# Patient Record
Sex: FEMALE | Race: WHITE | NOT HISPANIC OR LATINO | URBAN - METROPOLITAN AREA
[De-identification: names, ages, dates, MRNs, and addresses within clinical notes are randomized per-mention and may not be internally consistent; named-entity substitution may affect disease eponyms.]

---

## 2017-01-10 ENCOUNTER — ON CAMPUS - OUTPATIENT (AMBULATORY)
Dept: URBAN - METROPOLITAN AREA HOSPITAL 2 | Facility: HOSPITAL | Age: 72
End: 2017-01-10

## 2017-01-10 ENCOUNTER — OFFICE (AMBULATORY)
Dept: URBAN - METROPOLITAN AREA CLINIC 64 | Facility: CLINIC | Age: 72
End: 2017-01-10

## 2017-01-10 ENCOUNTER — HOSPITAL ENCOUNTER (OUTPATIENT)
Dept: OTHER | Facility: HOSPITAL | Age: 72
Setting detail: SPECIMEN
Discharge: HOME | End: 2017-01-10
Attending: INTERNAL MEDICINE | Admitting: INTERNAL MEDICINE

## 2017-01-10 VITALS
DIASTOLIC BLOOD PRESSURE: 79 MMHG | SYSTOLIC BLOOD PRESSURE: 155 MMHG | HEART RATE: 51 BPM | HEART RATE: 56 BPM | SYSTOLIC BLOOD PRESSURE: 128 MMHG | TEMPERATURE: 98.9 F | SYSTOLIC BLOOD PRESSURE: 139 MMHG | DIASTOLIC BLOOD PRESSURE: 78 MMHG | SYSTOLIC BLOOD PRESSURE: 113 MMHG | SYSTOLIC BLOOD PRESSURE: 117 MMHG | SYSTOLIC BLOOD PRESSURE: 158 MMHG | OXYGEN SATURATION: 100 % | RESPIRATION RATE: 18 BRPM | DIASTOLIC BLOOD PRESSURE: 76 MMHG | OXYGEN SATURATION: 97 % | OXYGEN SATURATION: 98 % | HEART RATE: 52 BPM | DIASTOLIC BLOOD PRESSURE: 85 MMHG | HEART RATE: 48 BPM | SYSTOLIC BLOOD PRESSURE: 130 MMHG | DIASTOLIC BLOOD PRESSURE: 90 MMHG | DIASTOLIC BLOOD PRESSURE: 89 MMHG | HEIGHT: 63 IN | HEART RATE: 54 BPM | DIASTOLIC BLOOD PRESSURE: 87 MMHG | DIASTOLIC BLOOD PRESSURE: 64 MMHG | HEART RATE: 53 BPM | SYSTOLIC BLOOD PRESSURE: 132 MMHG | DIASTOLIC BLOOD PRESSURE: 75 MMHG | SYSTOLIC BLOOD PRESSURE: 127 MMHG | WEIGHT: 136 LBS

## 2017-01-10 DIAGNOSIS — D12.2 BENIGN NEOPLASM OF ASCENDING COLON: ICD-10-CM

## 2017-01-10 DIAGNOSIS — K57.30 DIVERTICULOSIS OF LARGE INTESTINE WITHOUT PERFORATION OR ABS: ICD-10-CM

## 2017-01-10 DIAGNOSIS — K62.5 HEMORRHAGE OF ANUS AND RECTUM: ICD-10-CM

## 2017-01-10 LAB
GI HISTOLOGY: A. UNSPECIFIED: (no result)
GI HISTOLOGY: PDF REPORT: (no result)

## 2017-01-10 PROCEDURE — 88305 TISSUE EXAM BY PATHOLOGIST: CPT | Mod: 26 | Performed by: INTERNAL MEDICINE

## 2017-01-10 PROCEDURE — 45385 COLONOSCOPY W/LESION REMOVAL: CPT | Performed by: INTERNAL MEDICINE

## 2017-01-10 RX ADMIN — PROPOFOL: 10 INJECTION, EMULSION INTRAVENOUS at 09:24

## 2018-04-09 ENCOUNTER — OFFICE (AMBULATORY)
Dept: URBAN - METROPOLITAN AREA CLINIC 64 | Facility: CLINIC | Age: 73
End: 2018-04-09

## 2018-04-09 VITALS
WEIGHT: 150 LBS | HEIGHT: 63 IN | DIASTOLIC BLOOD PRESSURE: 91 MMHG | HEART RATE: 76 BPM | SYSTOLIC BLOOD PRESSURE: 149 MMHG

## 2018-04-09 DIAGNOSIS — R10.11 RIGHT UPPER QUADRANT PAIN: ICD-10-CM

## 2018-04-09 PROCEDURE — 99214 OFFICE O/P EST MOD 30 MIN: CPT | Performed by: INTERNAL MEDICINE

## 2018-04-09 RX ORDER — DICYCLOMINE HYDROCHLORIDE 20 MG/1
TABLET ORAL
Qty: 60 | Refills: 11 | Status: COMPLETED
End: 2018-04-09

## 2018-04-09 RX ORDER — HYOSCYAMINE SULFATE EXTENDED-RELEASE 0.38 MG/1
0.38 TABLET ORAL
Qty: 30 | Refills: 11 | Status: COMPLETED
Start: 2018-04-09 | End: 2018-06-12

## 2018-04-09 RX ORDER — POLYETHYLENE GLYCOL 3350 17 G/17G
17 POWDER, FOR SOLUTION ORAL
Qty: 1 | Refills: 11 | Status: COMPLETED
End: 2019-08-06

## 2018-06-12 ENCOUNTER — OFFICE (AMBULATORY)
Dept: URBAN - METROPOLITAN AREA CLINIC 64 | Facility: CLINIC | Age: 73
End: 2018-06-12

## 2018-06-12 VITALS
SYSTOLIC BLOOD PRESSURE: 126 MMHG | HEIGHT: 63 IN | WEIGHT: 148 LBS | HEART RATE: 72 BPM | DIASTOLIC BLOOD PRESSURE: 79 MMHG

## 2018-06-12 DIAGNOSIS — K21.9 GASTRO-ESOPHAGEAL REFLUX DISEASE WITHOUT ESOPHAGITIS: ICD-10-CM

## 2018-06-12 DIAGNOSIS — R10.11 RIGHT UPPER QUADRANT PAIN: ICD-10-CM

## 2018-06-12 PROCEDURE — 99213 OFFICE O/P EST LOW 20 MIN: CPT | Performed by: INTERNAL MEDICINE

## 2018-06-12 RX ORDER — HYOSCYAMINE SULFATE EXTENDED-RELEASE 0.38 MG/1
0.38 TABLET ORAL
Qty: 30 | Refills: 11 | Status: COMPLETED
Start: 2018-04-09 | End: 2018-06-12

## 2018-11-08 ENCOUNTER — OFFICE (AMBULATORY)
Dept: URBAN - METROPOLITAN AREA CLINIC 64 | Facility: CLINIC | Age: 73
End: 2018-11-08

## 2018-11-08 VITALS
HEART RATE: 74 BPM | SYSTOLIC BLOOD PRESSURE: 139 MMHG | HEIGHT: 63 IN | DIASTOLIC BLOOD PRESSURE: 91 MMHG | WEIGHT: 147 LBS

## 2018-11-08 DIAGNOSIS — K21.9 GASTRO-ESOPHAGEAL REFLUX DISEASE WITHOUT ESOPHAGITIS: ICD-10-CM

## 2018-11-08 DIAGNOSIS — K58.9 IRRITABLE BOWEL SYNDROME WITHOUT DIARRHEA: ICD-10-CM

## 2018-11-08 PROCEDURE — 99212 OFFICE O/P EST SF 10 MIN: CPT | Performed by: INTERNAL MEDICINE

## 2018-11-08 RX ORDER — RANITIDINE 300 MG/1
300 TABLET ORAL
Qty: 90 | Refills: 3 | Status: COMPLETED
Start: 2018-11-08 | End: 2019-02-06

## 2018-11-08 RX ORDER — DICYCLOMINE HYDROCHLORIDE 20 MG/1
40 TABLET ORAL
Qty: 180 | Refills: 3 | Status: COMPLETED
Start: 2018-11-08 | End: 2021-03-16

## 2018-11-08 RX ORDER — PANTOPRAZOLE SODIUM 40 MG/1
TABLET, DELAYED RELEASE ORAL
Qty: 90 | Refills: 0 | Status: ACTIVE

## 2019-01-07 ENCOUNTER — OFFICE (AMBULATORY)
Dept: URBAN - METROPOLITAN AREA CLINIC 64 | Facility: CLINIC | Age: 74
End: 2019-01-07

## 2019-01-07 VITALS — WEIGHT: 147 LBS | HEIGHT: 63 IN

## 2019-01-07 DIAGNOSIS — D50.0 IRON DEFICIENCY ANEMIA SECONDARY TO BLOOD LOSS (CHRONIC): ICD-10-CM

## 2019-01-07 DIAGNOSIS — R19.7 DIARRHEA, UNSPECIFIED: ICD-10-CM

## 2019-01-07 PROCEDURE — 99213 OFFICE O/P EST LOW 20 MIN: CPT | Performed by: INTERNAL MEDICINE

## 2019-02-06 ENCOUNTER — ON CAMPUS - OUTPATIENT (AMBULATORY)
Dept: URBAN - METROPOLITAN AREA HOSPITAL 2 | Facility: HOSPITAL | Age: 74
End: 2019-02-06

## 2019-02-06 VITALS
DIASTOLIC BLOOD PRESSURE: 77 MMHG | SYSTOLIC BLOOD PRESSURE: 124 MMHG | HEART RATE: 52 BPM | SYSTOLIC BLOOD PRESSURE: 163 MMHG | DIASTOLIC BLOOD PRESSURE: 101 MMHG | RESPIRATION RATE: 18 BRPM | WEIGHT: 136 LBS | DIASTOLIC BLOOD PRESSURE: 95 MMHG | OXYGEN SATURATION: 99 % | HEART RATE: 62 BPM | SYSTOLIC BLOOD PRESSURE: 140 MMHG | DIASTOLIC BLOOD PRESSURE: 89 MMHG | HEART RATE: 65 BPM | HEART RATE: 54 BPM | RESPIRATION RATE: 16 BRPM | TEMPERATURE: 97 F | SYSTOLIC BLOOD PRESSURE: 103 MMHG | OXYGEN SATURATION: 98 % | DIASTOLIC BLOOD PRESSURE: 72 MMHG | RESPIRATION RATE: 17 BRPM | HEART RATE: 61 BPM | OXYGEN SATURATION: 100 % | HEIGHT: 63 IN | DIASTOLIC BLOOD PRESSURE: 56 MMHG | HEART RATE: 56 BPM | SYSTOLIC BLOOD PRESSURE: 123 MMHG

## 2019-02-06 DIAGNOSIS — D50.0 IRON DEFICIENCY ANEMIA SECONDARY TO BLOOD LOSS (CHRONIC): ICD-10-CM

## 2019-02-06 DIAGNOSIS — K29.70 GASTRITIS, UNSPECIFIED, WITHOUT BLEEDING: ICD-10-CM

## 2019-02-06 PROCEDURE — 43235 EGD DIAGNOSTIC BRUSH WASH: CPT | Performed by: INTERNAL MEDICINE

## 2019-02-21 ENCOUNTER — OFFICE (AMBULATORY)
Dept: URBAN - METROPOLITAN AREA CLINIC 64 | Facility: CLINIC | Age: 74
End: 2019-02-21

## 2019-02-21 DIAGNOSIS — D50.0 IRON DEFICIENCY ANEMIA SECONDARY TO BLOOD LOSS (CHRONIC): ICD-10-CM

## 2019-02-21 DIAGNOSIS — Z98.890 OTHER SPECIFIED POSTPROCEDURAL STATES: ICD-10-CM

## 2019-02-21 DIAGNOSIS — K55.20 ANGIODYSPLASIA OF COLON WITHOUT HEMORRHAGE: ICD-10-CM

## 2019-02-21 PROCEDURE — 91110 GI TRC IMG INTRAL ESOPH-ILE: CPT | Performed by: INTERNAL MEDICINE

## 2019-04-02 ENCOUNTER — TELEPHONE (OUTPATIENT)
Dept: INTERNAL MEDICINE | Facility: CLINIC | Age: 74
End: 2019-04-02

## 2019-04-02 DIAGNOSIS — D50.9 IRON DEFICIENCY ANEMIA, UNSPECIFIED IRON DEFICIENCY ANEMIA TYPE: Primary | ICD-10-CM

## 2019-04-02 DIAGNOSIS — I10 ESSENTIAL HYPERTENSION, BENIGN: ICD-10-CM

## 2019-04-02 DIAGNOSIS — Z00.00 HEALTH CARE MAINTENANCE: ICD-10-CM

## 2019-04-02 DIAGNOSIS — E55.9 VITAMIN D DEFICIENCY: ICD-10-CM

## 2019-04-02 NOTE — TELEPHONE ENCOUNTER
----- Message from [facility] sent at 4/2/2019  3:18 PM EDT -----  Contact: [phone]  MAIL LAB ORDER TO PATIENT.

## 2019-04-07 ENCOUNTER — RESULTS ENCOUNTER (OUTPATIENT)
Dept: INTERNAL MEDICINE | Facility: CLINIC | Age: 74
End: 2019-04-07

## 2019-04-07 DIAGNOSIS — D50.9 IRON DEFICIENCY ANEMIA, UNSPECIFIED IRON DEFICIENCY ANEMIA TYPE: ICD-10-CM

## 2019-04-07 DIAGNOSIS — I10 ESSENTIAL HYPERTENSION, BENIGN: ICD-10-CM

## 2019-04-07 DIAGNOSIS — Z00.00 HEALTH CARE MAINTENANCE: ICD-10-CM

## 2019-04-07 DIAGNOSIS — E55.9 VITAMIN D DEFICIENCY: ICD-10-CM

## 2019-04-18 ENCOUNTER — HOSPITAL ENCOUNTER (OUTPATIENT)
Dept: LAB | Facility: HOSPITAL | Age: 74
Discharge: HOME | End: 2019-04-18
Attending: INTERNAL MEDICINE | Admitting: INTERNAL MEDICINE

## 2019-04-18 LAB
ALBUMIN SERPL-MCNC: 3.8 G/DL (ref 3.5–4.8)
ALBUMIN/GLOB SERPL: 1.6 {RATIO} (ref 1–1.7)
ALP SERPL-CCNC: 73 IU/L (ref 32–91)
ALT SERPL-CCNC: 21 IU/L (ref 14–54)
ANION GAP SERPL CALC-SCNC: 15.7 MMOL/L (ref 10–20)
AST SERPL-CCNC: 28 IU/L (ref 15–41)
BASOPHILS # BLD AUTO: 0.1 10*3/UL (ref 0–0.2)
BASOPHILS NFR BLD AUTO: 1 % (ref 0–2)
BILIRUB SERPL-MCNC: 0.6 MG/DL (ref 0.3–1.2)
BUN SERPL-MCNC: 10 MG/DL (ref 8–20)
BUN/CREAT SERPL: 8.3 (ref 5.4–26.2)
CALCIUM SERPL-MCNC: 9.2 MG/DL (ref 8.9–10.3)
CHLORIDE SERPL-SCNC: 98 MMOL/L (ref 101–111)
CONV CO2: 25 MMOL/L (ref 22–32)
CONV TOTAL PROTEIN: 6.2 G/DL (ref 6.1–7.9)
CREAT UR-MCNC: 1.2 MG/DL (ref 0.4–1)
DIFFERENTIAL METHOD BLD: (no result)
EOSINOPHIL # BLD AUTO: 0.4 10*3/UL (ref 0–0.3)
EOSINOPHIL # BLD AUTO: 4 % (ref 0–3)
ERYTHROCYTE [DISTWIDTH] IN BLOOD BY AUTOMATED COUNT: 15.6 % (ref 11.5–14.5)
GLOBULIN UR ELPH-MCNC: 2.4 G/DL (ref 2.5–3.8)
GLUCOSE SERPL-MCNC: 99 MG/DL (ref 65–99)
HCT VFR BLD AUTO: 36.6 % (ref 35–49)
HGB BLD-MCNC: 12.5 G/DL (ref 12–15)
LYMPHOCYTES # BLD AUTO: 2.7 10*3/UL (ref 0.8–4.8)
LYMPHOCYTES NFR BLD AUTO: 33 % (ref 18–42)
MCH RBC QN AUTO: 29.9 PG (ref 26–32)
MCHC RBC AUTO-ENTMCNC: 34.2 G/DL (ref 32–36)
MCV RBC AUTO: 87.3 FL (ref 80–94)
MONOCYTES # BLD AUTO: 0.8 10*3/UL (ref 0.1–1.3)
MONOCYTES NFR BLD AUTO: 9 % (ref 2–11)
NEUTROPHILS # BLD AUTO: 4.5 10*3/UL (ref 2.3–8.6)
NEUTROPHILS NFR BLD AUTO: 53 % (ref 50–75)
NRBC BLD AUTO-RTO: 0 /100{WBCS}
NRBC/RBC NFR BLD MANUAL: 0 10*3/UL
PLATELET # BLD AUTO: 295 10*3/UL (ref 150–450)
PMV BLD AUTO: 8.2 FL (ref 7.4–10.4)
POTASSIUM SERPL-SCNC: 4.7 MMOL/L (ref 3.6–5.1)
RBC # BLD AUTO: 4.2 10*6/UL (ref 4–5.4)
SODIUM SERPL-SCNC: 134 MMOL/L (ref 136–144)
WBC # BLD AUTO: 8.4 10*3/UL (ref 4.5–11.5)

## 2019-04-25 ENCOUNTER — TELEPHONE (OUTPATIENT)
Dept: INTERNAL MEDICINE | Facility: CLINIC | Age: 74
End: 2019-04-25

## 2019-04-25 ENCOUNTER — OFFICE VISIT (OUTPATIENT)
Dept: INTERNAL MEDICINE | Facility: CLINIC | Age: 74
End: 2019-04-25

## 2019-04-25 VITALS
WEIGHT: 142 LBS | TEMPERATURE: 97.9 F | HEART RATE: 69 BPM | SYSTOLIC BLOOD PRESSURE: 122 MMHG | DIASTOLIC BLOOD PRESSURE: 82 MMHG | OXYGEN SATURATION: 98 %

## 2019-04-25 DIAGNOSIS — D50.0 IRON DEFICIENCY ANEMIA DUE TO CHRONIC BLOOD LOSS: ICD-10-CM

## 2019-04-25 DIAGNOSIS — R73.03 PREDIABETES: ICD-10-CM

## 2019-04-25 DIAGNOSIS — E03.8 OTHER SPECIFIED HYPOTHYROIDISM: ICD-10-CM

## 2019-04-25 DIAGNOSIS — I10 ESSENTIAL HYPERTENSION: Primary | ICD-10-CM

## 2019-04-25 DIAGNOSIS — G44.89 OTHER HEADACHE SYNDROME: ICD-10-CM

## 2019-04-25 DIAGNOSIS — E78.49 OTHER HYPERLIPIDEMIA: ICD-10-CM

## 2019-04-25 PROBLEM — M85.89 OSTEOPENIA OF MULTIPLE SITES: Status: ACTIVE | Noted: 2019-04-25

## 2019-04-25 PROCEDURE — 99214 OFFICE O/P EST MOD 30 MIN: CPT | Performed by: INTERNAL MEDICINE

## 2019-04-25 RX ORDER — FEXOFENADINE HCL 180 MG/1
180 TABLET ORAL DAILY
COMMUNITY

## 2019-04-25 RX ORDER — CLONIDINE HYDROCHLORIDE 0.1 MG/1
0.1 TABLET ORAL
COMMUNITY
End: 2020-03-11 | Stop reason: SDUPTHER

## 2019-04-25 RX ORDER — PANTOPRAZOLE SODIUM 40 MG/1
40 TABLET, DELAYED RELEASE ORAL DAILY
COMMUNITY

## 2019-04-25 RX ORDER — MULTIVITAMIN
1 TABLET ORAL DAILY
COMMUNITY

## 2019-04-25 RX ORDER — ACETAMINOPHEN 500 MG
500 TABLET ORAL AS NEEDED
COMMUNITY
End: 2021-02-10

## 2019-04-25 RX ORDER — FERROUS SULFATE 325(65) MG
TABLET ORAL DAILY
Refills: 2 | COMMUNITY
Start: 2019-03-13 | End: 2019-10-29 | Stop reason: SDUPTHER

## 2019-04-25 RX ORDER — LEVOTHYROXINE SODIUM 0.05 MG/1
50 TABLET ORAL DAILY
COMMUNITY
End: 2020-09-03

## 2019-04-25 RX ORDER — POLYETHYLENE GLYCOL 3350 17 G/17G
POWDER, FOR SOLUTION ORAL
COMMUNITY
Start: 2018-01-15

## 2019-04-25 RX ORDER — KETOCONAZOLE 20 MG/ML
SHAMPOO TOPICAL
Refills: 3 | COMMUNITY
Start: 2019-02-12

## 2019-04-25 RX ORDER — SPIRONOLACTONE 25 MG/1
TABLET ORAL
COMMUNITY
Start: 2014-08-23 | End: 2019-10-29

## 2019-04-25 RX ORDER — DICYCLOMINE HCL 20 MG
20 TABLET ORAL DAILY
COMMUNITY

## 2019-04-25 RX ORDER — UBIDECARENONE 100 MG
100 CAPSULE ORAL DAILY
COMMUNITY

## 2019-04-25 RX ORDER — ALBUTEROL SULFATE 90 UG/1
2 AEROSOL, METERED RESPIRATORY (INHALATION)
COMMUNITY
Start: 2018-02-25 | End: 2022-05-12

## 2019-04-25 RX ORDER — ATORVASTATIN CALCIUM 40 MG/1
40 TABLET, FILM COATED ORAL DAILY
COMMUNITY
Start: 2014-08-25 | End: 2020-03-11 | Stop reason: SDUPTHER

## 2019-04-25 RX ORDER — PAROXETINE HYDROCHLORIDE 20 MG/1
TABLET, FILM COATED ORAL
COMMUNITY
Start: 2014-07-08 | End: 2020-03-11 | Stop reason: SDUPTHER

## 2019-04-25 NOTE — PROGRESS NOTES
"Subjective        Chief Complaint   Patient presents with   • Follow-up     aneima- also on stomach issues-had egd            Luna Jacobson is a 73 y.o. female who presents for    Patient Active Problem List   Diagnosis   • Essential hypertension   • Other hyperlipidemia   • Other specified hypothyroidism   • Other headache syndrome   • Prediabetes   • Iron deficiency anemia due to chronic blood loss       History of Present Illness     She has sporadic headaches. It will last 30 seconds. There is no trigger.  There is no slurred speech or blindness; it started 2 weeks ago and she has had similar in the past. She denies nausea or vomiting. They do not wake her from sleep. Her eyes do not water with them and her nose does not water with. There is no FH of cerebral aneurysm. She had an EGD in February and it was \"good\" so she had a capsule endoscopy. It showed a bulging vessel in the bowel. Her BP has been good. It has been 120/80. She has been doing well emotionally.   Allergies   Allergen Reactions   • Hydrochlorothiazide Other (See Comments)     hyponatremia   • Lisinopril Hives   • Norvasc [Amlodipine Besylate] Other (See Comments)     Head tightness   • Sporanos [Itraconazole] Hives     sporanax   • Sulfa Antibiotics Other (See Comments)     headache       Current Outpatient Medications on File Prior to Visit   Medication Sig Dispense Refill   • acetaminophen (TYLENOL) 500 MG tablet Take 500 mg by mouth.     • albuterol sulfate HFA (PROVENTIL HFA) 108 (90 Base) MCG/ACT inhaler Inhale 2 puffs.     • aspirin 81 MG tablet Take 81 mg by mouth Daily.     • atorvastatin (LIPITOR) 40 MG tablet Take 40 mg by mouth Daily.     • Calcium Carbonate-Vitamin D (Calcium 500/D) 500-125 MG-UNIT tablet Take  by mouth.     • CloNIDine (CATAPRES) 0.1 MG tablet Take 0.1 mg by mouth.     • coenzyme Q10 100 MG capsule Take 100 mg by mouth Daily.     • dicyclomine (BENTYL) 20 MG tablet Take 20 mg by mouth Daily.     • FEROSUL 325 " (65 Fe) MG tablet Take  by mouth Daily.  2   • fexofenadine (ALLEGRA) 180 MG tablet Take 180 mg by mouth Daily.     • ketoconazole (NIZORAL) 2 % shampoo   3   • levothyroxine (SYNTHROID, LEVOTHROID) 50 MCG tablet Take 50 mcg by mouth Daily.     • Multiple Vitamin (MULTIVITAMIN) tablet Take 1 tablet by mouth Daily.     • pantoprazole (PROTONIX) 40 MG EC tablet Take 40 mg by mouth Daily.     • PARoxetine (PAXIL) 20 MG tablet      • polyethylene glycol (MIRALAX) powder      • spironolactone (ALDACTONE) 25 MG tablet        No current facility-administered medications on file prior to visit.        Past Medical History:   Diagnosis Date   • Allergic    • Anemia    • Anxiety    • Aortic aneurysm (CMS/HCC)     4 cm thoracic aorta   • Arthritis    • Atypical chest pain    • COPD (chronic obstructive pulmonary disease) (CMS/HCC)    • Coronary artery calcification seen on CT scan 07/2012    mild calcification in the LAD with calcium score of 22. modere narrowing left subclavian origin   • Decreased diffusion capacity of lung    • Diverticulosis    • Gastroparesis    • GERD (gastroesophageal reflux disease)    • GIST (gastrointestinal stromal tumor), non-malignant    • Hyperlipidemia    • Hypertension    • Hypothyroidism    • Irritable bowel syndrome    • Low back pain    • Mild mitral regurgitation    • Osteoporosis    • PAD (peripheral artery disease) (CMS/HCC)     small vessel disease in feet   • Subclavian artery stenosis, left (CMS/HCC) 05/2016    50 percent   • Tubular adenoma of colon 01/2017       Past Surgical History:   Procedure Laterality Date   • APPENDECTOMY     • COLONOSCOPY      2018- dr mcintyre   • ENDOSCOPY  02/2019   • HYSTERECTOMY     • TONSILLECTOMY         Family History   Problem Relation Age of Onset   • Hypertension Mother    • Diabetes Mother    • Dementia Mother    • Stroke Mother    • Lung cancer Father    • Alcohol abuse Father    • COPD Father    • Breast cancer Sister    • Hypertension Sister     • Hypothyroidism Sister    • Stroke Sister    • Other Sister         AAA   • Bipolar disorder Daughter    • Fibromyalgia Daughter        Social History     Socioeconomic History   • Marital status:      Spouse name: Not on file   • Number of children: Not on file   • Years of education: Not on file   • Highest education level: Not on file   Tobacco Use   • Smoking status: Current Some Day Smoker   • Smokeless tobacco: Never Used   • Tobacco comment: she has smoked intermittently for 40 years; she smoked over 2 ppd twenty years ago   Substance and Sexual Activity   • Alcohol use: No     Frequency: Never           The following portions of the patient's history were reviewed and updated as appropriate: problem list, allergies, current medications, past medical history, past family history, past social history and past surgical history.    Review of Systems   Respiratory: Negative for shortness of breath.    Cardiovascular: Negative for chest pain.   Neurological: Positive for headache.       Immunization History   Administered Date(s) Administered   • Flu Vaccine High Dose PF 65YR+ 11/29/2018   • Hepatitis A 05/01/2018, 12/02/2018   • Pneumococcal Conjugate 13-Valent (PCV13) 04/23/2015   • Pneumococcal Polysaccharide (PPSV23) 08/01/2008   • Tdap 01/01/2012   • Zostavax 08/25/2010       Objective   Vitals:    04/25/19 1247   BP: 122/82   Pulse: 69   Temp: 97.9 °F (36.6 °C)   SpO2: 98%   Weight: 64.4 kg (142 lb)     Physical Exam   Constitutional: She appears well-developed and well-nourished.   HENT:   Head: Normocephalic and atraumatic.   Right Ear: Tympanic membrane and external ear normal.   Left Ear: Tympanic membrane and external ear normal.   Mouth/Throat: Oropharynx is clear and moist.   Eyes: EOM are normal. Pupils are equal, round, and reactive to light.   Cardiovascular: Normal rate, regular rhythm, S1 normal, S2 normal and normal heart sounds.   Pulmonary/Chest: Effort normal and breath sounds  normal.   Neurological: She is alert.   Skin: Skin is warm.   Psychiatric: She has a normal mood and affect.   Vitals reviewed.      Procedures    Assessment/Plan   Luna was seen today for follow-up.    Diagnoses and all orders for this visit:    Essential hypertension    Other hyperlipidemia  -     Lipid Panel w/ Chol/HDL Ratio; Future    Other specified hypothyroidism  -     TSH; Future  -     TSH    Other headache syndrome    Prediabetes  -     Hemoglobin A1c; Future  -     Comprehensive metabolic panel; Future    Iron deficiency anemia due to chronic blood loss  -     CBC w AUTO Differential; Future  -     Ferritin; Future             Discussed her headaches; we will monitor since she has had some in the past. Labs reviewed. Bp is good. Stable emotionally. She sees GI next month. Continue iron. Get copy of her capsule endoscopy and her last DEXA.    Return in about 6 months (around 10/25/2019), or 30 minutes.

## 2019-04-26 LAB — TSH SERPL DL<=0.005 MIU/L-ACNC: 1.83 MIU/ML (ref 0.27–4.2)

## 2019-04-26 NOTE — TELEPHONE ENCOUNTER
Please get her capsule study and last OV note Dr. Owen (GI in [place])    I also need her last DEXA from [facility]

## 2019-05-14 ENCOUNTER — OFFICE (AMBULATORY)
Dept: URBAN - METROPOLITAN AREA CLINIC 64 | Facility: CLINIC | Age: 74
End: 2019-05-14

## 2019-05-14 VITALS
SYSTOLIC BLOOD PRESSURE: 144 MMHG | WEIGHT: 144 LBS | HEIGHT: 63 IN | HEART RATE: 68 BPM | DIASTOLIC BLOOD PRESSURE: 83 MMHG

## 2019-05-14 DIAGNOSIS — D50.0 IRON DEFICIENCY ANEMIA SECONDARY TO BLOOD LOSS (CHRONIC): ICD-10-CM

## 2019-05-14 DIAGNOSIS — R10.11 RIGHT UPPER QUADRANT PAIN: ICD-10-CM

## 2019-05-14 PROCEDURE — 99213 OFFICE O/P EST LOW 20 MIN: CPT | Performed by: INTERNAL MEDICINE

## 2019-05-14 RX ORDER — POLYETHYLENE GLYCOL 3350 17 G/17G
17 POWDER, FOR SOLUTION ORAL
Qty: 1 | Refills: 11 | Status: COMPLETED
End: 2019-08-06

## 2019-05-14 RX ORDER — DICYCLOMINE HYDROCHLORIDE 20 MG/1
40 TABLET ORAL
Qty: 180 | Refills: 3 | Status: COMPLETED
Start: 2018-11-08 | End: 2021-03-16

## 2019-06-28 RX ORDER — BUTALBITAL, ACETAMINOPHEN AND CAFFEINE 50; 325; 40 MG/1; MG/1; MG/1
1 TABLET ORAL EVERY 12 HOURS PRN
COMMUNITY
End: 2019-06-28 | Stop reason: SDUPTHER

## 2019-06-28 RX ORDER — BUTALBITAL, ACETAMINOPHEN AND CAFFEINE 50; 325; 40 MG/1; MG/1; MG/1
1 TABLET ORAL EVERY 12 HOURS PRN
Qty: 30 TABLET | Refills: 0 | Status: SHIPPED | OUTPATIENT
Start: 2019-06-28 | End: 2019-11-11 | Stop reason: SDUPTHER

## 2019-07-30 ENCOUNTER — TELEPHONE (OUTPATIENT)
Dept: INTERNAL MEDICINE | Facility: CLINIC | Age: 74
End: 2019-07-30

## 2019-07-30 NOTE — TELEPHONE ENCOUNTER
Actually stop her iron after next refill. I see her in the fall with labs already. We will see what her iron does when she is off of it for 6 weeks or more.

## 2019-07-30 NOTE — TELEPHONE ENCOUNTER
Pt calling back on iron states GI did not put her on this and that she had her labs done by you please advise

## 2019-08-06 ENCOUNTER — OFFICE (AMBULATORY)
Dept: URBAN - METROPOLITAN AREA CLINIC 64 | Facility: CLINIC | Age: 74
End: 2019-08-06

## 2019-08-06 VITALS
HEART RATE: 66 BPM | WEIGHT: 141 LBS | SYSTOLIC BLOOD PRESSURE: 133 MMHG | DIASTOLIC BLOOD PRESSURE: 85 MMHG | HEIGHT: 63 IN

## 2019-08-06 DIAGNOSIS — R19.7 DIARRHEA, UNSPECIFIED: ICD-10-CM

## 2019-08-06 DIAGNOSIS — K58.9 IRRITABLE BOWEL SYNDROME WITHOUT DIARRHEA: ICD-10-CM

## 2019-08-06 DIAGNOSIS — D50.0 IRON DEFICIENCY ANEMIA SECONDARY TO BLOOD LOSS (CHRONIC): ICD-10-CM

## 2019-08-06 PROCEDURE — 99214 OFFICE O/P EST MOD 30 MIN: CPT | Performed by: INTERNAL MEDICINE

## 2019-08-06 RX ORDER — COLESTIPOL HYDROCHLORIDE 1 G/1
TABLET, FILM COATED ORAL
Qty: 60 | Refills: 11 | Status: COMPLETED
End: 2019-08-14

## 2019-08-06 RX ORDER — WHEAT DEXTRIN 3 G/3.5 G
POWDER IN PACKET (EA) ORAL
Refills: 0 | Status: COMPLETED
End: 2019-08-06

## 2019-08-06 RX ORDER — POLYETHYLENE GLYCOL 3350 17 G/17G
17 POWDER, FOR SOLUTION ORAL
Qty: 1 | Refills: 11 | Status: COMPLETED
End: 2019-08-06

## 2019-09-16 ENCOUNTER — OFFICE (AMBULATORY)
Dept: URBAN - METROPOLITAN AREA CLINIC 64 | Facility: CLINIC | Age: 74
End: 2019-09-16

## 2019-09-16 VITALS
HEIGHT: 63 IN | SYSTOLIC BLOOD PRESSURE: 119 MMHG | DIASTOLIC BLOOD PRESSURE: 75 MMHG | HEART RATE: 77 BPM | WEIGHT: 139 LBS

## 2019-09-16 DIAGNOSIS — I10 ESSENTIAL (PRIMARY) HYPERTENSION: ICD-10-CM

## 2019-09-16 DIAGNOSIS — K21.9 GASTRO-ESOPHAGEAL REFLUX DISEASE WITHOUT ESOPHAGITIS: ICD-10-CM

## 2019-09-16 DIAGNOSIS — K58.0 IRRITABLE BOWEL SYNDROME WITH DIARRHEA: ICD-10-CM

## 2019-09-16 DIAGNOSIS — K62.5 HEMORRHAGE OF ANUS AND RECTUM: ICD-10-CM

## 2019-09-16 PROCEDURE — 99214 OFFICE O/P EST MOD 30 MIN: CPT | Performed by: NURSE PRACTITIONER

## 2019-09-16 RX ORDER — RIFAXIMIN 550 MG/1
TABLET ORAL
Qty: 42 | Refills: 2 | Status: COMPLETED
Start: 2019-09-16 | End: 2019-10-01

## 2019-10-01 ENCOUNTER — ON CAMPUS - OUTPATIENT (AMBULATORY)
Dept: URBAN - METROPOLITAN AREA HOSPITAL 2 | Facility: HOSPITAL | Age: 74
End: 2019-10-01

## 2019-10-01 ENCOUNTER — OFFICE (AMBULATORY)
Dept: URBAN - METROPOLITAN AREA PATHOLOGY 4 | Facility: PATHOLOGY | Age: 74
End: 2019-10-01

## 2019-10-01 VITALS
DIASTOLIC BLOOD PRESSURE: 74 MMHG | SYSTOLIC BLOOD PRESSURE: 117 MMHG | SYSTOLIC BLOOD PRESSURE: 110 MMHG | RESPIRATION RATE: 17 BRPM | DIASTOLIC BLOOD PRESSURE: 68 MMHG | HEIGHT: 63 IN | DIASTOLIC BLOOD PRESSURE: 72 MMHG | DIASTOLIC BLOOD PRESSURE: 84 MMHG | OXYGEN SATURATION: 100 % | HEART RATE: 57 BPM | OXYGEN SATURATION: 97 % | TEMPERATURE: 98.7 F | OXYGEN SATURATION: 99 % | HEART RATE: 58 BPM | DIASTOLIC BLOOD PRESSURE: 76 MMHG | SYSTOLIC BLOOD PRESSURE: 146 MMHG | SYSTOLIC BLOOD PRESSURE: 138 MMHG | SYSTOLIC BLOOD PRESSURE: 181 MMHG | SYSTOLIC BLOOD PRESSURE: 120 MMHG | RESPIRATION RATE: 18 BRPM | HEART RATE: 74 BPM | HEART RATE: 62 BPM | DIASTOLIC BLOOD PRESSURE: 80 MMHG | HEART RATE: 69 BPM | SYSTOLIC BLOOD PRESSURE: 103 MMHG | HEART RATE: 60 BPM | DIASTOLIC BLOOD PRESSURE: 58 MMHG | RESPIRATION RATE: 16 BRPM | SYSTOLIC BLOOD PRESSURE: 124 MMHG | WEIGHT: 139 LBS | DIASTOLIC BLOOD PRESSURE: 92 MMHG

## 2019-10-01 DIAGNOSIS — K52.9 NONINFECTIVE GASTROENTERITIS AND COLITIS, UNSPECIFIED: ICD-10-CM

## 2019-10-01 DIAGNOSIS — Z12.11 ENCOUNTER FOR SCREENING FOR MALIGNANT NEOPLASM OF COLON: ICD-10-CM

## 2019-10-01 DIAGNOSIS — K64.0 FIRST DEGREE HEMORRHOIDS: ICD-10-CM

## 2019-10-01 DIAGNOSIS — Z86.010 PERSONAL HISTORY OF COLONIC POLYPS: ICD-10-CM

## 2019-10-01 LAB
GI HISTOLOGY: A. UNSPECIFIED: (no result)
GI HISTOLOGY: PDF REPORT: (no result)

## 2019-10-01 PROCEDURE — 88305 TISSUE EXAM BY PATHOLOGIST: CPT | Mod: 26 | Performed by: INTERNAL MEDICINE

## 2019-10-01 PROCEDURE — 88305 TISSUE EXAM BY PATHOLOGIST: CPT | Performed by: INTERNAL MEDICINE

## 2019-10-01 PROCEDURE — 45380 COLONOSCOPY AND BIOPSY: CPT | Mod: PT | Performed by: INTERNAL MEDICINE

## 2019-10-02 ENCOUNTER — LAB REQUISITION (OUTPATIENT)
Dept: LAB | Facility: HOSPITAL | Age: 74
End: 2019-10-02

## 2019-10-02 DIAGNOSIS — K64.0 FIRST DEGREE HEMORRHOIDS: ICD-10-CM

## 2019-10-02 DIAGNOSIS — Z86.010 PERSONAL HISTORY OF COLONIC POLYPS: ICD-10-CM

## 2019-10-03 LAB
LAB AP CASE REPORT: NORMAL
LAB AP DIAGNOSIS COMMENT: NORMAL
PATH REPORT.FINAL DX SPEC: NORMAL
PATH REPORT.GROSS SPEC: NORMAL

## 2019-10-17 ENCOUNTER — TELEPHONE (OUTPATIENT)
Dept: INTERNAL MEDICINE | Facility: CLINIC | Age: 74
End: 2019-10-17

## 2019-10-17 DIAGNOSIS — E03.8 OTHER SPECIFIED HYPOTHYROIDISM: ICD-10-CM

## 2019-10-17 DIAGNOSIS — R73.03 PREDIABETES: ICD-10-CM

## 2019-10-17 DIAGNOSIS — E78.49 OTHER HYPERLIPIDEMIA: ICD-10-CM

## 2019-10-17 DIAGNOSIS — D50.0 IRON DEFICIENCY ANEMIA DUE TO CHRONIC BLOOD LOSS: ICD-10-CM

## 2019-10-17 NOTE — TELEPHONE ENCOUNTER
Has upcoming appt wants to get labs at [facility] please advise of what she needs would like to get a cbc feels really tired per pt

## 2019-10-22 ENCOUNTER — OFFICE (AMBULATORY)
Dept: URBAN - METROPOLITAN AREA CLINIC 64 | Facility: CLINIC | Age: 74
End: 2019-10-22

## 2019-10-22 VITALS
DIASTOLIC BLOOD PRESSURE: 85 MMHG | HEIGHT: 63 IN | WEIGHT: 137 LBS | HEART RATE: 68 BPM | SYSTOLIC BLOOD PRESSURE: 153 MMHG

## 2019-10-22 DIAGNOSIS — K52.9 NONINFECTIVE GASTROENTERITIS AND COLITIS, UNSPECIFIED: ICD-10-CM

## 2019-10-22 DIAGNOSIS — R19.7 DIARRHEA, UNSPECIFIED: ICD-10-CM

## 2019-10-22 PROCEDURE — 99213 OFFICE O/P EST LOW 20 MIN: CPT | Performed by: INTERNAL MEDICINE

## 2019-10-24 ENCOUNTER — LAB (OUTPATIENT)
Dept: LAB | Facility: HOSPITAL | Age: 74
End: 2019-10-24

## 2019-10-24 DIAGNOSIS — E78.49 FAMILIAL COMBINED HYPERLIPIDEMIA: ICD-10-CM

## 2019-10-24 DIAGNOSIS — E03.8 HYPOTHYROIDISM DUE TO FIBROUS INVASIVE THYROIDITIS: ICD-10-CM

## 2019-10-24 DIAGNOSIS — D50.0 IRON DEFICIENCY ANEMIA SECONDARY TO BLOOD LOSS (CHRONIC): Primary | ICD-10-CM

## 2019-10-24 DIAGNOSIS — R73.03 DIABETES MELLITUS, LATENT: ICD-10-CM

## 2019-10-24 DIAGNOSIS — E06.5 HYPOTHYROIDISM DUE TO FIBROUS INVASIVE THYROIDITIS: ICD-10-CM

## 2019-10-24 LAB
ALBUMIN SERPL-MCNC: 4 G/DL (ref 3.5–5.2)
ALBUMIN/GLOB SERPL: 1.3 G/DL
ALP SERPL-CCNC: 74 U/L (ref 39–117)
ALT SERPL W P-5'-P-CCNC: 19 U/L (ref 1–33)
ANION GAP SERPL CALCULATED.3IONS-SCNC: 6.8 MMOL/L (ref 5–15)
AST SERPL-CCNC: 19 U/L (ref 1–32)
BASOPHILS # BLD AUTO: 0.05 10*3/MM3 (ref 0–0.2)
BASOPHILS NFR BLD AUTO: 0.5 % (ref 0–1.5)
BILIRUB SERPL-MCNC: 0.3 MG/DL (ref 0.2–1.2)
BUN BLD-MCNC: 11 MG/DL (ref 8–23)
BUN/CREAT SERPL: 8.4 (ref 7–25)
CALCIUM SPEC-SCNC: 9.2 MG/DL (ref 8.6–10.5)
CHLORIDE SERPL-SCNC: 97 MMOL/L (ref 98–107)
CHOLEST SERPL-MCNC: 133 MG/DL (ref 0–200)
CO2 SERPL-SCNC: 32.2 MMOL/L (ref 22–29)
CREAT BLD-MCNC: 1.31 MG/DL (ref 0.57–1)
DEPRECATED RDW RBC AUTO: 40 FL (ref 37–54)
EOSINOPHIL # BLD AUTO: 0.09 10*3/MM3 (ref 0–0.4)
EOSINOPHIL NFR BLD AUTO: 0.9 % (ref 0.3–6.2)
ERYTHROCYTE [DISTWIDTH] IN BLOOD BY AUTOMATED COUNT: 12.3 % (ref 12.3–15.4)
FERRITIN SERPL-MCNC: 15.1 NG/ML (ref 13–150)
GFR SERPL CREATININE-BSD FRML MDRD: 40 ML/MIN/1.73
GLOBULIN UR ELPH-MCNC: 3.2 GM/DL
GLUCOSE BLD-MCNC: 87 MG/DL (ref 65–99)
HBA1C MFR BLD: 5.5 % (ref 3.5–5.6)
HCT VFR BLD AUTO: 37.6 % (ref 34–46.6)
HDLC SERPL-MCNC: 73 MG/DL (ref 40–60)
HGB BLD-MCNC: 12.4 G/DL (ref 12–15.9)
IMM GRANULOCYTES # BLD AUTO: 0.05 10*3/MM3 (ref 0–0.05)
IMM GRANULOCYTES NFR BLD AUTO: 0.5 % (ref 0–0.5)
LDLC SERPL CALC-MCNC: 46 MG/DL (ref 0–100)
LDLC/HDLC SERPL: 0.63 {RATIO}
LYMPHOCYTES # BLD AUTO: 2.59 10*3/MM3 (ref 0.7–3.1)
LYMPHOCYTES NFR BLD AUTO: 27 % (ref 19.6–45.3)
MCH RBC QN AUTO: 29.6 PG (ref 26.6–33)
MCHC RBC AUTO-ENTMCNC: 33 G/DL (ref 31.5–35.7)
MCV RBC AUTO: 89.7 FL (ref 79–97)
MONOCYTES # BLD AUTO: 0.91 10*3/MM3 (ref 0.1–0.9)
MONOCYTES NFR BLD AUTO: 9.5 % (ref 5–12)
NEUTROPHILS # BLD AUTO: 5.91 10*3/MM3 (ref 1.7–7)
NEUTROPHILS NFR BLD AUTO: 61.6 % (ref 42.7–76)
NRBC BLD AUTO-RTO: 0 /100 WBC (ref 0–0.2)
PLATELET # BLD AUTO: 373 10*3/MM3 (ref 140–450)
PMV BLD AUTO: 9.5 FL (ref 6–12)
POTASSIUM BLD-SCNC: 4.1 MMOL/L (ref 3.5–5.2)
PROT SERPL-MCNC: 7.2 G/DL (ref 6–8.5)
RBC # BLD AUTO: 4.19 10*6/MM3 (ref 3.77–5.28)
SODIUM BLD-SCNC: 136 MMOL/L (ref 136–145)
TRIGL SERPL-MCNC: 70 MG/DL (ref 0–150)
TSH SERPL DL<=0.05 MIU/L-ACNC: 2.77 UIU/ML (ref 0.27–4.2)
VLDLC SERPL-MCNC: 14 MG/DL (ref 5–40)
WBC NRBC COR # BLD: 9.6 10*3/MM3 (ref 3.4–10.8)

## 2019-10-24 PROCEDURE — 84443 ASSAY THYROID STIM HORMONE: CPT

## 2019-10-24 PROCEDURE — 85025 COMPLETE CBC W/AUTO DIFF WBC: CPT

## 2019-10-24 PROCEDURE — 80053 COMPREHEN METABOLIC PANEL: CPT

## 2019-10-24 PROCEDURE — 82728 ASSAY OF FERRITIN: CPT

## 2019-10-24 PROCEDURE — 36415 COLL VENOUS BLD VENIPUNCTURE: CPT

## 2019-10-24 PROCEDURE — 83036 HEMOGLOBIN GLYCOSYLATED A1C: CPT

## 2019-10-24 PROCEDURE — 80061 LIPID PANEL: CPT

## 2019-10-29 ENCOUNTER — OFFICE VISIT (OUTPATIENT)
Dept: INTERNAL MEDICINE | Facility: CLINIC | Age: 74
End: 2019-10-29

## 2019-10-29 VITALS
DIASTOLIC BLOOD PRESSURE: 80 MMHG | BODY MASS INDEX: 24.07 KG/M2 | WEIGHT: 141 LBS | SYSTOLIC BLOOD PRESSURE: 150 MMHG | HEIGHT: 64 IN

## 2019-10-29 DIAGNOSIS — I10 ESSENTIAL HYPERTENSION: ICD-10-CM

## 2019-10-29 DIAGNOSIS — E78.49 OTHER HYPERLIPIDEMIA: ICD-10-CM

## 2019-10-29 DIAGNOSIS — D50.0 IRON DEFICIENCY ANEMIA DUE TO CHRONIC BLOOD LOSS: ICD-10-CM

## 2019-10-29 DIAGNOSIS — E03.8 OTHER SPECIFIED HYPOTHYROIDISM: ICD-10-CM

## 2019-10-29 DIAGNOSIS — Z12.31 ENCOUNTER FOR SCREENING MAMMOGRAM FOR MALIGNANT NEOPLASM OF BREAST: Primary | ICD-10-CM

## 2019-10-29 DIAGNOSIS — K52.839 MICROSCOPIC COLITIS, UNSPECIFIED MICROSCOPIC COLITIS TYPE: ICD-10-CM

## 2019-10-29 PROBLEM — I71.20 THORACIC AORTIC ANEURYSM: Status: ACTIVE | Noted: 2019-10-29

## 2019-10-29 PROCEDURE — 99214 OFFICE O/P EST MOD 30 MIN: CPT | Performed by: INTERNAL MEDICINE

## 2019-10-29 RX ORDER — BUDESONIDE 3 MG/1
CAPSULE, COATED PELLETS ORAL
Refills: 3 | COMMUNITY
Start: 2019-10-04 | End: 2021-03-01

## 2019-10-29 RX ORDER — FERROUS SULFATE 325(65) MG
325 TABLET ORAL DAILY
Qty: 30 TABLET | Refills: 2 | Status: SHIPPED | OUTPATIENT
Start: 2019-10-29 | End: 2020-01-24

## 2019-10-29 RX ORDER — SPIRONOLACTONE 50 MG/1
50 TABLET, FILM COATED ORAL DAILY
Qty: 30 TABLET | Refills: 3 | Status: SHIPPED | OUTPATIENT
Start: 2019-10-29 | End: 2019-10-29 | Stop reason: SDUPTHER

## 2019-10-29 RX ORDER — SPIRONOLACTONE 50 MG/1
50 TABLET, FILM COATED ORAL DAILY
Qty: 90 TABLET | Refills: 3 | Status: SHIPPED | OUTPATIENT
Start: 2019-10-29 | End: 2019-12-30

## 2019-10-29 NOTE — PROGRESS NOTES
Subjective        Chief Complaint   Patient presents with   • Anemia     6 month fup anemia htn thyroid med evall refills lab review    • Fatigue   • Hypertension   • Med Refill   • Hypothyroidism           Luna Jacobson is a 74 y.o. female who presents for    Patient Active Problem List   Diagnosis   • Essential hypertension   • Other hyperlipidemia   • Other specified hypothyroidism   • Other headache syndrome   • Prediabetes   • Iron deficiency anemia due to chronic blood loss   • Thoracic aortic aneurysm (CMS/HCC)   • Hypertension   • Hyperlipidemia   • Microscopic colitis       History of Present Illness     She had a colonoscopy on October 1st and she had microscopic colitis; she was started on Budesonide and her diarrhea resolved in 1-2 days. She is feeling much better and she is in the process of weaning Entocort. She has been checking her BP and it has been good at 120/80. She has had some reflux in the last two weeks and she started Ranitidine and it has helped. She sees Dr. Koenig tomorrow. She is smoke free for 6 months. She has been stable emotionally.   Allergies   Allergen Reactions   • Hydrochlorothiazide Other (See Comments)     hyponatremia   • Lisinopril Hives   • Norvasc [Amlodipine Besylate] Other (See Comments)     Head tightness   • Sporanos [Itraconazole] Hives     sporanax   • Sulfa Antibiotics Other (See Comments)     headache       Current Outpatient Medications on File Prior to Visit   Medication Sig Dispense Refill   • acetaminophen (TYLENOL) 500 MG tablet Take 500 mg by mouth.     • albuterol sulfate HFA (PROVENTIL HFA) 108 (90 Base) MCG/ACT inhaler Inhale 2 puffs.     • aspirin 81 MG tablet Take 81 mg by mouth Daily.     • atorvastatin (LIPITOR) 40 MG tablet Take 40 mg by mouth Daily.     • Budesonide (ENTOCORT EC) 3 MG 24 hr capsule TK 3 CS PO QAM  3   • butalbital-acetaminophen-caffeine (FIORICET, ESGIC) -40 MG per tablet Take 1 tablet by mouth Every 12 (Twelve) Hours  As Needed for Headache. 30 tablet 0   • Calcium Carbonate-Vitamin D (Calcium 500/D) 500-125 MG-UNIT tablet Take  by mouth.     • CloNIDine (CATAPRES) 0.1 MG tablet Take 0.1 mg by mouth. One tab in am and two in the pm     • coenzyme Q10 100 MG capsule Take 100 mg by mouth Daily.     • dicyclomine (BENTYL) 20 MG tablet Take 20 mg by mouth Daily.     • fexofenadine (ALLEGRA) 180 MG tablet Take 180 mg by mouth Daily.     • ketoconazole (NIZORAL) 2 % shampoo   3   • levothyroxine (SYNTHROID, LEVOTHROID) 50 MCG tablet Take 50 mcg by mouth Daily.     • Multiple Vitamin (MULTIVITAMIN) tablet Take 1 tablet by mouth Daily.     • pantoprazole (PROTONIX) 40 MG EC tablet Take 40 mg by mouth Daily.     • PARoxetine (PAXIL) 20 MG tablet      • polyethylene glycol (MIRALAX) powder      • [DISCONTINUED] spironolactone (ALDACTONE) 25 MG tablet      • [DISCONTINUED] FEROSUL 325 (65 Fe) MG tablet Take  by mouth Daily.  2     No current facility-administered medications on file prior to visit.        Past Medical History:   Diagnosis Date   • Allergic    • Anemia    • Anxiety    • Aortic aneurysm (CMS/HCC)     4 cm thoracic aorta   • Arthritis    • Atypical chest pain    • COPD (chronic obstructive pulmonary disease) (CMS/HCC)    • Coronary artery calcification seen on CT scan 07/2012    mild calcification in the LAD with calcium score of 22. modere narrowing left subclavian origin   • Decreased diffusion capacity of lung    • Diverticulosis    • Gastroparesis    • GERD (gastroesophageal reflux disease)    • GIST (gastrointestinal stromal tumor), non-malignant    • Hyperlipidemia    • Hypertension    • Hypothyroidism    • Irritable bowel syndrome    • Low back pain    • Mild mitral regurgitation    • Osteoporosis    • PAD (peripheral artery disease) (CMS/HCC)     small vessel disease in feet   • Subclavian artery stenosis, left (CMS/HCC) 05/2016    50 percent   • Tubular adenoma of colon 01/2017       Past Surgical History:   Procedure  "Laterality Date   • APPENDECTOMY     • COLONOSCOPY  10/01/2019    dr mcintyre   • ENDOSCOPY  02/2019   • HYSTERECTOMY     • TONSILLECTOMY         Family History   Problem Relation Age of Onset   • Hypertension Mother    • Diabetes Mother    • Dementia Mother    • Stroke Mother    • Lung cancer Father    • Alcohol abuse Father    • COPD Father    • Breast cancer Sister    • Hypertension Sister    • Hypothyroidism Sister    • Stroke Sister    • Other Sister         AAA   • Bipolar disorder Daughter    • Fibromyalgia Daughter        Social History     Socioeconomic History   • Marital status:      Spouse name: Not on file   • Number of children: Not on file   • Years of education: Not on file   • Highest education level: Not on file   Tobacco Use   • Smoking status: Current Some Day Smoker   • Smokeless tobacco: Never Used   • Tobacco comment: she has smoked intermittently for 40 years; she smoked over 2 ppd twenty years ago   Substance and Sexual Activity   • Alcohol use: No     Frequency: Never   • Drug use: No   • Sexual activity: Defer           The following portions of the patient's history were reviewed and updated as appropriate: problem list, allergies, current medications, past medical history, past family history, past social history and past surgical history.    Review of Systems   Gastrointestinal: Positive for GERD.   Psychiatric/Behavioral: Negative for depressed mood.       Immunization History   Administered Date(s) Administered   • Flu Vaccine High Dose PF 65YR+ 11/29/2018   • Hepatitis A 05/01/2018, 12/02/2018   • Influenza TIV (IM) 11/07/2013, 10/13/2014   • Pneumococcal Conjugate 13-Valent (PCV13) 04/23/2015   • Pneumococcal Polysaccharide (PPSV23) 08/01/2008   • Tdap 01/01/2012   • Zostavax 08/25/2010       Objective   Vitals:    10/29/19 1501   BP: 150/80   Weight: 64 kg (141 lb)   Height: 162.6 cm (64\")     Physical Exam   Constitutional: She appears well-developed and well-nourished. "   HENT:   Head: Normocephalic and atraumatic.   Cardiovascular: Normal rate, regular rhythm, S1 normal, S2 normal and normal heart sounds.   Pulmonary/Chest: Effort normal and breath sounds normal.   Neurological: She is alert.   Skin: Skin is warm.   Psychiatric: She has a normal mood and affect.   Vitals reviewed.      Procedures    Assessment/Plan   Luna was seen today for anemia, fatigue, hypertension, med refill and hypothyroidism.    Diagnoses and all orders for this visit:    Encounter for screening mammogram for malignant neoplasm of breast  -     Cancel: Mammo Screening Bilateral With CAD  -     Cancel: Mammo Screening Bilateral With CAD  -     Mammo Screening Bilateral With CAD; Future    Microscopic colitis, unspecified microscopic colitis type    Other specified hypothyroidism    Essential hypertension  -     Discontinue: spironolactone (ALDACTONE) 50 MG tablet; Take 1 tablet by mouth Daily.  -     Basic Metabolic Panel; Future    Other hyperlipidemia    Iron deficiency anemia due to chronic blood loss  -     FEROSUL 325 (65 Fe) MG tablet; Take 1 tablet by mouth Daily.  -     CBC & Differential; Future  -     Ferritin; Future             She will get a flu shot at [facility]. Reviewed labs. TSH and LDL are at goal. Her ferritin is low off iron; restart it. She did have a capsule study in the last year year with an angioectasia. Increase aldactone to 50 mg daily and monitor Cr carefully. I will get a copy of her last CT chest from [facility] to see when her aorta will need to be re imaged.    Return in about 2 months (around 12/29/2019).

## 2019-11-11 DIAGNOSIS — G44.89 OTHER HEADACHE SYNDROME: Primary | ICD-10-CM

## 2019-11-11 RX ORDER — BUTALBITAL, ACETAMINOPHEN AND CAFFEINE 50; 325; 40 MG/1; MG/1; MG/1
1 TABLET ORAL EVERY 12 HOURS PRN
Qty: 30 TABLET | Refills: 1 | Status: SHIPPED | OUTPATIENT
Start: 2019-11-11 | End: 2020-06-23

## 2019-11-11 RX ORDER — BUTALBITAL, ACETAMINOPHEN AND CAFFEINE 50; 325; 40 MG/1; MG/1; MG/1
TABLET ORAL
Qty: 30 TABLET | Refills: 0 | OUTPATIENT
Start: 2019-11-11

## 2019-11-22 ENCOUNTER — TRANSCRIBE ORDERS (OUTPATIENT)
Dept: ADMINISTRATIVE | Facility: HOSPITAL | Age: 74
End: 2019-11-22

## 2019-11-22 DIAGNOSIS — Z12.31 ENCOUNTER FOR SCREENING MAMMOGRAM FOR MALIGNANT NEOPLASM OF BREAST: Primary | ICD-10-CM

## 2019-12-06 ENCOUNTER — APPOINTMENT (OUTPATIENT)
Dept: MAMMOGRAPHY | Facility: HOSPITAL | Age: 74
End: 2019-12-06

## 2019-12-17 ENCOUNTER — APPOINTMENT (OUTPATIENT)
Dept: MAMMOGRAPHY | Facility: HOSPITAL | Age: 74
End: 2019-12-17

## 2019-12-18 ENCOUNTER — HOSPITAL ENCOUNTER (OUTPATIENT)
Dept: MAMMOGRAPHY | Facility: HOSPITAL | Age: 74
Discharge: HOME | End: 2019-12-18
Admitting: INTERNAL MEDICINE

## 2019-12-18 DIAGNOSIS — Z12.31 ENCOUNTER FOR SCREENING MAMMOGRAM FOR MALIGNANT NEOPLASM OF BREAST: ICD-10-CM

## 2019-12-18 PROCEDURE — 77067 SCR MAMMO BI INCL CAD: CPT

## 2019-12-18 PROCEDURE — 77063 BREAST TOMOSYNTHESIS BI: CPT

## 2019-12-26 ENCOUNTER — RESULTS ENCOUNTER (OUTPATIENT)
Dept: INTERNAL MEDICINE | Facility: CLINIC | Age: 74
End: 2019-12-26

## 2019-12-26 DIAGNOSIS — D50.0 IRON DEFICIENCY ANEMIA DUE TO CHRONIC BLOOD LOSS: ICD-10-CM

## 2019-12-26 DIAGNOSIS — I10 ESSENTIAL HYPERTENSION: ICD-10-CM

## 2019-12-30 ENCOUNTER — TELEPHONE (OUTPATIENT)
Dept: INTERNAL MEDICINE | Facility: CLINIC | Age: 74
End: 2019-12-30

## 2019-12-30 ENCOUNTER — OFFICE VISIT (OUTPATIENT)
Dept: INTERNAL MEDICINE | Facility: CLINIC | Age: 74
End: 2019-12-30

## 2019-12-30 VITALS
WEIGHT: 143.2 LBS | HEIGHT: 63 IN | BODY MASS INDEX: 25.37 KG/M2 | HEART RATE: 66 BPM | DIASTOLIC BLOOD PRESSURE: 82 MMHG | SYSTOLIC BLOOD PRESSURE: 160 MMHG

## 2019-12-30 DIAGNOSIS — I71.20 THORACIC AORTIC ANEURYSM WITHOUT RUPTURE (HCC): ICD-10-CM

## 2019-12-30 DIAGNOSIS — I10 ESSENTIAL HYPERTENSION: ICD-10-CM

## 2019-12-30 DIAGNOSIS — M79.10 MYALGIA: ICD-10-CM

## 2019-12-30 DIAGNOSIS — E55.9 VITAMIN D DEFICIENCY, UNSPECIFIED: ICD-10-CM

## 2019-12-30 DIAGNOSIS — D50.0 IRON DEFICIENCY ANEMIA DUE TO CHRONIC BLOOD LOSS: ICD-10-CM

## 2019-12-30 DIAGNOSIS — I10 ESSENTIAL HYPERTENSION: Primary | ICD-10-CM

## 2019-12-30 DIAGNOSIS — Z11.59 NEED FOR HEPATITIS C SCREENING TEST: ICD-10-CM

## 2019-12-30 PROCEDURE — 99214 OFFICE O/P EST MOD 30 MIN: CPT | Performed by: INTERNAL MEDICINE

## 2019-12-30 RX ORDER — SPIRONOLACTONE 100 MG/1
100 TABLET, FILM COATED ORAL DAILY
Qty: 30 TABLET | Refills: 3 | Status: SHIPPED | OUTPATIENT
Start: 2019-12-30 | End: 2019-12-30

## 2019-12-30 RX ORDER — SPIRONOLACTONE 100 MG/1
100 TABLET, FILM COATED ORAL DAILY
Qty: 90 TABLET | Refills: 3 | Status: SHIPPED | OUTPATIENT
Start: 2019-12-30 | End: 2021-01-11 | Stop reason: SDUPTHER

## 2019-12-30 NOTE — PROGRESS NOTES
Subjective        Chief Complaint   Patient presents with   • Hypertension           Luna Jacobson is a 74 y.o. female who presents for    Patient Active Problem List   Diagnosis   • Essential hypertension   • Other hyperlipidemia   • Other specified hypothyroidism   • Other headache syndrome   • Prediabetes   • Iron deficiency anemia due to chronic blood loss   • Thoracic aortic aneurysm (CMS/HCC)   • Hypertension   • Hyperlipidemia   • Microscopic colitis   • Myalgia       History of Present Illness     She is not on any new meds. She is on a lower dose of Budesonide; she takes two capsules per day. Her Bp went up last week and her heart rate is up as well. Her Heart rate has been up to 99. Her BP has been up 164/101. She has not missed of any of her BP meds. Her salt intake has not changed. She has had some headaches as well on top of her head for two months; taking Fioricet will help. She has been hurting in her arms and legs for two months; it is intermittent. She was treated for thrush with Nystatin earlier in the month. She stopped synthroid since was having hair loss and it has improved.  Allergies   Allergen Reactions   • Lisinopril Hives   • Norvasc [Amlodipine Besylate] Other (See Comments)     Head tightness   • Sporanos [Itraconazole] Hives     sporanax   • Sulfa Antibiotics Other (See Comments)     headache   • Hydrochlorothiazide Other (See Comments)     hyponatremia       Current Outpatient Medications on File Prior to Visit   Medication Sig Dispense Refill   • acetaminophen (TYLENOL) 500 MG tablet Take 500 mg by mouth.     • albuterol sulfate HFA (PROVENTIL HFA) 108 (90 Base) MCG/ACT inhaler Inhale 2 puffs.     • aspirin 81 MG tablet Take 81 mg by mouth Daily.     • atorvastatin (LIPITOR) 40 MG tablet Take 40 mg by mouth Daily.     • Budesonide (ENTOCORT EC) 3 MG 24 hr capsule TK 3 CS PO QAM  3   • butalbital-acetaminophen-caffeine (FIORICET, ESGIC) -40 MG per tablet Take 1 tablet by  mouth Every 12 (Twelve) Hours As Needed for Headache. 30 tablet 1   • Calcium Carbonate-Vitamin D (Calcium 500/D) 500-125 MG-UNIT tablet Take  by mouth.     • CloNIDine (CATAPRES) 0.1 MG tablet Take 0.1 mg by mouth. One tab in am and two in the pm     • coenzyme Q10 100 MG capsule Take 100 mg by mouth Daily.     • dicyclomine (BENTYL) 20 MG tablet Take 20 mg by mouth Daily.     • FEROSUL 325 (65 Fe) MG tablet Take 1 tablet by mouth Daily. 30 tablet 2   • fexofenadine (ALLEGRA) 180 MG tablet Take 180 mg by mouth Daily.     • ketoconazole (NIZORAL) 2 % shampoo   3   • Multiple Vitamin (MULTIVITAMIN) tablet Take 1 tablet by mouth Daily.     • pantoprazole (PROTONIX) 40 MG EC tablet Take 40 mg by mouth Daily.     • PARoxetine (PAXIL) 20 MG tablet      • polyethylene glycol (MIRALAX) powder      • [DISCONTINUED] nystatin (MYCOSTATIN) 788762 UNIT/ML suspension Take 5 mL by mouth 4 (Four) Times a Day. 473 mL 0   • [DISCONTINUED] spironolactone (ALDACTONE) 50 MG tablet TAKE 1 TABLET BY MOUTH DAILY 90 tablet 3   • levothyroxine (SYNTHROID, LEVOTHROID) 50 MCG tablet Take 50 mcg by mouth Daily.       No current facility-administered medications on file prior to visit.        Past Medical History:   Diagnosis Date   • Allergic    • Anemia    • Anxiety    • Aortic aneurysm (CMS/HCC)     4 cm thoracic aorta   • Arthritis    • Atypical chest pain    • COPD (chronic obstructive pulmonary disease) (CMS/HCC)    • Coronary artery calcification seen on CT scan 07/2012    mild calcification in the LAD with calcium score of 22. modere narrowing left subclavian origin   • Decreased diffusion capacity of lung    • Diverticulosis    • Gastroparesis    • GERD (gastroesophageal reflux disease)    • GIST (gastrointestinal stromal tumor), non-malignant    • Hyperlipidemia    • Hypertension    • Hypothyroidism    • Irritable bowel syndrome    • Low back pain    • Mild mitral regurgitation    • Osteoporosis    • PAD (peripheral artery disease)  (CMS/HCC)     small vessel disease in feet   • Subclavian artery stenosis, left (CMS/HCC) 05/2016    50 percent   • Tubular adenoma of colon 01/2017       Past Surgical History:   Procedure Laterality Date   • APPENDECTOMY     • COLONOSCOPY  10/01/2019    dr mcintyre   • ENDOSCOPY  02/2019   • HYSTERECTOMY     • TONSILLECTOMY         Family History   Problem Relation Age of Onset   • Hypertension Mother    • Diabetes Mother    • Dementia Mother    • Stroke Mother    • Lung cancer Father    • Alcohol abuse Father    • COPD Father    • Breast cancer Sister    • Hypertension Sister    • Hypothyroidism Sister    • Stroke Sister    • Other Sister         AAA   • Bipolar disorder Daughter    • Fibromyalgia Daughter        Social History     Socioeconomic History   • Marital status:      Spouse name: Not on file   • Number of children: Not on file   • Years of education: Not on file   • Highest education level: Not on file   Tobacco Use   • Smoking status: Current Some Day Smoker   • Smokeless tobacco: Never Used   • Tobacco comment: she has smoked intermittently for 40 years; she smoked over 2 ppd twenty years ago   Substance and Sexual Activity   • Alcohol use: No     Frequency: Never   • Drug use: No   • Sexual activity: Defer           The following portions of the patient's history were reviewed and updated as appropriate: problem list, allergies, current medications, past medical history, past family history, past social history and past surgical history.    Review of Systems   Skin:        Had white patches on her tongue.   Neurological: Positive for headache.       Immunization History   Administered Date(s) Administered   • FLUAD TRI 65YR+ 11/01/2019   • Fluzone High Dose =>65 Years ([employer] ONLY) 11/29/2018   • Hepatitis A 05/01/2018, 12/02/2018   • Influenza TIV (IM) 11/07/2013, 10/13/2014   • Pneumococcal Conjugate 13-Valent (PCV13) 04/23/2015   • Pneumococcal Polysaccharide (PPSV23) 08/01/2008   • Tdap  "01/01/2012   • Zostavax 08/25/2010       Objective   Vitals:    12/30/19 1440   BP: 160/82   Pulse: 66   Weight: 65 kg (143 lb 3.2 oz)   Height: 160 cm (63\")     Body mass index is 25.37 kg/m².  Physical Exam   Constitutional: She appears well-developed and well-nourished.   HENT:   Head: Normocephalic and atraumatic.   Mouth/Throat: Oropharynx is clear and moist.   Eyes: Pupils are equal, round, and reactive to light. Conjunctivae and EOM are normal.   Cardiovascular: Normal rate, regular rhythm, S1 normal, S2 normal and normal heart sounds.   Pulmonary/Chest: Effort normal and breath sounds normal.   Musculoskeletal:   Arms and legs non tender   Neurological: She is alert.   Skin: Skin is warm.   Psychiatric: She has a normal mood and affect.   Vitals reviewed.      Procedures    Assessment/Plan   Luna was seen today for hypertension.    Diagnoses and all orders for this visit:    Essential hypertension  -     Basic Metabolic Panel  -     spironolactone (ALDACTONE) 100 MG tablet; Take 1 tablet by mouth Daily.    Myalgia  -     TSH  -     CK  -     Vitamin D 25 hydroxy    Thoracic aortic aneurysm without rupture (CMS/HCC)  Comments:  Get copy of last CT from [facility]    Iron deficiency anemia due to chronic blood loss  -     CBC & Differential  -     Ferritin    Need for hepatitis C screening test  -     Hepatitis C Antibody    Vitamin D deficiency, unspecified   -     Vitamin D 25 hydroxy               MMG result pending. Get copy of CT chest from [facility]. Labs today. Increase Aldactone to 100 mg once we get her bmp back. Labs today. No evidence of thrush. She sees me in about 4 weeks.  No follow-ups on file.  "

## 2019-12-30 NOTE — TELEPHONE ENCOUNTER
What is her heart rate and what has her BP been in the last week   normal (ped)... In no apparent distress, appears well developed and well nourished.

## 2019-12-30 NOTE — TELEPHONE ENCOUNTER
155/106 OVER WEEKEND HR 75-95 SINCE Saturday PT TAKING BUDESONIDE FOR COLITIS PER GI STATES IT CAN MAKE YOUR HR ELEVATED

## 2019-12-31 DIAGNOSIS — I10 ESSENTIAL HYPERTENSION: Primary | ICD-10-CM

## 2019-12-31 DIAGNOSIS — I71.20 THORACIC AORTIC ANEURYSM WITHOUT RUPTURE (HCC): Primary | ICD-10-CM

## 2019-12-31 LAB
25(OH)D3+25(OH)D2 SERPL-MCNC: 49.9 NG/ML (ref 30–100)
BASOPHILS # BLD AUTO: 0.05 10*3/MM3 (ref 0–0.2)
BASOPHILS NFR BLD AUTO: 0.4 % (ref 0–1.5)
BUN SERPL-MCNC: 14 MG/DL (ref 8–23)
BUN/CREAT SERPL: 13.3 (ref 7–25)
CALCIUM SERPL-MCNC: 9.7 MG/DL (ref 8.6–10.5)
CHLORIDE SERPL-SCNC: 93 MMOL/L (ref 98–107)
CK SERPL-CCNC: 89 U/L (ref 20–180)
CO2 SERPL-SCNC: 25.4 MMOL/L (ref 22–29)
CREAT SERPL-MCNC: 1.05 MG/DL (ref 0.57–1)
EOSINOPHIL # BLD AUTO: 0.16 10*3/MM3 (ref 0–0.4)
EOSINOPHIL NFR BLD AUTO: 1.2 % (ref 0.3–6.2)
ERYTHROCYTE [DISTWIDTH] IN BLOOD BY AUTOMATED COUNT: 13.5 % (ref 12.3–15.4)
FERRITIN SERPL-MCNC: 24.8 NG/ML (ref 13–150)
GLUCOSE SERPL-MCNC: 81 MG/DL (ref 65–99)
HCT VFR BLD AUTO: 38.6 % (ref 34–46.6)
HCV AB S/CO SERPL IA: <0.1 S/CO RATIO (ref 0–0.9)
HGB BLD-MCNC: 13.1 G/DL (ref 12–15.9)
IMM GRANULOCYTES # BLD AUTO: 0.13 10*3/MM3 (ref 0–0.05)
IMM GRANULOCYTES NFR BLD AUTO: 1 % (ref 0–0.5)
LYMPHOCYTES # BLD AUTO: 2.91 10*3/MM3 (ref 0.7–3.1)
LYMPHOCYTES NFR BLD AUTO: 21.7 % (ref 19.6–45.3)
MCH RBC QN AUTO: 30.8 PG (ref 26.6–33)
MCHC RBC AUTO-ENTMCNC: 33.9 G/DL (ref 31.5–35.7)
MCV RBC AUTO: 90.8 FL (ref 79–97)
MONOCYTES # BLD AUTO: 1.25 10*3/MM3 (ref 0.1–0.9)
MONOCYTES NFR BLD AUTO: 9.3 % (ref 5–12)
NEUTROPHILS # BLD AUTO: 8.89 10*3/MM3 (ref 1.7–7)
NEUTROPHILS NFR BLD AUTO: 66.4 % (ref 42.7–76)
NRBC BLD AUTO-RTO: 0 /100 WBC (ref 0–0.2)
PLATELET # BLD AUTO: 386 10*3/MM3 (ref 140–450)
POTASSIUM SERPL-SCNC: 5.3 MMOL/L (ref 3.5–5.2)
RBC # BLD AUTO: 4.25 10*6/MM3 (ref 3.77–5.28)
SODIUM SERPL-SCNC: 134 MMOL/L (ref 136–145)
TSH SERPL DL<=0.005 MIU/L-ACNC: 6.3 UIU/ML (ref 0.27–4.2)
WBC # BLD AUTO: 13.39 10*3/MM3 (ref 3.4–10.8)

## 2020-01-08 ENCOUNTER — RESULTS ENCOUNTER (OUTPATIENT)
Dept: INTERNAL MEDICINE | Facility: CLINIC | Age: 75
End: 2020-01-08

## 2020-01-08 DIAGNOSIS — I10 ESSENTIAL HYPERTENSION: ICD-10-CM

## 2020-01-21 ENCOUNTER — APPOINTMENT (OUTPATIENT)
Dept: OTHER | Facility: HOSPITAL | Age: 75
End: 2020-01-21

## 2020-01-21 ENCOUNTER — HOSPITAL ENCOUNTER (OUTPATIENT)
Dept: CT IMAGING | Facility: HOSPITAL | Age: 75
Discharge: HOME | End: 2020-01-21
Admitting: INTERNAL MEDICINE

## 2020-01-21 DIAGNOSIS — I71.20 THORACIC AORTIC ANEURYSM WITHOUT RUPTURE (HCC): ICD-10-CM

## 2020-01-21 DIAGNOSIS — Z00.6 EXAMINATION FOR NORMAL COMPARISON OR CONTROL IN CLINICAL RESEARCH: ICD-10-CM

## 2020-01-21 PROCEDURE — 0 IOPAMIDOL PER 1 ML: Performed by: INTERNAL MEDICINE

## 2020-01-21 PROCEDURE — 71275 CT ANGIOGRAPHY CHEST: CPT

## 2020-01-21 RX ADMIN — IOPAMIDOL 100 ML: 755 INJECTION, SOLUTION INTRAVENOUS at 15:15

## 2020-01-24 DIAGNOSIS — D50.0 IRON DEFICIENCY ANEMIA DUE TO CHRONIC BLOOD LOSS: ICD-10-CM

## 2020-01-24 RX ORDER — FERROUS SULFATE 325(65) MG
325 TABLET ORAL DAILY
Qty: 30 TABLET | Refills: 2 | Status: SHIPPED | OUTPATIENT
Start: 2020-01-24 | End: 2020-05-14

## 2020-01-28 ENCOUNTER — OFFICE VISIT (OUTPATIENT)
Dept: INTERNAL MEDICINE | Facility: CLINIC | Age: 75
End: 2020-01-28

## 2020-01-28 VITALS
HEART RATE: 60 BPM | SYSTOLIC BLOOD PRESSURE: 110 MMHG | WEIGHT: 141.8 LBS | DIASTOLIC BLOOD PRESSURE: 72 MMHG | BODY MASS INDEX: 25.12 KG/M2 | HEIGHT: 63 IN

## 2020-01-28 DIAGNOSIS — R73.03 PREDIABETES: ICD-10-CM

## 2020-01-28 DIAGNOSIS — I71.20 THORACIC AORTIC ANEURYSM WITHOUT RUPTURE (HCC): ICD-10-CM

## 2020-01-28 DIAGNOSIS — D50.0 IRON DEFICIENCY ANEMIA DUE TO CHRONIC BLOOD LOSS: ICD-10-CM

## 2020-01-28 DIAGNOSIS — E03.8 OTHER SPECIFIED HYPOTHYROIDISM: ICD-10-CM

## 2020-01-28 DIAGNOSIS — I10 ESSENTIAL HYPERTENSION: Primary | ICD-10-CM

## 2020-01-28 PROCEDURE — 99214 OFFICE O/P EST MOD 30 MIN: CPT | Performed by: INTERNAL MEDICINE

## 2020-01-28 NOTE — PROGRESS NOTES
Subjective        Chief Complaint   Patient presents with   • Hypertension     fup bp med eval refills            Luna Jacobson is a 74 y.o. female who presents for    Patient Active Problem List   Diagnosis   • Essential hypertension   • Other hyperlipidemia   • Other specified hypothyroidism   • Other headache syndrome   • Prediabetes   • Iron deficiency anemia due to chronic blood loss   • Thoracic aortic aneurysm (CMS/HCC)   • Microscopic colitis   • Myalgia       History of Present Illness     She has had a could with a cough and a runny nose. She has checked her BP and it has been 107-143/75-90 with a heart rate of 58-79. She has not been exercising. Her breathing has been fine. She has cramps in her feet and ankles about once per week. She has them at night. She has felt a rapid beat after starting the Budesonide; it will last for an hour. It can happen randomly. It is not affected by activity. She has 4-5 cups of coffee per day. She does not use sudafed. She will have 1-2 cigarettes per month  Allergies   Allergen Reactions   • Lisinopril Hives   • Norvasc [Amlodipine Besylate] Other (See Comments)     Head tightness   • Sporanos [Itraconazole] Hives     sporanax   • Sulfa Antibiotics Other (See Comments)     headache   • Hydrochlorothiazide Other (See Comments)     hyponatremia       Current Outpatient Medications on File Prior to Visit   Medication Sig Dispense Refill   • albuterol sulfate HFA (PROVENTIL HFA) 108 (90 Base) MCG/ACT inhaler Inhale 2 puffs.     • aspirin 81 MG tablet Take 81 mg by mouth Daily.     • atorvastatin (LIPITOR) 40 MG tablet Take 40 mg by mouth Daily.     • Budesonide (ENTOCORT EC) 3 MG 24 hr capsule TK 3 CS PO QAM  3   • butalbital-acetaminophen-caffeine (FIORICET, ESGIC) -40 MG per tablet Take 1 tablet by mouth Every 12 (Twelve) Hours As Needed for Headache. 30 tablet 1   • Calcium Carbonate-Vitamin D (Calcium 500/D) 500-125 MG-UNIT tablet Take  by mouth.     •  CloNIDine (CATAPRES) 0.1 MG tablet Take 0.1 mg by mouth. One tab in am and two in the pm     • coenzyme Q10 100 MG capsule Take 100 mg by mouth Daily.     • dicyclomine (BENTYL) 20 MG tablet Take 20 mg by mouth Daily.     • FEROSUL 325 (65 Fe) MG tablet TAKE 1 TABLET BY MOUTH DAILY 30 tablet 2   • fexofenadine (ALLEGRA) 180 MG tablet Take 180 mg by mouth Daily.     • ketoconazole (NIZORAL) 2 % shampoo   3   • levothyroxine (SYNTHROID, LEVOTHROID) 50 MCG tablet Take 50 mcg by mouth Daily.     • Multiple Vitamin (MULTIVITAMIN) tablet Take 1 tablet by mouth Daily.     • pantoprazole (PROTONIX) 40 MG EC tablet Take 40 mg by mouth Daily.     • PARoxetine (PAXIL) 20 MG tablet      • polyethylene glycol (MIRALAX) powder      • spironolactone (ALDACTONE) 100 MG tablet TAKE 1 TABLET BY MOUTH DAILY 90 tablet 3   • acetaminophen (TYLENOL) 500 MG tablet Take 500 mg by mouth.       No current facility-administered medications on file prior to visit.        Past Medical History:   Diagnosis Date   • Allergic    • Anemia    • Anxiety    • Aortic aneurysm (CMS/HCC)     4 cm thoracic aorta   • Arthritis    • Atypical chest pain    • COPD (chronic obstructive pulmonary disease) (CMS/HCC)    • Coronary artery calcification seen on CT scan 07/2012    mild calcification in the LAD with calcium score of 22. modere narrowing left subclavian origin   • Decreased diffusion capacity of lung    • Diverticulosis    • Gastroparesis    • GERD (gastroesophageal reflux disease)    • GIST (gastrointestinal stromal tumor), non-malignant    • Hyperlipidemia    • Hypertension    • Hypothyroidism    • Irritable bowel syndrome    • Low back pain    • Mild mitral regurgitation    • Osteoporosis    • PAD (peripheral artery disease) (CMS/HCC)     small vessel disease in feet   • Subclavian artery stenosis, left (CMS/HCC) 05/2016    50 percent   • Tubular adenoma of colon 01/2017       Past Surgical History:   Procedure Laterality Date   • APPENDECTOMY    "  • COLONOSCOPY  10/01/2019    dr mcintyre   • ENDOSCOPY  02/2019   • HYSTERECTOMY     • TONSILLECTOMY         Family History   Problem Relation Age of Onset   • Hypertension Mother    • Diabetes Mother    • Dementia Mother    • Stroke Mother    • Lung cancer Father    • Alcohol abuse Father    • COPD Father    • Breast cancer Sister    • Hypertension Sister    • Hypothyroidism Sister    • Stroke Sister    • Other Sister         AAA   • Bipolar disorder Daughter    • Fibromyalgia Daughter        Social History     Socioeconomic History   • Marital status:      Spouse name: Not on file   • Number of children: Not on file   • Years of education: Not on file   • Highest education level: Not on file   Tobacco Use   • Smoking status: Current Some Day Smoker   • Smokeless tobacco: Never Used   • Tobacco comment: she has smoked intermittently for 40 years; she smoked over 2 ppd twenty years ago   Substance and Sexual Activity   • Alcohol use: No     Frequency: Never   • Drug use: No   • Sexual activity: Defer           The following portions of the patient's history were reviewed and updated as appropriate: problem list, allergies, current medications, past medical history, past family history, past social history and past surgical history.    Review of Systems   Constitutional: Negative for fever.   Cardiovascular: Positive for palpitations.       Immunization History   Administered Date(s) Administered   • FLUAD TRI 65YR+ 11/01/2019   • Fluzone High Dose =>65 Years ([employer] ONLY) 11/29/2018   • Hepatitis A 05/01/2018, 12/02/2018   • Influenza TIV (IM) 11/07/2013, 10/13/2014   • Pneumococcal Conjugate 13-Valent (PCV13) 04/23/2015   • Pneumococcal Polysaccharide (PPSV23) 08/01/2008   • Tdap 01/01/2012   • Zostavax 08/25/2010       Objective   Vitals:    01/28/20 1442   BP: 110/72   Pulse: 60   Weight: 64.3 kg (141 lb 12.8 oz)   Height: 160 cm (63\")     Body mass index is 25.12 kg/m².  Physical Exam   Constitutional: " She appears well-developed and well-nourished.   HENT:   Head: Normocephalic and atraumatic.   Cardiovascular: Normal rate, regular rhythm, S1 normal, S2 normal and normal heart sounds.   Pulmonary/Chest: Effort normal and breath sounds normal.   Musculoskeletal:   2+ DP/PT in both feet without edema or discoloration.   Neurological: She is alert.   Skin: Skin is warm.   Psychiatric: She has a normal mood and affect.   Vitals reviewed.      Procedures    Assessment/Plan   Luna was seen today for hypertension.    Diagnoses and all orders for this visit:    Essential hypertension  Comments:  Bmp today    Other specified hypothyroidism  Comments:  just r/s replacement  Orders:  -     TSH; Future    Thoracic aortic aneurysm without rupture (CMS/HCC)  Comments:  CT stable    Prediabetes  -     Hemoglobin A1c; Future    Iron deficiency anemia due to chronic blood loss  -     CBC & Differential; Future  -     Ferritin; Future               Discussed trying Tonic Water for leg cramps. BP is good. Reviewed CT chest. Wean off Entocort since she thinks it may have caused her a fast heart rate; she will call Dr. Koenig to discuss getting a holter.  Return in about 4 months (around 5/28/2020), or 30 minutes.

## 2020-01-29 LAB
BUN SERPL-MCNC: 15 MG/DL (ref 8–23)
BUN/CREAT SERPL: 13.6 (ref 7–25)
CALCIUM SERPL-MCNC: 9.3 MG/DL (ref 8.6–10.5)
CHLORIDE SERPL-SCNC: 92 MMOL/L (ref 98–107)
CO2 SERPL-SCNC: 25.4 MMOL/L (ref 22–29)
CREAT SERPL-MCNC: 1.1 MG/DL (ref 0.57–1)
GLUCOSE SERPL-MCNC: 90 MG/DL (ref 65–99)
POTASSIUM SERPL-SCNC: 4.8 MMOL/L (ref 3.5–5.2)
SODIUM SERPL-SCNC: 130 MMOL/L (ref 136–145)

## 2020-01-30 DIAGNOSIS — I10 ESSENTIAL HYPERTENSION: Primary | ICD-10-CM

## 2020-03-11 DIAGNOSIS — I10 ESSENTIAL HYPERTENSION: ICD-10-CM

## 2020-03-11 DIAGNOSIS — F41.9 ANXIETY: Primary | ICD-10-CM

## 2020-03-11 DIAGNOSIS — E78.49 OTHER HYPERLIPIDEMIA: ICD-10-CM

## 2020-03-11 RX ORDER — CLONIDINE HYDROCHLORIDE 0.1 MG/1
TABLET ORAL
Qty: 270 TABLET | Refills: 1 | Status: SHIPPED | OUTPATIENT
Start: 2020-03-11 | End: 2020-09-08

## 2020-03-11 RX ORDER — ATORVASTATIN CALCIUM 40 MG/1
40 TABLET, FILM COATED ORAL DAILY
Qty: 90 TABLET | Refills: 1 | Status: SHIPPED | OUTPATIENT
Start: 2020-03-11 | End: 2020-09-10

## 2020-03-11 RX ORDER — PAROXETINE HYDROCHLORIDE 20 MG/1
20 TABLET, FILM COATED ORAL EVERY MORNING
Qty: 90 TABLET | Refills: 1 | Status: SHIPPED | OUTPATIENT
Start: 2020-03-11 | End: 2020-09-15

## 2020-03-12 ENCOUNTER — RESULTS ENCOUNTER (OUTPATIENT)
Dept: INTERNAL MEDICINE | Facility: CLINIC | Age: 75
End: 2020-03-12

## 2020-03-12 ENCOUNTER — TELEPHONE (OUTPATIENT)
Dept: INTERNAL MEDICINE | Facility: CLINIC | Age: 75
End: 2020-03-12

## 2020-03-12 DIAGNOSIS — I10 ESSENTIAL HYPERTENSION: ICD-10-CM

## 2020-03-12 DIAGNOSIS — Z72.0 TOBACCO ABUSE: Primary | ICD-10-CM

## 2020-03-12 NOTE — TELEPHONE ENCOUNTER
Pt calling on wanting a new rx for Chantix sent into pharmacy she is wanting to try to quit smoking but needs help feels like she is smoking more than what she wants to do please advise

## 2020-04-11 DIAGNOSIS — Z72.0 TOBACCO ABUSE: ICD-10-CM

## 2020-04-28 ENCOUNTER — TELEPHONE (OUTPATIENT)
Dept: INTERNAL MEDICINE | Facility: CLINIC | Age: 75
End: 2020-04-28

## 2020-04-28 NOTE — TELEPHONE ENCOUNTER
Pt called and stated that she needs an Rx for the continuing pack of varenicline (Chantix) be sent to     [id number] - [facility] - [place] - [address] AT SEC OF [facility] [address] - [phone]  - [phone] FX    Pt stated that Insurance stated she will need a PA for continuing pack    Please advise   [phone]

## 2020-05-04 NOTE — TELEPHONE ENCOUNTER
PATIENT CALLED AND HAD NOT HEARD ANYTHING ABOUT GETTING THE PRESCRIPTION AND WAS  CHECKING THE ON THE STATUS OF IT.     PATIENT CALLBACK [phone]

## 2020-05-08 NOTE — TELEPHONE ENCOUNTER
has a number for her insurance company that she feels may help expedite this process of getting her medication.     [phone]

## 2020-05-13 DIAGNOSIS — D50.0 IRON DEFICIENCY ANEMIA DUE TO CHRONIC BLOOD LOSS: ICD-10-CM

## 2020-05-13 NOTE — TELEPHONE ENCOUNTER
ERICKA FROM [employer] CALLED IN AND STATED THE varenicline (Chantix Starting Month Pak) 0.5 MG X 11 & 1 MG X 42 tablet THAT WAS ORDERED NEEDS A PRIOR AUTHORIZATION. THE ONE SHE WAS GETTING BEFORE WAS THE STARTER PACK .  PATIENT HAS BEEN OUT OF MEDS SINCE MARCH . [facility] CALL BACK  [phone]

## 2020-05-14 RX ORDER — FERROUS SULFATE 325(65) MG
325 TABLET ORAL DAILY
Qty: 30 TABLET | Refills: 2 | Status: SHIPPED | OUTPATIENT
Start: 2020-05-14 | End: 2020-09-08

## 2020-05-27 ENCOUNTER — TELEPHONE (OUTPATIENT)
Dept: INTERNAL MEDICINE | Facility: CLINIC | Age: 75
End: 2020-05-27

## 2020-05-27 NOTE — TELEPHONE ENCOUNTER
Patient is wanting to do a video visit instead of an office visit due to her age, she doesn't feel comfortable coming in for an office visit, she is willing to wait a few months if that is okay with you.

## 2020-05-27 NOTE — TELEPHONE ENCOUNTER
Her visit centers around high blood pressure so if does not want to come in please find 30 minute OV later in the summer    Ask katlyn if someone else needs to be put in her spot

## 2020-05-28 ENCOUNTER — RESULTS ENCOUNTER (OUTPATIENT)
Dept: INTERNAL MEDICINE | Facility: CLINIC | Age: 75
End: 2020-05-28

## 2020-05-28 ENCOUNTER — OFFICE (AMBULATORY)
Dept: URBAN - METROPOLITAN AREA CLINIC 64 | Facility: CLINIC | Age: 75
End: 2020-05-28

## 2020-05-28 VITALS — HEIGHT: 63 IN

## 2020-05-28 DIAGNOSIS — R73.03 PREDIABETES: ICD-10-CM

## 2020-05-28 DIAGNOSIS — D50.0 IRON DEFICIENCY ANEMIA DUE TO CHRONIC BLOOD LOSS: ICD-10-CM

## 2020-05-28 DIAGNOSIS — R10.10 UPPER ABDOMINAL PAIN, UNSPECIFIED: ICD-10-CM

## 2020-05-28 DIAGNOSIS — R11.0 NAUSEA: ICD-10-CM

## 2020-05-28 DIAGNOSIS — E03.8 OTHER SPECIFIED HYPOTHYROIDISM: ICD-10-CM

## 2020-05-28 DIAGNOSIS — K52.9 NONINFECTIVE GASTROENTERITIS AND COLITIS, UNSPECIFIED: ICD-10-CM

## 2020-05-28 PROCEDURE — 99213 OFFICE O/P EST LOW 20 MIN: CPT | Mod: 95 | Performed by: NURSE PRACTITIONER

## 2020-05-28 RX ORDER — DICYCLOMINE HYDROCHLORIDE 20 MG/1
80 TABLET ORAL
Qty: 120 | Refills: 12 | Status: COMPLETED
Start: 2020-05-28 | End: 2021-06-17

## 2020-05-28 NOTE — SERVICEHPINOTES
Patient is a 75 y/o female with a history of microscopic colitis with diarrhea, PE and anemia.  She had been on budesonide and BID bentyl for microscopic colitis with good relief.  She has developed tachycardia with budesonide and had to stop about a month ago.  She tried taking it again and the tachycardia recurred.  Since stopping budesonide, she has had upper abdominal discomfort and bloating with a tightness.  It is worse after eating and associated with nausea.  She denies vomiting or reflux.  Rare NSAID use.  Reports normal gallbladder workup X 3 in the past.  No history of pancreatitis.  N o weight loss.  She has black stool on oral iron with rare scant bright red blood with hemorrhoid flare.  Previous hemorrhoid surgery as well.  She is having a daily bowel movement with miralax and denies diarrhea or constipation.  Dicyclomine and gas X help as she has increased gas as well.   10/2019 Colonoscopy (Dr. Bliss) - hemorrhoids, random bx  + nonspecific chronic colitis

## 2020-06-23 DIAGNOSIS — G44.89 OTHER HEADACHE SYNDROME: ICD-10-CM

## 2020-06-23 RX ORDER — BUTALBITAL, ACETAMINOPHEN AND CAFFEINE 50; 325; 40 MG/1; MG/1; MG/1
TABLET ORAL
Qty: 30 TABLET | Refills: 1 | Status: SHIPPED | OUTPATIENT
Start: 2020-06-23 | End: 2021-03-12

## 2020-07-21 ENCOUNTER — TELEPHONE (OUTPATIENT)
Dept: INTERNAL MEDICINE | Facility: CLINIC | Age: 75
End: 2020-07-21

## 2020-07-21 NOTE — TELEPHONE ENCOUNTER
PATIENT STATE: that she think that she has covid-19 and would like to know what she should do. Please advise     PATIENT CAN BE REACHED ON:[phone]

## 2020-07-27 ENCOUNTER — LAB REQUISITION (OUTPATIENT)
Dept: LAB | Facility: HOSPITAL | Age: 75
End: 2020-07-27

## 2020-07-27 DIAGNOSIS — Z00.00 ENCOUNTER FOR GENERAL ADULT MEDICAL EXAMINATION WITHOUT ABNORMAL FINDINGS: ICD-10-CM

## 2020-07-27 PROCEDURE — U0003 INFECTIOUS AGENT DETECTION BY NUCLEIC ACID (DNA OR RNA); SEVERE ACUTE RESPIRATORY SYNDROME CORONAVIRUS 2 (SARS-COV-2) (CORONAVIRUS DISEASE [COVID-19]), AMPLIFIED PROBE TECHNIQUE, MAKING USE OF HIGH THROUGHPUT TECHNOLOGIES AS DESCRIBED BY CMS-2020-01-R: HCPCS | Performed by: EMERGENCY MEDICINE

## 2020-07-28 LAB
COVID LABCORP PRIORITY: NORMAL
SARS-COV-2 RNA RESP QL NAA+PROBE: NOT DETECTED

## 2020-07-28 RX ORDER — VARENICLINE TARTRATE 1 MG/1
TABLET, FILM COATED ORAL
Qty: 56 TABLET | Refills: 0 | Status: SHIPPED | OUTPATIENT
Start: 2020-07-28 | End: 2020-09-08

## 2020-08-31 ENCOUNTER — TELEPHONE (OUTPATIENT)
Dept: INTERNAL MEDICINE | Facility: CLINIC | Age: 75
End: 2020-08-31

## 2020-08-31 NOTE — TELEPHONE ENCOUNTER
PATIENT IS REQUESTING TO BE SEEN. SHE IS IN PAIN IN HER BACK, LEGS AND FEET AND THEY ARE SWOLLEN. SHE IS HARDLY ABLE TO WALK SHE STATES.    PLEASE ADVISE  [phone]

## 2020-09-01 ENCOUNTER — OFFICE VISIT (OUTPATIENT)
Dept: INTERNAL MEDICINE | Facility: CLINIC | Age: 75
End: 2020-09-01

## 2020-09-01 VITALS
DIASTOLIC BLOOD PRESSURE: 90 MMHG | TEMPERATURE: 98.1 F | BODY MASS INDEX: 25.48 KG/M2 | HEIGHT: 63 IN | WEIGHT: 143.8 LBS | SYSTOLIC BLOOD PRESSURE: 150 MMHG

## 2020-09-01 DIAGNOSIS — I49.9 IRREGULAR HEART BEAT: ICD-10-CM

## 2020-09-01 DIAGNOSIS — I10 ESSENTIAL HYPERTENSION: ICD-10-CM

## 2020-09-01 DIAGNOSIS — E03.8 OTHER SPECIFIED HYPOTHYROIDISM: ICD-10-CM

## 2020-09-01 DIAGNOSIS — R60.9 EDEMA, UNSPECIFIED TYPE: ICD-10-CM

## 2020-09-01 DIAGNOSIS — R73.03 PREDIABETES: ICD-10-CM

## 2020-09-01 DIAGNOSIS — D50.0 IRON DEFICIENCY ANEMIA DUE TO CHRONIC BLOOD LOSS: ICD-10-CM

## 2020-09-01 DIAGNOSIS — M54.50 ACUTE MIDLINE LOW BACK PAIN WITHOUT SCIATICA: Primary | ICD-10-CM

## 2020-09-01 PROCEDURE — 93000 ELECTROCARDIOGRAM COMPLETE: CPT | Performed by: INTERNAL MEDICINE

## 2020-09-01 PROCEDURE — 99214 OFFICE O/P EST MOD 30 MIN: CPT | Performed by: INTERNAL MEDICINE

## 2020-09-01 RX ORDER — HYDROCODONE BITARTRATE AND ACETAMINOPHEN 5; 325 MG/1; MG/1
1 TABLET ORAL EVERY 6 HOURS PRN
Qty: 12 TABLET | Refills: 0 | Status: SHIPPED | OUTPATIENT
Start: 2020-09-01 | End: 2020-10-09

## 2020-09-01 RX ORDER — CYCLOBENZAPRINE HYDROCHLORIDE 7.5 MG/1
7.5 TABLET, FILM COATED ORAL 2 TIMES DAILY PRN
Qty: 20 TABLET | Refills: 0 | Status: SHIPPED | OUTPATIENT
Start: 2020-09-01 | End: 2020-09-15

## 2020-09-01 NOTE — PROGRESS NOTES
Subjective        Chief Complaint   Patient presents with   • Back Pain     into hips and legs   • Foot Swelling           Luna Jacobson is a 75 y.o. female who presents for    Patient Active Problem List   Diagnosis   • Essential hypertension   • Other hyperlipidemia   • Other specified hypothyroidism   • Other headache syndrome   • Prediabetes   • Iron deficiency anemia due to chronic blood loss   • Thoracic aortic aneurysm (CMS/HCC)   • Microscopic colitis   • Myalgia   • Acute midline low back pain without sciatica   • Irregular heart beat   • Edema       History of Present Illness     She has had severe lower back pain for the last two weeks that radiates to her legs. She has not injured herself. She has not had a similar episode. She has no incontinence with it. She saw the chiropractor. She is able to bend over without any problem. It is worse with standing up. Her feet have been swollen for 2 days. She has been checking her BP and it has been 132/85. She was to see Dr. Koenig for irregular heart beats but she did not see him secondary to she felt ill. She tested negative for COVID in July. She did not get a holter after seeing me earlier in the year. She is taking Chantix for smoking cessation; she has been off tobacco for two months.  Allergies   Allergen Reactions   • Lisinopril Hives   • Norvasc [Amlodipine Besylate] Other (See Comments)     Head tightness   • Sporanos [Itraconazole] Hives     sporanax   • Sulfa Antibiotics Other (See Comments)     headache   • Hydrochlorothiazide Other (See Comments)     hyponatremia       Current Outpatient Medications on File Prior to Visit   Medication Sig Dispense Refill   • aspirin 81 MG tablet Take 81 mg by mouth Daily.     • atorvastatin (LIPITOR) 40 MG tablet Take 1 tablet by mouth Daily. 90 tablet 1   • butalbital-acetaminophen-caffeine (FIORICET, ESGIC) -40 MG per tablet TAKE 1 TABLET BY MOUTH EVERY 12 HOURS AS NEEDED FOR HEADACHE 30 tablet 1   •  Calcium Carbonate-Vitamin D (Calcium 500/D) 500-125 MG-UNIT tablet Take  by mouth.     • CHANTIX CONTINUING MONTH GURPREET 1 MG tablet TAKE 1 TABLET BY MOUTH TWICE DAILY( EVERY TWELVE HOURS) AS DIRECTED 56 tablet 0   • cloNIDine (CATAPRES) 0.1 MG tablet One tab in am and two in the pm 270 tablet 1   • coenzyme Q10 100 MG capsule Take 100 mg by mouth Daily.     • dicyclomine (BENTYL) 20 MG tablet Take 20 mg by mouth Daily.     • FEROSUL 325 (65 Fe) MG tablet TAKE 1 TABLET BY MOUTH DAILY 30 tablet 2   • fexofenadine (ALLEGRA) 180 MG tablet Take 180 mg by mouth Daily.     • ketoconazole (NIZORAL) 2 % shampoo   3   • levothyroxine (SYNTHROID, LEVOTHROID) 50 MCG tablet Take 50 mcg by mouth Daily.     • Multiple Vitamin (MULTIVITAMIN) tablet Take 1 tablet by mouth Daily.     • pantoprazole (PROTONIX) 40 MG EC tablet Take 40 mg by mouth Daily.     • PARoxetine (PAXIL) 20 MG tablet Take 1 tablet by mouth Every Morning. 90 tablet 1   • polyethylene glycol (MIRALAX) powder      • spironolactone (ALDACTONE) 100 MG tablet TAKE 1 TABLET BY MOUTH DAILY 90 tablet 3   • acetaminophen (TYLENOL) 500 MG tablet Take 500 mg by mouth.     • albuterol sulfate HFA (PROVENTIL HFA) 108 (90 Base) MCG/ACT inhaler Inhale 2 puffs.     • Budesonide (ENTOCORT EC) 3 MG 24 hr capsule TK 3 CS PO QAM  3     No current facility-administered medications on file prior to visit.        Past Medical History:   Diagnosis Date   • Allergic    • Anemia    • Anxiety    • Aortic aneurysm (CMS/HCC)     4 cm thoracic aorta   • Arthritis    • Atypical chest pain    • COPD (chronic obstructive pulmonary disease) (CMS/HCC)    • Coronary artery calcification seen on CT scan 07/2012    mild calcification in the LAD with calcium score of 22. modere narrowing left subclavian origin   • Decreased diffusion capacity of lung    • Diverticulosis    • Gastroparesis    • GERD (gastroesophageal reflux disease)    • GIST (gastrointestinal stromal tumor), non-malignant    •  Hyperlipidemia    • Hypertension    • Hypothyroidism    • Irritable bowel syndrome    • Low back pain    • Mild mitral regurgitation    • Osteoporosis    • PAD (peripheral artery disease) (CMS/HCC)     small vessel disease in feet   • Subclavian artery stenosis, left (CMS/HCC) 05/2016    50 percent   • Tubular adenoma of colon 01/2017       Past Surgical History:   Procedure Laterality Date   • APPENDECTOMY     • COLONOSCOPY  10/01/2019    dr mcintyre   • ENDOSCOPY  02/2019   • HYSTERECTOMY     • TONSILLECTOMY         Family History   Problem Relation Age of Onset   • Hypertension Mother    • Diabetes Mother    • Dementia Mother    • Stroke Mother    • Lung cancer Father    • Alcohol abuse Father    • COPD Father    • Breast cancer Sister    • Hypertension Sister    • Hypothyroidism Sister    • Stroke Sister    • Other Sister         AAA   • Bipolar disorder Daughter    • Fibromyalgia Daughter        Social History     Socioeconomic History   • Marital status:      Spouse name: Not on file   • Number of children: Not on file   • Years of education: Not on file   • Highest education level: Not on file   Tobacco Use   • Smoking status: Current Some Day Smoker   • Smokeless tobacco: Never Used   • Tobacco comment: she has smoked intermittently for 40 years; she smoked over 2 ppd twenty years ago   Substance and Sexual Activity   • Alcohol use: No     Frequency: Never   • Drug use: No   • Sexual activity: Defer           The following portions of the patient's history were reviewed and updated as appropriate: problem list, allergies, current medications, past medical history, past family history, past social history and past surgical history.    Review of Systems   Cardiovascular: Positive for palpitations.   Musculoskeletal: Positive for back pain.       Immunization History   Administered Date(s) Administered   • FLUAD TRI 65YR+ 11/01/2019   • Fluzone High Dose =>65 Years ([employer] ONLY) 11/29/2018   • Hepatitis  "A 05/01/2018, 12/02/2018   • Influenza TIV (IM) 11/07/2013, 10/13/2014   • Pneumococcal Conjugate 13-Valent (PCV13) 04/23/2015   • Pneumococcal Polysaccharide (PPSV23) 08/01/2008   • Tdap 01/01/2012   • Zostavax 08/25/2010     Advance Care Planning   ACP discussion was held with the patient during this visit. Patient does not have an advance directive, information provided.  Objective   Vitals:    09/01/20 0906   BP: 150/90   Temp: 98.1 °F (36.7 °C)   Weight: 65.2 kg (143 lb 12.8 oz)   Height: 160 cm (63\")     Body mass index is 25.47 kg/m².  Physical Exam   Constitutional: She appears well-developed and well-nourished.   HENT:   Head: Normocephalic and atraumatic.   Cardiovascular: Normal rate, regular rhythm, S1 normal, S2 normal and normal heart sounds.   Pulmonary/Chest: Effort normal and breath sounds normal.   Musculoskeletal:   Back non tender   Neurological: She is alert.   Reflex Scores:       Patellar reflexes are 0 on the right side and 0 on the left side.       Achilles reflexes are 0 on the right side and 0 on the left side.  5/5 strength in back    Trace to 1+ edema at ankles   Skin: Skin is warm.   Psychiatric: She has a normal mood and affect.   Vitals reviewed.        ECG 12 Lead  Date/Time: 9/1/2020 12:38 PM  Performed by: Danish Serrano MD  Authorized by: Danish Serrano MD   Comparison: not compared with previous ECG   Rhythm: sinus rhythm  Rate: normal  Conduction: conduction normal  ST Segments: ST segments normal  T Waves: T waves normal    Clinical impression: normal ECG            Assessment/Plan   Luna was seen today for back pain and foot swelling.    Diagnoses and all orders for this visit:    Acute midline low back pain without sciatica  -     cyclobenzaprine (FEXMID) 7.5 MG tablet; Take 1 tablet by mouth 2 (Two) Times a Day As Needed for Muscle Spasms.  -     Ambulatory Referral to Physical Therapy Evaluate and treat  -     HYDROcodone-acetaminophen (NORCO) 5-325 MG per " tablet; Take 1 tablet by mouth Every 6 (Six) Hours As Needed (back pain).    Other specified hypothyroidism    Essential hypertension  -     CBC & Differential  -     Comprehensive Metabolic Panel    Irregular heart beat  -     Holter monitor - 48 hour  -     TSH  -     ECG 12 Lead    Edema, unspecified type  -     Urinalysis With Culture If Indicated -    Iron deficiency anemia due to chronic blood loss  -     Ferritin    Prediabetes  -     Hemoglobin A1c             She will get the flu shot at the drug store. Recc Shingrix at drug store. BP is up here today but it has not been at home. Check TSH and ferritin today. Set up PT. No indication for MRI at this point. Set up holter.     Return in about 6 weeks (around 10/13/2020), or 30 minutes.

## 2020-09-02 LAB
ALBUMIN SERPL-MCNC: 4.3 G/DL (ref 3.5–5.2)
ALBUMIN/GLOB SERPL: 1.7 G/DL
ALP SERPL-CCNC: 114 U/L (ref 39–117)
ALT SERPL-CCNC: 86 U/L (ref 1–33)
APPEARANCE UR: CLEAR
AST SERPL-CCNC: 88 U/L (ref 1–32)
BACTERIA #/AREA URNS HPF: NORMAL /HPF
BASOPHILS # BLD AUTO: 0.05 10*3/MM3 (ref 0–0.2)
BASOPHILS NFR BLD AUTO: 0.7 % (ref 0–1.5)
BILIRUB SERPL-MCNC: 0.3 MG/DL (ref 0–1.2)
BILIRUB UR QL STRIP: NEGATIVE
BUN SERPL-MCNC: 9 MG/DL (ref 8–23)
BUN/CREAT SERPL: 9.3 (ref 7–25)
CALCIUM SERPL-MCNC: 10 MG/DL (ref 8.6–10.5)
CHLORIDE SERPL-SCNC: 102 MMOL/L (ref 98–107)
CO2 SERPL-SCNC: 26.9 MMOL/L (ref 22–29)
COLOR UR: YELLOW
CREAT SERPL-MCNC: 0.97 MG/DL (ref 0.57–1)
EOSINOPHIL # BLD AUTO: 0.15 10*3/MM3 (ref 0–0.4)
EOSINOPHIL NFR BLD AUTO: 2 % (ref 0.3–6.2)
EPI CELLS #/AREA URNS HPF: NORMAL /HPF (ref 0–10)
ERYTHROCYTE [DISTWIDTH] IN BLOOD BY AUTOMATED COUNT: 12.3 % (ref 12.3–15.4)
FERRITIN SERPL-MCNC: 33 NG/ML (ref 13–150)
GLOBULIN SER CALC-MCNC: 2.6 GM/DL
GLUCOSE SERPL-MCNC: 103 MG/DL (ref 65–99)
GLUCOSE UR QL: NEGATIVE
HBA1C MFR BLD: 5.74 % (ref 4.8–5.6)
HCT VFR BLD AUTO: 34.8 % (ref 34–46.6)
HGB BLD-MCNC: 11.8 G/DL (ref 12–15.9)
HGB UR QL STRIP: ABNORMAL
IMM GRANULOCYTES # BLD AUTO: 0.02 10*3/MM3 (ref 0–0.05)
IMM GRANULOCYTES NFR BLD AUTO: 0.3 % (ref 0–0.5)
KETONES UR QL STRIP: NEGATIVE
LEUKOCYTE ESTERASE UR QL STRIP: NEGATIVE
LYMPHOCYTES # BLD AUTO: 1.84 10*3/MM3 (ref 0.7–3.1)
LYMPHOCYTES NFR BLD AUTO: 24.1 % (ref 19.6–45.3)
MCH RBC QN AUTO: 30.6 PG (ref 26.6–33)
MCHC RBC AUTO-ENTMCNC: 33.9 G/DL (ref 31.5–35.7)
MCV RBC AUTO: 90.4 FL (ref 79–97)
MICRO URNS: ABNORMAL
MONOCYTES # BLD AUTO: 0.79 10*3/MM3 (ref 0.1–0.9)
MONOCYTES NFR BLD AUTO: 10.4 % (ref 5–12)
NEUTROPHILS # BLD AUTO: 4.77 10*3/MM3 (ref 1.7–7)
NEUTROPHILS NFR BLD AUTO: 62.5 % (ref 42.7–76)
NITRITE UR QL STRIP: NEGATIVE
NRBC BLD AUTO-RTO: 0 /100 WBC (ref 0–0.2)
PH UR STRIP: 7.5 [PH] (ref 5–7.5)
PLATELET # BLD AUTO: 306 10*3/MM3 (ref 140–450)
POTASSIUM SERPL-SCNC: 4.8 MMOL/L (ref 3.5–5.2)
PROT SERPL-MCNC: 6.9 G/DL (ref 6–8.5)
PROT UR QL STRIP: NEGATIVE
RBC # BLD AUTO: 3.85 10*6/MM3 (ref 3.77–5.28)
RBC #/AREA URNS HPF: NORMAL /HPF (ref 0–2)
SODIUM SERPL-SCNC: 139 MMOL/L (ref 136–145)
SP GR UR: 1.01 (ref 1–1.03)
TSH SERPL DL<=0.005 MIU/L-ACNC: 5.6 UIU/ML (ref 0.27–4.2)
URINALYSIS REFLEX: ABNORMAL
UROBILINOGEN UR STRIP-MCNC: 0.2 MG/DL (ref 0.2–1)
WBC # BLD AUTO: 7.62 10*3/MM3 (ref 3.4–10.8)
WBC #/AREA URNS HPF: NORMAL /HPF (ref 0–5)

## 2020-09-03 DIAGNOSIS — E03.8 OTHER SPECIFIED HYPOTHYROIDISM: Primary | ICD-10-CM

## 2020-09-03 DIAGNOSIS — D50.0 IRON DEFICIENCY ANEMIA DUE TO CHRONIC BLOOD LOSS: Primary | ICD-10-CM

## 2020-09-03 DIAGNOSIS — R94.5 ABNORMAL LIVER FUNCTION: ICD-10-CM

## 2020-09-03 RX ORDER — LEVOTHYROXINE SODIUM 75 UG/1
75 CAPSULE ORAL DAILY
Qty: 30 CAPSULE | Refills: 2 | Status: SHIPPED | OUTPATIENT
Start: 2020-09-03 | End: 2021-08-10

## 2020-09-03 NOTE — PROGRESS NOTES
PT informed and understood information.    PT said she does not drink, she does take tylenol sometimes

## 2020-09-05 DIAGNOSIS — I10 ESSENTIAL HYPERTENSION: ICD-10-CM

## 2020-09-06 DIAGNOSIS — D50.0 IRON DEFICIENCY ANEMIA DUE TO CHRONIC BLOOD LOSS: ICD-10-CM

## 2020-09-08 RX ORDER — CLONIDINE HYDROCHLORIDE 0.1 MG/1
TABLET ORAL
Qty: 270 TABLET | Refills: 1 | Status: SHIPPED | OUTPATIENT
Start: 2020-09-08 | End: 2021-03-05

## 2020-09-08 RX ORDER — FERROUS SULFATE 325(65) MG
325 TABLET ORAL DAILY
Qty: 30 TABLET | Refills: 2 | Status: SHIPPED | OUTPATIENT
Start: 2020-09-08 | End: 2021-01-18

## 2020-09-08 RX ORDER — VARENICLINE TARTRATE 1 MG/1
TABLET, FILM COATED ORAL
Qty: 56 TABLET | Refills: 0 | Status: SHIPPED | OUTPATIENT
Start: 2020-09-08 | End: 2021-08-09

## 2020-09-10 DIAGNOSIS — E78.49 OTHER HYPERLIPIDEMIA: ICD-10-CM

## 2020-09-10 RX ORDER — ATORVASTATIN CALCIUM 40 MG/1
40 TABLET, FILM COATED ORAL DAILY
Qty: 90 TABLET | Refills: 1 | Status: SHIPPED | OUTPATIENT
Start: 2020-09-10 | End: 2021-03-05

## 2020-09-12 ENCOUNTER — RESULTS ENCOUNTER (OUTPATIENT)
Dept: INTERNAL MEDICINE | Facility: CLINIC | Age: 75
End: 2020-09-12

## 2020-09-12 DIAGNOSIS — R94.5 ABNORMAL LIVER FUNCTION: ICD-10-CM

## 2020-09-12 DIAGNOSIS — D50.0 IRON DEFICIENCY ANEMIA DUE TO CHRONIC BLOOD LOSS: ICD-10-CM

## 2020-09-14 DIAGNOSIS — F41.9 ANXIETY: ICD-10-CM

## 2020-09-15 DIAGNOSIS — M54.50 ACUTE MIDLINE LOW BACK PAIN WITHOUT SCIATICA: ICD-10-CM

## 2020-09-15 RX ORDER — PAROXETINE HYDROCHLORIDE 20 MG/1
20 TABLET, FILM COATED ORAL EVERY MORNING
Qty: 90 TABLET | Refills: 1 | Status: SHIPPED | OUTPATIENT
Start: 2020-09-15 | End: 2021-03-11

## 2020-09-15 RX ORDER — CYCLOBENZAPRINE HYDROCHLORIDE 7.5 MG/1
TABLET, FILM COATED ORAL
Qty: 20 TABLET | Refills: 0 | Status: SHIPPED | OUTPATIENT
Start: 2020-09-15 | End: 2020-09-15 | Stop reason: SDUPTHER

## 2020-09-15 RX ORDER — CYCLOBENZAPRINE HYDROCHLORIDE 7.5 MG/1
7.5 TABLET, FILM COATED ORAL 2 TIMES DAILY PRN
Qty: 20 TABLET | Refills: 0 | Status: SHIPPED | OUTPATIENT
Start: 2020-09-15 | End: 2020-09-29 | Stop reason: SDUPTHER

## 2020-09-15 NOTE — TELEPHONE ENCOUNTER
Caller: Luna Jacobson    Relationship: Self    Best call back number: [phone]    Medication needed:   Requested Prescriptions     Pending Prescriptions Disp Refills   • cyclobenzaprine (FEXMID) 7.5 MG tablet 20 tablet 0     Sig: Take 1 tablet by mouth 2 (Two) Times a Day As Needed for Muscle Spasms.         What details did the patient provide when requesting the medication: PT CALLED STATING SHE IS OUT OF THIS MEDICATION, SHE IS IN SEVERE PAIN AND WANTS TO KNOW OF ANOTHER RX CAN BE CALLED IN FOR IT.     Does the patient have less than a 3 day supply:  [x] Yes  [] No    What is the patient's preferred pharmacy: [facility] #[id number] [place] [address] AT [place] [address] & [facility] - [phone]  - [phone] FX

## 2020-09-27 DIAGNOSIS — M54.50 ACUTE MIDLINE LOW BACK PAIN WITHOUT SCIATICA: ICD-10-CM

## 2020-09-28 ENCOUNTER — TELEPHONE (OUTPATIENT)
Dept: INTERNAL MEDICINE | Facility: CLINIC | Age: 75
End: 2020-09-28

## 2020-09-28 RX ORDER — CYCLOBENZAPRINE HYDROCHLORIDE 7.5 MG/1
TABLET, FILM COATED ORAL
Qty: 20 TABLET | Refills: 0 | OUTPATIENT
Start: 2020-09-28

## 2020-09-29 DIAGNOSIS — M54.50 ACUTE MIDLINE LOW BACK PAIN WITHOUT SCIATICA: ICD-10-CM

## 2020-09-29 RX ORDER — CYCLOBENZAPRINE HYDROCHLORIDE 7.5 MG/1
7.5 TABLET, FILM COATED ORAL 2 TIMES DAILY PRN
Qty: 20 TABLET | Refills: 0 | Status: SHIPPED | OUTPATIENT
Start: 2020-09-29 | End: 2020-10-13 | Stop reason: SDUPTHER

## 2020-10-08 NOTE — PROGRESS NOTES
CHIEF COMPLAINT  Lower back pain       Subjective   Luna Jacobson is a 75 y.o. female who was referred by Dr. Serrano, Danish MAGUIRE MD  to our pain management clinic for consultation, evaluation and treatment of lower back pain. Her lower back pain has been present for several years which was well controlled with Chiropractor care. Her lower back pain has worsened since last 3 months without injury. Denies any surgeries in past. She has taken old norco which has helped with pain.     Lower back pain (L>R) is 8/10 on VAS, at maximum is 9/10. Pain is sharp, shooting and stabbing in nature. Pain is referred left lateral thigh and lateral leg going to 2nd toe on left foot. The pain is constant. The pain is improved by pain meds, laying on floor, exercises. The pain is worse in morning. She is unable to walk much.     PMH: COPD, GERD, HLD, HTN, Thoracic aortic aneurysm     Current Medications:   Norco 5-325 mg q6H PRN (last dose this morning)     Past Medications:  Flexeril 7.5 mg BID PRN    Past Modalities:  TENS:       no          Physical Therapy Within The Last 6 Months     no  Psychotherapy     no  Massage Therapy      no    Patient Complains Of:  Uro-Fecal Incontinence no  Weight Gain/Loss  no  Fever/Chills   no  Weakness   Yes (subjective weakness in left leg)      PEG Assessment   What number best describes your pain on average in the past week?8  What number best describes how, during the past week, pain has interfered with your enjoyment of life?8  What number best describes how, during the past week, pain has interfered with your general activity?  8        Current Outpatient Medications:   •  acetaminophen (TYLENOL) 500 MG tablet, Take 500 mg by mouth., Disp: , Rfl:   •  aspirin 81 MG tablet, Take 81 mg by mouth Daily., Disp: , Rfl:   •  atorvastatin (LIPITOR) 40 MG tablet, TAKE 1 TABLET BY MOUTH DAILY, Disp: 90 tablet, Rfl: 1  •  butalbital-acetaminophen-caffeine (FIORICET, ESGIC) -40 MG per  tablet, TAKE 1 TABLET BY MOUTH EVERY 12 HOURS AS NEEDED FOR HEADACHE, Disp: 30 tablet, Rfl: 1  •  Calcium Carbonate-Vitamin D (Calcium 500/D) 500-125 MG-UNIT tablet, Take  by mouth., Disp: , Rfl:   •  CHANTIX CONTINUING MONTH GURPREET 1 MG tablet, TAKE 1 TABLET BY MOUTH EVERY 12 HOURS AS DIRECTED, Disp: 56 tablet, Rfl: 0  •  cloNIDine (CATAPRES) 0.1 MG tablet, TAKE ONE TABLET BY MOUTH EVERY MORNING AND 2 TABLETS IN THE EVENING, Disp: 270 tablet, Rfl: 1  •  cyclobenzaprine (FEXMID) 7.5 MG tablet, Take 1 tablet by mouth 2 (Two) Times a Day As Needed for Muscle Spasms., Disp: 20 tablet, Rfl: 0  •  dicyclomine (BENTYL) 20 MG tablet, Take 20 mg by mouth Daily., Disp: , Rfl:   •  FEROSUL 325 (65 Fe) MG tablet, TAKE 1 TABLET BY MOUTH DAILY, Disp: 30 tablet, Rfl: 2  •  fexofenadine (ALLEGRA) 180 MG tablet, Take 180 mg by mouth Daily., Disp: , Rfl:   •  levothyroxine sodium (TIROSINT) 75 MCG capsule, Take 1 capsule by mouth Daily., Disp: 30 capsule, Rfl: 2  •  Multiple Vitamin (MULTIVITAMIN) tablet, Take 1 tablet by mouth Daily., Disp: , Rfl:   •  pantoprazole (PROTONIX) 40 MG EC tablet, Take 40 mg by mouth Daily., Disp: , Rfl:   •  PARoxetine (PAXIL) 20 MG tablet, TAKE 1 TABLET BY MOUTH EVERY MORNING, Disp: 90 tablet, Rfl: 1  •  polyethylene glycol (MIRALAX) powder, , Disp: , Rfl:   •  spironolactone (ALDACTONE) 100 MG tablet, TAKE 1 TABLET BY MOUTH DAILY, Disp: 90 tablet, Rfl: 3  •  albuterol sulfate HFA (PROVENTIL HFA) 108 (90 Base) MCG/ACT inhaler, Inhale 2 puffs., Disp: , Rfl:   •  Budesonide (ENTOCORT EC) 3 MG 24 hr capsule, TK 3 CS PO QAM, Disp: , Rfl: 3  •  coenzyme Q10 100 MG capsule, Take 100 mg by mouth Daily., Disp: , Rfl:   •  HYDROcodone-acetaminophen (NORCO) 5-325 MG per tablet, Take 1 tablet by mouth 2 (Two) Times a Day As Needed for Severe Pain ., Disp: 28 tablet, Rfl: 0  •  ketoconazole (NIZORAL) 2 % shampoo, , Disp: , Rfl: 3  •  naloxone (NARCAN) 4 MG/0.1ML nasal spray, 1 spray into the nostril(s) as directed  by provider As Needed (narcotic overdose, respiratory depression) for up to 30 days., Disp: 1 each, Rfl: 0    The following portions of the patient's history were reviewed and updated as appropriate: allergies, current medications, past family history, past medical history, past social history, past surgical history and problem list.      REVIEW OF PERTINENT MEDICAL DATA    Past Medical History:   Diagnosis Date   • Allergic    • Anemia    • Anxiety    • Aortic aneurysm (CMS/HCC)     4 cm thoracic aorta   • Arthritis    • Atypical chest pain    • COPD (chronic obstructive pulmonary disease) (CMS/HCC)    • Coronary artery calcification seen on CT scan 07/2012    mild calcification in the LAD with calcium score of 22. modere narrowing left subclavian origin   • Decreased diffusion capacity of lung    • Diverticulosis    • Gastroparesis    • GERD (gastroesophageal reflux disease)    • GIST (gastrointestinal stromal tumor), non-malignant    • Hyperlipidemia    • Hypertension    • Hypothyroidism    • Irritable bowel syndrome    • Low back pain    • Mild mitral regurgitation    • Osteoporosis    • PAD (peripheral artery disease) (CMS/HCC)     small vessel disease in feet   • Subclavian artery stenosis, left (CMS/HCC) 05/2016    50 percent   • Tubular adenoma of colon 01/2017     Past Surgical History:   Procedure Laterality Date   • APPENDECTOMY     • COLONOSCOPY  10/01/2019    dr mcintyre   • ENDOSCOPY  02/2019   • HEMORRHOIDECTOMY     • HYSTERECTOMY     • TONSILLECTOMY       Family History   Problem Relation Age of Onset   • Hypertension Mother    • Diabetes Mother    • Dementia Mother    • Stroke Mother    • Lung cancer Father    • Alcohol abuse Father    • COPD Father    • Breast cancer Sister    • Hypertension Sister    • Hypothyroidism Sister    • Stroke Sister    • Other Sister         AAA   • Bipolar disorder Daughter    • Fibromyalgia Daughter      Social History     Socioeconomic History   • Marital status:  "     Spouse name: Not on file   • Number of children: Not on file   • Years of education: Not on file   • Highest education level: Not on file   Tobacco Use   • Smoking status: Current Some Day Smoker   • Smokeless tobacco: Never Used   • Tobacco comment: she has smoked intermittently for 40 years; she smoked over 2 ppd twenty years ago   Substance and Sexual Activity   • Alcohol use: No     Frequency: Never   • Drug use: No   • Sexual activity: Defer         Review of Systems   Constitutional: Negative.  Negative for appetite change, chills, fatigue, fever and unexpected weight change.   HENT: Negative.    Eyes: Negative for pain and redness.   Respiratory: Negative.    Cardiovascular: Negative.    Gastrointestinal: Negative.    Endocrine: Negative.    Musculoskeletal: Positive for arthralgias and back pain.   Skin: Negative.    Neurological: Negative.    Psychiatric/Behavioral: Negative.          Vitals:    10/09/20 1109   BP: 132/87   Pulse: 89   Resp: 16   Temp: 98 °F (36.7 °C)   SpO2: 98%   Weight: 64.9 kg (143 lb)   Height: 160 cm (63\")   PainSc:   7         Objective   Physical Exam  Vitals signs and nursing note reviewed.   Constitutional:       Appearance: She is well-developed.   HENT:      Head: Normocephalic and atraumatic.   Eyes:      Conjunctiva/sclera: Conjunctivae normal.   Pulmonary:      Effort: Pulmonary effort is normal.   Abdominal:      Palpations: Abdomen is soft.   Musculoskeletal:      Lumbar back: She exhibits decreased range of motion and tenderness.        Legs:    Skin:     General: Skin is warm.   Neurological:      Mental Status: She is alert and oriented to person, place, and time.      Deep Tendon Reflexes:      Reflex Scores:       Patellar reflexes are 2+ on the right side and 2+ on the left side.       Achilles reflexes are 2+ on the right side and 2+ on the left side.     Comments: Motor strength 5/5 b/l LE  Sensory intact b/l LE             Imaging Reviewed:  MRI done at " [facility] - 9/2020:     L3-L4-a small broad based disc protrusion centrally.  There is moderate facet arthropathy.  This changes contribute to moderate central canal stenosis.  There is moderate right and severe left neural foraminal stenosis.  L4-L5-there is a posterior disc protrusion centrally.  Moderate facet arthropathy and mild limited flavum hypertrophy. Severe central canal stenosis.  Severe bilateral neural foraminal stenosis, right greater than left.  L5-S1-there is small left far lateral disc protrusion.  No central canal stenosis.  There is mild facet arthropathy.  There is moderate bilateral neural foraminal stenosis.    Lumbar X-ray: 10/1/2020   Multilevel degenerative spondylosis.     Assessment:    1. DDD (degenerative disc disease), lumbar    2. Lumbar spondylosis    3. Chronic pain syndrome    4. Chronic narcotic use             Plan:  1. New patient visit, urine drug screen per office policy. UDS today to monitor for compliance with medication use. The UDS is medically necessary to monitor for compliance due to the potential side effects and complications of misuse of opioids. UDS done today will review on next visit.  Narcotic contract discussed on 10/9/2020.   2. Discussed with the patient that pain medicine physicians use a variety of evidence based treatment modalities in order to treat various painful conditions. This may include referral to physical therapy, the use of interventional procedures, referral for psychological assessment and treatment, and the use of both opioid and non-opioid medications as appropriate for the treatment of pain. Informed the patient that opioids have not been proven to be effective for the long term treatment chronic pain conditions, hence our conservative use of these medications. Discussed with the patient that our goal is to use a variety of treatment options that may be indicated for their condition in order to manage their pain and improve their overall  functionality. The patient expressed understanding.   3. We discussed trying a course of formal physical therapy.  Physical therapy can help strengthen and stretch the muscles around the joints. Continue to be as active as possible. Start physical therapy as it will help generalized pain and follow up with HEP.   4. Discussed with the patient regarding the etiology of their pain. Informed them that they would likely benefit from a LESI L4-5. SLR is positive on left. MRI findings show severe b/l neural foraminal stenosis at L3-4, L4-5 and moderate at L5-S1.The procedure was described in detail and the risks, benefits and alternatives were discussed with the patient (including but not limited to: bleeding, infection, nerve damage, worsening of pain, CSF leak, inability to perform injection, paralysis, seizures, and death) who agreed to proceed. Will schedule for procedure.   5. Start Norco 5-325 mg  po q12h (#28 tablets given for 2 week) as needed for pain. Side effects discussed including but not limited to nausea, vomiting, constipation, lightheadedness, dizziness, drowsiness, or headache, abuse potential, respiratory depression, accidental overdosage and death  6. Narcan ordered as patient is on chronic opioid therapy. Narcan Nasal Spray/ Naloxone is used to treat an opioid overdose in an emergency situation.  It blocks or reverses the effects of opioid medication, including extreme drowsiness, slowed breathing, or loss of consciousness.   Administer 1 spray intranasally into 1 nostril, if desired response is not achieved after 2 or 3 minutes, give a second dose intranasally into alternate nostril.  Because Narcan reverses opioid effects, this medicine may cause sudden withdrawal symptoms such as: nausea, vomiting, diarrhea, stomach pain,fever, sweating, body aches, weakness; tremors or shivering, fast heart rate, pounding heartbeats, increased blood pressure; feeling nervous, restless, or irritable; goosebumps,  shivering; runny nose, yawning.  7. Continue meds as per other physicians for now.  8. Will consider starting gabapentin in future.     RTC for injection and then 4 week follow up.     Galileo Portillo DO  Pain Management   [facility]         INSPECT REPORT    As part of the patient's treatment plan, I may be prescribing controlled substances. The patient has been made aware of appropriate use of such medications, including potential risk of somnolence, limited ability to drive and/or work safely, and the potential for dependence or overdose. It has also been made clear that these medications are for use by this patient only, without concomitant use of alcohol or other substances unless prescribed.     Patient has completed prescribing agreement detailing terms of continued prescribing of controlled substances, including monitoring INSPECT reports, urine drug screening, and pill counts if necessary. The patient is aware that inappropriate use will results in cessation of prescribing such medications.    INSPECT report has been reviewed and scanned into the patient's chart.

## 2020-10-09 ENCOUNTER — OFFICE VISIT (OUTPATIENT)
Dept: PAIN MEDICINE | Facility: CLINIC | Age: 75
End: 2020-10-09

## 2020-10-09 VITALS
OXYGEN SATURATION: 98 % | WEIGHT: 143 LBS | DIASTOLIC BLOOD PRESSURE: 87 MMHG | RESPIRATION RATE: 16 BRPM | BODY MASS INDEX: 25.34 KG/M2 | HEART RATE: 89 BPM | SYSTOLIC BLOOD PRESSURE: 132 MMHG | HEIGHT: 63 IN | TEMPERATURE: 98 F

## 2020-10-09 DIAGNOSIS — M51.36 DDD (DEGENERATIVE DISC DISEASE), LUMBAR: Primary | ICD-10-CM

## 2020-10-09 DIAGNOSIS — Z79.899 ENCOUNTER FOR LONG-TERM (CURRENT) USE OF OTHER MEDICATIONS: ICD-10-CM

## 2020-10-09 DIAGNOSIS — M47.816 LUMBAR SPONDYLOSIS: ICD-10-CM

## 2020-10-09 DIAGNOSIS — G89.4 CHRONIC PAIN SYNDROME: ICD-10-CM

## 2020-10-09 DIAGNOSIS — F11.90 CHRONIC NARCOTIC USE: ICD-10-CM

## 2020-10-09 PROCEDURE — G0463 HOSPITAL OUTPT CLINIC VISIT: HCPCS | Performed by: STUDENT IN AN ORGANIZED HEALTH CARE EDUCATION/TRAINING PROGRAM

## 2020-10-09 PROCEDURE — 99204 OFFICE O/P NEW MOD 45 MIN: CPT | Performed by: STUDENT IN AN ORGANIZED HEALTH CARE EDUCATION/TRAINING PROGRAM

## 2020-10-09 RX ORDER — NALOXONE HYDROCHLORIDE 4 MG/.1ML
1 SPRAY NASAL AS NEEDED
Qty: 1 EACH | Refills: 0 | Status: SHIPPED | OUTPATIENT
Start: 2020-10-09 | End: 2020-11-08

## 2020-10-09 RX ORDER — HYDROCODONE BITARTRATE AND ACETAMINOPHEN 5; 325 MG/1; MG/1
1 TABLET ORAL 2 TIMES DAILY PRN
Qty: 28 TABLET | Refills: 0 | Status: SHIPPED | OUTPATIENT
Start: 2020-10-09 | End: 2020-10-13

## 2020-10-12 DIAGNOSIS — M54.50 ACUTE MIDLINE LOW BACK PAIN WITHOUT SCIATICA: ICD-10-CM

## 2020-10-12 RX ORDER — CYCLOBENZAPRINE HYDROCHLORIDE 7.5 MG/1
TABLET, FILM COATED ORAL
Qty: 20 TABLET | Refills: 0 | OUTPATIENT
Start: 2020-10-12

## 2020-10-13 ENCOUNTER — HOSPITAL ENCOUNTER (OUTPATIENT)
Dept: PAIN MEDICINE | Facility: HOSPITAL | Age: 75
Discharge: HOME | End: 2020-10-13

## 2020-10-13 VITALS
HEART RATE: 84 BPM | RESPIRATION RATE: 16 BRPM | WEIGHT: 143 LBS | OXYGEN SATURATION: 100 % | DIASTOLIC BLOOD PRESSURE: 98 MMHG | SYSTOLIC BLOOD PRESSURE: 160 MMHG | TEMPERATURE: 98.1 F | BODY MASS INDEX: 25.34 KG/M2 | HEIGHT: 63 IN

## 2020-10-13 DIAGNOSIS — M51.36 DDD (DEGENERATIVE DISC DISEASE), LUMBAR: Primary | ICD-10-CM

## 2020-10-13 DIAGNOSIS — M54.50 ACUTE MIDLINE LOW BACK PAIN WITHOUT SCIATICA: ICD-10-CM

## 2020-10-13 DIAGNOSIS — G89.4 CHRONIC PAIN SYNDROME: ICD-10-CM

## 2020-10-13 DIAGNOSIS — M47.816 LUMBAR SPONDYLOSIS: ICD-10-CM

## 2020-10-13 DIAGNOSIS — R52 PAIN: ICD-10-CM

## 2020-10-13 PROCEDURE — G0463 HOSPITAL OUTPT CLINIC VISIT: HCPCS | Performed by: STUDENT IN AN ORGANIZED HEALTH CARE EDUCATION/TRAINING PROGRAM

## 2020-10-13 PROCEDURE — G0463 HOSPITAL OUTPT CLINIC VISIT: HCPCS

## 2020-10-13 PROCEDURE — 62323 NJX INTERLAMINAR LMBR/SAC: CPT | Performed by: STUDENT IN AN ORGANIZED HEALTH CARE EDUCATION/TRAINING PROGRAM

## 2020-10-13 PROCEDURE — 25010000002 METHYLPREDNISOLONE PER 80 MG

## 2020-10-13 PROCEDURE — 77003 FLUOROGUIDE FOR SPINE INJECT: CPT

## 2020-10-13 PROCEDURE — 62323 NJX INTERLAMINAR LMBR/SAC: CPT

## 2020-10-13 RX ORDER — OXYCODONE HYDROCHLORIDE AND ACETAMINOPHEN 5; 325 MG/1; MG/1
1 TABLET ORAL 2 TIMES DAILY PRN
Qty: 60 TABLET | Refills: 0 | Status: SHIPPED | OUTPATIENT
Start: 2020-10-13 | End: 2020-11-10 | Stop reason: SDUPTHER

## 2020-10-13 RX ORDER — METHYLPREDNISOLONE ACETATE 80 MG/ML
80 INJECTION, SUSPENSION INTRA-ARTICULAR; INTRALESIONAL; INTRAMUSCULAR; SOFT TISSUE ONCE
Status: DISCONTINUED | OUTPATIENT
Start: 2020-10-13 | End: 2020-10-14 | Stop reason: HOSPADM

## 2020-10-13 RX ORDER — LIDOCAINE HYDROCHLORIDE 10 MG/ML
INJECTION, SOLUTION EPIDURAL; INFILTRATION; INTRACAUDAL; PERINEURAL
Status: COMPLETED
Start: 2020-10-13 | End: 2020-10-13

## 2020-10-13 RX ORDER — CYCLOBENZAPRINE HYDROCHLORIDE 7.5 MG/1
7.5 TABLET, FILM COATED ORAL 2 TIMES DAILY PRN
Qty: 20 TABLET | Refills: 0 | Status: SHIPPED | OUTPATIENT
Start: 2020-10-13 | End: 2020-10-13 | Stop reason: SDUPTHER

## 2020-10-13 RX ORDER — LIDOCAINE HYDROCHLORIDE 10 MG/ML
5 INJECTION, SOLUTION EPIDURAL; INFILTRATION; INTRACAUDAL; PERINEURAL ONCE
Status: COMPLETED | OUTPATIENT
Start: 2020-10-13 | End: 2020-10-13

## 2020-10-13 RX ORDER — METHYLPREDNISOLONE ACETATE 80 MG/ML
INJECTION, SUSPENSION INTRA-ARTICULAR; INTRALESIONAL; INTRAMUSCULAR; SOFT TISSUE
Status: DISCONTINUED
Start: 2020-10-13 | End: 2020-10-14 | Stop reason: HOSPADM

## 2020-10-13 RX ORDER — CYCLOBENZAPRINE HYDROCHLORIDE 7.5 MG/1
7.5 TABLET, FILM COATED ORAL 2 TIMES DAILY PRN
Qty: 60 TABLET | Refills: 1 | Status: SHIPPED | OUTPATIENT
Start: 2020-10-13 | End: 2020-11-10 | Stop reason: SDUPTHER

## 2020-10-13 RX ADMIN — LIDOCAINE HYDROCHLORIDE 5 ML: 10 INJECTION, SOLUTION EPIDURAL; INFILTRATION; INTRACAUDAL; PERINEURAL at 13:45

## 2020-10-13 RX ADMIN — LIDOCAINE HYDROCHLORIDE 5 ML: 10 INJECTION, SOLUTION EPIDURAL; INFILTRATION; INTRACAUDAL at 13:45

## 2020-10-13 NOTE — H&P
H and P reviewed from previous visit and no changes to patient's clinical presentation. Will proceed with procedure as planned. Patient denies history of DM and being on blood thinners.      Galileo Portillo DO  Pain Management   [facility]

## 2020-10-13 NOTE — PROGRESS NOTES
Patient complaints that her pain is not well controlled unless she takes 2 tablets of Norco at the same time and even that doesn't last more than few hours.     Advised to patient that, she is not supposed to take more medications than prescribed. Also, explained adverse effects of pain medications.     Due to increased pain, we will stop Norco and start her on Percocet 5-325 mg BID PRN. Patient will bring left over Norco on next visit.     Will also add flexeril 10 mg TID PRN. Common side effects of flexeril include blurred vision, dizziness or lightheadedness, drowsiness, dryness of mouth, bloated feeling or gas, indigestion, nausea or vomiting, or stomach cramps or pain, constipation, diarrhea, excitement or nervousness, frequent urination, general feeling of discomfort or illness, headache, muscle twitching, numbness, tingling, pain, or weakness in hands or feet, pounding heartbeat, problems in speaking, trembling, trouble in sleeping, unpleasant taste or other taste changes, unusual muscle weakness or unusual tiredness, especially during the first few days as your body adjusts to this medication.    RTC in 1 month.     Galileo Portillo DO  Pain Management   [facility]

## 2020-10-13 NOTE — PROCEDURES
PREOPERATIVE DIAGNOSIS:    1. Lumbar DDD    POSTOPERATIVE DIAGNOSIS: Same    PROCEDURE:  Lumbar epidural steroid injection L 4-5    PROCEDURE NOTE:  After obtaining written informed consent patient was taken to the procedure room. Pre-procedure blood pressure and pulse were stable and recorded in patients clinic chart.     The patient was placed in the prone position. The lower back was prepped with antiseptic solution and draped in the usual sterile fashion.  The skin over the L4-5 space was identified under fluoroscopic guidance and infiltrated with 1% lidocaine for local anesthesia via 25 gauge needle.  A 20 gauge tuohy needle was used to access the epidural space using loss of resistance to air technique. Patient started to get nervous and hyperventilating while doing injection. Upon reaching close to dura, osteophytes were encountered and needle was harder to advance. Patient also complained of pain. Needle was removed from L4-5 level.  Needle was moved to a level below at L5-S1 after infiltration of 1% lidocaine at the site. Needle was advanced until loss of resistance was achieved.  Mild CSF was aspirated at this level. Needle was quickly removed. Procedure at this point was terminated.     Patient and daughter were explained what happened during the procedure and why it was terminated. Also, explained about risk of spinal headache. Patient and daughter understood and agreed.     Following the procedure the patient's vital signs were stable. The patient was discharged home in good condition after being given discharge instructions.    COMPLICATIONS: Dural puncture.     Galileo Portillo DO  Pain Management   [facility]

## 2020-10-14 ENCOUNTER — TELEPHONE (OUTPATIENT)
Dept: PAIN MEDICINE | Facility: HOSPITAL | Age: 75
End: 2020-10-14

## 2020-10-14 NOTE — ADDENDUM NOTE
Encounter addended by: Annamaria Harley RN on: 10/14/2020 8:05 AM   Actions taken: Charge Capture section accepted

## 2020-10-14 NOTE — TELEPHONE ENCOUNTER
She thought you were going to order her pain medication at 3 times a day, she does not think with the amount of pain she is having that she will be able to tolerate only taking it BID.

## 2020-10-14 NOTE — TELEPHONE ENCOUNTER
She stated she understands but she is taking 1 in the morning and one at night but is having pain in between the 2 doses.

## 2020-10-14 NOTE — ADDENDUM NOTE
Encounter addended by: Galileo Portillo DO on: 10/13/2020 8:28 PM   Actions taken: Clinical Note Signed

## 2020-10-26 NOTE — ADDENDUM NOTE
Encounter addended by: Shari Cedeño LPN on: 10/26/2020 1:10 PM   Actions taken: Charge Capture section accepted

## 2020-10-27 ENCOUNTER — TELEPHONE (OUTPATIENT)
Dept: PHYSICAL THERAPY | Facility: CLINIC | Age: 75
End: 2020-10-27

## 2020-10-27 NOTE — TELEPHONE ENCOUNTER
SAME DAY CANCEL PATIENT IS NOT FEELING WELL TODAY SHE COULD NOT STAY ON THE PHONE TO RESCHEDULE APPT.

## 2020-11-02 ENCOUNTER — TELEPHONE (OUTPATIENT)
Dept: PAIN MEDICINE | Facility: HOSPITAL | Age: 75
End: 2020-11-02

## 2020-11-02 DIAGNOSIS — M51.36 DDD (DEGENERATIVE DISC DISEASE), LUMBAR: Primary | ICD-10-CM

## 2020-11-02 NOTE — TELEPHONE ENCOUNTER
Patient wants to know instead of follow up if she can schedule another LESI but this time with sedation.

## 2020-11-10 ENCOUNTER — APPOINTMENT (OUTPATIENT)
Dept: PAIN MEDICINE | Facility: CLINIC | Age: 75
End: 2020-11-10

## 2020-11-10 ENCOUNTER — HOSPITAL ENCOUNTER (OUTPATIENT)
Dept: PAIN MEDICINE | Facility: HOSPITAL | Age: 75
Discharge: HOME | End: 2020-11-10

## 2020-11-10 VITALS
BODY MASS INDEX: 25.34 KG/M2 | HEART RATE: 73 BPM | HEIGHT: 63 IN | OXYGEN SATURATION: 99 % | SYSTOLIC BLOOD PRESSURE: 124 MMHG | WEIGHT: 143 LBS | DIASTOLIC BLOOD PRESSURE: 70 MMHG | RESPIRATION RATE: 18 BRPM | TEMPERATURE: 97.3 F

## 2020-11-10 DIAGNOSIS — R52 PAIN: ICD-10-CM

## 2020-11-10 DIAGNOSIS — M51.36 DDD (DEGENERATIVE DISC DISEASE), LUMBAR: Primary | ICD-10-CM

## 2020-11-10 DIAGNOSIS — M54.50 ACUTE MIDLINE LOW BACK PAIN WITHOUT SCIATICA: ICD-10-CM

## 2020-11-10 PROCEDURE — 77003 FLUOROGUIDE FOR SPINE INJECT: CPT

## 2020-11-10 PROCEDURE — 25010000002 METHYLPREDNISOLONE PER 80 MG

## 2020-11-10 PROCEDURE — 25010000002 MIDAZOLAM PER 1 MG

## 2020-11-10 PROCEDURE — 62323 NJX INTERLAMINAR LMBR/SAC: CPT | Performed by: STUDENT IN AN ORGANIZED HEALTH CARE EDUCATION/TRAINING PROGRAM

## 2020-11-10 PROCEDURE — 25010000002 FENTANYL CITRATE (PF) 100 MCG/2ML SOLUTION

## 2020-11-10 PROCEDURE — 0 IOPAMIDOL 41 % SOLUTION: Performed by: STUDENT IN AN ORGANIZED HEALTH CARE EDUCATION/TRAINING PROGRAM

## 2020-11-10 RX ORDER — MIDAZOLAM HYDROCHLORIDE 1 MG/ML
1 INJECTION INTRAMUSCULAR; INTRAVENOUS ONCE
Status: COMPLETED | OUTPATIENT
Start: 2020-11-10 | End: 2020-11-10

## 2020-11-10 RX ORDER — FENTANYL CITRATE 50 UG/ML
50 INJECTION, SOLUTION INTRAMUSCULAR; INTRAVENOUS ONCE
Status: COMPLETED | OUTPATIENT
Start: 2020-11-10 | End: 2020-11-10

## 2020-11-10 RX ORDER — CYCLOBENZAPRINE HYDROCHLORIDE 7.5 MG/1
7.5 TABLET, FILM COATED ORAL 2 TIMES DAILY PRN
Qty: 60 TABLET | Refills: 1 | Status: SHIPPED | OUTPATIENT
Start: 2020-11-10 | End: 2020-12-08 | Stop reason: SDUPTHER

## 2020-11-10 RX ORDER — OXYCODONE HYDROCHLORIDE AND ACETAMINOPHEN 5; 325 MG/1; MG/1
1 TABLET ORAL 2 TIMES DAILY PRN
Qty: 60 TABLET | Refills: 0 | Status: SHIPPED | OUTPATIENT
Start: 2020-11-12 | End: 2020-12-08 | Stop reason: SDUPTHER

## 2020-11-10 RX ORDER — MIDAZOLAM HYDROCHLORIDE 1 MG/ML
INJECTION INTRAMUSCULAR; INTRAVENOUS
Status: DISPENSED
Start: 2020-11-10 | End: 2020-11-10

## 2020-11-10 RX ORDER — METHYLPREDNISOLONE ACETATE 80 MG/ML
INJECTION, SUSPENSION INTRA-ARTICULAR; INTRALESIONAL; INTRAMUSCULAR; SOFT TISSUE
Status: DISPENSED
Start: 2020-11-10 | End: 2020-11-10

## 2020-11-10 RX ORDER — FENTANYL CITRATE 50 UG/ML
INJECTION, SOLUTION INTRAMUSCULAR; INTRAVENOUS
Status: DISPENSED
Start: 2020-11-10 | End: 2020-11-10

## 2020-11-10 RX ORDER — METHYLPREDNISOLONE ACETATE 80 MG/ML
80 INJECTION, SUSPENSION INTRA-ARTICULAR; INTRALESIONAL; INTRAMUSCULAR; SOFT TISSUE ONCE
Status: COMPLETED | OUTPATIENT
Start: 2020-11-10 | End: 2020-11-10

## 2020-11-10 RX ADMIN — MIDAZOLAM HYDROCHLORIDE 1 MG: 1 INJECTION INTRAMUSCULAR; INTRAVENOUS at 08:26

## 2020-11-10 RX ADMIN — METHYLPREDNISOLONE ACETATE 80 MG: 80 INJECTION, SUSPENSION INTRA-ARTICULAR; INTRALESIONAL; INTRAMUSCULAR; SOFT TISSUE at 08:30

## 2020-11-10 RX ADMIN — FENTANYL CITRATE 25 MCG: 50 INJECTION, SOLUTION INTRAMUSCULAR; INTRAVENOUS at 08:26

## 2020-11-10 RX ADMIN — IOPAMIDOL 3 ML: 408 INJECTION, SOLUTION INTRATHECAL at 08:29

## 2020-11-10 NOTE — H&P
H and P reviewed from previous visit and no changes to patient's clinical presentation. Will proceed with procedure as planned. Patient denies history of DM and being on blood thinners.    Prescription of Percocet 5-325 mg BID PRN (11/12/2020 date for 1 prescription) and Flexeril 7.5 mg BID PRN sent after reviewing INSPECT.     Galileo Portillo DO  Pain Management   [facility]

## 2020-11-10 NOTE — PROCEDURES
PREOPERATIVE DIAGNOS  1. Lumbar DDD    POSTOPERATIVE DIAGNOSIS:  1.  Same    PROCEDURE:  Caudal Epidural Steroid Injection under Fluoroscopy.    PROCEDURE NOTE:  After obtaining written informed consent patient was taken to the procedure room. Pre-procedure blood pressure and pulse were stable and recorded in patients clinic chart.     The patient was placed in the prone position on fluoroscopy table.  The lower back was prepped with chloraprep times three and draped in the usual sterile fashion.  The skin over the sacral hiatus was identified under fluoroscopic guidance and infiltrated with 1% lidocaine for local anesthesia via 25 gauge needle.  An 22-gauge spinal needle was used to access the epidural space under fluoroscopic guidance.  3 cc of the omnipaque dye was injected through the catheter with good spread seen from L5-S2 area.  80 mg of DepoMedrol with 3 cc of saline as injected. There was no evidence of CSF, paresthesia or heme. The needle was cleared with preservative free local anesthetic and removed. Skin was cleaned and a sterile dressing was applied.    Following the procedure the patient's vital signs were stable. The patient was discharged home in good condition after being given discharge instructions.    Sedation: 1 mg Versed, 25 mcg Fentanyl     COMPLICATIONS: None      Galileo Portillo DO  Pain Management   [facility]

## 2020-11-10 NOTE — ADDENDUM NOTE
Encounter addended by: Liliana Parmar RN on: 11/10/2020 8:53 AM   Actions taken: LDA removed, Flowsheet accepted

## 2020-11-10 NOTE — PATIENT INSTRUCTIONS
Moderate Conscious Sedation, Adult, Care After  These instructions provide you with information about caring for yourself after your procedure. Your health care provider may also give you more specific instructions. Your treatment has been planned according to current medical practices, but problems sometimes occur. Call your health care provider if you have any problems or questions after your procedure.  What can I expect after the procedure?  After your procedure, it is common:  · To feel sleepy for several hours.  · To feel clumsy and have poor balance for several hours.  · To have poor judgment for several hours.  · To vomit if you eat too soon.  Follow these instructions at home:  For at least 24 hours after the procedure:    · Do not:  ? Participate in activities where you could fall or become injured.  ? Drive.  ? Use heavy machinery.  ? Drink alcohol.  ? Take sleeping pills or medicines that cause drowsiness.  ? Make important decisions or sign legal documents.  ? Take care of children on your own.  · Rest.  Eating and drinking  · Follow the diet recommended by your health care provider.  · If you vomit:  ? Drink water, juice, or soup when you can drink without vomiting.  ? Make sure you have little or no nausea before eating solid foods.  General instructions  · Have a responsible adult stay with you until you are awake and alert.  · Take over-the-counter and prescription medicines only as told by your health care provider.  · If you smoke, do not smoke without supervision.  · Keep all follow-up visits as told by your health care provider. This is important.  Contact a health care provider if:  · You keep feeling nauseous or you keep vomiting.  · You feel light-headed.  · You develop a rash.  · You have a fever.  Get help right away if:  · You have trouble breathing.  This information is not intended to replace advice given to you by your health care provider. Make sure you discuss any questions you have  with your health care provider.  Document Released: 10/08/2014 Document Revised: 11/30/2018 Document Reviewed: 04/08/2017  [employer] Patient Education © 2020 [employer]  Epidural Steroid Injection, Care After  Refer to this sheet in the next few weeks. These instructions provide you with information about caring for yourself after your procedure. Your health care provider may also give you more specific instructions. Your treatment has been planned according to current medical practices, but problems sometimes occur. Call your health care provider if you have any problems or questions after your procedure.  What can I expect after the procedure?  After your procedure, it is common to feel a little discomfort at the injection site.  Follow these instructions at home:  · For 24 hours after the procedure:  ? Avoid using heat on the injection site.  ? Do not take a tub bath, and do not soak in water.  ? Do not drive if you received a medicine to help you relax (sedative).  · If directed, put ice on the injection site:  ? Put ice in a plastic bag.  ? Place a towel between your skin and the bag.  ? Leave the ice on for 20 minutes, 2-3 times a day.  · Return to your normal activities as told by your health care provider. Ask your health care provider what activities are safe for you.  · You may remove the bandage (dressing) after 24 hours.  · Take over-the-counter and prescription medicines only as told by your health care provider.  · Keep all follow-up visits as told by your health care provider. This is important.  Contact a health care provider if:  · You have a fever.  · You continue to have pain and soreness around the injection site, even after taking over-the-counter pain medicine.  · You have severe, sudden, or lasting nausea or vomiting.  Get help right away if:  · You have severe pain at the injection site that is not relieved by medicines.  · You develop a severe headache or a stiff neck.  · You become  sensitive to light.  · You have any new numbness or weakness in your legs or arms.  · You lose control of your bladder or bowel movements.  · You have trouble breathing.  This information is not intended to replace advice given to you by your health care provider. Make sure you discuss any questions you have with your health care provider.  Document Released: 04/03/2012 Document Revised: 11/30/2018 Document Reviewed: 04/04/2017  [employer] Patient Education © 2020 [employer]

## 2020-12-07 NOTE — PROGRESS NOTES
Subjective   Luna Jacobson is a 75 y.o. female is here for follow up for lower back pain. Patient was last seen on 11/20/2020 for Caudal NIMA  With excellent relief. She was switched from Norco to percocet and she is doing well on those meds.     On last visit:     Lower back pain is 5/10 on VAS, at maximum is 6/10. Pain is sharp and shooting, stabbing in nature. Pain is referred left lateral thigh, left ankle. The pain is cocnstant. The pain is improved by caudal NIMA, pain meds. The pain is worse with walking.      Previous Injection:   11/20/2020 - Caudal NIMA - 80% pain relief ongoing     PMH: COPD, GERD, HLD, HTN, Thoracic aortic aneurysm      Current Medications:   Percocet 5-325 mg BID PRN   Flexeril 7.5 mg BID PRN     Past Medications:       Past Modalities:  TENS:                                                                          no                                                  Physical Therapy Within The Last 6 Months              no  Psychotherapy                                                            no  Massage Therapy                                                       no     Patient Complains Of:  Uro-Fecal Incontinence          no  Weight Gain/Loss                   no  Fever/Chills                             no  Weakness                               Yes (subjective weakness in left leg)              Current Outpatient Medications:   •  acetaminophen (TYLENOL) 500 MG tablet, Take 500 mg by mouth As Needed., Disp: , Rfl:   •  albuterol sulfate HFA (PROVENTIL HFA) 108 (90 Base) MCG/ACT inhaler, Inhale 2 puffs., Disp: , Rfl:   •  aspirin 81 MG tablet, Take 81 mg by mouth Daily., Disp: , Rfl:   •  atorvastatin (LIPITOR) 40 MG tablet, TAKE 1 TABLET BY MOUTH DAILY, Disp: 90 tablet, Rfl: 1  •  Budesonide (ENTOCORT EC) 3 MG 24 hr capsule, TK 3 CS PO QAM, Disp: , Rfl: 3  •  butalbital-acetaminophen-caffeine (FIORICET, ESGIC) -40 MG per tablet, TAKE 1 TABLET BY MOUTH EVERY 12 HOURS  AS NEEDED FOR HEADACHE, Disp: 30 tablet, Rfl: 1  •  Calcium Carbonate-Vitamin D (Calcium 500/D) 500-125 MG-UNIT tablet, Take  by mouth., Disp: , Rfl:   •  CHANTIX CONTINUING MONTH GURPREET 1 MG tablet, TAKE 1 TABLET BY MOUTH EVERY 12 HOURS AS DIRECTED, Disp: 56 tablet, Rfl: 0  •  cloNIDine (CATAPRES) 0.1 MG tablet, TAKE ONE TABLET BY MOUTH EVERY MORNING AND 2 TABLETS IN THE EVENING, Disp: 270 tablet, Rfl: 1  •  coenzyme Q10 100 MG capsule, Take 100 mg by mouth Daily., Disp: , Rfl:   •  cyclobenzaprine (FEXMID) 7.5 MG tablet, Take 1 tablet by mouth 2 (Two) Times a Day As Needed for Muscle Spasms for up to 60 days., Disp: 60 tablet, Rfl: 1  •  dicyclomine (BENTYL) 20 MG tablet, Take 20 mg by mouth Daily., Disp: , Rfl:   •  FEROSUL 325 (65 Fe) MG tablet, TAKE 1 TABLET BY MOUTH DAILY, Disp: 30 tablet, Rfl: 2  •  fexofenadine (ALLEGRA) 180 MG tablet, Take 180 mg by mouth Daily., Disp: , Rfl:   •  ketoconazole (NIZORAL) 2 % shampoo, , Disp: , Rfl: 3  •  levothyroxine sodium (TIROSINT) 75 MCG capsule, Take 1 capsule by mouth Daily., Disp: 30 capsule, Rfl: 2  •  Multiple Vitamin (MULTIVITAMIN) tablet, Take 1 tablet by mouth Daily., Disp: , Rfl:   •  oxyCODONE-acetaminophen (PERCOCET) 5-325 MG per tablet, Take 1 tablet by mouth 2 (Two) Times a Day As Needed for Severe Pain  for up to 30 days., Disp: 60 tablet, Rfl: 0  •  pantoprazole (PROTONIX) 40 MG EC tablet, Take 40 mg by mouth Daily., Disp: , Rfl:   •  PARoxetine (PAXIL) 20 MG tablet, TAKE 1 TABLET BY MOUTH EVERY MORNING, Disp: 90 tablet, Rfl: 1  •  polyethylene glycol (MIRALAX) powder, , Disp: , Rfl:   •  spironolactone (ALDACTONE) 100 MG tablet, TAKE 1 TABLET BY MOUTH DAILY, Disp: 90 tablet, Rfl: 3    The following portions of the patient's history were reviewed and updated as appropriate: allergies, current medications, past family history, past medical history, past social history, past surgical history and problem list.      REVIEW OF PERTINENT MEDICAL DATA    Past  Medical History:   Diagnosis Date   • Allergic    • Anemia    • Anxiety    • Aortic aneurysm (CMS/HCC)     4 cm thoracic aorta   • Arthritis    • Atypical chest pain    • COPD (chronic obstructive pulmonary disease) (CMS/HCC)    • Coronary artery calcification seen on CT scan 2012    mild calcification in the LAD with calcium score of 22. modere narrowing left subclavian origin   • Decreased diffusion capacity of lung    • Diverticulosis    • Gastroparesis    • GERD (gastroesophageal reflux disease)    • GIST (gastrointestinal stromal tumor), non-malignant    • Hyperlipidemia    • Hypertension    • Hypothyroidism    • Irritable bowel syndrome    • Low back pain    • Mild mitral regurgitation    • Osteoporosis    • PAD (peripheral artery disease) (CMS/HCC)     small vessel disease in feet   • Subclavian artery stenosis, left (CMS/HCC) 2016    50 percent   • Tubular adenoma of colon 2017     Past Surgical History:   Procedure Laterality Date   • APPENDECTOMY     • COLONOSCOPY  10/01/2019    dr mcintyre   • ENDOSCOPY  2019   • HEMORRHOIDECTOMY     • HYSTERECTOMY     • TONSILLECTOMY       Family History   Problem Relation Age of Onset   • Hypertension Mother    • Diabetes Mother    • Dementia Mother    • Stroke Mother    • Lung cancer Father    • Alcohol abuse Father    • COPD Father    • Breast cancer Sister    • Hypertension Sister    • Hypothyroidism Sister    • Stroke Sister    • Other Sister         AAA   • Bipolar disorder Daughter    • Fibromyalgia Daughter      Social History     Socioeconomic History   • Marital status:      Spouse name: Not on file   • Number of children: Not on file   • Years of education: Not on file   • Highest education level: Not on file   Tobacco Use   • Smoking status: Former Smoker     Quit date: 2020     Years since quittin.3   • Smokeless tobacco: Never Used   • Tobacco comment: she has smoked intermittently for 40 years; she smoked over 2 ppd twenty years  ago   Substance and Sexual Activity   • Alcohol use: No     Frequency: Never   • Drug use: No   • Sexual activity: Defer         Review of Systems      There were no vitals filed for this visit.      Objective   Physical Exam      Imaging Reviewed:  Imaging Reviewed:  MRI done at [facility] - 9/2020:      L3-L4-a small broad based disc protrusion centrally.  There is moderate facet arthropathy.  This changes contribute to moderate central canal stenosis.  There is moderate right and severe left neural foraminal stenosis.  L4-L5-there is a posterior disc protrusion centrally.  Moderate facet arthropathy and mild limited flavum hypertrophy. Severe central canal stenosis.  Severe bilateral neural foraminal stenosis, right greater than left.  L5-S1-there is small left far lateral disc protrusion.  No central canal stenosis.  There is mild facet arthropathy.  There is moderate bilateral neural foraminal stenosis.     Lumbar X-ray: 10/1/2020   Multilevel degenerative spondylosis.          Assessment:    No diagnosis found.   Assessment:     1. DDD (degenerative disc disease), lumbar    2. Lumbar spondylosis    3. Chronic pain syndrome    4. Chronic narcotic use                Plan:  1. UDS- 10/9/2020- consistent with patient interview.  Narcotic contract discussed on 10/9/2020. Narcan ordered on 10/9/2020.  2. Continue Percocet 5-325 mg  po BID PRN (#60 tablets- 2 prescriptions given) as needed for pain. Side effects discussed including but not limited to nausea, vomiting, constipation, lightheadedness, dizziness, drowsiness, or headache, abuse potential, respiratory depression, accidental overdosage and death  3. Continue Flexeril 7.5 mg BID PRN. Common side effects of flexeril include blurred vision, dizziness or lightheadedness, drowsiness, dryness of mouth, bloated feeling or gas, indigestion, nausea or vomiting, or stomach cramps or pain, constipation, diarrhea, excitement or nervousness, frequent urination, general  feeling of discomfort or illness, headache, muscle twitching, numbness, tingling, pain, or weakness in hands or feet, pounding heartbeat, problems in speaking, trembling, trouble in sleeping, unpleasant taste or other taste changes, unusual muscle weakness or unusual tiredness, especially during the first few days as your body adjusts to this medication.  4. Will repeat Caudal NIMA as needed in future.     RTC in 2 months.       Galileo Portillo DO  Pain Management   [facility]         INSPECT REPORT    As part of the patient's treatment plan, I may be prescribing controlled substances. The patient has been made aware of appropriate use of such medications, including potential risk of somnolence, limited ability to drive and/or work safely, and the potential for dependence or overdose. It has also been made clear that these medications are for use by this patient only, without concomitant use of alcohol or other substances unless prescribed.     Patient has completed prescribing agreement detailing terms of continued prescribing of controlled substances, including monitoring INSPECT reports, urine drug screening, and pill counts if necessary. The patient is aware that inappropriate use will results in cessation of prescribing such medications.    INSPECT report has been reviewed and scanned into the patient's chart.

## 2020-12-08 ENCOUNTER — OFFICE VISIT (OUTPATIENT)
Dept: PAIN MEDICINE | Facility: CLINIC | Age: 75
End: 2020-12-08

## 2020-12-08 VITALS
HEIGHT: 63 IN | OXYGEN SATURATION: 99 % | HEART RATE: 81 BPM | DIASTOLIC BLOOD PRESSURE: 73 MMHG | BODY MASS INDEX: 25.87 KG/M2 | WEIGHT: 146 LBS | SYSTOLIC BLOOD PRESSURE: 117 MMHG | RESPIRATION RATE: 16 BRPM | TEMPERATURE: 97.3 F

## 2020-12-08 DIAGNOSIS — M54.40 CHRONIC LEFT-SIDED LOW BACK PAIN WITH SCIATICA, SCIATICA LATERALITY UNSPECIFIED: ICD-10-CM

## 2020-12-08 DIAGNOSIS — G89.29 CHRONIC LEFT-SIDED LOW BACK PAIN WITH SCIATICA, SCIATICA LATERALITY UNSPECIFIED: ICD-10-CM

## 2020-12-08 DIAGNOSIS — M51.36 DDD (DEGENERATIVE DISC DISEASE), LUMBAR: Primary | ICD-10-CM

## 2020-12-08 PROBLEM — M51.369 DDD (DEGENERATIVE DISC DISEASE), LUMBAR: Status: ACTIVE | Noted: 2020-12-08

## 2020-12-08 PROBLEM — M54.50 ACUTE MIDLINE LOW BACK PAIN WITHOUT SCIATICA: Status: RESOLVED | Noted: 2020-09-01 | Resolved: 2020-12-08

## 2020-12-08 PROCEDURE — 99213 OFFICE O/P EST LOW 20 MIN: CPT | Performed by: STUDENT IN AN ORGANIZED HEALTH CARE EDUCATION/TRAINING PROGRAM

## 2020-12-08 RX ORDER — OXYCODONE HYDROCHLORIDE AND ACETAMINOPHEN 5; 325 MG/1; MG/1
1 TABLET ORAL 2 TIMES DAILY PRN
Qty: 60 TABLET | Refills: 0 | Status: SHIPPED | OUTPATIENT
Start: 2020-12-11 | End: 2021-01-10

## 2020-12-08 RX ORDER — CYCLOBENZAPRINE HYDROCHLORIDE 7.5 MG/1
7.5 TABLET, FILM COATED ORAL 2 TIMES DAILY PRN
Qty: 60 TABLET | Refills: 1 | Status: SHIPPED | OUTPATIENT
Start: 2020-12-08 | End: 2021-02-06

## 2020-12-08 RX ORDER — OXYCODONE HYDROCHLORIDE AND ACETAMINOPHEN 5; 325 MG/1; MG/1
1 TABLET ORAL 2 TIMES DAILY PRN
Qty: 60 TABLET | Refills: 0 | Status: SHIPPED | OUTPATIENT
Start: 2021-01-10 | End: 2021-02-09

## 2020-12-24 DIAGNOSIS — I10 ESSENTIAL HYPERTENSION: ICD-10-CM

## 2020-12-24 RX ORDER — SPIRONOLACTONE 100 MG/1
100 TABLET, FILM COATED ORAL DAILY
Qty: 90 TABLET | Refills: 3 | OUTPATIENT
Start: 2020-12-24

## 2021-01-10 DIAGNOSIS — I10 ESSENTIAL HYPERTENSION: ICD-10-CM

## 2021-01-11 ENCOUNTER — TELEPHONE (OUTPATIENT)
Dept: INTERNAL MEDICINE | Facility: CLINIC | Age: 76
End: 2021-01-11

## 2021-01-11 DIAGNOSIS — I10 ESSENTIAL HYPERTENSION: Primary | ICD-10-CM

## 2021-01-11 DIAGNOSIS — D50.0 IRON DEFICIENCY ANEMIA DUE TO CHRONIC BLOOD LOSS: ICD-10-CM

## 2021-01-11 DIAGNOSIS — R73.03 PREDIABETES: ICD-10-CM

## 2021-01-11 RX ORDER — SPIRONOLACTONE 100 MG/1
100 TABLET, FILM COATED ORAL DAILY
Qty: 90 TABLET | Refills: 3 | OUTPATIENT
Start: 2021-01-11

## 2021-01-11 RX ORDER — SPIRONOLACTONE 100 MG/1
100 TABLET, FILM COATED ORAL DAILY
Qty: 90 TABLET | Refills: 0 | Status: SHIPPED | OUTPATIENT
Start: 2021-01-11 | End: 2021-03-01

## 2021-01-15 DIAGNOSIS — D50.0 IRON DEFICIENCY ANEMIA DUE TO CHRONIC BLOOD LOSS: ICD-10-CM

## 2021-01-18 RX ORDER — FERROUS SULFATE 325(65) MG
325 TABLET ORAL DAILY
Qty: 30 TABLET | Refills: 2 | Status: SHIPPED | OUTPATIENT
Start: 2021-01-18 | End: 2021-08-13

## 2021-02-09 NOTE — PROGRESS NOTES
Subjective   Luna Jacobson is a 75 y.o. female is here for follow-up for lower back pain.  She was last seen on 12/8/2020. Her pain is well tolerated with medications and injection.       On last visit:     Lower back pain is 5/10 on VAS, at maximum is 6/10. Pain is sharp, shooting and stabbing in nature. Pain is referred left lateral thigh and left ankle. The pain is constant. The pain is improved by caudal NIMA and pain meds. The pain is worse with walking.    Previous Injection:   11/20/2020 - Caudal NIMA - 80% pain relief ongoing     PMH: COPD, GERD, HLD, HTN, Thoracic aortic aneurysm      Current Medications:   Percocet 5-325 mg BID PRN   Flexeril 10 mg BID PRN     Past Medications:       Past Modalities:  TENS:                                                                          no                                                  Physical Therapy Within The Last 6 Months              no  Psychotherapy                                                            no  Massage Therapy                                                       no     Patient Complains Of:  Uro-Fecal Incontinence          no  Weight Gain/Loss                   no  Fever/Chills                             no  Weakness                               Yes (subjective weakness in left leg)              Current Outpatient Medications:   •  albuterol sulfate HFA (PROVENTIL HFA) 108 (90 Base) MCG/ACT inhaler, Inhale 2 puffs., Disp: , Rfl:   •  aspirin 81 MG tablet, Take 81 mg by mouth Daily., Disp: , Rfl:   •  atorvastatin (LIPITOR) 40 MG tablet, TAKE 1 TABLET BY MOUTH DAILY, Disp: 90 tablet, Rfl: 1  •  Budesonide (ENTOCORT EC) 3 MG 24 hr capsule, TK 3 CS PO QAM, Disp: , Rfl: 3  •  butalbital-acetaminophen-caffeine (FIORICET, ESGIC) -40 MG per tablet, TAKE 1 TABLET BY MOUTH EVERY 12 HOURS AS NEEDED FOR HEADACHE, Disp: 30 tablet, Rfl: 1  •  Calcium Carbonate-Vitamin D (Calcium 500/D) 500-125 MG-UNIT tablet, Take  by mouth., Disp: ,  Rfl:   •  CHANTIX CONTINUING MONTH GURPREET 1 MG tablet, TAKE 1 TABLET BY MOUTH EVERY 12 HOURS AS DIRECTED, Disp: 56 tablet, Rfl: 0  •  cloNIDine (CATAPRES) 0.1 MG tablet, TAKE ONE TABLET BY MOUTH EVERY MORNING AND 2 TABLETS IN THE EVENING, Disp: 270 tablet, Rfl: 1  •  coenzyme Q10 100 MG capsule, Take 100 mg by mouth Daily., Disp: , Rfl:   •  dicyclomine (BENTYL) 20 MG tablet, Take 20 mg by mouth Daily., Disp: , Rfl:   •  FeroSul 325 (65 Fe) MG tablet, TAKE 1 TABLET BY MOUTH DAILY, Disp: 30 tablet, Rfl: 2  •  fexofenadine (ALLEGRA) 180 MG tablet, Take 180 mg by mouth Daily., Disp: , Rfl:   •  fluticasone (FLONASE) 50 MCG/ACT nasal spray, 2 sprays by Each Nare route Daily. Shake liquid, Disp: , Rfl:   •  ketoconazole (NIZORAL) 2 % shampoo, , Disp: , Rfl: 3  •  levothyroxine sodium (TIROSINT) 75 MCG capsule, Take 1 capsule by mouth Daily., Disp: 30 capsule, Rfl: 2  •  Multiple Vitamin (MULTIVITAMIN) tablet, Take 1 tablet by mouth Daily., Disp: , Rfl:   •  pantoprazole (PROTONIX) 40 MG EC tablet, Take 40 mg by mouth Daily., Disp: , Rfl:   •  PARoxetine (PAXIL) 20 MG tablet, TAKE 1 TABLET BY MOUTH EVERY MORNING, Disp: 90 tablet, Rfl: 1  •  polyethylene glycol (MIRALAX) powder, , Disp: , Rfl:   •  spironolactone (ALDACTONE) 100 MG tablet, Take 1 tablet by mouth Daily., Disp: 90 tablet, Rfl: 0  •  cyclobenzaprine (FLEXERIL) 10 MG tablet, Take 1 tablet by mouth 2 (Two) Times a Day As Needed for Muscle Spasms for up to 60 days., Disp: 60 tablet, Rfl: 1  •  [START ON 2/13/2021] oxyCODONE-acetaminophen (PERCOCET) 5-325 MG per tablet, Take 1 tablet by mouth 2 (Two) Times a Day As Needed for Severe Pain  for up to 30 days., Disp: 60 tablet, Rfl: 0  •  [START ON 3/14/2021] oxyCODONE-acetaminophen (PERCOCET) 5-325 MG per tablet, Take 1 tablet by mouth 2 (Two) Times a Day As Needed for Severe Pain  for up to 30 days., Disp: 60 tablet, Rfl: 0    The following portions of the patient's history were reviewed and updated as appropriate:  allergies, current medications, past family history, past medical history, past social history, past surgical history and problem list.      REVIEW OF PERTINENT MEDICAL DATA    Past Medical History:   Diagnosis Date   • Allergic    • Anemia    • Anxiety    • Aortic aneurysm (CMS/HCC)     4 cm thoracic aorta   • Arthritis    • Atypical chest pain    • COPD (chronic obstructive pulmonary disease) (CMS/HCC)    • Coronary artery calcification seen on CT scan 07/2012    mild calcification in the LAD with calcium score of 22. modere narrowing left subclavian origin   • Decreased diffusion capacity of lung    • Diverticulosis    • Gastroparesis    • GERD (gastroesophageal reflux disease)    • GIST (gastrointestinal stromal tumor), non-malignant    • Hyperlipidemia    • Hypertension    • Hypothyroidism    • Irritable bowel syndrome    • Low back pain    • Mild mitral regurgitation    • Osteoporosis    • PAD (peripheral artery disease) (CMS/[facility])     small vessel disease in feet   • Subclavian artery stenosis, left (CMS/HCC) 05/2016    50 percent   • Tubular adenoma of colon 01/2017     Past Surgical History:   Procedure Laterality Date   • APPENDECTOMY     • COLONOSCOPY  10/01/2019    dr mcintyre   • ENDOSCOPY  02/2019   • HEMORRHOIDECTOMY     • HYSTERECTOMY     • TONSILLECTOMY       Family History   Problem Relation Age of Onset   • Hypertension Mother    • Diabetes Mother    • Dementia Mother    • Stroke Mother    • Lung cancer Father    • Alcohol abuse Father    • COPD Father    • Breast cancer Sister    • Hypertension Sister    • Hypothyroidism Sister    • Stroke Sister    • Other Sister         AAA   • Bipolar disorder Daughter    • Fibromyalgia Daughter      Social History     Socioeconomic History   • Marital status:      Spouse name: Not on file   • Number of children: Not on file   • Years of education: Not on file   • Highest education level: Not on file   Tobacco Use   • Smoking status: Former Smoker      "Quit date: 2020     Years since quittin.4   • Smokeless tobacco: Never Used   • Tobacco comment: she has smoked intermittently for 40 years; she smoked over 2 ppd twenty years ago   Substance and Sexual Activity   • Alcohol use: No     Frequency: Never   • Drug use: No   • Sexual activity: Defer         Review of Systems      Vitals:    02/10/21 1341   BP: 121/80   Pulse: 82   Resp: 16   Temp: 97.8 °F (36.6 °C)   SpO2: 100%   Weight: 66.2 kg (146 lb)   Height: 160 cm (63\")   PainSc:   5         Objective   Physical Exam  Musculoskeletal:      Lumbar back: She exhibits decreased range of motion and tenderness.        Legs:    Neurological:      Comments: Motor strength 5/5 b/l LE  Sensory intact b/l LE             Imaging Reviewed:  MRI done at [facility] - 2020:      L3-L4-a small broad based disc protrusion centrally.  There is moderate facet arthropathy.  This changes contribute to moderate central canal stenosis.  There is moderate right and severe left neural foraminal stenosis.  L4-L5-there is a posterior disc protrusion centrally.  Moderate facet arthropathy and mild limited flavum hypertrophy. Severe central canal stenosis.  Severe bilateral neural foraminal stenosis, right greater than left.  L5-S1-there is small left far lateral disc protrusion.  No central canal stenosis.  There is mild facet arthropathy.  There is moderate bilateral neural foraminal stenosis.     Lumbar X-ray: 10/1/2020   Multilevel degenerative spondylosis.          Assessment:    1. DDD (degenerative disc disease), lumbar    2. Chronic left-sided low back pain with sciatica, sciatica laterality unspecified    3. Lumbar spondylosis    4. Chronic pain syndrome          Plan:  1. UDS- 10/9/2020- consistent with patient interview.  Narcotic contract discussed on 10/9/2020. Narcan ordered on 10/9/2020.  2. Continue Percocet 5-325 mg po BID PRN (#60 tablets- 2 prescriptions given -, 3/14 ) as needed for pain. Side effects discussed " including but not limited to nausea, vomiting, constipation, lightheadedness, dizziness, drowsiness, or headache, abuse potential, respiratory depression, accidental overdosage and death  3. Increase Flexeril to 10 mg BID PRN. Common side effects of flexeril include blurred vision, dizziness or lightheadedness, drowsiness, dryness of mouth, bloated feeling or gas, indigestion, nausea or vomiting, or stomach cramps or pain, constipation, diarrhea, excitement or nervousness, frequent urination, general feeling of discomfort or illness, headache, muscle twitching, numbness, tingling, pain, or weakness in hands or feet, pounding heartbeat, problems in speaking, trembling, trouble in sleeping, unpleasant taste or other taste changes, unusual muscle weakness or unusual tiredness, especially during the first few days as your body adjusts to this medication.  4. Will repeat Caudal NIMA as needed in future.     RTC in 2 months.       Galileo Portillo DO  Pain Management   [facility]         INSPECT REPORT    As part of the patient's treatment plan, I may be prescribing controlled substances. The patient has been made aware of appropriate use of such medications, including potential risk of somnolence, limited ability to drive and/or work safely, and the potential for dependence or overdose. It has also been made clear that these medications are for use by this patient only, without concomitant use of alcohol or other substances unless prescribed.     Patient has completed prescribing agreement detailing terms of continued prescribing of controlled substances, including monitoring INSPECT reports, urine drug screening, and pill counts if necessary. The patient is aware that inappropriate use will results in cessation of prescribing such medications.    INSPECT report has been reviewed and scanned into the patient's chart.

## 2021-02-10 ENCOUNTER — OFFICE VISIT (OUTPATIENT)
Dept: PAIN MEDICINE | Facility: CLINIC | Age: 76
End: 2021-02-10

## 2021-02-10 VITALS
RESPIRATION RATE: 16 BRPM | BODY MASS INDEX: 25.87 KG/M2 | OXYGEN SATURATION: 100 % | HEIGHT: 63 IN | HEART RATE: 82 BPM | TEMPERATURE: 97.8 F | SYSTOLIC BLOOD PRESSURE: 121 MMHG | DIASTOLIC BLOOD PRESSURE: 80 MMHG | WEIGHT: 146 LBS

## 2021-02-10 DIAGNOSIS — M47.816 LUMBAR SPONDYLOSIS: ICD-10-CM

## 2021-02-10 DIAGNOSIS — M51.36 DDD (DEGENERATIVE DISC DISEASE), LUMBAR: Primary | ICD-10-CM

## 2021-02-10 DIAGNOSIS — G89.29 CHRONIC LEFT-SIDED LOW BACK PAIN WITH SCIATICA, SCIATICA LATERALITY UNSPECIFIED: ICD-10-CM

## 2021-02-10 DIAGNOSIS — M54.40 CHRONIC LEFT-SIDED LOW BACK PAIN WITH SCIATICA, SCIATICA LATERALITY UNSPECIFIED: ICD-10-CM

## 2021-02-10 DIAGNOSIS — G89.4 CHRONIC PAIN SYNDROME: ICD-10-CM

## 2021-02-10 PROCEDURE — 99214 OFFICE O/P EST MOD 30 MIN: CPT | Performed by: STUDENT IN AN ORGANIZED HEALTH CARE EDUCATION/TRAINING PROGRAM

## 2021-02-10 RX ORDER — CYCLOBENZAPRINE HCL 10 MG
10 TABLET ORAL 2 TIMES DAILY PRN
Qty: 60 TABLET | Refills: 1 | Status: SHIPPED | OUTPATIENT
Start: 2021-02-10 | End: 2021-04-07 | Stop reason: SDUPTHER

## 2021-02-10 RX ORDER — OXYCODONE HYDROCHLORIDE AND ACETAMINOPHEN 5; 325 MG/1; MG/1
1 TABLET ORAL 2 TIMES DAILY PRN
Qty: 60 TABLET | Refills: 0 | Status: SHIPPED | OUTPATIENT
Start: 2021-02-13 | End: 2021-03-15

## 2021-02-10 RX ORDER — OXYCODONE HYDROCHLORIDE AND ACETAMINOPHEN 5; 325 MG/1; MG/1
1 TABLET ORAL 2 TIMES DAILY PRN
Qty: 60 TABLET | Refills: 0 | Status: SHIPPED | OUTPATIENT
Start: 2021-03-14 | End: 2021-03-01

## 2021-02-10 RX ORDER — FLUTICASONE PROPIONATE 50 MCG
2 SPRAY, SUSPENSION (ML) NASAL DAILY
COMMUNITY
Start: 2020-12-07 | End: 2021-03-05

## 2021-03-01 ENCOUNTER — OFFICE VISIT (OUTPATIENT)
Dept: INTERNAL MEDICINE | Facility: CLINIC | Age: 76
End: 2021-03-01

## 2021-03-01 VITALS
TEMPERATURE: 97.8 F | HEIGHT: 63 IN | WEIGHT: 141 LBS | BODY MASS INDEX: 24.98 KG/M2 | DIASTOLIC BLOOD PRESSURE: 80 MMHG | SYSTOLIC BLOOD PRESSURE: 134 MMHG

## 2021-03-01 DIAGNOSIS — Z12.31 ENCOUNTER FOR SCREENING MAMMOGRAM FOR MALIGNANT NEOPLASM OF BREAST: ICD-10-CM

## 2021-03-01 DIAGNOSIS — I71.20 THORACIC AORTIC ANEURYSM WITHOUT RUPTURE (HCC): Chronic | ICD-10-CM

## 2021-03-01 DIAGNOSIS — I10 ESSENTIAL HYPERTENSION: Primary | Chronic | ICD-10-CM

## 2021-03-01 DIAGNOSIS — E03.8 OTHER SPECIFIED HYPOTHYROIDISM: ICD-10-CM

## 2021-03-01 DIAGNOSIS — R06.00 DYSPNEA, UNSPECIFIED TYPE: ICD-10-CM

## 2021-03-01 DIAGNOSIS — R73.03 PREDIABETES: ICD-10-CM

## 2021-03-01 DIAGNOSIS — E78.49 OTHER HYPERLIPIDEMIA: Chronic | ICD-10-CM

## 2021-03-01 DIAGNOSIS — Z78.0 POSTMENOPAUSAL: ICD-10-CM

## 2021-03-01 PROCEDURE — 93000 ELECTROCARDIOGRAM COMPLETE: CPT | Performed by: INTERNAL MEDICINE

## 2021-03-01 PROCEDURE — 99214 OFFICE O/P EST MOD 30 MIN: CPT | Performed by: INTERNAL MEDICINE

## 2021-03-01 RX ORDER — SPIRONOLACTONE 50 MG/1
50 TABLET, FILM COATED ORAL DAILY
Qty: 30 TABLET | Refills: 4 | Status: SHIPPED | OUTPATIENT
Start: 2021-03-01 | End: 2021-04-23 | Stop reason: SDUPTHER

## 2021-03-01 NOTE — PROGRESS NOTES
"Subjective        Chief Complaint   Patient presents with   • Hypertension           Luna Jacobson is a 75 y.o. female who presents for    Patient Active Problem List   Diagnosis   • Essential hypertension   • Other hyperlipidemia   • Other specified hypothyroidism   • Other headache syndrome   • Prediabetes   • Iron deficiency anemia due to chronic blood loss   • Thoracic aortic aneurysm (CMS/HCC)   • Microscopic colitis   • Myalgia   • Irregular heart beat   • Edema   • DDD (degenerative disc disease), lumbar       History of Present Illness     Her BP runs /58-85. Her BP drops about an hour after taking her Clonidine and spironolactone in the am. She came off the latter 5 days ago. She is prescribed Percocet through [facility] for her back. She has been diagnosed with severe central canal stenosis at L4-L5. She has been off of tobacco since August. She has had some episodes of dyspnea that \"come out of no where.\" It happens about twice per week for the last 2-3 weeks.They can occur at rest or with walking. She denies wheezing. She has no chest pain.   Allergies   Allergen Reactions   • Amlodipine Headache     Head tightness   • Lisinopril Hives   • Norvasc [Amlodipine Besylate] Other (See Comments)     Head tightness   • Sporanos [Itraconazole] Hives     sporanax   • Sulfa Antibiotics Other (See Comments)     headache   • Hydrochlorothiazide Other (See Comments)     hyponatremia  Other reaction(s): Other (See Comments)  hyponatremia       Current Outpatient Medications on File Prior to Visit   Medication Sig Dispense Refill   • albuterol sulfate HFA (PROVENTIL HFA) 108 (90 Base) MCG/ACT inhaler Inhale 2 puffs.     • atorvastatin (LIPITOR) 40 MG tablet TAKE 1 TABLET BY MOUTH DAILY 90 tablet 1   • butalbital-acetaminophen-caffeine (FIORICET, ESGIC) -40 MG per tablet TAKE 1 TABLET BY MOUTH EVERY 12 HOURS AS NEEDED FOR HEADACHE 30 tablet 1   • Calcium Carbonate-Vitamin D (Calcium 500/D) " 500-125 MG-UNIT tablet Take  by mouth.     • CHANTIX CONTINUING MONTH GURPREET 1 MG tablet TAKE 1 TABLET BY MOUTH EVERY 12 HOURS AS DIRECTED 56 tablet 0   • cloNIDine (CATAPRES) 0.1 MG tablet TAKE ONE TABLET BY MOUTH EVERY MORNING AND 2 TABLETS IN THE EVENING 270 tablet 1   • coenzyme Q10 100 MG capsule Take 100 mg by mouth Daily.     • cyclobenzaprine (FLEXERIL) 10 MG tablet Take 1 tablet by mouth 2 (Two) Times a Day As Needed for Muscle Spasms for up to 60 days. 60 tablet 1   • dicyclomine (BENTYL) 20 MG tablet Take 20 mg by mouth Daily.     • FeroSul 325 (65 Fe) MG tablet TAKE 1 TABLET BY MOUTH DAILY 30 tablet 2   • fexofenadine (ALLEGRA) 180 MG tablet Take 180 mg by mouth Daily.     • fluticasone (FLONASE) 50 MCG/ACT nasal spray 2 sprays by Each Nare route Daily. Shake liquid     • ketoconazole (NIZORAL) 2 % shampoo   3   • levothyroxine sodium (TIROSINT) 75 MCG capsule Take 1 capsule by mouth Daily. 30 capsule 2   • Multiple Vitamin (MULTIVITAMIN) tablet Take 1 tablet by mouth Daily.     • oxyCODONE-acetaminophen (PERCOCET) 5-325 MG per tablet Take 1 tablet by mouth 2 (Two) Times a Day As Needed for Severe Pain  for up to 30 days. 60 tablet 0   • pantoprazole (PROTONIX) 40 MG EC tablet Take 40 mg by mouth Daily.     • PARoxetine (PAXIL) 20 MG tablet TAKE 1 TABLET BY MOUTH EVERY MORNING 90 tablet 1   • polyethylene glycol (MIRALAX) powder      • [DISCONTINUED] spironolactone (ALDACTONE) 100 MG tablet Take 1 tablet by mouth Daily. 90 tablet 0   • aspirin 81 MG tablet Take 81 mg by mouth Daily.     • [DISCONTINUED] Budesonide (ENTOCORT EC) 3 MG 24 hr capsule TK 3 CS PO QAM  3   • [DISCONTINUED] oxyCODONE-acetaminophen (PERCOCET) 5-325 MG per tablet Take 1 tablet by mouth 2 (Two) Times a Day As Needed for Severe Pain  for up to 30 days. 60 tablet 0     No current facility-administered medications on file prior to visit.        Past Medical History:   Diagnosis Date   • Anemia    • Anxiety    • Aortic aneurysm (CMS/HCC)      4 cm thoracic aorta   • COPD (chronic obstructive pulmonary disease) (CMS/HCC)    • Coronary artery calcification seen on CT scan 2012    mild calcification in the LAD with calcium score of 22. modere narrowing left subclavian origin   • Decreased diffusion capacity of lung    • Diverticulosis    • Gastroparesis    • GERD (gastroesophageal reflux disease)    • GIST (gastrointestinal stromal tumor), non-malignant    • Irritable bowel syndrome    • Low back pain    • Mild mitral regurgitation    • Osteoporosis    • PAD (peripheral artery disease) (CMS/HCC)     small vessel disease in feet   • Subclavian artery stenosis, left (CMS/HCC) 2016    50 percent   • Tubular adenoma of colon 2017       Past Surgical History:   Procedure Laterality Date   • APPENDECTOMY     • COLONOSCOPY  10/01/2019    dr mcintyre   • ENDOSCOPY  2019   • HEMORRHOIDECTOMY     • HYSTERECTOMY     • TONSILLECTOMY         Family History   Problem Relation Age of Onset   • Hypertension Mother    • Diabetes Mother    • Dementia Mother    • Stroke Mother    • Lung cancer Father    • Alcohol abuse Father    • COPD Father    • Breast cancer Sister    • Hypertension Sister    • Hypothyroidism Sister    • Stroke Sister    • Other Sister         AAA   • Bipolar disorder Daughter    • Fibromyalgia Daughter        Social History     Socioeconomic History   • Marital status:      Spouse name: Not on file   • Number of children: Not on file   • Years of education: Not on file   • Highest education level: Not on file   Tobacco Use   • Smoking status: Former Smoker     Quit date: 2020     Years since quittin.5   • Smokeless tobacco: Never Used   • Tobacco comment: she has smoked intermittently for 40 years; she smoked over 2 ppd twenty years ago   Substance and Sexual Activity   • Alcohol use: No     Frequency: Never   • Drug use: No   • Sexual activity: Defer           The following portions of the patient's history were reviewed and  "updated as appropriate: problem list, allergies, current medications, past medical history, past family history, past social history and past surgical history.    Review of Systems    Immunization History   Administered Date(s) Administered   • COVID-19 (PFIZER) 02/07/2021, 02/28/2021   • FLUAD TRI 65YR+ 11/01/2019   • Fluzone High Dose =>65 Years ([employer] ONLY) 11/29/2018   • Hepatitis A 05/01/2018, 12/02/2018   • Influenza TIV (IM) 11/07/2013, 10/13/2014   • Pneumococcal Conjugate 13-Valent (PCV13) 04/23/2015   • Pneumococcal Polysaccharide (PPSV23) 08/01/2008   • Tdap 01/01/2012   • Zostavax 08/25/2010       Objective   Vitals:    03/01/21 1418   BP: 134/80   Temp: 97.8 °F (36.6 °C)   Weight: 64 kg (141 lb)   Height: 160 cm (63\")     Body mass index is 24.98 kg/m².  Physical Exam  Vitals signs reviewed.   Constitutional:       Appearance: She is well-developed.   HENT:      Head: Normocephalic and atraumatic.   Cardiovascular:      Rate and Rhythm: Normal rate and regular rhythm.      Heart sounds: Normal heart sounds, S1 normal and S2 normal.   Pulmonary:      Effort: Pulmonary effort is normal.      Breath sounds: Normal breath sounds.   Skin:     General: Skin is warm.   Neurological:      Mental Status: She is alert.   Psychiatric:         Behavior: Behavior normal.           ECG 12 Lead    Date/Time: 3/1/2021 5:10 PM  Performed by: Danish Serrano MD  Authorized by: Danish Serrnao MD   Comparison: compared with previous ECG from 9/1/2020  Rhythm: sinus rhythm  Rate: normal  T inversion: V1    Clinical impression: normal ECG            Assessment/Plan   Diagnoses and all orders for this visit:    1. Essential hypertension (Primary)  -     CBC & Differential  -     spironolactone (Aldactone) 50 MG tablet; Take 1 tablet by mouth Daily.  Dispense: 30 tablet; Refill: 4    2. Other hyperlipidemia  -     Comprehensive Metabolic Panel  -     Lipid Panel With / Chol / HDL Ratio    3. Thoracic aortic aneurysm " without rupture (CMS/HCC)  -     CT Angiogram Chest With Contrast; Future    4. Other specified hypothyroidism  -     TSH    5. Prediabetes  -     Hemoglobin A1c    6. Encounter for screening mammogram for malignant neoplasm of breast  -     Mammo Screening Bilateral With CAD    7. Postmenopausal  -     DEXA Bone Density Axial    8. Dyspnea, unspecified type    Other orders  -     ECG 12 Lead               EKG is unchanged. I will image her chest and get a CBC. Set up CTA, MMG and DEXA. Labs today. Decrease spironolactone to 50 mg daily. She will call Dr. Koenig for follow up to see if the dyspnea is cardiac..  Return in about 3 months (around 6/1/2021) for Medicare Wellness.

## 2021-03-02 LAB
ALBUMIN SERPL-MCNC: 4.6 G/DL (ref 3.5–5.2)
ALBUMIN/GLOB SERPL: 2.2 G/DL
ALP SERPL-CCNC: 108 U/L (ref 39–117)
ALT SERPL-CCNC: 32 U/L (ref 1–33)
AST SERPL-CCNC: 37 U/L (ref 1–32)
BASOPHILS # BLD AUTO: 0.05 10*3/MM3 (ref 0–0.2)
BASOPHILS NFR BLD AUTO: 0.5 % (ref 0–1.5)
BILIRUB SERPL-MCNC: 0.3 MG/DL (ref 0–1.2)
BUN SERPL-MCNC: 15 MG/DL (ref 8–23)
BUN/CREAT SERPL: 14.2 (ref 7–25)
CALCIUM SERPL-MCNC: 9 MG/DL (ref 8.6–10.5)
CHLORIDE SERPL-SCNC: 95 MMOL/L (ref 98–107)
CHOLEST SERPL-MCNC: 110 MG/DL (ref 0–200)
CHOLEST/HDLC SERPL: 2 {RATIO}
CO2 SERPL-SCNC: 26.7 MMOL/L (ref 22–29)
CREAT SERPL-MCNC: 1.06 MG/DL (ref 0.57–1)
EOSINOPHIL # BLD AUTO: 0.12 10*3/MM3 (ref 0–0.4)
EOSINOPHIL NFR BLD AUTO: 1.3 % (ref 0.3–6.2)
ERYTHROCYTE [DISTWIDTH] IN BLOOD BY AUTOMATED COUNT: 12.3 % (ref 12.3–15.4)
GLOBULIN SER CALC-MCNC: 2.1 GM/DL
GLUCOSE SERPL-MCNC: 94 MG/DL (ref 65–99)
HBA1C MFR BLD: 5.8 % (ref 4.8–5.6)
HCT VFR BLD AUTO: 35 % (ref 34–46.6)
HDLC SERPL-MCNC: 55 MG/DL (ref 40–60)
HGB BLD-MCNC: 11.6 G/DL (ref 12–15.9)
IMM GRANULOCYTES # BLD AUTO: 0.04 10*3/MM3 (ref 0–0.05)
IMM GRANULOCYTES NFR BLD AUTO: 0.4 % (ref 0–0.5)
LDLC SERPL CALC-MCNC: 36 MG/DL (ref 0–100)
LYMPHOCYTES # BLD AUTO: 1.05 10*3/MM3 (ref 0.7–3.1)
LYMPHOCYTES NFR BLD AUTO: 11.4 % (ref 19.6–45.3)
Lab: NORMAL
MCH RBC QN AUTO: 30.9 PG (ref 26.6–33)
MCHC RBC AUTO-ENTMCNC: 33.1 G/DL (ref 31.5–35.7)
MCV RBC AUTO: 93.3 FL (ref 79–97)
MONOCYTES # BLD AUTO: 1.05 10*3/MM3 (ref 0.1–0.9)
MONOCYTES NFR BLD AUTO: 11.4 % (ref 5–12)
NEUTROPHILS # BLD AUTO: 6.89 10*3/MM3 (ref 1.7–7)
NEUTROPHILS NFR BLD AUTO: 75 % (ref 42.7–76)
NRBC BLD AUTO-RTO: 0 /100 WBC (ref 0–0.2)
PLATELET # BLD AUTO: 319 10*3/MM3 (ref 140–450)
POTASSIUM SERPL-SCNC: 4.7 MMOL/L (ref 3.5–5.2)
PROT SERPL-MCNC: 6.7 G/DL (ref 6–8.5)
RBC # BLD AUTO: 3.75 10*6/MM3 (ref 3.77–5.28)
SODIUM SERPL-SCNC: 132 MMOL/L (ref 136–145)
TRIGL SERPL-MCNC: 102 MG/DL (ref 0–150)
TSH SERPL DL<=0.005 MIU/L-ACNC: 3.62 UIU/ML (ref 0.27–4.2)
VLDLC SERPL CALC-MCNC: 19 MG/DL (ref 5–40)
WBC # BLD AUTO: 9.2 10*3/MM3 (ref 3.4–10.8)

## 2021-03-05 DIAGNOSIS — I10 ESSENTIAL HYPERTENSION: ICD-10-CM

## 2021-03-05 DIAGNOSIS — E78.49 OTHER HYPERLIPIDEMIA: ICD-10-CM

## 2021-03-05 RX ORDER — ATORVASTATIN CALCIUM 40 MG/1
40 TABLET, FILM COATED ORAL DAILY
Qty: 90 TABLET | Refills: 1 | Status: SHIPPED | OUTPATIENT
Start: 2021-03-05 | End: 2021-09-07

## 2021-03-05 RX ORDER — FLUTICASONE PROPIONATE 50 MCG
SPRAY, SUSPENSION (ML) NASAL
Qty: 48 G | Refills: 3 | Status: SHIPPED | OUTPATIENT
Start: 2021-03-05 | End: 2022-04-04

## 2021-03-05 RX ORDER — CLONIDINE HYDROCHLORIDE 0.1 MG/1
TABLET ORAL
Qty: 270 TABLET | Refills: 1 | Status: SHIPPED | OUTPATIENT
Start: 2021-03-05 | End: 2021-08-31

## 2021-03-11 DIAGNOSIS — F41.9 ANXIETY: ICD-10-CM

## 2021-03-11 DIAGNOSIS — G44.89 OTHER HEADACHE SYNDROME: ICD-10-CM

## 2021-03-11 RX ORDER — PAROXETINE HYDROCHLORIDE 20 MG/1
20 TABLET, FILM COATED ORAL EVERY MORNING
Qty: 90 TABLET | Refills: 1 | Status: SHIPPED | OUTPATIENT
Start: 2021-03-11 | End: 2022-03-24

## 2021-03-12 ENCOUNTER — TRANSCRIBE ORDERS (OUTPATIENT)
Dept: ADMINISTRATIVE | Facility: HOSPITAL | Age: 76
End: 2021-03-12

## 2021-03-12 DIAGNOSIS — Z12.31 VISIT FOR SCREENING MAMMOGRAM: Primary | ICD-10-CM

## 2021-03-12 RX ORDER — BUTALBITAL, ACETAMINOPHEN AND CAFFEINE 50; 325; 40 MG/1; MG/1; MG/1
TABLET ORAL
Qty: 30 TABLET | Refills: 1 | Status: SHIPPED | OUTPATIENT
Start: 2021-03-12 | End: 2021-09-16

## 2021-03-16 ENCOUNTER — OFFICE (AMBULATORY)
Dept: URBAN - METROPOLITAN AREA CLINIC 64 | Facility: CLINIC | Age: 76
End: 2021-03-16

## 2021-03-16 VITALS
DIASTOLIC BLOOD PRESSURE: 52 MMHG | HEART RATE: 79 BPM | SYSTOLIC BLOOD PRESSURE: 96 MMHG | WEIGHT: 141 LBS | HEIGHT: 63 IN

## 2021-03-16 DIAGNOSIS — D50.0 IRON DEFICIENCY ANEMIA SECONDARY TO BLOOD LOSS (CHRONIC): ICD-10-CM

## 2021-03-16 DIAGNOSIS — K62.5 HEMORRHAGE OF ANUS AND RECTUM: ICD-10-CM

## 2021-03-16 PROCEDURE — 99213 OFFICE O/P EST LOW 20 MIN: CPT | Performed by: INTERNAL MEDICINE

## 2021-03-17 ENCOUNTER — APPOINTMENT (OUTPATIENT)
Dept: CT IMAGING | Facility: HOSPITAL | Age: 76
End: 2021-03-17

## 2021-03-22 ENCOUNTER — HOSPITAL ENCOUNTER (OUTPATIENT)
Dept: CT IMAGING | Facility: HOSPITAL | Age: 76
End: 2021-03-22

## 2021-04-02 ENCOUNTER — HOSPITAL ENCOUNTER (OUTPATIENT)
Dept: CT IMAGING | Facility: HOSPITAL | Age: 76
Discharge: HOME | End: 2021-04-02
Admitting: INTERNAL MEDICINE

## 2021-04-02 DIAGNOSIS — I71.20 THORACIC AORTIC ANEURYSM WITHOUT RUPTURE (HCC): Chronic | ICD-10-CM

## 2021-04-02 LAB — CREAT BLDA-MCNC: 1.1 MG/DL (ref 0.6–1.3)

## 2021-04-02 PROCEDURE — 71275 CT ANGIOGRAPHY CHEST: CPT

## 2021-04-02 PROCEDURE — 0 IOPAMIDOL PER 1 ML: Performed by: INTERNAL MEDICINE

## 2021-04-02 PROCEDURE — 82565 ASSAY OF CREATININE: CPT

## 2021-04-02 RX ADMIN — IOPAMIDOL 100 ML: 755 INJECTION, SOLUTION INTRAVENOUS at 14:42

## 2021-04-07 ENCOUNTER — OFFICE VISIT (OUTPATIENT)
Dept: PAIN MEDICINE | Facility: CLINIC | Age: 76
End: 2021-04-07

## 2021-04-07 VITALS
DIASTOLIC BLOOD PRESSURE: 80 MMHG | BODY MASS INDEX: 24.98 KG/M2 | TEMPERATURE: 98.4 F | SYSTOLIC BLOOD PRESSURE: 125 MMHG | HEIGHT: 63 IN | WEIGHT: 141 LBS | OXYGEN SATURATION: 100 % | HEART RATE: 63 BPM | RESPIRATION RATE: 16 BRPM

## 2021-04-07 DIAGNOSIS — F11.90 CHRONIC NARCOTIC USE: ICD-10-CM

## 2021-04-07 DIAGNOSIS — M47.816 LUMBAR SPONDYLOSIS: ICD-10-CM

## 2021-04-07 DIAGNOSIS — M54.40 CHRONIC LEFT-SIDED LOW BACK PAIN WITH SCIATICA, SCIATICA LATERALITY UNSPECIFIED: ICD-10-CM

## 2021-04-07 DIAGNOSIS — G89.29 CHRONIC LEFT-SIDED LOW BACK PAIN WITH SCIATICA, SCIATICA LATERALITY UNSPECIFIED: ICD-10-CM

## 2021-04-07 DIAGNOSIS — M51.36 DDD (DEGENERATIVE DISC DISEASE), LUMBAR: Primary | ICD-10-CM

## 2021-04-07 DIAGNOSIS — G89.4 CHRONIC PAIN SYNDROME: ICD-10-CM

## 2021-04-07 PROCEDURE — 99214 OFFICE O/P EST MOD 30 MIN: CPT | Performed by: STUDENT IN AN ORGANIZED HEALTH CARE EDUCATION/TRAINING PROGRAM

## 2021-04-07 RX ORDER — CYCLOBENZAPRINE HCL 10 MG
10 TABLET ORAL 2 TIMES DAILY PRN
Qty: 60 TABLET | Refills: 1 | Status: SHIPPED | OUTPATIENT
Start: 2021-04-07 | End: 2021-06-01 | Stop reason: SDUPTHER

## 2021-04-07 RX ORDER — OXYCODONE HYDROCHLORIDE AND ACETAMINOPHEN 5; 325 MG/1; MG/1
1 TABLET ORAL 2 TIMES DAILY PRN
Qty: 60 TABLET | Refills: 0 | Status: SHIPPED | OUTPATIENT
Start: 2021-04-16 | End: 2021-05-16

## 2021-04-07 RX ORDER — OXYCODONE HYDROCHLORIDE AND ACETAMINOPHEN 5; 325 MG/1; MG/1
1 TABLET ORAL 2 TIMES DAILY PRN
Qty: 60 TABLET | Refills: 0 | Status: SHIPPED | OUTPATIENT
Start: 2021-05-16 | End: 2021-06-02 | Stop reason: SDUPTHER

## 2021-04-07 RX ORDER — OXYCODONE HYDROCHLORIDE AND ACETAMINOPHEN 5; 325 MG/1; MG/1
TABLET ORAL
COMMUNITY
Start: 2021-03-17 | End: 2021-04-07

## 2021-04-07 NOTE — PROGRESS NOTES
Subjective   Luna Jacobson is a 75 y.o. female is here for follow-up for lower back pain.  Last seen on 2/10/2021. Sates that her pain is slightly worsened since visit.  She states that injection is still helping her with pain.     On last visit: increased flexeril - no side effects.     Lower back pain is 5/10 on VAS, at maximum is 6/10. Pain is sharp, shooting and stabbing in nature. Pain is referred L lateral thigh, left ankle. The pain is constant . The pain is improved by caudal NIMA, pain meds. The pain is worse with walking.      Previous Injection:   11/20/2020 - Caudal NIMA - 80% pain relief ongoing     PMH: COPD, GERD, HLD, HTN, Thoracic aortic aneurysm      Current Medications:   Percocet 5-325 mg BID PRN   Flexeril 10 mg BID PRN     Past Medications:       Past Modalities:  TENS:                                                                          no                                                  Physical Therapy Within The Last 6 Months              no  Psychotherapy                                                            no  Massage Therapy                                                       no     Patient Complains Of:  Uro-Fecal Incontinence          no  Weight Gain/Loss                   no  Fever/Chills                             no  Weakness                               Yes (subjective weakness in left leg)              Current Outpatient Medications:   •  albuterol sulfate HFA (PROVENTIL HFA) 108 (90 Base) MCG/ACT inhaler, Inhale 2 puffs., Disp: , Rfl:   •  aspirin 81 MG tablet, Take 81 mg by mouth Daily., Disp: , Rfl:   •  atorvastatin (LIPITOR) 40 MG tablet, TAKE 1 TABLET BY MOUTH DAILY, Disp: 90 tablet, Rfl: 1  •  butalbital-acetaminophen-caffeine (FIORICET, ESGIC) -40 MG per tablet, TAKE 1 TABLET BY MOUTH EVERY 12 HOURS AS NEEDED FOR HEADACHE, Disp: 30 tablet, Rfl: 1  •  Calcium Carbonate-Vitamin D (Calcium 500/D) 500-125 MG-UNIT tablet, Take  by mouth., Disp: , Rfl:    •  CHANTIX CONTINUING MONTH GURPREET 1 MG tablet, TAKE 1 TABLET BY MOUTH EVERY 12 HOURS AS DIRECTED, Disp: 56 tablet, Rfl: 0  •  cloNIDine (CATAPRES) 0.1 MG tablet, TAKE ONE TABLET BY MOUTH EVERY MORNING AND TWO TABLETS BY MOUTH EVERY EVENING, Disp: 270 tablet, Rfl: 1  •  coenzyme Q10 100 MG capsule, Take 100 mg by mouth Daily., Disp: , Rfl:   •  cyclobenzaprine (FLEXERIL) 10 MG tablet, Take 1 tablet by mouth 2 (Two) Times a Day As Needed for Muscle Spasms for up to 60 days., Disp: 60 tablet, Rfl: 1  •  dicyclomine (BENTYL) 20 MG tablet, Take 20 mg by mouth Daily., Disp: , Rfl:   •  FeroSul 325 (65 Fe) MG tablet, TAKE 1 TABLET BY MOUTH DAILY, Disp: 30 tablet, Rfl: 2  •  fexofenadine (ALLEGRA) 180 MG tablet, Take 180 mg by mouth Daily., Disp: , Rfl:   •  fluticasone (FLONASE) 50 MCG/ACT nasal spray, SHAKE LIQUID AND USE 2 SPRAYS IN EACH NOSTRIL EVERY DAY, Disp: 48 g, Rfl: 3  •  ketoconazole (NIZORAL) 2 % shampoo, , Disp: , Rfl: 3  •  levothyroxine sodium (TIROSINT) 75 MCG capsule, Take 1 capsule by mouth Daily., Disp: 30 capsule, Rfl: 2  •  Multiple Vitamin (MULTIVITAMIN) tablet, Take 1 tablet by mouth Daily., Disp: , Rfl:   •  pantoprazole (PROTONIX) 40 MG EC tablet, Take 40 mg by mouth Daily., Disp: , Rfl:   •  PARoxetine (PAXIL) 20 MG tablet, TAKE 1 TABLET BY MOUTH EVERY MORNING, Disp: 90 tablet, Rfl: 1  •  polyethylene glycol (MIRALAX) powder, , Disp: , Rfl:   •  spironolactone (Aldactone) 50 MG tablet, Take 1 tablet by mouth Daily., Disp: 30 tablet, Rfl: 4  •  [START ON 4/16/2021] oxyCODONE-acetaminophen (PERCOCET) 5-325 MG per tablet, Take 1 tablet by mouth 2 (Two) Times a Day As Needed for Moderate Pain  for up to 30 days., Disp: 60 tablet, Rfl: 0  •  [START ON 5/16/2021] oxyCODONE-acetaminophen (PERCOCET) 5-325 MG per tablet, Take 1 tablet by mouth 2 (Two) Times a Day As Needed for Moderate Pain  for up to 30 days., Disp: 60 tablet, Rfl: 0    The following portions of the patient's history were reviewed and  updated as appropriate: allergies, current medications, past family history, past medical history, past social history, past surgical history and problem list.      REVIEW OF PERTINENT MEDICAL DATA    Past Medical History:   Diagnosis Date   • Anemia    • Anxiety    • Aortic aneurysm (CMS/HCC)     4 cm thoracic aorta   • COPD (chronic obstructive pulmonary disease) (CMS/HCC)    • Coronary artery calcification seen on CT scan 2012    mild calcification in the LAD with calcium score of 22. modere narrowing left subclavian origin   • Decreased diffusion capacity of lung    • Diverticulosis    • Gastroparesis    • GERD (gastroesophageal reflux disease)    • GIST (gastrointestinal stromal tumor), non-malignant    • Irritable bowel syndrome    • Low back pain    • Mild mitral regurgitation    • Osteoporosis    • PAD (peripheral artery disease) (CMS/HCC)     small vessel disease in feet   • Subclavian artery stenosis, left (CMS/HCC) 2016    50 percent   • Tubular adenoma of colon 2017     Past Surgical History:   Procedure Laterality Date   • APPENDECTOMY     • COLONOSCOPY  10/01/2019    dr mcintyre   • ENDOSCOPY  2019   • HEMORRHOIDECTOMY     • HYSTERECTOMY     • TONSILLECTOMY       Family History   Problem Relation Age of Onset   • Hypertension Mother    • Diabetes Mother    • Dementia Mother    • Stroke Mother    • Lung cancer Father    • Alcohol abuse Father    • COPD Father    • Breast cancer Sister    • Hypertension Sister    • Hypothyroidism Sister    • Stroke Sister    • Other Sister         AAA   • Bipolar disorder Daughter    • Fibromyalgia Daughter      Social History     Socioeconomic History   • Marital status:      Spouse name: Not on file   • Number of children: Not on file   • Years of education: Not on file   • Highest education level: Not on file   Tobacco Use   • Smoking status: Former Smoker     Quit date: 2020     Years since quittin.6   • Smokeless tobacco: Never Used   •  "Tobacco comment: she has smoked intermittently for 40 years; she smoked over 2 ppd twenty years ago   Vaping Use   • Vaping Use: Never used   Substance and Sexual Activity   • Alcohol use: No   • Drug use: No   • Sexual activity: Defer         Review of Systems      Vitals:    04/07/21 1300   BP: 125/80   Pulse: 63   Resp: 16   Temp: 98.4 °F (36.9 °C)   SpO2: 100%   Weight: 64 kg (141 lb)   Height: 160 cm (63\")   PainSc:   5         Objective   Physical Exam  Musculoskeletal:      Lumbar back: Tenderness present. Decreased range of motion.        Legs:    Neurological:      Comments: Motor strength 5/5 b/l LE  Sensory intact b/l LE             Imaging Reviewed:  [facility]  - 9/2020:      L3-L4-a small broad based disc protrusion centrally.  There is moderate facet arthropathy.  This changes contribute to moderate central canal stenosis.  There is moderate right and severe left neural foraminal stenosis.  L4-L5-there is a posterior disc protrusion centrally.  Moderate facet arthropathy and mild limited flavum hypertrophy. Severe central canal stenosis.  Severe bilateral neural foraminal stenosis, right greater than left.  L5-S1-there is small left far lateral disc protrusion.  No central canal stenosis.  There is mild facet arthropathy.  There is moderate bilateral neural foraminal stenosis.     Lumbar X-ray: 10/1/2020   Multilevel degenerative spondylosis.          Assessment:    1. DDD (degenerative disc disease), lumbar    2. Chronic left-sided low back pain with sciatica, sciatica laterality unspecified    3. Lumbar spondylosis    4. Chronic narcotic use    5. Chronic pain syndrome          Plan:    1. UDS- 10/9/2020- consistent with patient interview.  Narcotic contract discussed on 10/9/2020. Narcan ordered on 10/9/2020.  2. Continue Percocet 5-325 mg po BID PRN (#60 tablets-4/16; 5/16) as needed for pain. Side effects discussed including but not limited to nausea, vomiting, constipation, " lightheadedness, dizziness, drowsiness, or headache, abuse potential, respiratory depression, accidental overdosage and death  3. Continue Flexeril to 10 mg BID PRN (1 refill). Common side effects of flexeril include blurred vision, dizziness or lightheadedness, drowsiness, dryness of mouth, bloated feeling or gas, indigestion, nausea or vomiting, or stomach cramps or pain, constipation, diarrhea, excitement or nervousness, frequent urination, general feeling of discomfort or illness, headache, muscle twitching, numbness, tingling, pain, or weakness in hands or feet, pounding heartbeat, problems in speaking, trembling, trouble in sleeping, unpleasant taste or other taste changes, unusual muscle weakness or unusual tiredness, especially during the first few days as your body adjusts to this medication.  4. Will repeat Caudal NIMA as needed in future. Patient will give us a call to schedule the injection.     RTC in 2 months.       Galileo Portillo DO  Pain Management   [facility]         INSPECT REPORT    As part of the patient's treatment plan, I may be prescribing controlled substances. The patient has been made aware of appropriate use of such medications, including potential risk of somnolence, limited ability to drive and/or work safely, and the potential for dependence or overdose. It has also been made clear that these medications are for use by this patient only, without concomitant use of alcohol or other substances unless prescribed.     Patient has completed prescribing agreement detailing terms of continued prescribing of controlled substances, including monitoring INSPECT reports, urine drug screening, and pill counts if necessary. The patient is aware that inappropriate use will results in cessation of prescribing such medications.    INSPECT report has been reviewed and scanned into the patient's chart.

## 2021-04-20 ENCOUNTER — APPOINTMENT (OUTPATIENT)
Dept: MAMMOGRAPHY | Facility: HOSPITAL | Age: 76
End: 2021-04-20

## 2021-04-23 DIAGNOSIS — I10 ESSENTIAL HYPERTENSION: Chronic | ICD-10-CM

## 2021-04-23 RX ORDER — SPIRONOLACTONE 50 MG/1
50 TABLET, FILM COATED ORAL DAILY
Qty: 30 TABLET | Refills: 4 | Status: SHIPPED | OUTPATIENT
Start: 2021-04-23 | End: 2021-11-05

## 2021-05-11 ENCOUNTER — APPOINTMENT (OUTPATIENT)
Dept: MAMMOGRAPHY | Facility: HOSPITAL | Age: 76
End: 2021-05-11

## 2021-05-19 ENCOUNTER — HOSPITAL ENCOUNTER (OUTPATIENT)
Dept: MAMMOGRAPHY | Facility: HOSPITAL | Age: 76
Discharge: HOME | End: 2021-05-19
Admitting: INTERNAL MEDICINE

## 2021-05-19 DIAGNOSIS — Z12.31 VISIT FOR SCREENING MAMMOGRAM: ICD-10-CM

## 2021-05-19 PROCEDURE — 77063 BREAST TOMOSYNTHESIS BI: CPT

## 2021-05-19 PROCEDURE — 77067 SCR MAMMO BI INCL CAD: CPT

## 2021-06-01 RX ORDER — OXYCODONE HYDROCHLORIDE AND ACETAMINOPHEN 5; 325 MG/1; MG/1
1 TABLET ORAL 2 TIMES DAILY PRN
Qty: 60 TABLET | Refills: 0 | Status: CANCELLED | OUTPATIENT
Start: 2021-07-15 | End: 2021-08-14

## 2021-06-01 RX ORDER — OXYCODONE HYDROCHLORIDE AND ACETAMINOPHEN 5; 325 MG/1; MG/1
1 TABLET ORAL 2 TIMES DAILY PRN
Qty: 60 TABLET | Refills: 0 | Status: CANCELLED | OUTPATIENT
Start: 2021-06-15 | End: 2021-07-15

## 2021-06-01 NOTE — PROGRESS NOTES
Subjective   Luna Jacobson is a 75 y.o. female is here for follow-up for lower back pain.  She was last seen on 4/7/2021. Pain is same as last time. Well controlled with percocet.        On last visit:    Lower back pain is 4/10 on VAS, at maximum is 5/10. Pain is sharp and shooting in nature. Pain is referred L lateral thigh, left ankle. The pain is constant. The pain is improved by pain meds, caudal NIMA. The pain is worse with worse with walking.    Previous Injection:   11/20/2020 - Caudal NIMA - 80% pain relief ongoing     PMH: COPD, GERD, HLD, HTN, Thoracic aortic aneurysm      Current Medications:   Percocet 5-325 mg BID PRN   Flexeril 10 mg BID PRN     Past Medications:       Past Modalities:  TENS:                                                                          no                                                  Physical Therapy Within The Last 6 Months              no  Psychotherapy                                                            no  Massage Therapy                                                       no     Patient Complains Of:  Uro-Fecal Incontinence          no  Weight Gain/Loss                   no  Fever/Chills                             no  Weakness                               Yes (subjective weakness in left leg)              Current Outpatient Medications:   •  albuterol sulfate HFA (PROVENTIL HFA) 108 (90 Base) MCG/ACT inhaler, Inhale 2 puffs., Disp: , Rfl:   •  aspirin 81 MG tablet, Take 81 mg by mouth Daily., Disp: , Rfl:   •  atorvastatin (LIPITOR) 40 MG tablet, TAKE 1 TABLET BY MOUTH DAILY, Disp: 90 tablet, Rfl: 1  •  butalbital-acetaminophen-caffeine (FIORICET, ESGIC) -40 MG per tablet, TAKE 1 TABLET BY MOUTH EVERY 12 HOURS AS NEEDED FOR HEADACHE, Disp: 30 tablet, Rfl: 1  •  Calcium Carbonate-Vitamin D (Calcium 500/D) 500-125 MG-UNIT tablet, Take  by mouth., Disp: , Rfl:   •  CHANTIX CONTINUING MONTH GURPREET 1 MG tablet, TAKE 1 TABLET BY MOUTH EVERY 12 HOURS  AS DIRECTED, Disp: 56 tablet, Rfl: 0  •  cloNIDine (CATAPRES) 0.1 MG tablet, TAKE ONE TABLET BY MOUTH EVERY MORNING AND TWO TABLETS BY MOUTH EVERY EVENING, Disp: 270 tablet, Rfl: 1  •  coenzyme Q10 100 MG capsule, Take 100 mg by mouth Daily., Disp: , Rfl:   •  dicyclomine (BENTYL) 20 MG tablet, Take 20 mg by mouth Daily., Disp: , Rfl:   •  FeroSul 325 (65 Fe) MG tablet, TAKE 1 TABLET BY MOUTH DAILY, Disp: 30 tablet, Rfl: 2  •  fexofenadine (ALLEGRA) 180 MG tablet, Take 180 mg by mouth Daily., Disp: , Rfl:   •  fluticasone (FLONASE) 50 MCG/ACT nasal spray, SHAKE LIQUID AND USE 2 SPRAYS IN EACH NOSTRIL EVERY DAY, Disp: 48 g, Rfl: 3  •  ketoconazole (NIZORAL) 2 % shampoo, , Disp: , Rfl: 3  •  levothyroxine sodium (TIROSINT) 75 MCG capsule, Take 1 capsule by mouth Daily., Disp: 30 capsule, Rfl: 2  •  Multiple Vitamin (MULTIVITAMIN) tablet, Take 1 tablet by mouth Daily., Disp: , Rfl:   •  [START ON 6/14/2021] oxyCODONE-acetaminophen (PERCOCET) 5-325 MG per tablet, Take 1 tablet by mouth 2 (Two) Times a Day As Needed for Moderate Pain  for up to 30 days., Disp: 60 tablet, Rfl: 0  •  [START ON 7/14/2021] oxyCODONE-acetaminophen (PERCOCET) 5-325 MG per tablet, Take 1 tablet by mouth 2 (Two) Times a Day As Needed for Moderate Pain  for up to 30 days., Disp: 60 tablet, Rfl: 0  •  pantoprazole (PROTONIX) 40 MG EC tablet, Take 40 mg by mouth Daily., Disp: , Rfl:   •  PARoxetine (PAXIL) 20 MG tablet, TAKE 1 TABLET BY MOUTH EVERY MORNING, Disp: 90 tablet, Rfl: 1  •  polyethylene glycol (MIRALAX) powder, , Disp: , Rfl:   •  spironolactone (Aldactone) 50 MG tablet, Take 1 tablet by mouth Daily., Disp: 30 tablet, Rfl: 4  •  cyclobenzaprine (FLEXERIL) 10 MG tablet, Take 1 tablet by mouth 2 (Two) Times a Day As Needed for Muscle Spasms for up to 60 days., Disp: 60 tablet, Rfl: 1    The following portions of the patient's history were reviewed and updated as appropriate: allergies, current medications, past family history, past medical  history, past social history, past surgical history and problem list.      REVIEW OF PERTINENT MEDICAL DATA    Past Medical History:   Diagnosis Date   • Anemia    • Anxiety    • Aortic aneurysm (CMS/HCC)     4 cm thoracic aorta   • COPD (chronic obstructive pulmonary disease) (CMS/HCC)    • Coronary artery calcification seen on CT scan 2012    mild calcification in the LAD with calcium score of 22. modere narrowing left subclavian origin   • Decreased diffusion capacity of lung    • Diverticulosis    • Gastroparesis    • GERD (gastroesophageal reflux disease)    • GIST (gastrointestinal stromal tumor), non-malignant    • Irritable bowel syndrome    • Low back pain    • Mild mitral regurgitation    • Osteoporosis    • PAD (peripheral artery disease) (CMS/HCC)     small vessel disease in feet   • Subclavian artery stenosis, left (CMS/HCC) 2016    50 percent   • Tubular adenoma of colon 2017     Past Surgical History:   Procedure Laterality Date   • APPENDECTOMY     • COLONOSCOPY  10/01/2019    dr mcintyre   • ENDOSCOPY  2019   • HEMORRHOIDECTOMY     • HYSTERECTOMY     • TONSILLECTOMY       Family History   Problem Relation Age of Onset   • Hypertension Mother    • Diabetes Mother    • Dementia Mother    • Stroke Mother    • Lung cancer Father    • Alcohol abuse Father    • COPD Father    • Breast cancer Sister    • Hypertension Sister    • Hypothyroidism Sister    • Stroke Sister    • Other Sister         AAA   • Bipolar disorder Daughter    • Fibromyalgia Daughter      Social History     Socioeconomic History   • Marital status:      Spouse name: Not on file   • Number of children: Not on file   • Years of education: Not on file   • Highest education level: Not on file   Tobacco Use   • Smoking status: Former Smoker     Quit date: 2020     Years since quittin.8   • Smokeless tobacco: Never Used   • Tobacco comment: she has smoked intermittently for 40 years; she smoked over 2 ppd twenty  "years ago   Vaping Use   • Vaping Use: Never used   Substance and Sexual Activity   • Alcohol use: No   • Drug use: No   • Sexual activity: Defer         Review of Systems      Vitals:    06/02/21 1313   BP: 113/68   Pulse: 85   Resp: 16   SpO2: 98%   Weight: 64 kg (141 lb)   Height: 160 cm (63\")   PainSc:   4         Objective   Physical Exam  Musculoskeletal:      Lumbar back: Tenderness present. Decreased range of motion.        Legs:    Neurological:      Comments: Motor strength 5/5 b/l LE  Sensory intact b/l LE             Imaging Reviewed:  MRI done at [facility] - 9/2020:      L3-L4-a small broad based disc protrusion centrally.  There is moderate facet arthropathy.  This changes contribute to moderate central canal stenosis.  There is moderate right and severe left neural foraminal stenosis.  L4-L5-there is a posterior disc protrusion centrally.  Moderate facet arthropathy and mild limited flavum hypertrophy. Severe central canal stenosis.  Severe bilateral neural foraminal stenosis, right greater than left.  L5-S1-there is small left far lateral disc protrusion.  No central canal stenosis.  There is mild facet arthropathy.  There is moderate bilateral neural foraminal stenosis.     Lumbar X-ray: 10/1/2020   Multilevel degenerative spondylosis.          Assessment:    1. DDD (degenerative disc disease), lumbar    2. Chronic left-sided low back pain with sciatica, sciatica laterality unspecified    3. Lumbar spondylosis    4. Chronic narcotic use    5. Chronic pain syndrome          Plan:    1. UDS- 10/9/2020- consistent with patient interview.  Narcotic contract discussed on 10/9/2020. Narcan ordered on 10/9/2020.  2. Continue Percocet 5-325 mg po BID PRN (#60 tablets- 6/14; 7/14) as needed for pain. Side effects discussed including but not limited to nausea, vomiting, constipation, lightheadedness, dizziness, drowsiness, or headache, abuse potential, respiratory depression, accidental overdosage and death  3. " Continue Flexeril to 10 mg BID PRN (1 refill). Common side effects of flexeril include blurred vision, dizziness or lightheadedness, drowsiness, dryness of mouth, bloated feeling or gas, indigestion, nausea or vomiting, or stomach cramps or pain, constipation, diarrhea, excitement or nervousness, frequent urination, general feeling of discomfort or illness, headache, muscle twitching, numbness, tingling, pain, or weakness in hands or feet, pounding heartbeat, problems in speaking, trembling, trouble in sleeping, unpleasant taste or other taste changes, unusual muscle weakness or unusual tiredness, especially during the first few days as your body adjusts to this medication.  4. Will repeat Caudal NIMA as needed in future. Patient will give us a call to schedule the injection.     RTC in 2 months.     Galileo Portillo DO  Pain Management   [facility]         INSPECT REPORT    As part of the patient's treatment plan, I may be prescribing controlled substances. The patient has been made aware of appropriate use of such medications, including potential risk of somnolence, limited ability to drive and/or work safely, and the potential for dependence or overdose. It has also been made clear that these medications are for use by this patient only, without concomitant use of alcohol or other substances unless prescribed.     Patient has completed prescribing agreement detailing terms of continued prescribing of controlled substances, including monitoring INSPECT reports, urine drug screening, and pill counts if necessary. The patient is aware that inappropriate use will results in cessation of prescribing such medications.    INSPECT report has been reviewed and scanned into the patient's chart.

## 2021-06-02 ENCOUNTER — OFFICE VISIT (OUTPATIENT)
Dept: PAIN MEDICINE | Facility: CLINIC | Age: 76
End: 2021-06-02

## 2021-06-02 VITALS
HEART RATE: 85 BPM | OXYGEN SATURATION: 98 % | BODY MASS INDEX: 24.98 KG/M2 | SYSTOLIC BLOOD PRESSURE: 113 MMHG | DIASTOLIC BLOOD PRESSURE: 68 MMHG | WEIGHT: 141 LBS | RESPIRATION RATE: 16 BRPM | HEIGHT: 63 IN

## 2021-06-02 DIAGNOSIS — M47.816 LUMBAR SPONDYLOSIS: ICD-10-CM

## 2021-06-02 DIAGNOSIS — G89.4 CHRONIC PAIN SYNDROME: ICD-10-CM

## 2021-06-02 DIAGNOSIS — M54.40 CHRONIC LEFT-SIDED LOW BACK PAIN WITH SCIATICA, SCIATICA LATERALITY UNSPECIFIED: ICD-10-CM

## 2021-06-02 DIAGNOSIS — F11.90 CHRONIC NARCOTIC USE: ICD-10-CM

## 2021-06-02 DIAGNOSIS — G89.29 CHRONIC LEFT-SIDED LOW BACK PAIN WITH SCIATICA, SCIATICA LATERALITY UNSPECIFIED: ICD-10-CM

## 2021-06-02 DIAGNOSIS — M51.36 DDD (DEGENERATIVE DISC DISEASE), LUMBAR: ICD-10-CM

## 2021-06-02 PROCEDURE — 99214 OFFICE O/P EST MOD 30 MIN: CPT | Performed by: STUDENT IN AN ORGANIZED HEALTH CARE EDUCATION/TRAINING PROGRAM

## 2021-06-02 RX ORDER — OXYCODONE HYDROCHLORIDE AND ACETAMINOPHEN 5; 325 MG/1; MG/1
1 TABLET ORAL 2 TIMES DAILY PRN
Qty: 60 TABLET | Refills: 0 | Status: SHIPPED | OUTPATIENT
Start: 2021-06-14 | End: 2021-07-14

## 2021-06-02 RX ORDER — CYCLOBENZAPRINE HCL 10 MG
10 TABLET ORAL 2 TIMES DAILY PRN
Qty: 60 TABLET | Refills: 1 | Status: SHIPPED | OUTPATIENT
Start: 2021-06-02 | End: 2021-08-01

## 2021-06-02 RX ORDER — OXYCODONE HYDROCHLORIDE AND ACETAMINOPHEN 5; 325 MG/1; MG/1
1 TABLET ORAL 2 TIMES DAILY PRN
Qty: 60 TABLET | Refills: 0 | Status: SHIPPED | OUTPATIENT
Start: 2021-07-14 | End: 2021-08-04

## 2021-06-17 ENCOUNTER — OFFICE (AMBULATORY)
Dept: URBAN - METROPOLITAN AREA CLINIC 64 | Facility: CLINIC | Age: 76
End: 2021-06-17

## 2021-06-17 ENCOUNTER — TELEPHONE (OUTPATIENT)
Dept: INTERNAL MEDICINE | Facility: CLINIC | Age: 76
End: 2021-06-17

## 2021-06-17 VITALS
HEART RATE: 86 BPM | WEIGHT: 146 LBS | HEIGHT: 63 IN | SYSTOLIC BLOOD PRESSURE: 121 MMHG | DIASTOLIC BLOOD PRESSURE: 74 MMHG

## 2021-06-17 DIAGNOSIS — K62.5 HEMORRHAGE OF ANUS AND RECTUM: ICD-10-CM

## 2021-06-17 DIAGNOSIS — D50.0 IRON DEFICIENCY ANEMIA SECONDARY TO BLOOD LOSS (CHRONIC): ICD-10-CM

## 2021-06-17 PROCEDURE — 99213 OFFICE O/P EST LOW 20 MIN: CPT | Performed by: INTERNAL MEDICINE

## 2021-06-17 NOTE — TELEPHONE ENCOUNTER
I put letter on your desk. She has cancelled more than one appointment same day. Explain to her our policy.     That is the standard [employer] letter.     Medicare wellness are pushed by hospital.    Please make sure she has 30 minute OV for August.

## 2021-07-16 ENCOUNTER — TELEPHONE (OUTPATIENT)
Dept: INTERNAL MEDICINE | Facility: CLINIC | Age: 76
End: 2021-07-16

## 2021-07-16 NOTE — TELEPHONE ENCOUNTER
Pt is wanting Chantix not Xanax. But chantix is on recall and not availiable right now. Would you be able to send something else in?

## 2021-07-16 NOTE — TELEPHONE ENCOUNTER
Caller: Luna Jacobson    Relationship: Self    Best call back number: [phone]    What medication are you requesting: XANX    What are your current symptoms: DEALING WITH SOME PERSONAL ISSUES     If a prescription is needed, what is your preferred pharmacy and phone number: [id number] [place] [id number] AT SEC OF [facility] [address] [facility] [address]  - [phone]  - [phone] FX     Additional notes: PATIENT STATES THAT SHE HAS TAKEN THIS MEDICATION BEFORE AND IT WORKED WELL FOR HER.    PLEASE ADVISE

## 2021-08-03 NOTE — PROGRESS NOTES
Subjective   Luna Jacobson is a 75 y.o. female is here for follow-up for lower back pain.  She was last seen on 6/2/2021.  Injection is still giving her good relief. States that Oxycodone is making her too sleepy.     On last visit:    Lower back pain is 4/10 on VAS, at maximum 5/10.  Pain is sharp and shooting in nature.  Pain is referred to left lateral thigh, left ankle.  Pain is constant.  Pain is improved by pain meds and caudal NIMA.  Pain is worse with walking.      Previous Injection:   11/20/2020 - Caudal NIMA - 80% pain relief ongoing     PMH: COPD, GERD, HLD, HTN, Thoracic aortic aneurysm      Current Medications:   Percocet 5-325 mg BID PRN   Flexeril 10 mg BID PRN     Past Medications:       Past Modalities:  TENS:                                                                          no                                                  Physical Therapy Within The Last 6 Months              no  Psychotherapy                                                            no  Massage Therapy                                                       no     Patient Complains Of:  Uro-Fecal Incontinence          no  Weight Gain/Loss                   no  Fever/Chills                             no  Weakness                               Yes (subjective weakness in left leg)            Current Outpatient Medications:   •  albuterol sulfate HFA (PROVENTIL HFA) 108 (90 Base) MCG/ACT inhaler, Inhale 2 puffs., Disp: , Rfl:   •  amoxicillin (AMOXIL) 500 MG tablet, , Disp: , Rfl:   •  aspirin 81 MG tablet, Take 81 mg by mouth Daily., Disp: , Rfl:   •  atorvastatin (LIPITOR) 40 MG tablet, TAKE 1 TABLET BY MOUTH DAILY, Disp: 90 tablet, Rfl: 1  •  butalbital-acetaminophen-caffeine (FIORICET, ESGIC) -40 MG per tablet, TAKE 1 TABLET BY MOUTH EVERY 12 HOURS AS NEEDED FOR HEADACHE, Disp: 30 tablet, Rfl: 1  •  Calcium Carbonate-Vitamin D (Calcium 500/D) 500-125 MG-UNIT tablet, Take  by mouth., Disp: , Rfl:   •   CHANTIX CONTINUING MONTH GURPREET 1 MG tablet, TAKE 1 TABLET BY MOUTH EVERY 12 HOURS AS DIRECTED, Disp: 56 tablet, Rfl: 0  •  clobetasol (TEMOVATE) 0.05 % external solution, APPLY TOPICALLY TO THE SCALP EVERY DAY IN THE MORNING AND IN THE EVENING, Disp: , Rfl:   •  cloNIDine (CATAPRES) 0.1 MG tablet, TAKE ONE TABLET BY MOUTH EVERY MORNING AND TWO TABLETS BY MOUTH EVERY EVENING, Disp: 270 tablet, Rfl: 1  •  coenzyme Q10 100 MG capsule, Take 100 mg by mouth Daily., Disp: , Rfl:   •  dicyclomine (BENTYL) 20 MG tablet, Take 20 mg by mouth Daily., Disp: , Rfl:   •  FeroSul 325 (65 Fe) MG tablet, TAKE 1 TABLET BY MOUTH DAILY, Disp: 30 tablet, Rfl: 2  •  fexofenadine (ALLEGRA) 180 MG tablet, Take 180 mg by mouth Daily., Disp: , Rfl:   •  fluticasone (FLONASE) 50 MCG/ACT nasal spray, SHAKE LIQUID AND USE 2 SPRAYS IN EACH NOSTRIL EVERY DAY, Disp: 48 g, Rfl: 3  •  ibuprofen (ADVIL,MOTRIN) 600 MG tablet, , Disp: , Rfl:   •  ketoconazole (NIZORAL) 2 % shampoo, , Disp: , Rfl: 3  •  levothyroxine sodium (TIROSINT) 75 MCG capsule, Take 1 capsule by mouth Daily., Disp: 30 capsule, Rfl: 2  •  Multiple Vitamin (MULTIVITAMIN) tablet, Take 1 tablet by mouth Daily., Disp: , Rfl:   •  oxyCODONE-acetaminophen (PERCOCET) 5-325 MG per tablet, Take 1 tablet by mouth 2 (Two) Times a Day As Needed for Moderate Pain  for up to 30 days., Disp: 60 tablet, Rfl: 0  •  pantoprazole (PROTONIX) 40 MG EC tablet, Take 40 mg by mouth Daily., Disp: , Rfl:   •  PARoxetine (PAXIL) 20 MG tablet, TAKE 1 TABLET BY MOUTH EVERY MORNING, Disp: 90 tablet, Rfl: 1  •  polyethylene glycol (MIRALAX) powder, , Disp: , Rfl:   •  PreviDent 5000 Dry Mouth 1.1 % gel, See Admin Instructions., Disp: , Rfl:   •  spironolactone (Aldactone) 50 MG tablet, Take 1 tablet by mouth Daily., Disp: 30 tablet, Rfl: 4    The following portions of the patient's history were reviewed and updated as appropriate: allergies, current medications, past family history, past medical history, past  social history, past surgical history and problem list.      REVIEW OF PERTINENT MEDICAL DATA    Past Medical History:   Diagnosis Date   • Anemia    • Anxiety    • Aortic aneurysm (CMS/HCC)     4 cm thoracic aorta   • COPD (chronic obstructive pulmonary disease) (CMS/HCC)    • Coronary artery calcification seen on CT scan 2012    mild calcification in the LAD with calcium score of 22. modere narrowing left subclavian origin   • Decreased diffusion capacity of lung    • Diverticulosis    • Gastroparesis    • GERD (gastroesophageal reflux disease)    • GIST (gastrointestinal stromal tumor), non-malignant    • Irritable bowel syndrome    • Low back pain    • Mild mitral regurgitation    • Osteoporosis    • PAD (peripheral artery disease) (CMS/HCC)     small vessel disease in feet   • Subclavian artery stenosis, left (CMS/HCC) 2016    50 percent   • Tubular adenoma of colon 2017     Past Surgical History:   Procedure Laterality Date   • APPENDECTOMY     • COLONOSCOPY  10/01/2019    dr mcintyre   • ENDOSCOPY  2019   • HEMORRHOIDECTOMY     • HYSTERECTOMY     • TONSILLECTOMY     • TOOTH EXTRACTION      8/3/21     Family History   Problem Relation Age of Onset   • Hypertension Mother    • Diabetes Mother    • Dementia Mother    • Stroke Mother    • Lung cancer Father    • Alcohol abuse Father    • COPD Father    • Breast cancer Sister    • Hypertension Sister    • Hypothyroidism Sister    • Stroke Sister    • Other Sister         AAA   • Bipolar disorder Daughter    • Fibromyalgia Daughter      Social History     Socioeconomic History   • Marital status:      Spouse name: Not on file   • Number of children: Not on file   • Years of education: Not on file   • Highest education level: Not on file   Tobacco Use   • Smoking status: Former Smoker     Quit date: 2020     Years since quittin.9   • Smokeless tobacco: Never Used   • Tobacco comment: she has smoked intermittently for 40 years; she smoked  "over 2 ppd twenty years ago   Vaping Use   • Vaping Use: Never used   Substance and Sexual Activity   • Alcohol use: No   • Drug use: No   • Sexual activity: Defer         Review of Systems      Vitals:    08/04/21 1348   BP: 111/71   Pulse: 77   Resp: 16   SpO2: 99%   Weight: 64 kg (141 lb)   Height: 160 cm (63\")   PainSc:   5         Objective   Physical Exam  Musculoskeletal:      Lumbar back: Tenderness present. Decreased range of motion.        Legs:    Neurological:      Comments: Motor strength 5/5 b/l LE  Sensory intact b/l LE             Imaging Reviewed:  [facility] done at [facility] - 9/2020:      L3-L4-a small broad based disc protrusion centrally.  There is moderate facet arthropathy.  This changes contribute to moderate central canal stenosis.  There is moderate right and severe left neural foraminal stenosis.  L4-L5-there is a posterior disc protrusion centrally.  Moderate facet arthropathy and mild limited flavum hypertrophy. Severe central canal stenosis.  Severe bilateral neural foraminal stenosis, right greater than left.  L5-S1-there is small left far lateral disc protrusion.  No central canal stenosis.  There is mild facet arthropathy.  There is moderate bilateral neural foraminal stenosis.     Lumbar X-ray: 10/1/2020   Multilevel degenerative spondylosis.          Assessment:    1. DDD (degenerative disc disease), lumbar    2. Chronic left-sided low back pain with sciatica, sciatica laterality unspecified    3. Lumbar spondylosis    4. Chronic pain syndrome          Plan:    1. Repeat UDS today - 8/3/2021; UDS- 10/9/2020- consistent with patient interview.  Narcotic contract discussed on 10/9/2020. Narcan ordered on 10/9/2020.  2. STOP percocet after finishing existing prescription.   3. START Norco 5-325 mg BID PRN ( 7 days followed by 30 days - 8/13; 8/18). Discussed with the patient regarding long-term side effects of opioids including but not limited to opioid induced hormonal suppression, " hyperalgesia, fatigue, weight gain, possible opioid induced altered immune system, addiction, tolerance, dependence, risk of hearing loss and elevated risk of myocardial infarction. Proper use and potential life threatening side effects of over use discussed with patient. Patient states understanding of their use and risks.  3. Continue Flexeril to 10 mg BID PRN (1 refill). Common side effects of flexeril include blurred vision, dizziness or lightheadedness, drowsiness, dryness of mouth, bloated feeling or gas, indigestion, nausea or vomiting, or stomach cramps or pain, constipation, diarrhea, excitement or nervousness, frequent urination, general feeling of discomfort or illness, headache, muscle twitching, numbness, tingling, pain, or weakness in hands or feet, pounding heartbeat, problems in speaking, trembling, trouble in sleeping, unpleasant taste or other taste changes, unusual muscle weakness or unusual tiredness, especially during the first few days as your body adjusts to this medication.  4. Will repeat Caudal NIMA as needed in future. Patient will give us a call to schedule the injection.     RTC in 5 weeks.     Galileo Portillo DO  Pain Management   [facility]         INSPECT REPORT    As part of the patient's treatment plan, I may be prescribing controlled substances. The patient has been made aware of appropriate use of such medications, including potential risk of somnolence, limited ability to drive and/or work safely, and the potential for dependence or overdose. It has also been made clear that these medications are for use by this patient only, without concomitant use of alcohol or other substances unless prescribed.     Patient has completed prescribing agreement detailing terms of continued prescribing of controlled substances, including monitoring INSPECT reports, urine drug screening, and pill counts if necessary. The patient is aware that inappropriate use will results in cessation of  prescribing such medications.    INSPECT report has been reviewed and scanned into the patient's chart.

## 2021-08-04 ENCOUNTER — OFFICE VISIT (OUTPATIENT)
Dept: PAIN MEDICINE | Facility: CLINIC | Age: 76
End: 2021-08-04

## 2021-08-04 VITALS
SYSTOLIC BLOOD PRESSURE: 111 MMHG | DIASTOLIC BLOOD PRESSURE: 71 MMHG | HEART RATE: 77 BPM | HEIGHT: 63 IN | BODY MASS INDEX: 24.98 KG/M2 | WEIGHT: 141 LBS | RESPIRATION RATE: 16 BRPM | OXYGEN SATURATION: 99 %

## 2021-08-04 DIAGNOSIS — M54.40 CHRONIC LEFT-SIDED LOW BACK PAIN WITH SCIATICA, SCIATICA LATERALITY UNSPECIFIED: ICD-10-CM

## 2021-08-04 DIAGNOSIS — M51.36 DDD (DEGENERATIVE DISC DISEASE), LUMBAR: Primary | ICD-10-CM

## 2021-08-04 DIAGNOSIS — M47.816 LUMBAR SPONDYLOSIS: ICD-10-CM

## 2021-08-04 DIAGNOSIS — Z79.899 HIGH RISK MEDICATION USE: Primary | ICD-10-CM

## 2021-08-04 DIAGNOSIS — G89.29 CHRONIC LEFT-SIDED LOW BACK PAIN WITH SCIATICA, SCIATICA LATERALITY UNSPECIFIED: ICD-10-CM

## 2021-08-04 DIAGNOSIS — G89.4 CHRONIC PAIN SYNDROME: ICD-10-CM

## 2021-08-04 PROCEDURE — 99214 OFFICE O/P EST MOD 30 MIN: CPT | Performed by: STUDENT IN AN ORGANIZED HEALTH CARE EDUCATION/TRAINING PROGRAM

## 2021-08-04 RX ORDER — CLOBETASOL PROPIONATE 0.46 MG/ML
SOLUTION TOPICAL
COMMUNITY
Start: 2021-07-08

## 2021-08-04 RX ORDER — HYDROCODONE BITARTRATE AND ACETAMINOPHEN 5; 325 MG/1; MG/1
1 TABLET ORAL 2 TIMES DAILY PRN
Qty: 60 TABLET | Refills: 0 | Status: SHIPPED | OUTPATIENT
Start: 2021-08-18 | End: 2021-09-09 | Stop reason: SDUPTHER

## 2021-08-04 RX ORDER — AMOXICILLIN 500 MG/1
TABLET, FILM COATED ORAL
COMMUNITY
Start: 2021-08-03 | End: 2021-09-09

## 2021-08-04 RX ORDER — CYCLOBENZAPRINE HCL 10 MG
10 TABLET ORAL 2 TIMES DAILY PRN
Qty: 60 TABLET | Refills: 1 | Status: SHIPPED | OUTPATIENT
Start: 2021-08-04 | End: 2021-09-09 | Stop reason: SDUPTHER

## 2021-08-04 RX ORDER — SODIUM FLUORIDE 6.1 MG/ML
GEL, DENTIFRICE DENTAL SEE ADMIN INSTRUCTIONS
COMMUNITY
Start: 2021-07-05 | End: 2022-03-24

## 2021-08-04 RX ORDER — IBUPROFEN 600 MG/1
TABLET ORAL
COMMUNITY
Start: 2021-08-03 | End: 2022-01-10

## 2021-08-04 RX ORDER — HYDROCODONE BITARTRATE AND ACETAMINOPHEN 5; 325 MG/1; MG/1
1 TABLET ORAL 2 TIMES DAILY PRN
Qty: 14 TABLET | Refills: 0 | Status: SHIPPED | OUTPATIENT
Start: 2021-08-12 | End: 2021-08-09

## 2021-08-09 ENCOUNTER — OFFICE VISIT (OUTPATIENT)
Dept: INTERNAL MEDICINE | Facility: CLINIC | Age: 76
End: 2021-08-09

## 2021-08-09 VITALS
WEIGHT: 140 LBS | HEIGHT: 63 IN | TEMPERATURE: 97.8 F | BODY MASS INDEX: 24.8 KG/M2 | SYSTOLIC BLOOD PRESSURE: 108 MMHG | DIASTOLIC BLOOD PRESSURE: 76 MMHG

## 2021-08-09 DIAGNOSIS — I71.20 THORACIC AORTIC ANEURYSM WITHOUT RUPTURE (HCC): Chronic | ICD-10-CM

## 2021-08-09 DIAGNOSIS — Z78.0 POST-MENOPAUSAL: ICD-10-CM

## 2021-08-09 DIAGNOSIS — I10 ESSENTIAL HYPERTENSION: Primary | Chronic | ICD-10-CM

## 2021-08-09 DIAGNOSIS — E03.8 OTHER SPECIFIED HYPOTHYROIDISM: ICD-10-CM

## 2021-08-09 DIAGNOSIS — R73.03 PREDIABETES: ICD-10-CM

## 2021-08-09 DIAGNOSIS — D50.0 IRON DEFICIENCY ANEMIA DUE TO CHRONIC BLOOD LOSS: ICD-10-CM

## 2021-08-09 DIAGNOSIS — R06.02 SHORTNESS OF BREATH: ICD-10-CM

## 2021-08-09 PROCEDURE — 99214 OFFICE O/P EST MOD 30 MIN: CPT | Performed by: INTERNAL MEDICINE

## 2021-08-09 NOTE — PROGRESS NOTES
Subjective        Chief Complaint   Patient presents with   • Hypertension           Luna Jacobson is a 75 y.o. female who presents for    Patient Active Problem List   Diagnosis   • Essential hypertension   • Other hyperlipidemia   • Other specified hypothyroidism   • Other headache syndrome   • Prediabetes   • Iron deficiency anemia due to chronic blood loss   • Thoracic aortic aneurysm (CMS/HCC)   • Microscopic colitis   • Myalgia   • Irregular heart beat   • DDD (degenerative disc disease), lumbar       History of Present Illness     She has been feeling good except for her stomach pain. She saw Dr. Owen in June with ferritin and hgb that were reported as nl. Her BP has been below 140/90. She smokes two cigarettes per day. She is not exercising. She thinks she sees Dr. Koenig soon. She had an echo and holter with Dr. Koenig in May; she reports them as normal. She thinks her breathing is good most of the time for the last two years. She does not wheeze or cough. She has been off iron for about 2 months. She was seeing pain mgmt and she is being switched from Oxycodone to Norco.  Allergies   Allergen Reactions   • Amlodipine Headache     Head tightness   • Itraconazole Hives     sporanax   • Lisinopril Hives   • Norvasc [Amlodipine Besylate] Other (See Comments)     Head tightness   • Sulfa Antibiotics Other (See Comments)     headache   • Hydrochlorothiazide Other (See Comments)     hyponatremia  Other reaction(s): Other (See Comments)  hyponatremia   • Sulfamethoxazole Nausea And Vomiting       Current Outpatient Medications on File Prior to Visit   Medication Sig Dispense Refill   • albuterol sulfate HFA (PROVENTIL HFA) 108 (90 Base) MCG/ACT inhaler Inhale 2 puffs.     • amoxicillin (AMOXIL) 500 MG tablet      • aspirin 81 MG tablet Take 81 mg by mouth Daily.     • atorvastatin (LIPITOR) 40 MG tablet TAKE 1 TABLET BY MOUTH DAILY 90 tablet 1   • butalbital-acetaminophen-caffeine (FIORICET, ESGIC)  -40 MG per tablet TAKE 1 TABLET BY MOUTH EVERY 12 HOURS AS NEEDED FOR HEADACHE 30 tablet 1   • Calcium Carbonate-Vitamin D (Calcium 500/D) 500-125 MG-UNIT tablet Take  by mouth.     • clobetasol (TEMOVATE) 0.05 % external solution APPLY TOPICALLY TO THE SCALP EVERY DAY IN THE MORNING AND IN THE EVENING     • cloNIDine (CATAPRES) 0.1 MG tablet TAKE ONE TABLET BY MOUTH EVERY MORNING AND TWO TABLETS BY MOUTH EVERY EVENING 270 tablet 1   • coenzyme Q10 100 MG capsule Take 100 mg by mouth Daily.     • cyclobenzaprine (FLEXERIL) 10 MG tablet Take 1 tablet by mouth 2 (Two) Times a Day As Needed for Muscle Spasms for up to 60 days. 60 tablet 1   • dicyclomine (BENTYL) 20 MG tablet Take 20 mg by mouth Daily.     • fexofenadine (ALLEGRA) 180 MG tablet Take 180 mg by mouth Daily.     • fluticasone (FLONASE) 50 MCG/ACT nasal spray SHAKE LIQUID AND USE 2 SPRAYS IN EACH NOSTRIL EVERY DAY 48 g 3   • [START ON 8/18/2021] HYDROcodone-acetaminophen (NORCO) 5-325 MG per tablet Take 1 tablet by mouth 2 (Two) Times a Day As Needed for Severe Pain  for up to 30 days. 60 tablet 0   • ibuprofen (ADVIL,MOTRIN) 600 MG tablet      • ketoconazole (NIZORAL) 2 % shampoo   3   • levothyroxine sodium (TIROSINT) 75 MCG capsule Take 1 capsule by mouth Daily. 30 capsule 2   • Multiple Vitamin (MULTIVITAMIN) tablet Take 1 tablet by mouth Daily.     • pantoprazole (PROTONIX) 40 MG EC tablet Take 40 mg by mouth Daily.     • PARoxetine (PAXIL) 20 MG tablet TAKE 1 TABLET BY MOUTH EVERY MORNING 90 tablet 1   • polyethylene glycol (MIRALAX) powder      • PreviDent 5000 Dry Mouth 1.1 % gel See Admin Instructions.     • spironolactone (Aldactone) 50 MG tablet Take 1 tablet by mouth Daily. 30 tablet 4   • [DISCONTINUED] CHANTIX CONTINUING MONTH GURPREET 1 MG tablet TAKE 1 TABLET BY MOUTH EVERY 12 HOURS AS DIRECTED 56 tablet 0   • [DISCONTINUED] HYDROcodone-acetaminophen (NORCO) 5-325 MG per tablet Take 1 tablet by mouth 2 (Two) Times a Day As Needed for  Severe Pain  for up to 7 days. 14 tablet 0   • FeroSul 325 (65 Fe) MG tablet TAKE 1 TABLET BY MOUTH DAILY 30 tablet 2     No current facility-administered medications on file prior to visit.       Past Medical History:   Diagnosis Date   • Anemia    • Anxiety    • Aortic aneurysm (CMS/HCC)     4 cm thoracic aorta   • COPD (chronic obstructive pulmonary disease) (CMS/[facility])    • Coronary artery calcification seen on CT scan 2012    mild calcification in the LAD with calcium score of 22. modere narrowing left subclavian origin   • Decreased diffusion capacity of lung    • Diverticulosis    • Gastroparesis    • GERD (gastroesophageal reflux disease)    • GIST (gastrointestinal stromal tumor), non-malignant    • Irritable bowel syndrome    • Low back pain    • Mild mitral regurgitation    • Osteoporosis    • PAD (peripheral artery disease) (CMS/[facility])     small vessel disease in feet   • Subclavian artery stenosis, left (CMS/[facility]) 2016    50 percent   • Tubular adenoma of colon 2017       Past Surgical History:   Procedure Laterality Date   • APPENDECTOMY     • COLONOSCOPY  10/01/2019    dr mcintyre   • ENDOSCOPY  2019   • HEMORRHOIDECTOMY     • HYSTERECTOMY     • TONSILLECTOMY     • TOOTH EXTRACTION      8/3/21       Family History   Problem Relation Age of Onset   • Hypertension Mother    • Diabetes Mother    • Dementia Mother    • Stroke Mother    • Lung cancer Father    • Alcohol abuse Father    • COPD Father    • Breast cancer Sister    • Hypertension Sister    • Hypothyroidism Sister    • Stroke Sister    • Other Sister         AAA   • Bipolar disorder Daughter    • Fibromyalgia Daughter        Social History     Socioeconomic History   • Marital status:      Spouse name: Not on file   • Number of children: Not on file   • Years of education: Not on file   • Highest education level: Not on file   Tobacco Use   • Smoking status: Former Smoker     Quit date: 2020     Years since quittin.9   •  "Smokeless tobacco: Never Used   • Tobacco comment: she has smoked intermittently for 40 years; she smoked over 2 ppd twenty years ago   Vaping Use   • Vaping Use: Never used   Substance and Sexual Activity   • Alcohol use: No   • Drug use: No   • Sexual activity: Defer           The following portions of the patient's history were reviewed and updated as appropriate: problem list, allergies, current medications, past medical history, past family history, past social history and past surgical history.    Review of Systems    Immunization History   Administered Date(s) Administered   • COVID-19 (PFIZER) 02/07/2021, 02/28/2021   • FLUAD TRI 65YR+ 11/01/2019   • Fluzone High Dose =>65 Years ([employer] ONLY) 11/29/2018   • Hepatitis A 05/01/2018, 12/02/2018   • Influenza TIV (IM) 11/07/2013, 10/13/2014   • Pneumococcal Conjugate 13-Valent (PCV13) 04/23/2015   • Pneumococcal Polysaccharide (PPSV23) 08/01/2008   • Tdap 01/01/2012   • Zostavax 08/25/2010       Objective   Vitals:    08/09/21 1507   BP: 108/76   Temp: 97.8 °F (36.6 °C)   Weight: 63.5 kg (140 lb)   Height: 160 cm (63\")     Body mass index is 24.8 kg/m².  Physical Exam  Vitals reviewed.   Constitutional:       Appearance: She is well-developed.   HENT:      Head: Normocephalic and atraumatic.   Cardiovascular:      Rate and Rhythm: Normal rate and regular rhythm.      Heart sounds: Normal heart sounds, S1 normal and S2 normal.   Pulmonary:      Effort: Pulmonary effort is normal.      Breath sounds: Normal breath sounds.   Skin:     General: Skin is warm.   Neurological:      Mental Status: She is alert.   Psychiatric:         Behavior: Behavior normal.         Procedures    Assessment/Plan   Diagnoses and all orders for this visit:    1. Essential hypertension (Primary)  -     Basic Metabolic Panel  -     Comprehensive Metabolic Panel; Future  -     Lipid Panel With / Chol / HDL Ratio; Future    2. Thoracic aortic aneurysm without rupture " (CMS/[facility])  Comments:  Stable earlier this year    3. Prediabetes  -     Hemoglobin A1c  -     Hemoglobin A1c; Future    4. Iron deficiency anemia due to chronic blood loss  Comments:  Check iron since she stopped tab  Orders:  -     CBC & Differential  -     Ferritin    5. Other specified hypothyroidism  -     TSH  -     TSH; Future    6. Post-menopausal  -     DEXA Bone Density Axial    7. Shortness of breath  Comments:  CT showed some COPD. I will get formal PFTs  Orders:  -     Full Pulmonary Function Test With Bronchodilator; Future               Reviewed CTA from April and nl MMG from May. Get labs today. BP is good.  Return in about 6 months (around 2/9/2022) for Lab Today, Lab Before [facility], Medicare Wellness.

## 2021-08-10 DIAGNOSIS — D50.0 IRON DEFICIENCY ANEMIA DUE TO CHRONIC BLOOD LOSS: Primary | ICD-10-CM

## 2021-08-10 DIAGNOSIS — E03.8 OTHER SPECIFIED HYPOTHYROIDISM: ICD-10-CM

## 2021-08-10 LAB
BASOPHILS # BLD AUTO: 0.03 10*3/MM3 (ref 0–0.2)
BASOPHILS NFR BLD AUTO: 0.4 % (ref 0–1.5)
BUN SERPL-MCNC: 18 MG/DL (ref 8–23)
BUN/CREAT SERPL: 14.3 (ref 7–25)
CALCIUM SERPL-MCNC: 9.9 MG/DL (ref 8.6–10.5)
CHLORIDE SERPL-SCNC: 98 MMOL/L (ref 98–107)
CO2 SERPL-SCNC: 23.9 MMOL/L (ref 22–29)
CREAT SERPL-MCNC: 1.26 MG/DL (ref 0.57–1)
EOSINOPHIL # BLD AUTO: 0.19 10*3/MM3 (ref 0–0.4)
EOSINOPHIL NFR BLD AUTO: 2.6 % (ref 0.3–6.2)
ERYTHROCYTE [DISTWIDTH] IN BLOOD BY AUTOMATED COUNT: 12.6 % (ref 12.3–15.4)
FERRITIN SERPL-MCNC: 20.5 NG/ML (ref 13–150)
GLUCOSE SERPL-MCNC: 96 MG/DL (ref 65–99)
HBA1C MFR BLD: 6.1 % (ref 4.8–5.6)
HCT VFR BLD AUTO: 36.9 % (ref 34–46.6)
HGB BLD-MCNC: 12.1 G/DL (ref 12–15.9)
IMM GRANULOCYTES # BLD AUTO: 0.03 10*3/MM3 (ref 0–0.05)
IMM GRANULOCYTES NFR BLD AUTO: 0.4 % (ref 0–0.5)
LYMPHOCYTES # BLD AUTO: 1.55 10*3/MM3 (ref 0.7–3.1)
LYMPHOCYTES NFR BLD AUTO: 21.5 % (ref 19.6–45.3)
MCH RBC QN AUTO: 29.7 PG (ref 26.6–33)
MCHC RBC AUTO-ENTMCNC: 32.8 G/DL (ref 31.5–35.7)
MCV RBC AUTO: 90.7 FL (ref 79–97)
MONOCYTES # BLD AUTO: 0.65 10*3/MM3 (ref 0.1–0.9)
MONOCYTES NFR BLD AUTO: 9 % (ref 5–12)
NEUTROPHILS # BLD AUTO: 4.77 10*3/MM3 (ref 1.7–7)
NEUTROPHILS NFR BLD AUTO: 66.1 % (ref 42.7–76)
NRBC BLD AUTO-RTO: 0 /100 WBC (ref 0–0.2)
PLATELET # BLD AUTO: 335 10*3/MM3 (ref 140–450)
POTASSIUM SERPL-SCNC: 4.7 MMOL/L (ref 3.5–5.2)
RBC # BLD AUTO: 4.07 10*6/MM3 (ref 3.77–5.28)
SODIUM SERPL-SCNC: 135 MMOL/L (ref 136–145)
TSH SERPL DL<=0.005 MIU/L-ACNC: 4.87 UIU/ML (ref 0.27–4.2)
WBC # BLD AUTO: 7.22 10*3/MM3 (ref 3.4–10.8)

## 2021-08-10 RX ORDER — LEVOTHYROXINE SODIUM 88 UG/1
88 TABLET ORAL DAILY
Qty: 30 TABLET | Refills: 3 | Status: SHIPPED | OUTPATIENT
Start: 2021-08-10 | End: 2021-12-13

## 2021-08-12 ENCOUNTER — TELEPHONE (OUTPATIENT)
Dept: INTERNAL MEDICINE | Facility: CLINIC | Age: 76
End: 2021-08-12

## 2021-08-12 NOTE — TELEPHONE ENCOUNTER
Caller: Luna Garcia    Relationship: Self    Best call back number:[phone]     What is the best time to reach you: ANY TIME    Who are you requesting to speak with (clinical staff, provider,  specific staff member): YOSEF    Do you know the name of the person who called: LUNA GARCIA    What was the call regarding: RETURNING CALL    Do you require a callback: YES

## 2021-08-13 ENCOUNTER — TRANSCRIBE ORDERS (OUTPATIENT)
Dept: ADMINISTRATIVE | Facility: HOSPITAL | Age: 76
End: 2021-08-13

## 2021-08-13 DIAGNOSIS — Z01.818 PREOP TESTING: Primary | ICD-10-CM

## 2021-08-13 DIAGNOSIS — D50.0 IRON DEFICIENCY ANEMIA DUE TO CHRONIC BLOOD LOSS: ICD-10-CM

## 2021-08-13 RX ORDER — FERROUS SULFATE 325(65) MG
325 TABLET ORAL DAILY
Qty: 30 TABLET | Refills: 2 | Status: SHIPPED | OUTPATIENT
Start: 2021-08-13 | End: 2021-11-24

## 2021-08-30 DIAGNOSIS — I10 ESSENTIAL HYPERTENSION: ICD-10-CM

## 2021-08-31 RX ORDER — SPIRONOLACTONE 100 MG/1
100 TABLET, FILM COATED ORAL DAILY
Qty: 90 TABLET | Refills: 0 | OUTPATIENT
Start: 2021-08-31

## 2021-08-31 RX ORDER — CLONIDINE HYDROCHLORIDE 0.1 MG/1
TABLET ORAL
Qty: 270 TABLET | Refills: 1 | Status: SHIPPED | OUTPATIENT
Start: 2021-08-31 | End: 2022-02-14

## 2021-09-04 DIAGNOSIS — E78.49 OTHER HYPERLIPIDEMIA: ICD-10-CM

## 2021-09-07 RX ORDER — ATORVASTATIN CALCIUM 40 MG/1
40 TABLET, FILM COATED ORAL DAILY
Qty: 90 TABLET | Refills: 1 | Status: SHIPPED | OUTPATIENT
Start: 2021-09-07 | End: 2022-03-14

## 2021-09-07 NOTE — PROGRESS NOTES
Subjective   Luna Jacobson is a 76 y.o. female is here for follow-up for lower back pain.  Last seen on 8/4/2021. No change in pain. Caudal is still helping with pain.     On last visit: Switched from oxycodone to hydrocodone due to excessive sedation - working better; no side effect.     Lower back pain is 4/10 on VAS, at maximum 5/10.  Pain is sharp and shooting in nature.  Pain is referred to left lateral thigh and left ankle.  Pain is constant.  Pain is improved by pain meds and caudal NIMA.  Pain is worse with walking.      Previous Injection:   11/20/2020 - Caudal NIMA - 80% pain relief ongoing     PMH: COPD, GERD, HLD, HTN, Thoracic aortic aneurysm      Current Medications:   Norco 5-325 mg BID PRN   Flexeril 10 mg BID PRN     Past Medications:       Past Modalities:  TENS:                                                                          no                                                  Physical Therapy Within The Last 6 Months              no  Psychotherapy                                                            no  Massage Therapy                                                       no     Patient Complains Of:  Uro-Fecal Incontinence          no  Weight Gain/Loss                   no  Fever/Chills                             no  Weakness                               Yes (subjective weakness in left leg)            Current Outpatient Medications:   •  albuterol sulfate HFA (PROVENTIL HFA) 108 (90 Base) MCG/ACT inhaler, Inhale 2 puffs., Disp: , Rfl:   •  aspirin 81 MG tablet, Take 81 mg by mouth Daily., Disp: , Rfl:   •  atorvastatin (LIPITOR) 40 MG tablet, TAKE 1 TABLET BY MOUTH DAILY, Disp: 90 tablet, Rfl: 1  •  butalbital-acetaminophen-caffeine (FIORICET, ESGIC) -40 MG per tablet, TAKE 1 TABLET BY MOUTH EVERY 12 HOURS AS NEEDED FOR HEADACHE, Disp: 30 tablet, Rfl: 1  •  Calcium Carbonate-Vitamin D (Calcium 500/D) 500-125 MG-UNIT tablet, Take  by mouth., Disp: , Rfl:   •   clobetasol (TEMOVATE) 0.05 % external solution, APPLY TOPICALLY TO THE SCALP EVERY DAY IN THE MORNING AND IN THE EVENING, Disp: , Rfl:   •  cloNIDine (CATAPRES) 0.1 MG tablet, TAKE ONE TABLET BY MOUTH EVERY MORNING AND TWO TABLETS BY MOUTH EVERY EVENING, Disp: 270 tablet, Rfl: 1  •  coenzyme Q10 100 MG capsule, Take 100 mg by mouth Daily., Disp: , Rfl:   •  [START ON 9/17/2021] cyclobenzaprine (FLEXERIL) 10 MG tablet, Take 1 tablet by mouth 2 (Two) Times a Day As Needed for Muscle Spasms for up to 60 days., Disp: 60 tablet, Rfl: 1  •  dicyclomine (BENTYL) 20 MG tablet, Take 20 mg by mouth Daily., Disp: , Rfl:   •  FeroSul 325 (65 Fe) MG tablet, TAKE 1 TABLET BY MOUTH DAILY, Disp: 30 tablet, Rfl: 2  •  fexofenadine (ALLEGRA) 180 MG tablet, Take 180 mg by mouth Daily., Disp: , Rfl:   •  fluticasone (FLONASE) 50 MCG/ACT nasal spray, SHAKE LIQUID AND USE 2 SPRAYS IN EACH NOSTRIL EVERY DAY, Disp: 48 g, Rfl: 3  •  [START ON 9/17/2021] HYDROcodone-acetaminophen (NORCO) 5-325 MG per tablet, Take 1 tablet by mouth 2 (Two) Times a Day As Needed for Severe Pain  for up to 30 days., Disp: 60 tablet, Rfl: 0  •  [START ON 10/17/2021] HYDROcodone-acetaminophen (NORCO) 5-325 MG per tablet, Take 1 tablet by mouth 2 (Two) Times a Day As Needed for Severe Pain  for up to 30 days., Disp: 60 tablet, Rfl: 0  •  ketoconazole (NIZORAL) 2 % shampoo, , Disp: , Rfl: 3  •  levothyroxine (Synthroid) 88 MCG tablet, Take 1 tablet by mouth Daily., Disp: 30 tablet, Rfl: 3  •  Multiple Vitamin (MULTIVITAMIN) tablet, Take 1 tablet by mouth Daily., Disp: , Rfl:   •  pantoprazole (PROTONIX) 40 MG EC tablet, Take 40 mg by mouth Daily., Disp: , Rfl:   •  PARoxetine (PAXIL) 20 MG tablet, TAKE 1 TABLET BY MOUTH EVERY MORNING, Disp: 90 tablet, Rfl: 1  •  polyethylene glycol (MIRALAX) powder, , Disp: , Rfl:   •  PreviDent 5000 Dry Mouth 1.1 % gel, See Admin Instructions., Disp: , Rfl:   •  spironolactone (Aldactone) 50 MG tablet, Take 1 tablet by mouth  Daily., Disp: 30 tablet, Rfl: 4  •  amoxicillin (AMOXIL) 500 MG tablet, , Disp: , Rfl:   •  ibuprofen (ADVIL,MOTRIN) 600 MG tablet, , Disp: , Rfl:     The following portions of the patient's history were reviewed and updated as appropriate: allergies, current medications, past family history, past medical history, past social history, past surgical history and problem list.      REVIEW OF PERTINENT MEDICAL DATA    Past Medical History:   Diagnosis Date   • Anemia    • Anxiety    • Aortic aneurysm (CMS/HCC)     4 cm thoracic aorta   • COPD (chronic obstructive pulmonary disease) (CMS/HCC)    • Coronary artery calcification seen on CT scan 07/2012    mild calcification in the LAD with calcium score of 22. modere narrowing left subclavian origin   • Decreased diffusion capacity of lung    • Diverticulosis    • Gastroparesis    • GERD (gastroesophageal reflux disease)    • GIST (gastrointestinal stromal tumor), non-malignant    • Irritable bowel syndrome    • Low back pain    • Mild mitral regurgitation    • Osteoporosis    • PAD (peripheral artery disease) (CMS/HCC)     small vessel disease in feet   • Subclavian artery stenosis, left (CMS/HCC) 05/2016    50 percent   • Tubular adenoma of colon 01/2017     Past Surgical History:   Procedure Laterality Date   • APPENDECTOMY     • COLONOSCOPY  10/01/2019    dr mcintyre   • ENDOSCOPY  02/2019   • HEMORRHOIDECTOMY     • HYSTERECTOMY     • TONSILLECTOMY     • TOOTH EXTRACTION      8/3/21     Family History   Problem Relation Age of Onset   • Hypertension Mother    • Diabetes Mother    • Dementia Mother    • Stroke Mother    • Lung cancer Father    • Alcohol abuse Father    • COPD Father    • Breast cancer Sister    • Hypertension Sister    • Hypothyroidism Sister    • Stroke Sister    • Other Sister         AAA   • Bipolar disorder Daughter    • Fibromyalgia Daughter      Social History     Socioeconomic History   • Marital status:      Spouse name: Not on file   •  "Number of children: Not on file   • Years of education: Not on file   • Highest education level: Not on file   Tobacco Use   • Smoking status: Former Smoker     Quit date: 2020     Years since quittin.0   • Smokeless tobacco: Never Used   • Tobacco comment: she has smoked intermittently for 40 years; she smoked over 2 ppd twenty years ago   Vaping Use   • Vaping Use: Never used   Substance and Sexual Activity   • Alcohol use: No   • Drug use: No   • Sexual activity: Defer         Review of Systems      Vitals:    21 1408   BP: 145/80   Pulse: 56   Resp: 16   SpO2: 99%   Weight: 63.5 kg (140 lb)   Height: 160 cm (63\")   PainSc:   3         Objective   Physical Exam  Musculoskeletal:      Lumbar back: Tenderness present. Decreased range of motion.        Legs:    Neurological:      Comments: Motor strength 5/5 b/l LE  Sensory intact b/l LE             Imaging Reviewed:  [facility]:      L3-L4-a small broad based disc protrusion centrally.  There is moderate facet arthropathy.  This changes contribute to moderate central canal stenosis.  There is moderate right and severe left neural foraminal stenosis.  L4-L5-there is a posterior disc protrusion centrally.  Moderate facet arthropathy and mild limited flavum hypertrophy. Severe central canal stenosis.  Severe bilateral neural foraminal stenosis, right greater than left.  L5-S1-there is small left far lateral disc protrusion.  No central canal stenosis.  There is mild facet arthropathy.  There is moderate bilateral neural foraminal stenosis.     Lumbar X-ray: 10/1/2020   Multilevel degenerative spondylosis.          Assessment:    1. DDD (degenerative disc disease), lumbar    2. Chronic left-sided low back pain with sciatica, sciatica laterality unspecified    3. Lumbar spondylosis    4. Chronic pain syndrome          Plan:    1.  UDS on 8/3/2021 is consistent with patient's interview.  Positive for Percocet as patient was on oxycodone at " the time.  Narcotic contract discussed on 10/9/2020. Narcan ordered on 10/9/2020.  2. Continue Norco 5-325 mg BID PRN (9/17; 10/17). Discussed with the patient regarding long-term side effects of opioids including but not limited to opioid induced hormonal suppression, hyperalgesia, fatigue, weight gain, possible opioid induced altered immune system, addiction, tolerance, dependence, risk of hearing loss and elevated risk of myocardial infarction. Proper use and potential life threatening side effects of over use discussed with patient. Patient states understanding of their use and risks.  3. Continue Flexeril to 10 mg BID PRN (1 refill). Common side effects of flexeril include blurred vision, dizziness or lightheadedness, drowsiness, dryness of mouth, bloated feeling or gas, indigestion, nausea or vomiting, or stomach cramps or pain, constipation, diarrhea, excitement or nervousness, frequent urination, general feeling of discomfort or illness, headache, muscle twitching, numbness, tingling, pain, or weakness in hands or feet, pounding heartbeat, problems in speaking, trembling, trouble in sleeping, unpleasant taste or other taste changes, unusual muscle weakness or unusual tiredness, especially during the first few days as your body adjusts to this medication.  4. Will repeat Caudal NIMA as needed in future. Patient will give us a call to schedule the injection.     RTC in 2 months.     Galileo Portillo DO  Pain Management   [facility]         INSPECT REPORT    As part of the patient's treatment plan, I may be prescribing controlled substances. The patient has been made aware of appropriate use of such medications, including potential risk of somnolence, limited ability to drive and/or work safely, and the potential for dependence or overdose. It has also been made clear that these medications are for use by this patient only, without concomitant use of alcohol or other substances unless prescribed.     Patient  has completed prescribing agreement detailing terms of continued prescribing of controlled substances, including monitoring INSPECT reports, urine drug screening, and pill counts if necessary. The patient is aware that inappropriate use will results in cessation of prescribing such medications.    INSPECT report has been reviewed and scanned into the patient's chart.

## 2021-09-09 ENCOUNTER — OFFICE VISIT (OUTPATIENT)
Dept: PAIN MEDICINE | Facility: CLINIC | Age: 76
End: 2021-09-09

## 2021-09-09 VITALS
HEIGHT: 63 IN | OXYGEN SATURATION: 99 % | HEART RATE: 56 BPM | SYSTOLIC BLOOD PRESSURE: 145 MMHG | RESPIRATION RATE: 16 BRPM | WEIGHT: 140 LBS | DIASTOLIC BLOOD PRESSURE: 80 MMHG | BODY MASS INDEX: 24.8 KG/M2

## 2021-09-09 DIAGNOSIS — G89.4 CHRONIC PAIN SYNDROME: ICD-10-CM

## 2021-09-09 DIAGNOSIS — M54.40 CHRONIC LEFT-SIDED LOW BACK PAIN WITH SCIATICA, SCIATICA LATERALITY UNSPECIFIED: ICD-10-CM

## 2021-09-09 DIAGNOSIS — M47.816 LUMBAR SPONDYLOSIS: ICD-10-CM

## 2021-09-09 DIAGNOSIS — M51.36 DDD (DEGENERATIVE DISC DISEASE), LUMBAR: ICD-10-CM

## 2021-09-09 DIAGNOSIS — G89.29 CHRONIC LEFT-SIDED LOW BACK PAIN WITH SCIATICA, SCIATICA LATERALITY UNSPECIFIED: ICD-10-CM

## 2021-09-09 PROCEDURE — 99214 OFFICE O/P EST MOD 30 MIN: CPT | Performed by: STUDENT IN AN ORGANIZED HEALTH CARE EDUCATION/TRAINING PROGRAM

## 2021-09-09 RX ORDER — HYDROCODONE BITARTRATE AND ACETAMINOPHEN 5; 325 MG/1; MG/1
1 TABLET ORAL 2 TIMES DAILY PRN
Qty: 60 TABLET | Refills: 0 | Status: SHIPPED | OUTPATIENT
Start: 2021-10-17 | End: 2021-11-08 | Stop reason: SDUPTHER

## 2021-09-09 RX ORDER — CYCLOBENZAPRINE HCL 10 MG
10 TABLET ORAL 2 TIMES DAILY PRN
Qty: 60 TABLET | Refills: 1 | Status: SHIPPED | OUTPATIENT
Start: 2021-09-17 | End: 2021-11-08 | Stop reason: SDUPTHER

## 2021-09-09 RX ORDER — HYDROCODONE BITARTRATE AND ACETAMINOPHEN 5; 325 MG/1; MG/1
1 TABLET ORAL 2 TIMES DAILY PRN
Qty: 60 TABLET | Refills: 0 | Status: SHIPPED | OUTPATIENT
Start: 2021-09-17 | End: 2021-10-17

## 2021-09-10 ENCOUNTER — HOSPITAL ENCOUNTER (OUTPATIENT)
Dept: RESPIRATORY THERAPY | Facility: HOSPITAL | Age: 76
End: 2021-09-10

## 2021-09-10 ENCOUNTER — TELEPHONE (OUTPATIENT)
Dept: PAIN MEDICINE | Facility: CLINIC | Age: 76
End: 2021-09-10

## 2021-09-10 ENCOUNTER — APPOINTMENT (OUTPATIENT)
Dept: BONE DENSITY | Facility: HOSPITAL | Age: 76
End: 2021-09-10

## 2021-09-15 DIAGNOSIS — G44.89 OTHER HEADACHE SYNDROME: ICD-10-CM

## 2021-09-16 RX ORDER — BUTALBITAL, ACETAMINOPHEN AND CAFFEINE 50; 325; 40 MG/1; MG/1; MG/1
TABLET ORAL
Qty: 30 TABLET | Refills: 1 | Status: SHIPPED | OUTPATIENT
Start: 2021-09-16 | End: 2021-12-30

## 2021-11-01 ENCOUNTER — TELEPHONE (OUTPATIENT)
Dept: INTERNAL MEDICINE | Facility: CLINIC | Age: 76
End: 2021-11-01

## 2021-11-01 DIAGNOSIS — I10 ESSENTIAL HYPERTENSION: Chronic | ICD-10-CM

## 2021-11-01 NOTE — TELEPHONE ENCOUNTER
Caller: Luna Jacobson    Relationship: Self    Requested Prescriptions:   Requested Prescriptions     Pending Prescriptions Disp Refills   • spironolactone (Aldactone) 50 MG tablet 30 tablet 4     Sig: Take 1 tablet by mouth Daily.        Pharmacy where request should be sent: [id number] [place], [place] - [address] AT [address] OF [place] [address] & [facility] [address]  - [phone]  - [phone]   [phone]    Additional details provided by patient: PATIENT IS REQUESTING  THAT SHE BE PUT BACK  MG OF SPIRONOLACTONE DUE TO BLOOD PRESSURE WENT BACK TO HOW IT WAS WHEN SHE WAS ON  MG .     Best call back number: [phone]     Does the patient have less than a 3 day supply:  [] Yes  [x] No    Benjamin Kc Rep   11/01/21 11:26 EDT

## 2021-11-01 NOTE — TELEPHONE ENCOUNTER
Message does not make sense about 100 mg. Please call her and find out what BP is and what she wants    Then send to [facility] for follow up on retraining

## 2021-11-03 NOTE — TELEPHONE ENCOUNTER
PATIENT IS RETURNING A CALL TO YOSEF, THE OFFICE INFORMED ME THAT SHE IS OUT TODAY AND THAT SHE WILL BE IN TOMORROW.  PATIENT WANTS TO BE CALLED BACK  [phone]

## 2021-11-04 ENCOUNTER — TELEPHONE (OUTPATIENT)
Dept: INTERNAL MEDICINE | Facility: CLINIC | Age: 76
End: 2021-11-04

## 2021-11-04 NOTE — PROGRESS NOTES
Subjective   Luna Jacobson is a 76 y.o. female is here for follow-up for lower back pain.  She was last seen on 9/9/2021. No change in pain since last visit. Doing well with medications.       Lower back pain is 4/10 on VAS, at maximum 5/10.  Pain is sharp and shooting in nature.  Pain is referred intermittently to left lateral thigh and left ankle.  Pain is constant.  Improved by pain meds and caudal NIMA.  Worse with walking.      Previous Injection:   11/20/2020 - Caudal NIMA - 80% pain relief ongoing     PMH: COPD, GERD, HLD, HTN, Thoracic aortic aneurysm      Current Medications:   Norco 5-325 mg BID PRN   Flexeril 10 mg BID PRN     Past Medications:       Past Modalities:  TENS:                                                                          no                                                  Physical Therapy Within The Last 6 Months              no  Psychotherapy                                                            no  Massage Therapy                                                       no     Patient Complains Of:  Uro-Fecal Incontinence          no  Weight Gain/Loss                   no  Fever/Chills                             no  Weakness                               Yes (subjective weakness in left leg)            Current Outpatient Medications:   •  albuterol sulfate HFA (PROVENTIL HFA) 108 (90 Base) MCG/ACT inhaler, Inhale 2 puffs., Disp: , Rfl:   •  aspirin 81 MG tablet, Take 81 mg by mouth Daily., Disp: , Rfl:   •  atorvastatin (LIPITOR) 40 MG tablet, TAKE 1 TABLET BY MOUTH DAILY, Disp: 90 tablet, Rfl: 1  •  butalbital-acetaminophen-caffeine (FIORICET, ESGIC) -40 MG per tablet, TAKE 1 TABLET BY MOUTH EVERY 12 HOURS AS NEEDED FOR HEADACHE, Disp: 30 tablet, Rfl: 1  •  Calcium Carbonate-Vitamin D (Calcium 500/D) 500-125 MG-UNIT tablet, Take  by mouth., Disp: , Rfl:   •  clobetasol (TEMOVATE) 0.05 % external solution, APPLY TOPICALLY TO THE SCALP EVERY DAY IN THE MORNING AND  IN THE EVENING, Disp: , Rfl:   •  cloNIDine (CATAPRES) 0.1 MG tablet, TAKE ONE TABLET BY MOUTH EVERY MORNING AND TWO TABLETS BY MOUTH EVERY EVENING, Disp: 270 tablet, Rfl: 1  •  coenzyme Q10 100 MG capsule, Take 100 mg by mouth Daily., Disp: , Rfl:   •  cyclobenzaprine (FLEXERIL) 10 MG tablet, Take 1 tablet by mouth 2 (Two) Times a Day As Needed for Muscle Spasms for up to 60 days., Disp: 60 tablet, Rfl: 1  •  dicyclomine (BENTYL) 20 MG tablet, Take 20 mg by mouth Daily., Disp: , Rfl:   •  FeroSul 325 (65 Fe) MG tablet, TAKE 1 TABLET BY MOUTH DAILY, Disp: 30 tablet, Rfl: 2  •  fexofenadine (ALLEGRA) 180 MG tablet, Take 180 mg by mouth Daily., Disp: , Rfl:   •  fluticasone (FLONASE) 50 MCG/ACT nasal spray, SHAKE LIQUID AND USE 2 SPRAYS IN EACH NOSTRIL EVERY DAY, Disp: 48 g, Rfl: 3  •  HYDROcodone-acetaminophen (NORCO) 5-325 MG per tablet, Take 1 tablet by mouth 2 (Two) Times a Day As Needed for Severe Pain  for up to 30 days., Disp: 60 tablet, Rfl: 0  •  ketoconazole (NIZORAL) 2 % shampoo, , Disp: , Rfl: 3  •  levothyroxine (Synthroid) 88 MCG tablet, Take 1 tablet by mouth Daily., Disp: 30 tablet, Rfl: 3  •  Multiple Vitamin (MULTIVITAMIN) tablet, Take 1 tablet by mouth Daily., Disp: , Rfl:   •  pantoprazole (PROTONIX) 40 MG EC tablet, Take 40 mg by mouth Daily., Disp: , Rfl:   •  PARoxetine (PAXIL) 20 MG tablet, TAKE 1 TABLET BY MOUTH EVERY MORNING, Disp: 90 tablet, Rfl: 1  •  polyethylene glycol (MIRALAX) powder, , Disp: , Rfl:   •  PreviDent 5000 Dry Mouth 1.1 % gel, See Admin Instructions., Disp: , Rfl:   •  spironolactone (Aldactone) 100 MG tablet, Take 1 tablet by mouth Daily., Disp: 30 tablet, Rfl: 5  •  ibuprofen (ADVIL,MOTRIN) 600 MG tablet, , Disp: , Rfl:     The following portions of the patient's history were reviewed and updated as appropriate: allergies, current medications, past family history, past medical history, past social history, past surgical history and problem list.      REVIEW OF PERTINENT  MEDICAL DATA    Past Medical History:   Diagnosis Date   • Anemia    • Anxiety    • Aortic aneurysm (HCC)     4 cm thoracic aorta   • COPD (chronic obstructive pulmonary disease) (HCC)    • Coronary artery calcification seen on CT scan 2012    mild calcification in the LAD with calcium score of 22. modere narrowing left subclavian origin   • Decreased diffusion capacity of lung    • Diverticulosis    • Gastroparesis    • GERD (gastroesophageal reflux disease)    • GIST (gastrointestinal stromal tumor), non-malignant    • Irritable bowel syndrome    • Low back pain    • Mild mitral regurgitation    • Osteoporosis    • PAD (peripheral artery disease) (HCC)     small vessel disease in feet   • Subclavian artery stenosis, left (HCC) 2016    50 percent   • Tubular adenoma of colon 2017     Past Surgical History:   Procedure Laterality Date   • APPENDECTOMY     • COLONOSCOPY  10/01/2019    dr mcintyre   • ENDOSCOPY  2019   • HEMORRHOIDECTOMY     • HYSTERECTOMY     • TONSILLECTOMY     • TOOTH EXTRACTION      8/3/21     Family History   Problem Relation Age of Onset   • Hypertension Mother    • Diabetes Mother    • Dementia Mother    • Stroke Mother    • Lung cancer Father    • Alcohol abuse Father    • COPD Father    • Breast cancer Sister    • Hypertension Sister    • Hypothyroidism Sister    • Stroke Sister    • Other Sister         AAA   • Bipolar disorder Daughter    • Fibromyalgia Daughter      Social History     Socioeconomic History   • Marital status:    Tobacco Use   • Smoking status: Former Smoker     Quit date: 2020     Years since quittin.2   • Smokeless tobacco: Never Used   • Tobacco comment: she has smoked intermittently for 40 years; she smoked over 2 ppd twenty years ago   Vaping Use   • Vaping Use: Never used   Substance and Sexual Activity   • Alcohol use: No   • Drug use: No   • Sexual activity: Defer         Review of Systems      Vitals:    21 1414   BP: 118/67  "  Pulse: 73   Resp: 16   SpO2: 96%   Weight: 63.5 kg (140 lb)   Height: 160 cm (63\")   PainSc:   4         Objective   Physical Exam  Musculoskeletal:      Lumbar back: Tenderness present. Decreased range of motion.        Legs:    Neurological:      Comments: Motor strength 5/5 b/l LE  Sensory intact b/l LE             Imaging Reviewed:  [facility]:      L3-L4-a small broad based disc protrusion centrally.  There is moderate facet arthropathy.  This changes contribute to moderate central canal stenosis.  There is moderate right and severe left neural foraminal stenosis.  L4-L5-there is a posterior disc protrusion centrally.  Moderate facet arthropathy and mild limited flavum hypertrophy. Severe central canal stenosis.  Severe bilateral neural foraminal stenosis, right greater than left.  L5-S1-there is small left far lateral disc protrusion.  No central canal stenosis.  There is mild facet arthropathy.  There is moderate bilateral neural foraminal stenosis.     Lumbar X-ray: 10/1/2020   Multilevel degenerative spondylosis.          Assessment:    1. DDD (degenerative disc disease), lumbar    2. Chronic left-sided low back pain with sciatica, sciatica laterality unspecified    3. Lumbar spondylosis    4. Chronic pain syndrome          Plan:    1.  UDS on 8/3/2021 is consistent with patient's interview.  Positive for Percocet as patient was on oxycodone at the time.  Narcotic contract discussed on 10/9/2020. Narcan ordered on 10/9/2020.  2. Continue Norco 5-325 mg BID PRN (9/17; 10/17). Discussed with the patient regarding long-term side effects of opioids including but not limited to opioid induced hormonal suppression, hyperalgesia, fatigue, weight gain, possible opioid induced altered immune system, addiction, tolerance, dependence, risk of hearing loss and elevated risk of myocardial infarction. Proper use and potential life threatening side effects of over use discussed with patient. Patient " states understanding of their use and risks.  3. Continue Flexeril to 10 mg BID PRN (1 refill). Common side effects of flexeril include blurred vision, dizziness or lightheadedness, drowsiness, dryness of mouth, bloated feeling or gas, indigestion, nausea or vomiting, or stomach cramps or pain, constipation, diarrhea, excitement or nervousness, frequent urination, general feeling of discomfort or illness, headache, muscle twitching, numbness, tingling, pain, or weakness in hands or feet, pounding heartbeat, problems in speaking, trembling, trouble in sleeping, unpleasant taste or other taste changes, unusual muscle weakness or unusual tiredness, especially during the first few days as your body adjusts to this medication.  4. Will repeat Caudal NIMA as needed in future. Patient will give us a call to schedule the injection.     RTC in 2 months.     Galileo Portillo DO  Pain Management   [facility]         INSPECT REPORT    As part of the patient's treatment plan, I may be prescribing controlled substances. The patient has been made aware of appropriate use of such medications, including potential risk of somnolence, limited ability to drive and/or work safely, and the potential for dependence or overdose. It has also been made clear that these medications are for use by this patient only, without concomitant use of alcohol or other substances unless prescribed.     Patient has completed prescribing agreement detailing terms of continued prescribing of controlled substances, including monitoring INSPECT reports, urine drug screening, and pill counts if necessary. The patient is aware that inappropriate use will results in cessation of prescribing such medications.    INSPECT report has been reviewed and scanned into the patient's chart.

## 2021-11-05 DIAGNOSIS — I10 ESSENTIAL HYPERTENSION: Primary | ICD-10-CM

## 2021-11-05 RX ORDER — SPIRONOLACTONE 100 MG/1
100 TABLET, FILM COATED ORAL DAILY
Qty: 30 TABLET | Refills: 5 | Status: SHIPPED | OUTPATIENT
Start: 2021-11-05 | End: 2022-03-25

## 2021-11-05 RX ORDER — SPIRONOLACTONE 50 MG/1
50 TABLET, FILM COATED ORAL DAILY
Qty: 30 TABLET | Refills: 4 | OUTPATIENT
Start: 2021-11-05

## 2021-11-05 NOTE — TELEPHONE ENCOUNTER
She was on 100mg and you switched her to 50mg last time. Her b/p has went up since lowering her dose and wants the 100mg again.

## 2021-11-08 ENCOUNTER — OFFICE VISIT (OUTPATIENT)
Dept: PAIN MEDICINE | Facility: CLINIC | Age: 76
End: 2021-11-08

## 2021-11-08 VITALS
HEART RATE: 73 BPM | OXYGEN SATURATION: 96 % | DIASTOLIC BLOOD PRESSURE: 67 MMHG | BODY MASS INDEX: 24.8 KG/M2 | RESPIRATION RATE: 16 BRPM | SYSTOLIC BLOOD PRESSURE: 118 MMHG | WEIGHT: 140 LBS | HEIGHT: 63 IN

## 2021-11-08 DIAGNOSIS — M54.40 CHRONIC LEFT-SIDED LOW BACK PAIN WITH SCIATICA, SCIATICA LATERALITY UNSPECIFIED: ICD-10-CM

## 2021-11-08 DIAGNOSIS — M51.36 DDD (DEGENERATIVE DISC DISEASE), LUMBAR: Primary | ICD-10-CM

## 2021-11-08 DIAGNOSIS — G89.29 CHRONIC LEFT-SIDED LOW BACK PAIN WITH SCIATICA, SCIATICA LATERALITY UNSPECIFIED: ICD-10-CM

## 2021-11-08 DIAGNOSIS — G89.4 CHRONIC PAIN SYNDROME: ICD-10-CM

## 2021-11-08 DIAGNOSIS — M47.816 LUMBAR SPONDYLOSIS: ICD-10-CM

## 2021-11-08 PROCEDURE — 99214 OFFICE O/P EST MOD 30 MIN: CPT | Performed by: STUDENT IN AN ORGANIZED HEALTH CARE EDUCATION/TRAINING PROGRAM

## 2021-11-08 RX ORDER — HYDROCODONE BITARTRATE AND ACETAMINOPHEN 5; 325 MG/1; MG/1
1 TABLET ORAL 2 TIMES DAILY PRN
Qty: 60 TABLET | Refills: 0 | Status: SHIPPED | OUTPATIENT
Start: 2021-12-16 | End: 2022-01-15

## 2021-11-08 RX ORDER — HYDROCODONE BITARTRATE AND ACETAMINOPHEN 5; 325 MG/1; MG/1
1 TABLET ORAL 2 TIMES DAILY PRN
Qty: 60 TABLET | Refills: 0 | Status: SHIPPED | OUTPATIENT
Start: 2021-11-16 | End: 2021-12-16

## 2021-11-08 RX ORDER — CYCLOBENZAPRINE HCL 10 MG
10 TABLET ORAL DAILY PRN
Qty: 60 TABLET | Refills: 0 | Status: SHIPPED | OUTPATIENT
Start: 2021-11-08 | End: 2021-11-29

## 2021-11-24 DIAGNOSIS — D50.0 IRON DEFICIENCY ANEMIA DUE TO CHRONIC BLOOD LOSS: ICD-10-CM

## 2021-11-24 RX ORDER — FERROUS SULFATE 325(65) MG
325 TABLET ORAL DAILY
Qty: 30 TABLET | Refills: 2 | Status: SHIPPED | OUTPATIENT
Start: 2021-11-24 | End: 2022-03-03

## 2021-11-26 DIAGNOSIS — M51.36 DDD (DEGENERATIVE DISC DISEASE), LUMBAR: ICD-10-CM

## 2021-11-26 DIAGNOSIS — M54.40 CHRONIC LEFT-SIDED LOW BACK PAIN WITH SCIATICA, SCIATICA LATERALITY UNSPECIFIED: ICD-10-CM

## 2021-11-26 DIAGNOSIS — G89.29 CHRONIC LEFT-SIDED LOW BACK PAIN WITH SCIATICA, SCIATICA LATERALITY UNSPECIFIED: ICD-10-CM

## 2021-11-26 DIAGNOSIS — G89.4 CHRONIC PAIN SYNDROME: ICD-10-CM

## 2021-11-26 DIAGNOSIS — M47.816 LUMBAR SPONDYLOSIS: ICD-10-CM

## 2021-11-29 RX ORDER — CYCLOBENZAPRINE HCL 10 MG
TABLET ORAL
Qty: 60 TABLET | Refills: 1 | Status: SHIPPED | OUTPATIENT
Start: 2021-11-29 | End: 2022-01-10 | Stop reason: SDUPTHER

## 2021-12-11 DIAGNOSIS — E03.8 OTHER SPECIFIED HYPOTHYROIDISM: ICD-10-CM

## 2021-12-13 RX ORDER — LEVOTHYROXINE SODIUM 88 UG/1
88 TABLET ORAL DAILY
Qty: 30 TABLET | Refills: 3 | Status: SHIPPED | OUTPATIENT
Start: 2021-12-13 | End: 2022-04-25 | Stop reason: CLARIF

## 2021-12-30 DIAGNOSIS — G44.89 OTHER HEADACHE SYNDROME: ICD-10-CM

## 2021-12-30 RX ORDER — HYDROCODONE BITARTRATE AND ACETAMINOPHEN 5; 325 MG/1; MG/1
1 TABLET ORAL 2 TIMES DAILY PRN
Qty: 60 TABLET | Refills: 0 | Status: CANCELLED | OUTPATIENT
Start: 2021-12-30 | End: 2022-01-29

## 2021-12-30 RX ORDER — BUTALBITAL, ACETAMINOPHEN AND CAFFEINE 50; 325; 40 MG/1; MG/1; MG/1
TABLET ORAL
Qty: 30 TABLET | Refills: 1 | Status: SHIPPED | OUTPATIENT
Start: 2021-12-30 | End: 2022-04-21

## 2021-12-30 RX ORDER — CYCLOBENZAPRINE HCL 10 MG
10 TABLET ORAL 2 TIMES DAILY PRN
Qty: 60 TABLET | Refills: 0 | Status: CANCELLED | OUTPATIENT
Start: 2021-12-30 | End: 2022-02-28

## 2022-01-03 ENCOUNTER — APPOINTMENT (OUTPATIENT)
Dept: PAIN MEDICINE | Facility: CLINIC | Age: 77
End: 2022-01-03

## 2022-01-05 NOTE — PROGRESS NOTES
Subjective   Luna Jacobson is a 76 y.o. female is here for follow-up for lower back pain.  She was last seen on 11/8/2021.  No change in pain since last visit.  Patient has been doing very well with medications.    Lower back pain is 4/10 on VAS, at maximum 5/10.  Pain is sharp and shooting in nature.  Referred intermittently to left lateral thigh and left ankle.  Pain is constant.  Improved with pain meds and caudal NIMA.  Worse with walking.    Previous Injection:   11/20/2020 - Caudal NIMA - 80% pain relief ongoing     PMH: COPD, GERD, HLD, HTN, Thoracic aortic aneurysm      Current Medications:   Norco 5-325 mg BID PRN   Flexeril 10 mg BID PRN     Past Medications:       Past Modalities:  TENS:                                                                          no                                                  Physical Therapy Within The Last 6 Months              no  Psychotherapy                                                            no  Massage Therapy                                                       no     Patient Complains Of:  Uro-Fecal Incontinence          no  Weight Gain/Loss                   no  Fever/Chills                             no  Weakness                               Yes (subjective weakness in left leg)            Current Outpatient Medications:   •  albuterol sulfate HFA (PROVENTIL HFA) 108 (90 Base) MCG/ACT inhaler, Inhale 2 puffs., Disp: , Rfl:   •  aspirin 81 MG tablet, Take 81 mg by mouth Daily., Disp: , Rfl:   •  atorvastatin (LIPITOR) 40 MG tablet, TAKE 1 TABLET BY MOUTH DAILY, Disp: 90 tablet, Rfl: 1  •  butalbital-acetaminophen-caffeine (FIORICET, ESGIC) -40 MG per tablet, TAKE 1 TABLET BY MOUTH EVERY 12 HOURS AS NEEDED FOR HEADACHE, Disp: 30 tablet, Rfl: 1  •  Calcium Carbonate-Vitamin D (Calcium 500/D) 500-125 MG-UNIT tablet, Take  by mouth., Disp: , Rfl:   •  clobetasol (TEMOVATE) 0.05 % external solution, APPLY TOPICALLY TO THE SCALP EVERY DAY IN  THE MORNING AND IN THE EVENING, Disp: , Rfl:   •  cloNIDine (CATAPRES) 0.1 MG tablet, TAKE ONE TABLET BY MOUTH EVERY MORNING AND TWO TABLETS BY MOUTH EVERY EVENING, Disp: 270 tablet, Rfl: 1  •  coenzyme Q10 100 MG capsule, Take 100 mg by mouth Daily., Disp: , Rfl:   •  cyclobenzaprine (FLEXERIL) 10 MG tablet, Take 1 tablet by mouth 2 (Two) Times a Day As Needed for Muscle Spasms for up to 60 days., Disp: 120 tablet, Rfl: 0  •  dicyclomine (BENTYL) 20 MG tablet, Take 20 mg by mouth Daily., Disp: , Rfl:   •  FeroSul 325 (65 Fe) MG tablet, TAKE 1 TABLET BY MOUTH DAILY, Disp: 30 tablet, Rfl: 2  •  fexofenadine (ALLEGRA) 180 MG tablet, Take 180 mg by mouth Daily., Disp: , Rfl:   •  fluticasone (FLONASE) 50 MCG/ACT nasal spray, SHAKE LIQUID AND USE 2 SPRAYS IN EACH NOSTRIL EVERY DAY, Disp: 48 g, Rfl: 3  •  HYDROcodone-acetaminophen (NORCO) 5-325 MG per tablet, Take 1 tablet by mouth 2 (Two) Times a Day As Needed for Severe Pain  for up to 30 days., Disp: 60 tablet, Rfl: 0  •  ketoconazole (NIZORAL) 2 % shampoo, , Disp: , Rfl: 3  •  levothyroxine (SYNTHROID, LEVOTHROID) 88 MCG tablet, TAKE 1 TABLET BY MOUTH DAILY, Disp: 30 tablet, Rfl: 3  •  Multiple Vitamin (MULTIVITAMIN) tablet, Take 1 tablet by mouth Daily., Disp: , Rfl:   •  pantoprazole (PROTONIX) 40 MG EC tablet, Take 40 mg by mouth Daily., Disp: , Rfl:   •  PARoxetine (PAXIL) 20 MG tablet, TAKE 1 TABLET BY MOUTH EVERY MORNING, Disp: 90 tablet, Rfl: 1  •  polyethylene glycol (MIRALAX) powder, , Disp: , Rfl:   •  PreviDent 5000 Dry Mouth 1.1 % gel, See Admin Instructions., Disp: , Rfl:   •  spironolactone (Aldactone) 100 MG tablet, Take 1 tablet by mouth Daily., Disp: 30 tablet, Rfl: 5  •  [START ON 1/15/2022] HYDROcodone-acetaminophen (NORCO) 5-325 MG per tablet, Take 1 tablet by mouth 2 (Two) Times a Day As Needed for Moderate Pain  for up to 30 days., Disp: 60 tablet, Rfl: 0  •  [START ON 2/13/2022] HYDROcodone-acetaminophen (NORCO) 5-325 MG per tablet, Take 1  tablet by mouth 2 (Two) Times a Day As Needed for Moderate Pain  for up to 30 days., Disp: 60 tablet, Rfl: 0    The following portions of the patient's history were reviewed and updated as appropriate: allergies, current medications, past family history, past medical history, past social history, past surgical history and problem list.      REVIEW OF PERTINENT MEDICAL DATA    Past Medical History:   Diagnosis Date   • Anemia    • Anxiety    • Aortic aneurysm (HCC)     4 cm thoracic aorta   • COPD (chronic obstructive pulmonary disease) ([facility])    • Coronary artery calcification seen on CT scan 07/2012    mild calcification in the LAD with calcium score of 22. modere narrowing left subclavian origin   • Decreased diffusion capacity of lung    • Diverticulosis    • Gastroparesis    • GERD (gastroesophageal reflux disease)    • GIST (gastrointestinal stromal tumor), non-malignant    • Irritable bowel syndrome    • Low back pain    • Mild mitral regurgitation    • Osteoporosis    • PAD (peripheral artery disease) ([facility])     small vessel disease in feet   • Subclavian artery stenosis, left ([facility]) 05/2016    50 percent   • Tubular adenoma of colon 01/2017     Past Surgical History:   Procedure Laterality Date   • APPENDECTOMY     • COLONOSCOPY  10/01/2019    dr mcintyre   • ENDOSCOPY  02/2019   • HEMORRHOIDECTOMY     • HYSTERECTOMY     • TONSILLECTOMY     • TOOTH EXTRACTION      8/3/21     Family History   Problem Relation Age of Onset   • Hypertension Mother    • Diabetes Mother    • Dementia Mother    • Stroke Mother    • Lung cancer Father    • Alcohol abuse Father    • COPD Father    • Breast cancer Sister    • Hypertension Sister    • Hypothyroidism Sister    • Stroke Sister    • Other Sister         AAA   • Bipolar disorder Daughter    • Fibromyalgia Daughter      Social History     Socioeconomic History   • Marital status:    Tobacco Use   • Smoking status: Former Smoker     Quit date: 8/13/2020     Years since  "quittin.4   • Smokeless tobacco: Never Used   • Tobacco comment: she has smoked intermittently for 40 years; she smoked over 2 ppd twenty years ago   Vaping Use   • Vaping Use: Never used   Substance and Sexual Activity   • Alcohol use: No   • Drug use: No   • Sexual activity: Defer         Review of Systems      Vitals:    01/10/22 1547   BP: 126/54   Pulse: 69   Resp: 16   SpO2: 96%   Weight: 63.5 kg (140 lb)   Height: 160 cm (63\")   PainSc:   4         Objective   Physical Exam  Musculoskeletal:      Lumbar back: Tenderness present. Decreased range of motion.        Legs:    Neurological:      Comments: Motor strength 5/5 b/l LE  Sensory intact b/l LE             Imaging Reviewed:  [facility]:      L3-L4-a small broad based disc protrusion centrally.  There is moderate facet arthropathy.  This changes contribute to moderate central canal stenosis.  There is moderate right and severe left neural foraminal stenosis.  L4-L5-there is a posterior disc protrusion centrally.  Moderate facet arthropathy and mild limited flavum hypertrophy. Severe central canal stenosis.  Severe bilateral neural foraminal stenosis, right greater than left.  L5-S1-there is small left far lateral disc protrusion.  No central canal stenosis.  There is mild facet arthropathy.  There is moderate bilateral neural foraminal stenosis.     Lumbar X-ray: 10/1/2020   Multilevel degenerative spondylosis.          Assessment:    1. DDD (degenerative disc disease), lumbar    2. Chronic left-sided low back pain with sciatica, sciatica laterality unspecified    3. Lumbar spondylosis    4. Chronic pain syndrome          Plan:    1.  Repeat UDS-1/3/2022.  UDS on 8/3/2021 is consistent with patient's interview.  Positive for Percocet as patient was on oxycodone at the time.  Narcotic contract discussed on 10/9/2020. Narcan ordered on 10/9/2020.  2. Continue Norco 5-325 mg BID PRN (1/15; ). Discussed with the patient regarding " long-term side effects of opioids including but not limited to opioid induced hormonal suppression, hyperalgesia, fatigue, weight gain, possible opioid induced altered immune system, addiction, tolerance, dependence, risk of hearing loss and elevated risk of myocardial infarction. Proper use and potential life threatening side effects of over use discussed with patient. Patient states understanding of their use and risks.  3. Continue Flexeril to 10 mg BID PRN (1 refill). Common side effects of flexeril include blurred vision, dizziness or lightheadedness, drowsiness, dryness of mouth, bloated feeling or gas, indigestion, nausea or vomiting, or stomach cramps or pain, constipation, diarrhea, excitement or nervousness, frequent urination, general feeling of discomfort or illness, headache, muscle twitching, numbness, tingling, pain, or weakness in hands or feet, pounding heartbeat, problems in speaking, trembling, trouble in sleeping, unpleasant taste or other taste changes, unusual muscle weakness or unusual tiredness, especially during the first few days as your body adjusts to this medication.  4. Will repeat Caudal NIMA as needed in future. Patient will give us a call to schedule the injection.     RTC in 2 months.     Galileo Portillo DO  Pain Management   [facility]         INSPECT REPORT    As part of the patient's treatment plan, I may be prescribing controlled substances. The patient has been made aware of appropriate use of such medications, including potential risk of somnolence, limited ability to drive and/or work safely, and the potential for dependence or overdose. It has also been made clear that these medications are for use by this patient only, without concomitant use of alcohol or other substances unless prescribed.     Patient has completed prescribing agreement detailing terms of continued prescribing of controlled substances, including monitoring INSPECT reports, urine drug screening, and pill  counts if necessary. The patient is aware that inappropriate use will results in cessation of prescribing such medications.    INSPECT report has been reviewed and scanned into the patient's chart.

## 2022-01-10 ENCOUNTER — OFFICE VISIT (OUTPATIENT)
Dept: PAIN MEDICINE | Facility: CLINIC | Age: 77
End: 2022-01-10

## 2022-01-10 VITALS
WEIGHT: 140 LBS | OXYGEN SATURATION: 96 % | HEIGHT: 63 IN | DIASTOLIC BLOOD PRESSURE: 54 MMHG | HEART RATE: 69 BPM | RESPIRATION RATE: 16 BRPM | SYSTOLIC BLOOD PRESSURE: 126 MMHG | BODY MASS INDEX: 24.8 KG/M2

## 2022-01-10 DIAGNOSIS — G89.29 CHRONIC LEFT-SIDED LOW BACK PAIN WITH SCIATICA, SCIATICA LATERALITY UNSPECIFIED: ICD-10-CM

## 2022-01-10 DIAGNOSIS — M54.40 CHRONIC LEFT-SIDED LOW BACK PAIN WITH SCIATICA, SCIATICA LATERALITY UNSPECIFIED: ICD-10-CM

## 2022-01-10 DIAGNOSIS — M47.816 LUMBAR SPONDYLOSIS: ICD-10-CM

## 2022-01-10 DIAGNOSIS — G89.4 CHRONIC PAIN SYNDROME: ICD-10-CM

## 2022-01-10 DIAGNOSIS — Z79.899 HIGH RISK MEDICATION USE: Primary | ICD-10-CM

## 2022-01-10 DIAGNOSIS — M51.36 DDD (DEGENERATIVE DISC DISEASE), LUMBAR: Primary | ICD-10-CM

## 2022-01-10 PROCEDURE — 99214 OFFICE O/P EST MOD 30 MIN: CPT | Performed by: STUDENT IN AN ORGANIZED HEALTH CARE EDUCATION/TRAINING PROGRAM

## 2022-01-10 RX ORDER — HYDROCODONE BITARTRATE AND ACETAMINOPHEN 5; 325 MG/1; MG/1
1 TABLET ORAL 2 TIMES DAILY PRN
Qty: 60 TABLET | Refills: 0 | Status: SHIPPED | OUTPATIENT
Start: 2022-02-13 | End: 2022-01-10

## 2022-01-10 RX ORDER — HYDROCODONE BITARTRATE AND ACETAMINOPHEN 5; 325 MG/1; MG/1
1 TABLET ORAL 2 TIMES DAILY PRN
Qty: 60 TABLET | Refills: 0 | Status: SHIPPED | OUTPATIENT
Start: 2022-01-15 | End: 2022-01-10

## 2022-01-10 RX ORDER — HYDROCODONE BITARTRATE AND ACETAMINOPHEN 5; 325 MG/1; MG/1
1 TABLET ORAL 2 TIMES DAILY PRN
Qty: 60 TABLET | Refills: 0 | Status: SHIPPED | OUTPATIENT
Start: 2022-02-13 | End: 2022-01-13 | Stop reason: SDUPTHER

## 2022-01-10 RX ORDER — HYDROCODONE BITARTRATE AND ACETAMINOPHEN 5; 325 MG/1; MG/1
1 TABLET ORAL 2 TIMES DAILY PRN
Qty: 60 TABLET | Refills: 0 | Status: SHIPPED | OUTPATIENT
Start: 2022-01-15 | End: 2022-01-13 | Stop reason: SDUPTHER

## 2022-01-10 RX ORDER — CYCLOBENZAPRINE HCL 10 MG
10 TABLET ORAL 2 TIMES DAILY PRN
Qty: 120 TABLET | Refills: 0 | Status: SHIPPED | OUTPATIENT
Start: 2022-01-10 | End: 2022-03-08 | Stop reason: SDUPTHER

## 2022-01-13 DIAGNOSIS — M51.36 DDD (DEGENERATIVE DISC DISEASE), LUMBAR: ICD-10-CM

## 2022-01-13 DIAGNOSIS — G89.4 CHRONIC PAIN SYNDROME: ICD-10-CM

## 2022-01-13 DIAGNOSIS — G89.29 CHRONIC LEFT-SIDED LOW BACK PAIN WITH SCIATICA, SCIATICA LATERALITY UNSPECIFIED: ICD-10-CM

## 2022-01-13 DIAGNOSIS — M47.816 LUMBAR SPONDYLOSIS: ICD-10-CM

## 2022-01-13 DIAGNOSIS — M54.40 CHRONIC LEFT-SIDED LOW BACK PAIN WITH SCIATICA, SCIATICA LATERALITY UNSPECIFIED: ICD-10-CM

## 2022-01-13 RX ORDER — HYDROCODONE BITARTRATE AND ACETAMINOPHEN 5; 325 MG/1; MG/1
1 TABLET ORAL 2 TIMES DAILY PRN
Qty: 60 TABLET | Refills: 0 | Status: SHIPPED | OUTPATIENT
Start: 2022-01-15 | End: 2022-02-14

## 2022-01-13 RX ORDER — HYDROCODONE BITARTRATE AND ACETAMINOPHEN 5; 325 MG/1; MG/1
1 TABLET ORAL 2 TIMES DAILY PRN
Qty: 60 TABLET | Refills: 0 | Status: SHIPPED | OUTPATIENT
Start: 2022-02-13 | End: 2022-03-08 | Stop reason: SDUPTHER

## 2022-02-09 ENCOUNTER — TELEPHONE (OUTPATIENT)
Dept: INTERNAL MEDICINE | Facility: CLINIC | Age: 77
End: 2022-02-09

## 2022-02-09 NOTE — TELEPHONE ENCOUNTER
Caller: Luna Jacobson    Relationship: Self    Best call back number: [phone]     What is the best time to reach you: ANY     Who are you requesting to speak with (clinical staff, provider,  specific staff member): CLINICAL STAFF     Do you know the name of the person who called: N/A     What was the call regarding: PATIENT WANTS TO KNOW IF SHE CAN GET IN SOONER THEN 5/20/2022 PLEASE CALL PATIENT . HUB ADDED TO WAIT LIST      Do you require a callback: YES

## 2022-02-11 ENCOUNTER — TELEPHONE (OUTPATIENT)
Dept: INTERNAL MEDICINE | Facility: CLINIC | Age: 77
End: 2022-02-11

## 2022-02-13 DIAGNOSIS — I10 ESSENTIAL HYPERTENSION: ICD-10-CM

## 2022-02-14 RX ORDER — CLONIDINE HYDROCHLORIDE 0.1 MG/1
TABLET ORAL
Qty: 270 TABLET | Refills: 1 | Status: SHIPPED | OUTPATIENT
Start: 2022-02-14 | End: 2022-03-24

## 2022-03-03 DIAGNOSIS — D50.0 IRON DEFICIENCY ANEMIA DUE TO CHRONIC BLOOD LOSS: ICD-10-CM

## 2022-03-03 RX ORDER — FERROUS SULFATE 325(65) MG
325 TABLET ORAL DAILY
Qty: 30 TABLET | Refills: 2 | Status: SHIPPED | OUTPATIENT
Start: 2022-03-03 | End: 2022-06-13

## 2022-03-04 DIAGNOSIS — I10 ESSENTIAL HYPERTENSION: Chronic | ICD-10-CM

## 2022-03-04 RX ORDER — SPIRONOLACTONE 50 MG/1
50 TABLET, FILM COATED ORAL DAILY
Qty: 30 TABLET | Refills: 4 | OUTPATIENT
Start: 2022-03-04

## 2022-03-08 NOTE — PROGRESS NOTES
Subjective   Luna Jacobson is a 76 y.o. female is here for follow-up for lower back pain.  She was last seen on 1/10/2022.  She has been doing well with medications.  No complications.    Lower back pain is 4/10 on VAS, maximum 5/10.  Pain is sharp and shooting in nature.  Referred intermittently to left lateral thigh and left ankle.  Pain is constant.  Improved by pain meds and caudal NIMA.  Worse with walking.      Previous Injection:   11/20/2020 - Caudal NIMA - 80% pain relief ongoing     PMH: COPD, GERD, HLD, HTN, Thoracic aortic aneurysm      Current Medications:   Norco 5-325 mg BID PRN   Flexeril 10 mg BID PRN  Fioricet     Past Medications:       Past Modalities:  TENS:                                                                          no                                                  Physical Therapy Within The Last 6 Months              no  Psychotherapy                                                            no  Massage Therapy                                                       no     Patient Complains Of:  Uro-Fecal Incontinence          no  Weight Gain/Loss                   no  Fever/Chills                             no  Weakness                               Yes (subjective weakness in left leg)            Current Outpatient Medications:   •  aspirin 81 MG tablet, Take 81 mg by mouth Daily., Disp: , Rfl:   •  atorvastatin (LIPITOR) 40 MG tablet, TAKE 1 TABLET BY MOUTH DAILY, Disp: 90 tablet, Rfl: 1  •  butalbital-acetaminophen-caffeine (FIORICET, ESGIC) -40 MG per tablet, TAKE 1 TABLET BY MOUTH EVERY 12 HOURS AS NEEDED FOR HEADACHE, Disp: 30 tablet, Rfl: 1  •  Calcium Carbonate-Vitamin D 500-125 MG-UNIT tablet, Take  by mouth., Disp: , Rfl:   •  clobetasol (TEMOVATE) 0.05 % external solution, APPLY TOPICALLY TO THE SCALP EVERY DAY IN THE MORNING AND IN THE EVENING, Disp: , Rfl:   •  cloNIDine (CATAPRES) 0.1 MG tablet, TAKE ONE TABLET BY MOUTH EVERY MORNING AND TWO TABLETS  BY MOUTH EVERY EVENING, Disp: 270 tablet, Rfl: 1  •  coenzyme Q10 100 MG capsule, Take 100 mg by mouth Daily., Disp: , Rfl:   •  cyclobenzaprine (FLEXERIL) 10 MG tablet, Take 1 tablet by mouth 2 (Two) Times a Day As Needed for Muscle Spasms for up to 60 days., Disp: 120 tablet, Rfl: 0  •  dicyclomine (BENTYL) 20 MG tablet, Take 20 mg by mouth Daily., Disp: , Rfl:   •  FeroSul 325 (65 Fe) MG tablet, TAKE 1 TABLET BY MOUTH DAILY, Disp: 30 tablet, Rfl: 2  •  fexofenadine (ALLEGRA) 180 MG tablet, Take 180 mg by mouth Daily., Disp: , Rfl:   •  fluticasone (FLONASE) 50 MCG/ACT nasal spray, SHAKE LIQUID AND USE 2 SPRAYS IN EACH NOSTRIL EVERY DAY, Disp: 48 g, Rfl: 3  •  [START ON 3/15/2022] HYDROcodone-acetaminophen (NORCO) 5-325 MG per tablet, Take 1 tablet by mouth 2 (Two) Times a Day As Needed for Severe Pain  for up to 30 days., Disp: 60 tablet, Rfl: 0  •  [START ON 4/14/2022] HYDROcodone-acetaminophen (NORCO) 5-325 MG per tablet, Take 1 tablet by mouth 2 (Two) Times a Day As Needed for Severe Pain  for up to 30 days., Disp: 60 tablet, Rfl: 0  •  ketoconazole (NIZORAL) 2 % shampoo, , Disp: , Rfl: 3  •  levothyroxine (SYNTHROID, LEVOTHROID) 88 MCG tablet, TAKE 1 TABLET BY MOUTH DAILY, Disp: 30 tablet, Rfl: 3  •  Multiple Vitamin (MULTIVITAMIN) tablet, Take 1 tablet by mouth Daily., Disp: , Rfl:   •  pantoprazole (PROTONIX) 40 MG EC tablet, Take 40 mg by mouth Daily., Disp: , Rfl:   •  PARoxetine (PAXIL) 20 MG tablet, TAKE 1 TABLET BY MOUTH EVERY MORNING, Disp: 90 tablet, Rfl: 1  •  polyethylene glycol (MIRALAX) powder, , Disp: , Rfl:   •  PreviDent 5000 Dry Mouth 1.1 % gel, See Admin Instructions., Disp: , Rfl:   •  spironolactone (Aldactone) 100 MG tablet, Take 1 tablet by mouth Daily., Disp: 30 tablet, Rfl: 5  •  albuterol sulfate  (90 Base) MCG/ACT inhaler, Inhale 2 puffs., Disp: , Rfl:     The following portions of the patient's history were reviewed and updated as appropriate: allergies, current medications,  past family history, past medical history, past social history, past surgical history and problem list.      REVIEW OF PERTINENT MEDICAL DATA    Past Medical History:   Diagnosis Date   • Anemia    • Anxiety    • Aortic aneurysm (HCC)     4 cm thoracic aorta   • COPD (chronic obstructive pulmonary disease) (HCC)    • Coronary artery calcification seen on CT scan 2012    mild calcification in the LAD with calcium score of 22. modere narrowing left subclavian origin   • Decreased diffusion capacity of lung    • Diverticulosis    • Gastroparesis    • GERD (gastroesophageal reflux disease)    • GIST (gastrointestinal stromal tumor), non-malignant    • Irritable bowel syndrome    • Low back pain    • Mild mitral regurgitation    • Osteoporosis    • PAD (peripheral artery disease) (HCC)     small vessel disease in feet   • Subclavian artery stenosis, left (HCC) 2016    50 percent   • Tubular adenoma of colon 2017     Past Surgical History:   Procedure Laterality Date   • APPENDECTOMY     • COLONOSCOPY  10/01/2019    dr mcintyre   • ENDOSCOPY  2019   • HEMORRHOIDECTOMY     • HYSTERECTOMY     • TONSILLECTOMY     • TOOTH EXTRACTION      8/3/21     Family History   Problem Relation Age of Onset   • Hypertension Mother    • Diabetes Mother    • Dementia Mother    • Stroke Mother    • Lung cancer Father    • Alcohol abuse Father    • COPD Father    • Breast cancer Sister    • Hypertension Sister    • Hypothyroidism Sister    • Stroke Sister    • Other Sister         AAA   • Bipolar disorder Daughter    • Fibromyalgia Daughter      Social History     Socioeconomic History   • Marital status:    Tobacco Use   • Smoking status: Former Smoker     Quit date: 2020     Years since quittin.5   • Smokeless tobacco: Never Used   • Tobacco comment: she has smoked intermittently for 40 years; she smoked over 2 ppd twenty years ago   Vaping Use   • Vaping Use: Never used   Substance and Sexual Activity   • Alcohol  "use: No   • Drug use: No   • Sexual activity: Defer         Review of Systems      Vitals:    03/10/22 1439   BP: 130/76   Pulse: 80   Resp: 16   SpO2: 98%   Weight: 65.8 kg (145 lb)   Height: 160 cm (63\")   PainSc:   4         Objective   Physical Exam  Musculoskeletal:      Lumbar back: Tenderness present. Decreased range of motion.        Legs:    Neurological:      Comments: Motor strength 5/5 b/l LE  Sensory intact b/l LE             Imaging Reviewed:  MRI done at [facility] - 9/2020:      L3-L4-a small broad based disc protrusion centrally.  There is moderate facet arthropathy.  This changes contribute to moderate central canal stenosis.  There is moderate right and severe left neural foraminal stenosis.  L4-L5-there is a posterior disc protrusion centrally.  Moderate facet arthropathy and mild limited flavum hypertrophy. Severe central canal stenosis.  Severe bilateral neural foraminal stenosis, right greater than left.  L5-S1-there is small left far lateral disc protrusion.  No central canal stenosis.  There is mild facet arthropathy.  There is moderate bilateral neural foraminal stenosis.     Lumbar X-ray: 10/1/2020   Multilevel degenerative spondylosis.          Assessment:    1. DDD (degenerative disc disease), lumbar    2. Chronic left-sided low back pain with sciatica, sciatica laterality unspecified    3. Lumbar spondylosis    4. Chronic pain syndrome          Plan:    1.  UDS consistent with patient's therapy on 1/10/2022. Narcotic contract discussed on 10/9/2020. Narcan ordered on 10/9/2020.  2. Continue Norco 5-325 mg BID PRN (3/15; 4/14). Discussed with the patient regarding long-term side effects of opioids including but not limited to opioid induced hormonal suppression, hyperalgesia, fatigue, weight gain, possible opioid induced altered immune system, addiction, tolerance, dependence, risk of hearing loss and elevated risk of myocardial infarction. Proper use and potential life threatening side " effects of over use discussed with patient. Patient states understanding of their use and risks.  3. Continue Flexeril to 10 mg BID PRN (1 refill). Common side effects of flexeril include blurred vision, dizziness or lightheadedness, drowsiness, dryness of mouth, bloated feeling or gas, indigestion, nausea or vomiting, or stomach cramps or pain, constipation, diarrhea, excitement or nervousness, frequent urination, general feeling of discomfort or illness, headache, muscle twitching, numbness, tingling, pain, or weakness in hands or feet, pounding heartbeat, problems in speaking, trembling, trouble in sleeping, unpleasant taste or other taste changes, unusual muscle weakness or unusual tiredness, especially during the first few days as your body adjusts to this medication.  4. Will repeat Caudal NIMA as needed in future. Patient will give us a call to schedule the injection.     RTC in 2 months.     Galileo Portillo DO  Pain Management   [facility]         INSPECT REPORT    As part of the patient's treatment plan, I may be prescribing controlled substances. The patient has been made aware of appropriate use of such medications, including potential risk of somnolence, limited ability to drive and/or work safely, and the potential for dependence or overdose. It has also been made clear that these medications are for use by this patient only, without concomitant use of alcohol or other substances unless prescribed.     Patient has completed prescribing agreement detailing terms of continued prescribing of controlled substances, including monitoring INSPECT reports, urine drug screening, and pill counts if necessary. The patient is aware that inappropriate use will results in cessation of prescribing such medications.    INSPECT report has been reviewed and scanned into the patient's chart.

## 2022-03-10 ENCOUNTER — OFFICE VISIT (OUTPATIENT)
Dept: PAIN MEDICINE | Facility: CLINIC | Age: 77
End: 2022-03-10

## 2022-03-10 VITALS
RESPIRATION RATE: 16 BRPM | WEIGHT: 145 LBS | SYSTOLIC BLOOD PRESSURE: 130 MMHG | HEART RATE: 80 BPM | HEIGHT: 63 IN | BODY MASS INDEX: 25.69 KG/M2 | OXYGEN SATURATION: 98 % | DIASTOLIC BLOOD PRESSURE: 76 MMHG

## 2022-03-10 DIAGNOSIS — G89.29 CHRONIC LEFT-SIDED LOW BACK PAIN WITH SCIATICA, SCIATICA LATERALITY UNSPECIFIED: ICD-10-CM

## 2022-03-10 DIAGNOSIS — G89.4 CHRONIC PAIN SYNDROME: ICD-10-CM

## 2022-03-10 DIAGNOSIS — M54.40 CHRONIC LEFT-SIDED LOW BACK PAIN WITH SCIATICA, SCIATICA LATERALITY UNSPECIFIED: ICD-10-CM

## 2022-03-10 DIAGNOSIS — M51.36 DDD (DEGENERATIVE DISC DISEASE), LUMBAR: ICD-10-CM

## 2022-03-10 DIAGNOSIS — M47.816 LUMBAR SPONDYLOSIS: ICD-10-CM

## 2022-03-10 PROCEDURE — 99214 OFFICE O/P EST MOD 30 MIN: CPT | Performed by: STUDENT IN AN ORGANIZED HEALTH CARE EDUCATION/TRAINING PROGRAM

## 2022-03-10 RX ORDER — HYDROCODONE BITARTRATE AND ACETAMINOPHEN 5; 325 MG/1; MG/1
1 TABLET ORAL 2 TIMES DAILY PRN
Qty: 60 TABLET | Refills: 0 | Status: SHIPPED | OUTPATIENT
Start: 2022-03-15 | End: 2022-03-24

## 2022-03-10 RX ORDER — HYDROCODONE BITARTRATE AND ACETAMINOPHEN 5; 325 MG/1; MG/1
1 TABLET ORAL 2 TIMES DAILY PRN
Qty: 60 TABLET | Refills: 0 | Status: SHIPPED | OUTPATIENT
Start: 2022-04-14 | End: 2022-05-11 | Stop reason: SDUPTHER

## 2022-03-10 RX ORDER — CYCLOBENZAPRINE HCL 10 MG
10 TABLET ORAL 2 TIMES DAILY PRN
Qty: 120 TABLET | Refills: 0 | Status: SHIPPED | OUTPATIENT
Start: 2022-03-10 | End: 2022-05-09

## 2022-03-13 DIAGNOSIS — E78.49 OTHER HYPERLIPIDEMIA: ICD-10-CM

## 2022-03-14 RX ORDER — ATORVASTATIN CALCIUM 40 MG/1
40 TABLET, FILM COATED ORAL DAILY
Qty: 90 TABLET | Refills: 1 | Status: SHIPPED | OUTPATIENT
Start: 2022-03-14 | End: 2022-09-15

## 2022-03-24 ENCOUNTER — OFFICE VISIT (OUTPATIENT)
Dept: INTERNAL MEDICINE | Facility: CLINIC | Age: 77
End: 2022-03-24

## 2022-03-24 VITALS
TEMPERATURE: 97.8 F | HEIGHT: 63 IN | WEIGHT: 147 LBS | DIASTOLIC BLOOD PRESSURE: 84 MMHG | SYSTOLIC BLOOD PRESSURE: 124 MMHG | BODY MASS INDEX: 26.05 KG/M2

## 2022-03-24 DIAGNOSIS — F33.41 RECURRENT MAJOR DEPRESSIVE DISORDER, IN PARTIAL REMISSION: ICD-10-CM

## 2022-03-24 DIAGNOSIS — Z78.0 POSTMENOPAUSAL: ICD-10-CM

## 2022-03-24 DIAGNOSIS — D50.0 IRON DEFICIENCY ANEMIA DUE TO CHRONIC BLOOD LOSS: ICD-10-CM

## 2022-03-24 DIAGNOSIS — I10 ESSENTIAL HYPERTENSION: Chronic | ICD-10-CM

## 2022-03-24 DIAGNOSIS — I71.20 THORACIC AORTIC ANEURYSM WITHOUT RUPTURE: Chronic | ICD-10-CM

## 2022-03-24 DIAGNOSIS — R73.03 PREDIABETES: ICD-10-CM

## 2022-03-24 DIAGNOSIS — E78.49 OTHER HYPERLIPIDEMIA: Chronic | ICD-10-CM

## 2022-03-24 DIAGNOSIS — Z12.31 ENCOUNTER FOR SCREENING MAMMOGRAM FOR MALIGNANT NEOPLASM OF BREAST: ICD-10-CM

## 2022-03-24 DIAGNOSIS — Z00.00 MEDICARE ANNUAL WELLNESS VISIT, SUBSEQUENT: Primary | ICD-10-CM

## 2022-03-24 PROCEDURE — G0439 PPPS, SUBSEQ VISIT: HCPCS | Performed by: INTERNAL MEDICINE

## 2022-03-24 PROCEDURE — 1159F MED LIST DOCD IN RCRD: CPT | Performed by: INTERNAL MEDICINE

## 2022-03-24 PROCEDURE — 96160 PT-FOCUSED HLTH RISK ASSMT: CPT | Performed by: INTERNAL MEDICINE

## 2022-03-24 PROCEDURE — 99213 OFFICE O/P EST LOW 20 MIN: CPT | Performed by: INTERNAL MEDICINE

## 2022-03-24 RX ORDER — PAROXETINE HYDROCHLORIDE 40 MG/1
40 TABLET, FILM COATED ORAL DAILY
Qty: 30 TABLET | Refills: 3 | Status: SHIPPED | OUTPATIENT
Start: 2022-03-24 | End: 2022-05-27

## 2022-03-24 RX ORDER — CARVEDILOL 12.5 MG/1
TABLET ORAL
COMMUNITY
Start: 2022-03-22 | End: 2022-04-22

## 2022-03-24 NOTE — PROGRESS NOTES
The ABCs of the Annual Wellness Visit  Subsequent Medicare Wellness Visit    Chief Complaint   Patient presents with   • Medicare Wellness-subsequent      Subjective    History of Present Illness:  Luna Jacobson is a 76 y.o. female who presents for a Subsequent Medicare Wellness Visit.  She saw Dr. Koenig earlier in the week. She was changed from Clonidine to Coreg b/c she was tired with it. She has been checking her BP; she says it runs 135/80. She has been feeling more depressed since being isolated with COVID and her  having dementia. She denies SI.   The following portions of the patient's history were reviewed and   updated as appropriate: allergies, current medications, past family history, past medical history, past social history, past surgical history and problem list.    Compared to one year ago, the patient feels her physical   health is the same.    Compared to one year ago, the patient feels her mental   health is worse.    Recent Hospitalizations:  She was not admitted to the hospital during the last year.       Current Medical Providers:  Patient Care Team:  Danish Serrano MD as PCP - General (Internal Medicine)  Danish Serrano MD as PCP - Family Medicine  Galileo Portillo DO as Consulting Physician (Anesthesiology)  Adelita Koenig MD as Consulting Physician (Cardiology)    Outpatient Medications Prior to Visit   Medication Sig Dispense Refill   • albuterol sulfate  (90 Base) MCG/ACT inhaler Inhale 2 puffs.     • aspirin 81 MG tablet Take 81 mg by mouth Daily.     • atorvastatin (LIPITOR) 40 MG tablet TAKE 1 TABLET BY MOUTH DAILY 90 tablet 1   • butalbital-acetaminophen-caffeine (FIORICET, ESGIC) -40 MG per tablet TAKE 1 TABLET BY MOUTH EVERY 12 HOURS AS NEEDED FOR HEADACHE 30 tablet 1   • Calcium Carbonate-Vitamin D 500-125 MG-UNIT tablet Take  by mouth.     • carvedilol (COREG) 12.5 MG tablet      • clobetasol (TEMOVATE) 0.05 % external solution APPLY TOPICALLY TO  THE SCALP EVERY DAY IN THE MORNING AND IN THE EVENING     • coenzyme Q10 100 MG capsule Take 100 mg by mouth Daily.     • cyclobenzaprine (FLEXERIL) 10 MG tablet Take 1 tablet by mouth 2 (Two) Times a Day As Needed for Muscle Spasms for up to 60 days. 120 tablet 0   • dicyclomine (BENTYL) 20 MG tablet Take 20 mg by mouth Daily.     • FeroSul 325 (65 Fe) MG tablet TAKE 1 TABLET BY MOUTH DAILY 30 tablet 2   • fexofenadine (ALLEGRA) 180 MG tablet Take 180 mg by mouth Daily.     • fluticasone (FLONASE) 50 MCG/ACT nasal spray SHAKE LIQUID AND USE 2 SPRAYS IN EACH NOSTRIL EVERY DAY 48 g 3   • [START ON 4/14/2022] HYDROcodone-acetaminophen (NORCO) 5-325 MG per tablet Take 1 tablet by mouth 2 (Two) Times a Day As Needed for Severe Pain  for up to 30 days. 60 tablet 0   • ketoconazole (NIZORAL) 2 % shampoo   3   • levothyroxine (SYNTHROID, LEVOTHROID) 88 MCG tablet TAKE 1 TABLET BY MOUTH DAILY 30 tablet 3   • Multiple Vitamin (MULTIVITAMIN) tablet Take 1 tablet by mouth Daily.     • pantoprazole (PROTONIX) 40 MG EC tablet Take 40 mg by mouth Daily.     • polyethylene glycol (MIRALAX) powder      • spironolactone (Aldactone) 100 MG tablet Take 1 tablet by mouth Daily. 30 tablet 5   • cloNIDine (CATAPRES) 0.1 MG tablet TAKE ONE TABLET BY MOUTH EVERY MORNING AND TWO TABLETS BY MOUTH EVERY EVENING 270 tablet 1   • HYDROcodone-acetaminophen (NORCO) 5-325 MG per tablet Take 1 tablet by mouth 2 (Two) Times a Day As Needed for Severe Pain  for up to 30 days. 60 tablet 0   • PARoxetine (PAXIL) 20 MG tablet TAKE 1 TABLET BY MOUTH EVERY MORNING 90 tablet 1   • PreviDent 5000 Dry Mouth 1.1 % gel See Admin Instructions.       No facility-administered medications prior to visit.       Opioid medication/s are on active medication list.  and I have evaluated her active treatment plan and pain score trends (see table).  There were no vitals filed for this visit.  I have reviewed the chart for potential of high risk medication and harmful  "drug interactions in the elderly.            Aspirin is on active medication list. Aspirin use is indicated based on review of current medical condition/s. Pros and cons of this therapy have been discussed today. Benefits of this medication outweigh potential harm.  Patient has been encouraged to continue taking this medication.  .      Patient Active Problem List   Diagnosis   • Essential hypertension   • Other hyperlipidemia   • Other specified hypothyroidism   • Other headache syndrome   • Prediabetes   • Iron deficiency anemia due to chronic blood loss   • Thoracic aortic aneurysm (HCC)   • Microscopic colitis   • Myalgia   • Irregular heart beat   • DDD (degenerative disc disease), lumbar   • Recurrent major depressive disorder, in partial remission (HCC)     Advance Care Planning  Advance Directive is not on file.  ACP discussion was held with the patient during this visit. Patient has an advance directive (not in EMR), copy requested.          Objective    Vitals:    03/24/22 1359   BP: 124/84   Temp: 97.8 °F (36.6 °C)   Weight: 66.7 kg (147 lb)   Height: 160 cm (62.99\")     BMI Readings from Last 1 Encounters:   03/24/22 26.05 kg/m²   BMI is above normal parameters. Recommendations include: exercise counseling    Does the patient have evidence of cognitive impairment? No    Physical Exam  Vitals reviewed.   Constitutional:       Appearance: She is well-developed.   HENT:      Head: Normocephalic and atraumatic.   Neck:      Vascular: No carotid bruit.   Cardiovascular:      Rate and Rhythm: Normal rate and regular rhythm.      Heart sounds: Normal heart sounds, S1 normal and S2 normal.   Pulmonary:      Effort: Pulmonary effort is normal.      Breath sounds: Normal breath sounds.   Skin:     General: Skin is warm.   Neurological:      Mental Status: She is alert.   Psychiatric:         Behavior: Behavior normal.                 HEALTH RISK ASSESSMENT    Smoking Status:  Social History     Tobacco Use "   Smoking Status Former Smoker   • Quit date: 2020   • Years since quittin.6   Smokeless Tobacco Never Used   Tobacco Comment    she has smoked intermittently for 40 years; she smoked over 2 ppd twenty years ago     Alcohol Consumption:  Social History     Substance and Sexual Activity   Alcohol Use No     Fall Risk Screen:    EDENILSON Fall Risk Assessment was completed, and patient is at LOW risk for falls.Assessment completed on:3/24/2022    Depression Screening:  PHQ-2/PHQ-9 Depression Screening 3/24/2022   Retired PHQ-9 Total Score -   Retired Total Score -   Little Interest or Pleasure in Doing Things 0-->not at all   Feeling Down, Depressed or Hopeless 0-->not at all   PHQ-9: Brief Depression Severity Measure Score 0       Health Habits and Functional and Cognitive Screening:  Functional & Cognitive Status 3/24/2022   Do you have difficulty preparing food and eating? No   Do you have difficulty bathing yourself, getting dressed or grooming yourself? No   Do you have difficulty using the toilet? No   Do you have difficulty moving around from place to place? No   Do you have trouble with steps or getting out of a bed or a chair? No   Current Diet Unhealthy Diet   Dental Exam Up to date   Eye Exam Up to date   Exercise (times per week) 0 times per week   Current Exercises Include No Regular Exercise   Do you need help using the phone?  No   Are you deaf or do you have serious difficulty hearing?  No   Do you need help with transportation? No   Do you need help shopping? No   Do you need help preparing meals?  No   Do you need help with housework?  No   Do you need help with laundry? No   Do you need help taking your medications? No   Do you need help managing money? No   Do you ever drive or ride in a car without wearing a seat belt? No   Have you felt unusual stress, anger or loneliness in the last month? No   Who do you live with? Spouse   If you need help, do you have trouble finding someone available  to you? No   Have you been bothered in the last four weeks by sexual problems? No   Do you have difficulty concentrating, remembering or making decisions? No       Age-appropriate Screening Schedule:  Refer to the list below for future screening recommendations based on patient's age, sex and/or medical conditions. Orders for these recommended tests are listed in the plan section. The patient has been provided with a written plan.    Health Maintenance   Topic Date Due   • ZOSTER VACCINE (2 of 3) 10/20/2010   • DXA SCAN  02/07/2020   • INFLUENZA VACCINE  08/01/2021   • TDAP/TD VACCINES (2 - Td or Tdap) 01/01/2022   • LIPID PANEL  03/01/2022              Assessment/Plan   CMS Preventative Services Quick Reference  Risk Factors Identified During Encounter  Immunizations Discussed/Encouraged (specific Immunizations; Shingrix  The above risks/problems have been discussed with the patient.  Follow up actions/plans if indicated are seen below in the Assessment/Plan Section.  Pertinent information has been shared with the patient in the After Visit Summary.    Diagnoses and all orders for this visit:    1. Medicare annual wellness visit, subsequent (Primary)    2. Essential hypertension  -     Comprehensive Metabolic Panel; Future  -     Lipid Panel With / Chol / HDL Ratio; Future  -     TSH; Future    3. Prediabetes  -     Hemoglobin A1c; Future    4. Iron deficiency anemia due to chronic blood loss  -     Ferritin; Future  -     CBC w AUTO Differential; Future    5. Other hyperlipidemia    6. Recurrent major depressive disorder, in partial remission (HCC)  -     PARoxetine (Paxil) 40 MG tablet; Take 1 tablet by mouth Daily.  Dispense: 30 tablet; Refill: 3    7. Thoracic aortic aneurysm without rupture (HCC)  -     CT Angiogram Chest With Contrast; Future    8. Encounter for screening mammogram for malignant neoplasm of breast  -     Mammo Screening Bilateral With CAD; Future    9. Postmenopausal  -     DEXA Bone Density  Axial; Future        Follow Up:   Return in about 2 months (around 5/24/2022), or 30 minutes, for Lab Today.     An After Visit Summary and PPPS were made available to the patient.                 Recc Tdap, Shingrix, and COVID. She is fasting so we will draw her TSH, CMP and FLP. Set up MMG and CTA chest. Increase Paxil. BP is good; she is transitioning to Coreg.

## 2022-03-25 DIAGNOSIS — I10 ESSENTIAL HYPERTENSION: Primary | ICD-10-CM

## 2022-03-25 LAB
ALBUMIN SERPL-MCNC: 4.7 G/DL (ref 3.7–4.7)
ALBUMIN/GLOB SERPL: 2 {RATIO} (ref 1.2–2.2)
ALP SERPL-CCNC: 110 IU/L (ref 44–121)
ALT SERPL-CCNC: 25 IU/L (ref 0–32)
AST SERPL-CCNC: 26 IU/L (ref 0–40)
BASOPHILS # BLD AUTO: 0.1 X10E3/UL (ref 0–0.2)
BASOPHILS NFR BLD AUTO: 1 %
BILIRUB SERPL-MCNC: 0.3 MG/DL (ref 0–1.2)
BUN SERPL-MCNC: 12 MG/DL (ref 8–27)
BUN/CREAT SERPL: 10 (ref 12–28)
CALCIUM SERPL-MCNC: 9.9 MG/DL (ref 8.7–10.3)
CHLORIDE SERPL-SCNC: 93 MMOL/L (ref 96–106)
CHOLEST SERPL-MCNC: 135 MG/DL (ref 100–199)
CHOLEST/HDLC SERPL: 2.7 RATIO (ref 0–4.4)
CO2 SERPL-SCNC: 20 MMOL/L (ref 20–29)
CREAT SERPL-MCNC: 1.2 MG/DL (ref 0.57–1)
EGFRCR SERPLBLD CKD-EPI 2021: 47 ML/MIN/1.73
EOSINOPHIL # BLD AUTO: 0.1 X10E3/UL (ref 0–0.4)
EOSINOPHIL NFR BLD AUTO: 1 %
ERYTHROCYTE [DISTWIDTH] IN BLOOD BY AUTOMATED COUNT: 12.6 % (ref 11.7–15.4)
FERRITIN SERPL-MCNC: 44 NG/ML (ref 15–150)
GLOBULIN SER CALC-MCNC: 2.4 G/DL (ref 1.5–4.5)
GLUCOSE SERPL-MCNC: 103 MG/DL (ref 65–99)
HBA1C MFR BLD: 5.8 % (ref 4.8–5.6)
HCT VFR BLD AUTO: 37 % (ref 34–46.6)
HDLC SERPL-MCNC: 50 MG/DL
HGB BLD-MCNC: 12.4 G/DL (ref 11.1–15.9)
IMM GRANULOCYTES # BLD AUTO: 0 X10E3/UL (ref 0–0.1)
IMM GRANULOCYTES NFR BLD AUTO: 1 %
LDLC SERPL CALC-MCNC: 51 MG/DL (ref 0–99)
LYMPHOCYTES # BLD AUTO: 1.4 X10E3/UL (ref 0.7–3.1)
LYMPHOCYTES NFR BLD AUTO: 15 %
MCH RBC QN AUTO: 30.8 PG (ref 26.6–33)
MCHC RBC AUTO-ENTMCNC: 33.5 G/DL (ref 31.5–35.7)
MCV RBC AUTO: 92 FL (ref 79–97)
MONOCYTES # BLD AUTO: 0.7 X10E3/UL (ref 0.1–0.9)
MONOCYTES NFR BLD AUTO: 8 %
NEUTROPHILS # BLD AUTO: 6.5 X10E3/UL (ref 1.4–7)
NEUTROPHILS NFR BLD AUTO: 74 %
PLATELET # BLD AUTO: 393 X10E3/UL (ref 150–450)
POTASSIUM SERPL-SCNC: 5.9 MMOL/L (ref 3.5–5.2)
PROT SERPL-MCNC: 7.1 G/DL (ref 6–8.5)
RBC # BLD AUTO: 4.02 X10E6/UL (ref 3.77–5.28)
SODIUM SERPL-SCNC: 132 MMOL/L (ref 134–144)
TRIGL SERPL-MCNC: 214 MG/DL (ref 0–149)
TSH SERPL DL<=0.005 MIU/L-ACNC: 2.17 UIU/ML (ref 0.45–4.5)
VLDLC SERPL CALC-MCNC: 34 MG/DL (ref 5–40)
WBC # BLD AUTO: 8.8 X10E3/UL (ref 3.4–10.8)

## 2022-03-25 RX ORDER — SPIRONOLACTONE 50 MG/1
50 TABLET, FILM COATED ORAL DAILY
Qty: 30 TABLET | Refills: 3 | Status: SHIPPED | OUTPATIENT
Start: 2022-03-25 | End: 2022-05-12

## 2022-03-31 ENCOUNTER — LAB (OUTPATIENT)
Dept: LAB | Facility: HOSPITAL | Age: 77
End: 2022-03-31

## 2022-03-31 ENCOUNTER — TELEPHONE (OUTPATIENT)
Dept: INTERNAL MEDICINE | Facility: CLINIC | Age: 77
End: 2022-03-31

## 2022-03-31 PROCEDURE — 80048 BASIC METABOLIC PNL TOTAL CA: CPT | Performed by: INTERNAL MEDICINE

## 2022-03-31 NOTE — TELEPHONE ENCOUNTER
Caller: Luna Jacobson    Relationship: Self    Best call back number: [phone]      What test was performed: LABS     When was the test performed: 3/31/2022    Where was the test performed: [facility]     Additional notes: PATIENT CALLED AND STATED SHE HAD HER LABS DONE THIS MORNING AND WANTED TO LET DOCTOR PAULETTE BUENROSTRO.     PATIENT IS NOT SURE WHEN SHE WILL RECEIVE HER RESULTS.

## 2022-04-04 RX ORDER — FLUTICASONE PROPIONATE 50 MCG
SPRAY, SUSPENSION (ML) NASAL
Qty: 48 G | Refills: 3 | Status: SHIPPED | OUTPATIENT
Start: 2022-04-04

## 2022-04-05 ENCOUNTER — TELEPHONE (OUTPATIENT)
Dept: INTERNAL MEDICINE | Facility: CLINIC | Age: 77
End: 2022-04-05

## 2022-04-05 DIAGNOSIS — E87.1 LOW SODIUM LEVELS: Primary | ICD-10-CM

## 2022-04-19 ENCOUNTER — LAB (OUTPATIENT)
Dept: LAB | Facility: HOSPITAL | Age: 77
End: 2022-04-19

## 2022-04-19 ENCOUNTER — TELEPHONE (OUTPATIENT)
Dept: INTERNAL MEDICINE | Facility: CLINIC | Age: 77
End: 2022-04-19

## 2022-04-19 PROCEDURE — 80048 BASIC METABOLIC PNL TOTAL CA: CPT | Performed by: INTERNAL MEDICINE

## 2022-04-19 NOTE — TELEPHONE ENCOUNTER
Caller: Luna Jacobson    Relationship: Self    Best call back number:[phone]    What is the best time to reach you: 3PM    Who are you requesting to speak with (clinical staff, provider,  specific staff member): YOSEF     What was the call regarding: PATIENT WANTED TO LET THE MEDICAL ASSISTANT YOSEF KNOW SHE HAD HER LABS DONE TODAY 4/19/2022

## 2022-04-20 ENCOUNTER — TELEPHONE (OUTPATIENT)
Dept: INTERNAL MEDICINE | Facility: CLINIC | Age: 77
End: 2022-04-20

## 2022-04-20 DIAGNOSIS — G44.89 OTHER HEADACHE SYNDROME: ICD-10-CM

## 2022-04-20 NOTE — TELEPHONE ENCOUNTER
Tell her this needs to be evaluated to know what is going on- can come today at 4:30 if she thinks emergency or Dr. Serrano can work her in tomrrow.

## 2022-04-20 NOTE — TELEPHONE ENCOUNTER
Caller: Luna Jacobson    Relationship to patient: Self    Best call back number: [phone]    Patient is needing: FOR THE  LAST 2 DAYS, PATIENT HAS HAD EXTREME SWELLING IN HANDS, FEET, LEGS AND FACE.  PLEASE CALL PATIENT AND ADVISE

## 2022-04-21 RX ORDER — BUTALBITAL, ACETAMINOPHEN AND CAFFEINE 50; 325; 40 MG/1; MG/1; MG/1
TABLET ORAL
Qty: 30 TABLET | Refills: 2 | Status: SHIPPED | OUTPATIENT
Start: 2022-04-21 | End: 2022-09-16

## 2022-04-22 ENCOUNTER — HOSPITAL ENCOUNTER (OUTPATIENT)
Dept: GENERAL RADIOLOGY | Facility: HOSPITAL | Age: 77
Discharge: HOME | End: 2022-04-22
Admitting: NURSE PRACTITIONER

## 2022-04-22 ENCOUNTER — OFFICE VISIT (OUTPATIENT)
Dept: INTERNAL MEDICINE | Facility: CLINIC | Age: 77
End: 2022-04-22

## 2022-04-22 VITALS
TEMPERATURE: 97.7 F | BODY MASS INDEX: 26.4 KG/M2 | WEIGHT: 149 LBS | RESPIRATION RATE: 14 BRPM | HEART RATE: 64 BPM | SYSTOLIC BLOOD PRESSURE: 158 MMHG | HEIGHT: 63 IN | DIASTOLIC BLOOD PRESSURE: 80 MMHG

## 2022-04-22 DIAGNOSIS — I10 ESSENTIAL HYPERTENSION: Chronic | ICD-10-CM

## 2022-04-22 DIAGNOSIS — R60.9 SWELLING: Primary | ICD-10-CM

## 2022-04-22 DIAGNOSIS — E03.8 OTHER SPECIFIED HYPOTHYROIDISM: Chronic | ICD-10-CM

## 2022-04-22 DIAGNOSIS — R06.00 DYSPNEA, UNSPECIFIED TYPE: ICD-10-CM

## 2022-04-22 DIAGNOSIS — I35.1 MODERATE AORTIC REGURGITATION: ICD-10-CM

## 2022-04-22 PROCEDURE — 99214 OFFICE O/P EST MOD 30 MIN: CPT | Performed by: NURSE PRACTITIONER

## 2022-04-22 PROCEDURE — 93000 ELECTROCARDIOGRAM COMPLETE: CPT | Performed by: NURSE PRACTITIONER

## 2022-04-22 PROCEDURE — 71046 X-RAY EXAM CHEST 2 VIEWS: CPT

## 2022-04-22 RX ORDER — CARVEDILOL 25 MG/1
TABLET ORAL
Qty: 180 TABLET | Refills: 0 | Status: SHIPPED | OUTPATIENT
Start: 2022-04-22 | End: 2022-06-27

## 2022-04-22 RX ORDER — CARVEDILOL 25 MG/1
25 TABLET ORAL 2 TIMES DAILY WITH MEALS
Qty: 60 TABLET | Refills: 1 | Status: SHIPPED | OUTPATIENT
Start: 2022-04-22 | End: 2022-04-22

## 2022-04-22 NOTE — PROGRESS NOTES
Subjective   Chief Complaint   Patient presents with   • Leg Swelling   • Shortness of Breath       History of Present Illness   76 y.o. female presents with cc swelling in hands, feet, legs and face ongoing times 5 days; she is followed by PCP Dr. Serrano.     Aldactone held for several days in March as potassium was high. Repeated the following week at which time potassium was normal but sodium was low (127). Spironolactone stopped. BMP repeated 4/19--electrolytes normal and kidney function stable.     She restarted Spironolactone 2 days ago and the swelling is getting better. Dyspnea also started 5 days ago. Weight up 2 pounds. NPC she feels is related to her allergies. Denies fever or chills. Energy is good. She has brought in HBP readings while she was off Spironolactone 129-158/79-95.     H/O aortic regurg with last echo in May 2021 with Dr. Koenig. Also has grade 1 diastolic dysfunction. Fatigue better after starting Clonidine.     She has not yet had a call from scheduling regarding chest CTA or MMG.      Patient Active Problem List   Diagnosis   • Essential hypertension   • Other hyperlipidemia   • Other specified hypothyroidism   • Other headache syndrome   • Prediabetes   • Iron deficiency anemia due to chronic blood loss   • Thoracic aortic aneurysm (HCC)   • Microscopic colitis   • Myalgia   • Irregular heart beat   • DDD (degenerative disc disease), lumbar   • Recurrent major depressive disorder, in partial remission (HCC)   • Moderate aortic regurgitation       Allergies   Allergen Reactions   • Amlodipine Headache     Head tightness   • Itraconazole Hives     sporanax   • Lisinopril Hives   • Norvasc [Amlodipine Besylate] Other (See Comments)     Head tightness   • Sulfa Antibiotics Other (See Comments)     headache   • Hydrochlorothiazide Other (See Comments)     hyponatremia  Other reaction(s): Other (See Comments)  hyponatremia   • Sulfamethoxazole Nausea And Vomiting       Current Outpatient  Medications on File Prior to Visit   Medication Sig Dispense Refill   • aspirin 81 MG tablet Take 81 mg by mouth Daily.     • atorvastatin (LIPITOR) 40 MG tablet TAKE 1 TABLET BY MOUTH DAILY 90 tablet 1   • butalbital-acetaminophen-caffeine (FIORICET, ESGIC) -40 MG per tablet TAKE 1 TABLET BY MOUTH EVERY 12 HOURS AS NEEDED FOR HEADACHE 30 tablet 2   • Calcium Carbonate-Vitamin D 500-125 MG-UNIT tablet Take  by mouth.     • clobetasol (TEMOVATE) 0.05 % external solution APPLY TOPICALLY TO THE SCALP EVERY DAY IN THE MORNING AND IN THE EVENING     • coenzyme Q10 100 MG capsule Take 100 mg by mouth Daily.     • cyclobenzaprine (FLEXERIL) 10 MG tablet Take 1 tablet by mouth 2 (Two) Times a Day As Needed for Muscle Spasms for up to 60 days. 120 tablet 0   • dicyclomine (BENTYL) 20 MG tablet Take 20 mg by mouth Daily.     • FeroSul 325 (65 Fe) MG tablet TAKE 1 TABLET BY MOUTH DAILY 30 tablet 2   • fexofenadine (ALLEGRA) 180 MG tablet Take 180 mg by mouth Daily.     • fluticasone (FLONASE) 50 MCG/ACT nasal spray SHAKE LIQUID AND USE 2 SPRAYS IN EACH NOSTRIL EVERY DAY 48 g 3   • HYDROcodone-acetaminophen (NORCO) 5-325 MG per tablet Take 1 tablet by mouth 2 (Two) Times a Day As Needed for Severe Pain  for up to 30 days. 60 tablet 0   • ketoconazole (NIZORAL) 2 % shampoo   3   • levothyroxine (SYNTHROID, LEVOTHROID) 88 MCG tablet TAKE 1 TABLET BY MOUTH DAILY 30 tablet 3   • Multiple Vitamin (MULTIVITAMIN) tablet Take 1 tablet by mouth Daily.     • pantoprazole (PROTONIX) 40 MG EC tablet Take 40 mg by mouth Daily.     • PARoxetine (Paxil) 40 MG tablet Take 1 tablet by mouth Daily. 30 tablet 3   • polyethylene glycol (MIRALAX) powder      • spironolactone (Aldactone) 50 MG tablet Take 1 tablet by mouth Daily. 30 tablet 3   • [DISCONTINUED] carvedilol (COREG) 12.5 MG tablet      • albuterol sulfate  (90 Base) MCG/ACT inhaler Inhale 2 puffs.       No current facility-administered medications on file prior to visit.        Past Medical History:   Diagnosis Date   • Anemia    • Anxiety    • Aortic aneurysm (HCC)     4 cm thoracic aorta   • COPD (chronic obstructive pulmonary disease) (HCC)    • Coronary artery calcification seen on CT scan 2012    mild calcification in the LAD with calcium score of 22. modere narrowing left subclavian origin   • Decreased diffusion capacity of lung    • Diverticulosis    • Gastroparesis    • GERD (gastroesophageal reflux disease)    • GIST (gastrointestinal stromal tumor), non-malignant    • Irritable bowel syndrome    • Low back pain    • Mild mitral regurgitation    • Osteoporosis    • PAD (peripheral artery disease) (HCC)     small vessel disease in feet   • Subclavian artery stenosis, left (HCC) 2016    50 percent   • Tubular adenoma of colon 2017       Family History   Problem Relation Age of Onset   • Hypertension Mother    • Diabetes Mother    • Dementia Mother    • Stroke Mother    • Lung cancer Father    • Alcohol abuse Father    • COPD Father    • Breast cancer Sister    • Hypertension Sister    • Hypothyroidism Sister    • Stroke Sister    • Other Sister         AAA   • Bipolar disorder Daughter    • Fibromyalgia Daughter        Social History     Socioeconomic History   • Marital status:    Tobacco Use   • Smoking status: Former Smoker     Quit date: 2020     Years since quittin.6   • Smokeless tobacco: Never Used   • Tobacco comment: she has smoked intermittently for 40 years; she smoked over 2 ppd twenty years ago   Vaping Use   • Vaping Use: Never used   Substance and Sexual Activity   • Alcohol use: No   • Drug use: No   • Sexual activity: Defer       Past Surgical History:   Procedure Laterality Date   • APPENDECTOMY     • COLONOSCOPY  10/01/2019    dr mcintyre   • ENDOSCOPY  2019   • HEMORRHOIDECTOMY     • HYSTERECTOMY     • TONSILLECTOMY     • TOOTH EXTRACTION      8/3/21       The following portions of the patient's history were reviewed and updated  "as appropriate: problem list, allergies, current medications, past medical history and past social history.    Review of Systems    Immunization History   Administered Date(s) Administered   • COVID-19 (PFIZER) PURPLE CAP 02/07/2021, 02/28/2021   • FLUAD TRI 65YR+ 11/01/2019   • Fluzone High Dose =>65 Years ([employer] ONLY) 11/29/2018   • Hepatitis A 05/01/2018, 12/02/2018   • Influenza TIV (IM) 11/07/2013, 10/13/2014   • Pneumococcal Conjugate 13-Valent (PCV13) 04/23/2015   • Pneumococcal Polysaccharide (PPSV23) 08/01/2008   • Tdap 01/01/2012   • Zostavax 08/25/2010       Objective   Vitals:    04/22/22 1334   BP: 158/80   Pulse: 64   Resp: 14   Temp: 97.7 °F (36.5 °C)   Weight: 67.6 kg (149 lb)   Height: 160 cm (63\")     Body mass index is 26.39 kg/m².  Physical Exam  Vitals reviewed.   Constitutional:       Appearance: Normal appearance. She is well-developed.   HENT:      Head: Normocephalic and atraumatic.   Neck:      Thyroid: No thyromegaly.   Cardiovascular:      Rate and Rhythm: Normal rate and regular rhythm.      Heart sounds: Normal heart sounds, S1 normal and S2 normal.   Pulmonary:      Effort: Pulmonary effort is normal.      Breath sounds: Normal breath sounds.   Musculoskeletal:      Cervical back: Neck supple.      Right lower leg: No edema.      Left lower leg: No edema.   Skin:     General: Skin is warm and dry.   Neurological:      Mental Status: She is alert.   Psychiatric:         Mood and Affect: Mood normal.         Behavior: Behavior normal.           ECG 12 Lead    Date/Time: 4/22/2022 2:33 PM  Performed by: Dionne Serrano APRN  Authorized by: Dionne Serrano APRN   Comparison: compared with previous ECG from 3/2/2021  Rhythm: sinus rhythm  Rate: normal  BPM: 66  ST Segments: ST segments normal  T Waves: T waves normal  QRS axis: normal    Clinical impression: normal ECG            Assessment/Plan   Diagnoses and all orders for this visit:    1. Swelling " (Primary)  Comments:  Hands, lower extremities and face--reports better since restarting Spironolactone 50 mg daily. Appears euvolemic. Labs as below.    Orders:  -     Comprehensive Metabolic Panel  -     TSH  -     Urinalysis With Culture If Indicated -  -     XR Chest 2 View  -     proBNP    2. Dyspnea, unspecified type  Comments:  CXR, CBC, CMP and Bpro-BNP today. If unrevelaing then advised to follow up with Dr. Koenig as echo may need to be repeated--she has mod aortic regurg.   Orders:  -     CBC & Differential  -     XR Chest 2 View  -     proBNP  -     ECG 12 Lead    3. Essential hypertension  Comments:  Not at goal. Increase Coreg to 25 mg bid. She has follow up appt with Dr. Serrano in 4 weeks.   Orders:  -     carvedilol (Coreg) 25 MG tablet; Take 1 tablet by mouth 2 (Two) Times a Day With Meals.  Dispense: 60 tablet; Refill: 1    4. Other specified hypothyroidism  Comments:  Giving swelling repeat TSH    5. Moderate aortic regurgitation  Comments:  Followed by Dr. Koenig.     Records reviewed include previous OV with Dr. Serrano and Arya as well past echo results and recent labs. .     Return for Next scheduled follow up, Lab Today.

## 2022-04-23 LAB
ALBUMIN SERPL-MCNC: 4.5 G/DL (ref 3.7–4.7)
ALBUMIN/GLOB SERPL: 1.9 {RATIO} (ref 1.2–2.2)
ALP SERPL-CCNC: 140 IU/L (ref 44–121)
ALT SERPL-CCNC: 118 IU/L (ref 0–32)
APPEARANCE UR: CLEAR
AST SERPL-CCNC: 64 IU/L (ref 0–40)
BACTERIA #/AREA URNS HPF: NORMAL /[HPF]
BASOPHILS # BLD AUTO: 0.1 X10E3/UL (ref 0–0.2)
BASOPHILS NFR BLD AUTO: 1 %
BILIRUB SERPL-MCNC: 0.3 MG/DL (ref 0–1.2)
BILIRUB UR QL STRIP: NEGATIVE
BUN SERPL-MCNC: 17 MG/DL (ref 8–27)
BUN/CREAT SERPL: 15 (ref 12–28)
CALCIUM SERPL-MCNC: 9.9 MG/DL (ref 8.7–10.3)
CASTS URNS QL MICRO: NORMAL /LPF
CHLORIDE SERPL-SCNC: 95 MMOL/L (ref 96–106)
CO2 SERPL-SCNC: 21 MMOL/L (ref 20–29)
COLOR UR: YELLOW
CREAT SERPL-MCNC: 1.15 MG/DL (ref 0.57–1)
EGFRCR SERPLBLD CKD-EPI 2021: 50 ML/MIN/1.73
EOSINOPHIL # BLD AUTO: 0.3 X10E3/UL (ref 0–0.4)
EOSINOPHIL NFR BLD AUTO: 3 %
EPI CELLS #/AREA URNS HPF: NORMAL /HPF (ref 0–10)
ERYTHROCYTE [DISTWIDTH] IN BLOOD BY AUTOMATED COUNT: 12.6 % (ref 11.7–15.4)
GLOBULIN SER CALC-MCNC: 2.4 G/DL (ref 1.5–4.5)
GLUCOSE SERPL-MCNC: 104 MG/DL (ref 65–99)
GLUCOSE UR QL STRIP: NEGATIVE
HCT VFR BLD AUTO: 35.4 % (ref 34–46.6)
HGB BLD-MCNC: 12 G/DL (ref 11.1–15.9)
HGB UR QL STRIP: NEGATIVE
IMM GRANULOCYTES # BLD AUTO: 0 X10E3/UL (ref 0–0.1)
IMM GRANULOCYTES NFR BLD AUTO: 0 %
KETONES UR QL STRIP: NEGATIVE
LEUKOCYTE ESTERASE UR QL STRIP: NEGATIVE
LYMPHOCYTES # BLD AUTO: 2.5 X10E3/UL (ref 0.7–3.1)
LYMPHOCYTES NFR BLD AUTO: 24 %
MCH RBC QN AUTO: 31.1 PG (ref 26.6–33)
MCHC RBC AUTO-ENTMCNC: 33.9 G/DL (ref 31.5–35.7)
MCV RBC AUTO: 92 FL (ref 79–97)
MICRO URNS: NORMAL
MICRO URNS: NORMAL
MONOCYTES # BLD AUTO: 0.9 X10E3/UL (ref 0.1–0.9)
MONOCYTES NFR BLD AUTO: 9 %
NEUTROPHILS # BLD AUTO: 6.5 X10E3/UL (ref 1.4–7)
NEUTROPHILS NFR BLD AUTO: 63 %
NITRITE UR QL STRIP: NEGATIVE
NT-PROBNP SERPL-MCNC: 490 PG/ML (ref 0–738)
PH UR STRIP: 7.5 [PH] (ref 5–7.5)
PLATELET # BLD AUTO: 341 X10E3/UL (ref 150–450)
POTASSIUM SERPL-SCNC: 4.7 MMOL/L (ref 3.5–5.2)
PROT SERPL-MCNC: 6.9 G/DL (ref 6–8.5)
PROT UR QL STRIP: NEGATIVE
RBC # BLD AUTO: 3.86 X10E6/UL (ref 3.77–5.28)
RBC #/AREA URNS HPF: NORMAL /HPF (ref 0–2)
SODIUM SERPL-SCNC: 134 MMOL/L (ref 134–144)
SP GR UR STRIP: 1.01 (ref 1–1.03)
TSH SERPL DL<=0.005 MIU/L-ACNC: 4.98 UIU/ML (ref 0.45–4.5)
URINALYSIS REFLEX: NORMAL
UROBILINOGEN UR STRIP-MCNC: 0.2 MG/DL (ref 0.2–1)
WBC # BLD AUTO: 10.3 X10E3/UL (ref 3.4–10.8)
WBC #/AREA URNS HPF: NORMAL /HPF (ref 0–5)

## 2022-04-25 ENCOUNTER — TELEPHONE (OUTPATIENT)
Dept: INTERNAL MEDICINE | Facility: CLINIC | Age: 77
End: 2022-04-25

## 2022-04-25 DIAGNOSIS — E03.8 OTHER SPECIFIED HYPOTHYROIDISM: Primary | ICD-10-CM

## 2022-04-25 DIAGNOSIS — I10 ESSENTIAL HYPERTENSION: Primary | ICD-10-CM

## 2022-04-25 DIAGNOSIS — R74.8 ELEVATED LIVER ENZYMES: Primary | ICD-10-CM

## 2022-04-25 DIAGNOSIS — I10 ESSENTIAL HYPERTENSION: ICD-10-CM

## 2022-04-25 RX ORDER — HYDRALAZINE HYDROCHLORIDE 25 MG/1
TABLET, FILM COATED ORAL
Qty: 180 TABLET | OUTPATIENT
Start: 2022-04-25

## 2022-04-25 RX ORDER — HYDRALAZINE HYDROCHLORIDE 25 MG/1
25 TABLET, FILM COATED ORAL 2 TIMES DAILY
Qty: 60 TABLET | Refills: 3 | Status: SHIPPED | OUTPATIENT
Start: 2022-04-25 | End: 2022-05-27

## 2022-04-25 RX ORDER — LEVOTHYROXINE SODIUM 0.1 MG/1
100 TABLET ORAL DAILY
Qty: 90 TABLET | Refills: 0 | Status: SHIPPED | OUTPATIENT
Start: 2022-04-25

## 2022-04-25 NOTE — TELEPHONE ENCOUNTER
Tell her I spoke with Dr. Koenig today and a kidney doctor about the low sodium she has had with Spironolactone.    Stop the spironolactone. Continue the carvedilol    I am adding the smallest dose of hydralazine to take twice per day

## 2022-04-29 ENCOUNTER — TELEPHONE (OUTPATIENT)
Dept: INTERNAL MEDICINE | Facility: CLINIC | Age: 77
End: 2022-04-29

## 2022-04-29 NOTE — TELEPHONE ENCOUNTER
Pt clarified that this was last prescribed on 5/8/2019 for COPD. Needing refilled due to recent SOB. SOB mainly with walking / steps. No dizziness. Not tried anything else, as she does not have any other ones. Please advise.

## 2022-04-29 NOTE — TELEPHONE ENCOUNTER
PATIENT IS NEEDING A REFILL ON HER SPIRIVA INHALER BUT IT'S NOT SHOWING IN Epic     PLEASE  ADVISE   Luna Jacobson (Self) [phone] (H)     PHARMACY  [id number], [place] - [address] AT SEC OF [facility] [address] - [phone]  - [phone]   [phone]

## 2022-05-11 NOTE — PROGRESS NOTES
Subjective   Luna Jacobson is a 76 y.o. female is here for follow-up for lower back pain.  She was last seen on 3/10/2022. Pain is unchanged and well tolerated with pain medications.     Lower back pain is 4/10 on VAS, at maximum 5/10.  Pain is sharp and shooting in nature.  Referred intermittently to left lateral thigh and left ankle.  Pain is constant.  Improved by pain meds and caudal NIMA.  Worse with walking.      Previous Injection:   11/20/2020 - Caudal NIMA - 80% pain relief ongoing     PMH: COPD, GERD, HLD, HTN, Thoracic aortic aneurysm      Current Medications:   Norco 5-325 mg BID PRN   Flexeril 10 mg BID PRN  Fioricet     Past Medications:       Past Modalities:  TENS:                                                                          no                                                  Physical Therapy Within The Last 6 Months              no  Psychotherapy                                                            no  Massage Therapy                                                       no     Patient Complains Of:  Uro-Fecal Incontinence          no  Weight Gain/Loss                   no  Fever/Chills                             no  Weakness                               Yes (subjective weakness in left leg)            Current Outpatient Medications:   •  aspirin 81 MG tablet, Take 81 mg by mouth Daily., Disp: , Rfl:   •  atorvastatin (LIPITOR) 40 MG tablet, TAKE 1 TABLET BY MOUTH DAILY, Disp: 90 tablet, Rfl: 1  •  butalbital-acetaminophen-caffeine (FIORICET, ESGIC) -40 MG per tablet, TAKE 1 TABLET BY MOUTH EVERY 12 HOURS AS NEEDED FOR HEADACHE, Disp: 30 tablet, Rfl: 2  •  Calcium Carbonate-Vitamin D 500-125 MG-UNIT tablet, Take  by mouth., Disp: , Rfl:   •  carvedilol (COREG) 25 MG tablet, TAKE 1 TABLET BY MOUTH TWICE DAILY WITH MEALS, Disp: 180 tablet, Rfl: 0  •  clobetasol (TEMOVATE) 0.05 % external solution, APPLY TOPICALLY TO THE SCALP EVERY DAY IN THE MORNING AND IN THE  EVENING, Disp: , Rfl:   •  coenzyme Q10 100 MG capsule, Take 100 mg by mouth Daily., Disp: , Rfl:   •  dicyclomine (BENTYL) 20 MG tablet, Take 20 mg by mouth Daily., Disp: , Rfl:   •  FeroSul 325 (65 Fe) MG tablet, TAKE 1 TABLET BY MOUTH DAILY, Disp: 30 tablet, Rfl: 2  •  fexofenadine (ALLEGRA) 180 MG tablet, Take 180 mg by mouth Daily., Disp: , Rfl:   •  fluticasone (FLONASE) 50 MCG/ACT nasal spray, SHAKE LIQUID AND USE 2 SPRAYS IN EACH NOSTRIL EVERY DAY, Disp: 48 g, Rfl: 3  •  hydrALAZINE (APRESOLINE) 25 MG tablet, Take 1 tablet by mouth 2 (Two) Times a Day., Disp: 60 tablet, Rfl: 3  •  [START ON 5/14/2022] HYDROcodone-acetaminophen (NORCO) 5-325 MG per tablet, Take 1 tablet by mouth 2 (Two) Times a Day As Needed for Severe Pain  for up to 30 days., Disp: 60 tablet, Rfl: 0  •  [START ON 6/13/2022] HYDROcodone-acetaminophen (NORCO) 5-325 MG per tablet, Take 1 tablet by mouth 2 (Two) Times a Day As Needed for Severe Pain  for up to 30 days., Disp: 60 tablet, Rfl: 0  •  ketoconazole (NIZORAL) 2 % shampoo, , Disp: , Rfl: 3  •  levothyroxine (Synthroid) 100 MCG tablet, Take 1 tablet by mouth Daily., Disp: 90 tablet, Rfl: 0  •  Multiple Vitamin (MULTIVITAMIN) tablet, Take 1 tablet by mouth Daily., Disp: , Rfl:   •  pantoprazole (PROTONIX) 40 MG EC tablet, Take 40 mg by mouth Daily., Disp: , Rfl:   •  PARoxetine (Paxil) 40 MG tablet, Take 1 tablet by mouth Daily., Disp: 30 tablet, Rfl: 3  •  polyethylene glycol (MIRALAX) powder, , Disp: , Rfl:   •  tiotropium (Spiriva HandiHaler) 18 MCG per inhalation capsule, Place 1 capsule into inhaler and inhale Daily., Disp: 30 capsule, Rfl: 5  •  cyclobenzaprine (FLEXERIL) 10 MG tablet, Take 1 tablet by mouth 3 (Three) Times a Day As Needed for Muscle Spasms for up to 60 days., Disp: 90 tablet, Rfl: 1    The following portions of the patient's history were reviewed and updated as appropriate: allergies, current medications, past family history, past medical history, past social  history, past surgical history and problem list.      REVIEW OF PERTINENT MEDICAL DATA    Past Medical History:   Diagnosis Date   • Anemia    • Anxiety    • Aortic aneurysm (HCC)     4 cm thoracic aorta   • COPD (chronic obstructive pulmonary disease) (HCC)    • Coronary artery calcification seen on CT scan 2012    mild calcification in the LAD with calcium score of 22. modere narrowing left subclavian origin   • Decreased diffusion capacity of lung    • Diverticulosis    • Gastroparesis    • GERD (gastroesophageal reflux disease)    • GIST (gastrointestinal stromal tumor), non-malignant    • Irritable bowel syndrome    • Low back pain    • Mild mitral regurgitation    • Osteoporosis    • PAD (peripheral artery disease) (HCC)     small vessel disease in feet   • Subclavian artery stenosis, left (HCC) 2016    50 percent   • Tubular adenoma of colon 2017     Past Surgical History:   Procedure Laterality Date   • APPENDECTOMY     • COLONOSCOPY  10/01/2019    dr mcintyre   • ENDOSCOPY  2019   • HEMORRHOIDECTOMY     • HYSTERECTOMY     • TONSILLECTOMY     • TOOTH EXTRACTION      8/3/21     Family History   Problem Relation Age of Onset   • Hypertension Mother    • Diabetes Mother    • Dementia Mother    • Stroke Mother    • Lung cancer Father    • Alcohol abuse Father    • COPD Father    • Breast cancer Sister    • Hypertension Sister    • Hypothyroidism Sister    • Stroke Sister    • Other Sister         AAA   • Bipolar disorder Daughter    • Fibromyalgia Daughter      Social History     Socioeconomic History   • Marital status:    Tobacco Use   • Smoking status: Former Smoker     Quit date: 2020     Years since quittin.7   • Smokeless tobacco: Never Used   • Tobacco comment: she has smoked intermittently for 40 years; she smoked over 2 ppd twenty years ago   Vaping Use   • Vaping Use: Never used   Substance and Sexual Activity   • Alcohol use: No   • Drug use: No   • Sexual activity: Defer  "        Review of Systems      Vitals:    05/12/22 1435   BP: 155/71   Pulse: 73   Resp: 16   SpO2: 97%   Weight: 67.6 kg (149 lb)   Height: 160 cm (63\")   PainSc:   4         Objective   Physical Exam  Musculoskeletal:      Lumbar back: Tenderness present. Decreased range of motion.        Legs:    Neurological:      Comments: Motor strength 5/5 b/l LE  Sensory intact b/l LE             Imaging Reviewed:  [facility]  - 9/2020:      L3-L4-a small broad based disc protrusion centrally.  There is moderate facet arthropathy.  This changes contribute to moderate central canal stenosis.  There is moderate right and severe left neural foraminal stenosis.  L4-L5-there is a posterior disc protrusion centrally.  Moderate facet arthropathy and mild limited flavum hypertrophy. Severe central canal stenosis.  Severe bilateral neural foraminal stenosis, right greater than left.  L5-S1-there is small left far lateral disc protrusion.  No central canal stenosis.  There is mild facet arthropathy.  There is moderate bilateral neural foraminal stenosis.     Lumbar X-ray: 10/1/2020   Multilevel degenerative spondylosis.          Assessment:    1. DDD (degenerative disc disease), lumbar    2. Chronic left-sided low back pain with sciatica, sciatica laterality unspecified    3. Lumbar spondylosis    4. Chronic pain syndrome          Plan:    1.   UDS consistent with patient's therapy on 1/10/2022. Narcotic contract discussed on 10/9/2020. Narcan ordered on 10/9/2020.  2. Continue Norco 5-325 mg BID PRN (5/14;6/13). Discussed with the patient regarding long-term side effects of opioids including but not limited to opioid induced hormonal suppression, hyperalgesia, fatigue, weight gain, possible opioid induced altered immune system, addiction, tolerance, dependence, risk of hearing loss and elevated risk of myocardial infarction. Proper use and potential life threatening side effects of over use discussed with patient. Patient " states understanding of their use and risks.  3. Continue Flexeril to 10 mg BID PRN (2 months). Common side effects of flexeril include blurred vision, dizziness or lightheadedness, drowsiness, dryness of mouth, bloated feeling or gas, indigestion, nausea or vomiting, or stomach cramps or pain, constipation, diarrhea, excitement or nervousness, frequent urination, general feeling of discomfort or illness, headache, muscle twitching, numbness, tingling, pain, or weakness in hands or feet, pounding heartbeat, problems in speaking, trembling, trouble in sleeping, unpleasant taste or other taste changes, unusual muscle weakness or unusual tiredness, especially during the first few days as your body adjusts to this medication.  4. Will repeat Caudal NIMA as needed in future. Patient will give us a call to schedule the injection.     RTC in 2 months.     Galileo Portillo DO  Pain Management   [facility]         INSPECT REPORT    As part of the patient's treatment plan, I may be prescribing controlled substances. The patient has been made aware of appropriate use of such medications, including potential risk of somnolence, limited ability to drive and/or work safely, and the potential for dependence or overdose. It has also been made clear that these medications are for use by this patient only, without concomitant use of alcohol or other substances unless prescribed.     Patient has completed prescribing agreement detailing terms of continued prescribing of controlled substances, including monitoring INSPECT reports, urine drug screening, and pill counts if necessary. The patient is aware that inappropriate use will results in cessation of prescribing such medications.    INSPECT report has been reviewed and scanned into the patient's chart.

## 2022-05-12 ENCOUNTER — OFFICE VISIT (OUTPATIENT)
Dept: PAIN MEDICINE | Facility: CLINIC | Age: 77
End: 2022-05-12

## 2022-05-12 ENCOUNTER — LAB (OUTPATIENT)
Dept: LAB | Facility: HOSPITAL | Age: 77
End: 2022-05-12

## 2022-05-12 VITALS
WEIGHT: 149 LBS | HEIGHT: 63 IN | RESPIRATION RATE: 16 BRPM | OXYGEN SATURATION: 97 % | BODY MASS INDEX: 26.4 KG/M2 | SYSTOLIC BLOOD PRESSURE: 155 MMHG | DIASTOLIC BLOOD PRESSURE: 71 MMHG | HEART RATE: 73 BPM

## 2022-05-12 DIAGNOSIS — M51.36 DDD (DEGENERATIVE DISC DISEASE), LUMBAR: ICD-10-CM

## 2022-05-12 DIAGNOSIS — M47.816 LUMBAR SPONDYLOSIS: ICD-10-CM

## 2022-05-12 DIAGNOSIS — G89.29 CHRONIC LEFT-SIDED LOW BACK PAIN WITH SCIATICA, SCIATICA LATERALITY UNSPECIFIED: ICD-10-CM

## 2022-05-12 DIAGNOSIS — G89.4 CHRONIC PAIN SYNDROME: ICD-10-CM

## 2022-05-12 DIAGNOSIS — M54.40 CHRONIC LEFT-SIDED LOW BACK PAIN WITH SCIATICA, SCIATICA LATERALITY UNSPECIFIED: ICD-10-CM

## 2022-05-12 DIAGNOSIS — R74.8 ELEVATED LIVER ENZYMES: Primary | ICD-10-CM

## 2022-05-12 LAB
ALBUMIN SERPL-MCNC: 4.6 G/DL (ref 3.5–5.2)
ALBUMIN/GLOB SERPL: 2.4 G/DL
ALP SERPL-CCNC: 100 U/L (ref 39–117)
ALT SERPL W P-5'-P-CCNC: 26 U/L (ref 1–33)
ANION GAP SERPL CALCULATED.3IONS-SCNC: 11.1 MMOL/L (ref 5–15)
AST SERPL-CCNC: 28 U/L (ref 1–32)
BILIRUB SERPL-MCNC: 0.2 MG/DL (ref 0–1.2)
BUN SERPL-MCNC: 14 MG/DL (ref 8–23)
BUN/CREAT SERPL: 14 (ref 7–25)
CALCIUM SPEC-SCNC: 9.8 MG/DL (ref 8.6–10.5)
CHLORIDE SERPL-SCNC: 102 MMOL/L (ref 98–107)
CO2 SERPL-SCNC: 24.9 MMOL/L (ref 22–29)
CREAT SERPL-MCNC: 1 MG/DL (ref 0.57–1)
EGFRCR SERPLBLD CKD-EPI 2021: 58.9 ML/MIN/1.73
GLOBULIN UR ELPH-MCNC: 1.9 GM/DL
GLUCOSE SERPL-MCNC: 104 MG/DL (ref 65–99)
POTASSIUM SERPL-SCNC: 4.9 MMOL/L (ref 3.5–5.2)
PROT SERPL-MCNC: 6.5 G/DL (ref 6–8.5)
SODIUM SERPL-SCNC: 138 MMOL/L (ref 136–145)

## 2022-05-12 PROCEDURE — 80053 COMPREHEN METABOLIC PANEL: CPT

## 2022-05-12 PROCEDURE — 99214 OFFICE O/P EST MOD 30 MIN: CPT | Performed by: STUDENT IN AN ORGANIZED HEALTH CARE EDUCATION/TRAINING PROGRAM

## 2022-05-12 RX ORDER — CYCLOBENZAPRINE HCL 10 MG
10 TABLET ORAL 3 TIMES DAILY PRN
Qty: 90 TABLET | Refills: 1 | Status: SHIPPED | OUTPATIENT
Start: 2022-05-12 | End: 2022-07-08 | Stop reason: SDUPTHER

## 2022-05-12 RX ORDER — HYDROCODONE BITARTRATE AND ACETAMINOPHEN 5; 325 MG/1; MG/1
1 TABLET ORAL 2 TIMES DAILY PRN
Qty: 60 TABLET | Refills: 0 | Status: SHIPPED | OUTPATIENT
Start: 2022-05-14 | End: 2022-06-14

## 2022-05-12 RX ORDER — HYDROCODONE BITARTRATE AND ACETAMINOPHEN 5; 325 MG/1; MG/1
1 TABLET ORAL 2 TIMES DAILY PRN
Qty: 60 TABLET | Refills: 0 | Status: SHIPPED | OUTPATIENT
Start: 2022-06-13 | End: 2022-05-27

## 2022-05-13 ENCOUNTER — TELEPHONE (OUTPATIENT)
Dept: INTERNAL MEDICINE | Facility: CLINIC | Age: 77
End: 2022-05-13

## 2022-05-13 NOTE — TELEPHONE ENCOUNTER
Caller: Luna Jacobson    Relationship to patient: Self    Best call back number: [phone] (H)    Patient is needing: PATIENT JUST WANTED LET DR. BUENROSTRO KNOW THAT SHE HAD HER LAB WORK DONE YESTERDAY.         THANKS

## 2022-05-27 ENCOUNTER — OFFICE VISIT (OUTPATIENT)
Dept: INTERNAL MEDICINE | Facility: CLINIC | Age: 77
End: 2022-05-27

## 2022-05-27 VITALS
DIASTOLIC BLOOD PRESSURE: 82 MMHG | TEMPERATURE: 98 F | SYSTOLIC BLOOD PRESSURE: 136 MMHG | HEIGHT: 63 IN | WEIGHT: 150 LBS | HEART RATE: 60 BPM | BODY MASS INDEX: 26.58 KG/M2

## 2022-05-27 DIAGNOSIS — F33.41 RECURRENT MAJOR DEPRESSIVE DISORDER, IN PARTIAL REMISSION: ICD-10-CM

## 2022-05-27 DIAGNOSIS — Z12.31 ENCOUNTER FOR SCREENING MAMMOGRAM FOR MALIGNANT NEOPLASM OF BREAST: ICD-10-CM

## 2022-05-27 DIAGNOSIS — R53.83 FATIGUE, UNSPECIFIED TYPE: ICD-10-CM

## 2022-05-27 DIAGNOSIS — I10 ESSENTIAL HYPERTENSION: Primary | Chronic | ICD-10-CM

## 2022-05-27 DIAGNOSIS — R00.2 PALPITATIONS: ICD-10-CM

## 2022-05-27 DIAGNOSIS — R06.02 SHORTNESS OF BREATH: ICD-10-CM

## 2022-05-27 DIAGNOSIS — I71.20 THORACIC AORTIC ANEURYSM WITHOUT RUPTURE: Chronic | ICD-10-CM

## 2022-05-27 DIAGNOSIS — M81.0 AGE-RELATED OSTEOPOROSIS WITHOUT CURRENT PATHOLOGICAL FRACTURE: ICD-10-CM

## 2022-05-27 PROCEDURE — 99215 OFFICE O/P EST HI 40 MIN: CPT | Performed by: INTERNAL MEDICINE

## 2022-05-27 PROCEDURE — 93000 ELECTROCARDIOGRAM COMPLETE: CPT | Performed by: INTERNAL MEDICINE

## 2022-05-27 RX ORDER — HYDRALAZINE HYDROCHLORIDE 50 MG/1
50 TABLET, FILM COATED ORAL 2 TIMES DAILY
Qty: 60 TABLET | Refills: 3 | Status: SHIPPED | OUTPATIENT
Start: 2022-05-27 | End: 2022-06-14

## 2022-05-27 RX ORDER — DESVENLAFAXINE SUCCINATE 50 MG/1
50 TABLET, EXTENDED RELEASE ORAL DAILY
Qty: 30 TABLET | Refills: 3 | Status: SHIPPED | OUTPATIENT
Start: 2022-05-27 | End: 2022-06-14

## 2022-05-27 NOTE — PROGRESS NOTES
Subjective        Chief Complaint   Patient presents with   • Hypertension           Luna Jacobson is a 76 y.o. female who presents for    Patient Active Problem List   Diagnosis   • Essential hypertension   • Other hyperlipidemia   • Other specified hypothyroidism   • Other headache syndrome   • Prediabetes   • Iron deficiency anemia due to chronic blood loss   • Thoracic aortic aneurysm (HCC)   • Microscopic colitis   • Myalgia   • Irregular heart beat   • DDD (degenerative disc disease), lumbar   • Recurrent major depressive disorder, in partial remission (HCC)   • Moderate aortic regurgitation       History of Present Illness     She saw Dr. Koenig in March and he put her on Coreg 12.5 BID. Venus increased her Coreg to 25 mg BID. She will take half tab extra sometimes. Her BP has been 116-189/79--12. Her heart rate has been 55-75. She can feel a fluttery feeling in her chest. It occurs once per week.  It lasts a few minutes. It is at rest. She has had no injury in the 6 months. She has had allergies. She takes allegra and it does help. She sleeps 12 hours per night. She does not snore. She does not wake up rested. She does feel depressed. She does not think Paxil is helping. Her  has memory issues. She denies SI. She does not think Spiriva helps her breathing. It occurs several times per day. She is not wheezing or coughing. She smokes two cigarettes per day. She denies chest pain with activity.   Allergies   Allergen Reactions   • Amlodipine Headache     Head tightness   • Itraconazole Hives     sporanax   • Lisinopril Hives   • Norvasc [Amlodipine Besylate] Other (See Comments)     Head tightness   • Sulfa Antibiotics Other (See Comments)     headache   • Hydrochlorothiazide Other (See Comments)     hyponatremia  Other reaction(s): Other (See Comments)  hyponatremia   • Sulfamethoxazole Nausea And Vomiting       Current Outpatient Medications on File Prior to Visit   Medication Sig Dispense  Refill   • atorvastatin (LIPITOR) 40 MG tablet TAKE 1 TABLET BY MOUTH DAILY 90 tablet 1   • butalbital-acetaminophen-caffeine (FIORICET, ESGIC) -40 MG per tablet TAKE 1 TABLET BY MOUTH EVERY 12 HOURS AS NEEDED FOR HEADACHE 30 tablet 2   • Calcium Carbonate-Vitamin D 500-125 MG-UNIT tablet Take  by mouth.     • carvedilol (COREG) 25 MG tablet TAKE 1 TABLET BY MOUTH TWICE DAILY WITH MEALS 180 tablet 0   • clobetasol (TEMOVATE) 0.05 % external solution APPLY TOPICALLY TO THE SCALP EVERY DAY IN THE MORNING AND IN THE EVENING     • coenzyme Q10 100 MG capsule Take 100 mg by mouth Daily.     • cyclobenzaprine (FLEXERIL) 10 MG tablet Take 1 tablet by mouth 3 (Three) Times a Day As Needed for Muscle Spasms for up to 60 days. 90 tablet 1   • dicyclomine (BENTYL) 20 MG tablet Take 20 mg by mouth Daily.     • FeroSul 325 (65 Fe) MG tablet TAKE 1 TABLET BY MOUTH DAILY 30 tablet 2   • fexofenadine (ALLEGRA) 180 MG tablet Take 180 mg by mouth Daily.     • fluticasone (FLONASE) 50 MCG/ACT nasal spray SHAKE LIQUID AND USE 2 SPRAYS IN EACH NOSTRIL EVERY DAY 48 g 3   • HYDROcodone-acetaminophen (NORCO) 5-325 MG per tablet Take 1 tablet by mouth 2 (Two) Times a Day As Needed for Severe Pain  for up to 30 days. 60 tablet 0   • ketoconazole (NIZORAL) 2 % shampoo   3   • levothyroxine (Synthroid) 100 MCG tablet Take 1 tablet by mouth Daily. 90 tablet 0   • Multiple Vitamin (MULTIVITAMIN) tablet Take 1 tablet by mouth Daily.     • pantoprazole (PROTONIX) 40 MG EC tablet Take 40 mg by mouth Daily.     • polyethylene glycol (MIRALAX) powder      • tiotropium (Spiriva HandiHaler) 18 MCG per inhalation capsule Place 1 capsule into inhaler and inhale Daily. 30 capsule 5   • [DISCONTINUED] hydrALAZINE (APRESOLINE) 25 MG tablet Take 1 tablet by mouth 2 (Two) Times a Day. 60 tablet 3   • [DISCONTINUED] PARoxetine (Paxil) 40 MG tablet Take 1 tablet by mouth Daily. 30 tablet 3   • [DISCONTINUED] aspirin 81 MG tablet Take 81 mg by mouth Daily.      • [DISCONTINUED] HYDROcodone-acetaminophen (NORCO) 5-325 MG per tablet Take 1 tablet by mouth 2 (Two) Times a Day As Needed for Severe Pain  for up to 30 days. 60 tablet 0     No current facility-administered medications on file prior to visit.       Past Medical History:   Diagnosis Date   • Anemia    • Anxiety    • Aortic aneurysm (HCC)     4 cm thoracic aorta   • COPD (chronic obstructive pulmonary disease) ([facility])    • Coronary artery calcification seen on CT scan 2012    mild calcification in the LAD with calcium score of 22. modere narrowing left subclavian origin   • Decreased diffusion capacity of lung    • Diverticulosis    • Gastroparesis    • GERD (gastroesophageal reflux disease)    • GIST (gastrointestinal stromal tumor), non-malignant    • Irritable bowel syndrome    • Low back pain    • Mild mitral regurgitation    • Osteoporosis    • PAD (peripheral artery disease) ([facility])     small vessel disease in feet   • Subclavian artery stenosis, left ([facility]) 2016    50 percent   • Tubular adenoma of colon 2017       Past Surgical History:   Procedure Laterality Date   • APPENDECTOMY     • COLONOSCOPY  10/01/2019    dr mcintyre   • ENDOSCOPY  2019   • HEMORRHOIDECTOMY     • HYSTERECTOMY     • TONSILLECTOMY     • TOOTH EXTRACTION      8/3/21       Family History   Problem Relation Age of Onset   • Hypertension Mother    • Diabetes Mother    • Dementia Mother    • Stroke Mother    • Lung cancer Father    • Alcohol abuse Father    • COPD Father    • Breast cancer Sister    • Hypertension Sister    • Hypothyroidism Sister    • Stroke Sister    • Other Sister         AAA   • Bipolar disorder Daughter    • Fibromyalgia Daughter        Social History     Socioeconomic History   • Marital status:    Tobacco Use   • Smoking status: Former Smoker     Quit date: 2020     Years since quittin.7   • Smokeless tobacco: Never Used   • Tobacco comment: she has smoked intermittently for 40 years; she  "smoked over 2 ppd twenty years ago   Vaping Use   • Vaping Use: Never used   Substance and Sexual Activity   • Alcohol use: No   • Drug use: No   • Sexual activity: Defer           The following portions of the patient's history were reviewed and updated as appropriate: problem list, allergies, current medications, past medical history, past family history, past social history and past surgical history.    Review of Systems    Immunization History   Administered Date(s) Administered   • COVID-19 (PFIZER) PURPLE CAP 02/07/2021, 02/28/2021   • FLUAD TRI 65YR+ 11/01/2019   • Fluzone High Dose =>65 Years ([employer] ONLY) 11/29/2018   • Hepatitis A 05/01/2018, 12/02/2018   • Influenza TIV (IM) 11/07/2013, 10/13/2014   • Pneumococcal Conjugate 13-Valent (PCV13) 04/23/2015   • Pneumococcal Polysaccharide (PPSV23) 08/01/2008   • Tdap 01/01/2012   • Zostavax 08/25/2010       Objective   Vitals:    05/27/22 1427   BP: 136/82   Pulse: 60   Temp: 98 °F (36.7 °C)   Weight: 68 kg (150 lb)   Height: 160 cm (62.99\")     Body mass index is 26.58 kg/m².  Physical Exam  Vitals reviewed.   Constitutional:       Appearance: She is well-developed.   HENT:      Head: Normocephalic and atraumatic.   Cardiovascular:      Rate and Rhythm: Normal rate and regular rhythm. Occasional extrasystoles are present.     Heart sounds: Normal heart sounds, S1 normal and S2 normal.   Pulmonary:      Effort: Pulmonary effort is normal.      Breath sounds: Normal breath sounds.   Skin:     General: Skin is warm.   Neurological:      Mental Status: She is alert.   Psychiatric:         Behavior: Behavior normal.           ECG 12 Lead    Date/Time: 5/27/2022 3:12 PM  Performed by: Danish Serrano MD  Authorized by: Danish Serrano MD   Comparison: compared with previous ECG from 4/22/2022  Comparison to previous ECG: Now with PVC  Rhythm: sinus bradycardia  Ectopy: unifocal PVCs  Rate: bradycardic    Clinical impression: abnormal EKG  Comments: Sinus " jeffery with PVCs            Assessment & Plan   Diagnoses and all orders for this visit:    1. Essential hypertension (Primary)  -     hydrALAZINE (APRESOLINE) 50 MG tablet; Take 1 tablet by mouth 2 (Two) Times a Day.  Dispense: 60 tablet; Refill: 3  -     ECG 12 Lead    2. Shortness of breath  -     Full Pulmonary Function Test With Bronchodilator    3. Palpitations  -     Holter monitor - 24 hour; Future  -     ECG 12 Lead    4. Recurrent major depressive disorder, in partial remission (HCC)  -     desvenlafaxine (Pristiq) 50 MG 24 hr tablet; Take 1 tablet by mouth Daily.  Dispense: 30 tablet; Refill: 3    5. Fatigue, unspecified type    6. Age-related osteoporosis without current pathological fracture  -     DEXA Bone Density Axial    7. Encounter for screening mammogram for malignant neoplasm of breast  -     Mammo Screening Bilateral With CAD    8. Thoracic aortic aneurysm without rupture (HCC)  -     CT Angiogram Chest With Contrast               Increase hydralazine. Change to Pristiq. Her depression may be contributing to her fatigue. Decrease the Coreg to 25 mg BID. Discussed getting a sleep study; she would like to wait on that. Additional tests as above.     41 minutes spent today. Bring in BP monitor to compare next time. Cmp reviewed; it showed nl sodium off aldactone.  Return in about 6 weeks (around 7/8/2022), or 30 minutes.

## 2022-06-07 ENCOUNTER — TRANSCRIBE ORDERS (OUTPATIENT)
Dept: ADMINISTRATIVE | Facility: HOSPITAL | Age: 77
End: 2022-06-07

## 2022-06-07 DIAGNOSIS — Z12.31 VISIT FOR SCREENING MAMMOGRAM: Primary | ICD-10-CM

## 2022-06-11 DIAGNOSIS — D50.0 IRON DEFICIENCY ANEMIA DUE TO CHRONIC BLOOD LOSS: ICD-10-CM

## 2022-06-13 RX ORDER — FERROUS SULFATE 325(65) MG
325 TABLET ORAL DAILY
Qty: 30 TABLET | Refills: 2 | Status: SHIPPED | OUTPATIENT
Start: 2022-06-13 | End: 2022-09-20

## 2022-06-14 ENCOUNTER — OFFICE VISIT (OUTPATIENT)
Dept: INTERNAL MEDICINE | Facility: CLINIC | Age: 77
End: 2022-06-14

## 2022-06-14 VITALS
DIASTOLIC BLOOD PRESSURE: 86 MMHG | WEIGHT: 149 LBS | HEIGHT: 63 IN | BODY MASS INDEX: 26.4 KG/M2 | SYSTOLIC BLOOD PRESSURE: 140 MMHG | TEMPERATURE: 97.8 F

## 2022-06-14 DIAGNOSIS — I10 ESSENTIAL HYPERTENSION: Chronic | ICD-10-CM

## 2022-06-14 DIAGNOSIS — F33.41 RECURRENT MAJOR DEPRESSIVE DISORDER, IN PARTIAL REMISSION: ICD-10-CM

## 2022-06-14 DIAGNOSIS — I10 ESSENTIAL HYPERTENSION: Primary | Chronic | ICD-10-CM

## 2022-06-14 PROCEDURE — 99214 OFFICE O/P EST MOD 30 MIN: CPT | Performed by: INTERNAL MEDICINE

## 2022-06-14 RX ORDER — HYDRALAZINE HYDROCHLORIDE 100 MG/1
100 TABLET, FILM COATED ORAL 2 TIMES DAILY
Qty: 60 TABLET | Refills: 4 | Status: SHIPPED | OUTPATIENT
Start: 2022-06-14 | End: 2022-06-14

## 2022-06-14 RX ORDER — HYDRALAZINE HYDROCHLORIDE 100 MG/1
TABLET, FILM COATED ORAL
Qty: 180 TABLET | Refills: 0 | Status: SHIPPED | OUTPATIENT
Start: 2022-06-14 | End: 2022-07-12

## 2022-06-14 NOTE — PROGRESS NOTES
Subjective        Chief Complaint   Patient presents with   • Hypertension           Luna Jacobson is a 76 y.o. female who presents for    Patient Active Problem List   Diagnosis   • Essential hypertension   • Other hyperlipidemia   • Other specified hypothyroidism   • Other headache syndrome   • Prediabetes   • Iron deficiency anemia due to chronic blood loss   • Thoracic aortic aneurysm (HCC)   • Microscopic colitis   • Myalgia   • Irregular heart beat   • DDD (degenerative disc disease), lumbar   • Recurrent major depressive disorder, in partial remission (HCC)   • Moderate aortic regurgitation       History of Present Illness     Her BP has been 151-183/. She was not seated for 5 minutes at home when she checked it. It was 140/78 last night. She stopped Pristiq after a week since she was more depressed with it. She changed back to Paxil and wants to stay on it. She has a lot going on her life. Her  has been confused. She does not wake up rested. She sleeps 8 hours per night. She does not snore.   Allergies   Allergen Reactions   • Amlodipine Headache     Head tightness   • Itraconazole Hives     sporanax   • Lisinopril Hives   • Norvasc [Amlodipine Besylate] Other (See Comments)     Head tightness   • Sulfa Antibiotics Other (See Comments)     headache   • Hydrochlorothiazide Other (See Comments)     hyponatremia  Other reaction(s): Other (See Comments)  hyponatremia   • Sulfamethoxazole Nausea And Vomiting       Current Outpatient Medications on File Prior to Visit   Medication Sig Dispense Refill   • atorvastatin (LIPITOR) 40 MG tablet TAKE 1 TABLET BY MOUTH DAILY 90 tablet 1   • butalbital-acetaminophen-caffeine (FIORICET, ESGIC) -40 MG per tablet TAKE 1 TABLET BY MOUTH EVERY 12 HOURS AS NEEDED FOR HEADACHE 30 tablet 2   • Calcium Carbonate-Vitamin D 500-125 MG-UNIT tablet Take  by mouth.     • carvedilol (COREG) 25 MG tablet TAKE 1 TABLET BY MOUTH TWICE DAILY WITH MEALS 180 tablet 0    • clobetasol (TEMOVATE) 0.05 % external solution APPLY TOPICALLY TO THE SCALP EVERY DAY IN THE MORNING AND IN THE EVENING     • coenzyme Q10 100 MG capsule Take 100 mg by mouth Daily.     • cyclobenzaprine (FLEXERIL) 10 MG tablet Take 1 tablet by mouth 3 (Three) Times a Day As Needed for Muscle Spasms for up to 60 days. 90 tablet 1   • dicyclomine (BENTYL) 20 MG tablet Take 20 mg by mouth Daily.     • FeroSul 325 (65 Fe) MG tablet TAKE 1 TABLET BY MOUTH DAILY 30 tablet 2   • fexofenadine (ALLEGRA) 180 MG tablet Take 180 mg by mouth Daily.     • fluticasone (FLONASE) 50 MCG/ACT nasal spray SHAKE LIQUID AND USE 2 SPRAYS IN EACH NOSTRIL EVERY DAY 48 g 3   • HYDROcodone-acetaminophen (NORCO) 5-325 MG per tablet Take 1 tablet by mouth 2 (Two) Times a Day As Needed for Severe Pain  for up to 30 days. 60 tablet 0   • ketoconazole (NIZORAL) 2 % shampoo   3   • levothyroxine (Synthroid) 100 MCG tablet Take 1 tablet by mouth Daily. 90 tablet 0   • Multiple Vitamin (MULTIVITAMIN) tablet Take 1 tablet by mouth Daily.     • pantoprazole (PROTONIX) 40 MG EC tablet Take 40 mg by mouth Daily.     • polyethylene glycol (MIRALAX) powder      • tiotropium (Spiriva HandiHaler) 18 MCG per inhalation capsule Place 1 capsule into inhaler and inhale Daily. 30 capsule 5   • [DISCONTINUED] desvenlafaxine (Pristiq) 50 MG 24 hr tablet Take 1 tablet by mouth Daily. 30 tablet 3   • [DISCONTINUED] hydrALAZINE (APRESOLINE) 50 MG tablet Take 1 tablet by mouth 2 (Two) Times a Day. 60 tablet 3     No current facility-administered medications on file prior to visit.       Past Medical History:   Diagnosis Date   • Anemia    • Anxiety    • Aortic aneurysm (HCC)     4 cm thoracic aorta   • COPD (chronic obstructive pulmonary disease) (HCC)    • Coronary artery calcification seen on CT scan 07/2012    mild calcification in the LAD with calcium score of 22. modere narrowing left subclavian origin   • Decreased diffusion capacity of lung    •  Diverticulosis    • Gastroparesis    • GERD (gastroesophageal reflux disease)    • GIST (gastrointestinal stromal tumor), non-malignant    • Irritable bowel syndrome    • Low back pain    • Mild mitral regurgitation    • Osteoporosis    • PAD (peripheral artery disease) (HCC)     small vessel disease in feet   • Subclavian artery stenosis, left (HCC) 2016    50 percent   • Tubular adenoma of colon 2017       Past Surgical History:   Procedure Laterality Date   • APPENDECTOMY     • COLONOSCOPY  10/01/2019    dr mcintyre   • ENDOSCOPY  2019   • HEMORRHOIDECTOMY     • HYSTERECTOMY     • TONSILLECTOMY     • TOOTH EXTRACTION      8/3/21       Family History   Problem Relation Age of Onset   • Hypertension Mother    • Diabetes Mother    • Dementia Mother    • Stroke Mother    • Lung cancer Father    • Alcohol abuse Father    • COPD Father    • Breast cancer Sister    • Hypertension Sister    • Hypothyroidism Sister    • Stroke Sister    • Other Sister         AAA   • Bipolar disorder Daughter    • Fibromyalgia Daughter        Social History     Socioeconomic History   • Marital status:    Tobacco Use   • Smoking status: Former Smoker     Quit date: 2020     Years since quittin.8   • Smokeless tobacco: Never Used   • Tobacco comment: she has smoked intermittently for 40 years; she smoked over 2 ppd twenty years ago   Vaping Use   • Vaping Use: Never used   Substance and Sexual Activity   • Alcohol use: No   • Drug use: No   • Sexual activity: Defer           The following portions of the patient's history were reviewed and updated as appropriate: problem list, allergies, current medications, past medical history, past family history, past social history and past surgical history.    Review of Systems    Immunization History   Administered Date(s) Administered   • COVID-19 (PFIZER) PURPLE CAP 2021, 2021   • FLUAD TRI 65YR+ 2019   • Fluzone High Dose =>65 Years ([employer] ONLY)  "11/29/2018   • Hepatitis A 05/01/2018, 12/02/2018   • Influenza TIV (IM) 11/07/2013, 10/13/2014   • Pneumococcal Conjugate 13-Valent (PCV13) 04/23/2015   • Pneumococcal Polysaccharide (PPSV23) 08/01/2008   • Tdap 01/01/2012   • Zostavax 08/25/2010       Objective   Vitals:    06/14/22 1544   BP: 140/86   Temp: 97.8 °F (36.6 °C)   Weight: 67.6 kg (149 lb)   Height: 160 cm (62.99\")     Body mass index is 26.4 kg/m².  Physical Exam  Vitals reviewed.   Constitutional:       Appearance: She is well-developed.   HENT:      Head: Normocephalic and atraumatic.   Cardiovascular:      Rate and Rhythm: Normal rate and regular rhythm.      Heart sounds: Normal heart sounds, S1 normal and S2 normal.   Pulmonary:      Effort: Pulmonary effort is normal.      Breath sounds: Normal breath sounds.   Skin:     General: Skin is warm.   Neurological:      Mental Status: She is alert.   Psychiatric:         Behavior: Behavior normal.         Procedures    Assessment & Plan   Diagnoses and all orders for this visit:    1. Essential hypertension (Primary)  -     hydrALAZINE (APRESOLINE) 100 MG tablet; Take 1 tablet by mouth 2 (Two) Times a Day.  Dispense: 60 tablet; Refill: 4    2. Recurrent major depressive disorder, in partial remission (HCC)  Comments:  She would like to stay on the Paxil.             She has her holter, MMG, CT, DEXA and PFTs have been arranged per pt.    Her Omron monitor is accurate. Increase Hydralazine to 100 mg BID and encouraged her to be seated for 5 minutes before checking it. Use monitor on the right arm b/c of subclavian stenosis on the left Discussed doing sleep study since BP has been hard to get controlled; she would like to wait on that..   No follow-ups on file.  "

## 2022-06-24 ENCOUNTER — APPOINTMENT (OUTPATIENT)
Dept: CT IMAGING | Facility: HOSPITAL | Age: 77
End: 2022-06-24

## 2022-06-24 ENCOUNTER — APPOINTMENT (OUTPATIENT)
Dept: RESPIRATORY THERAPY | Facility: HOSPITAL | Age: 77
End: 2022-06-24

## 2022-06-27 DIAGNOSIS — I10 ESSENTIAL HYPERTENSION: Chronic | ICD-10-CM

## 2022-06-27 RX ORDER — CARVEDILOL 25 MG/1
TABLET ORAL
Qty: 180 TABLET | Refills: 0 | Status: SHIPPED | OUTPATIENT
Start: 2022-06-27 | End: 2022-07-12

## 2022-06-29 ENCOUNTER — HOSPITAL ENCOUNTER (OUTPATIENT)
Dept: BONE DENSITY | Facility: HOSPITAL | Age: 77
End: 2022-06-29

## 2022-06-29 ENCOUNTER — APPOINTMENT (OUTPATIENT)
Dept: MAMMOGRAPHY | Facility: HOSPITAL | Age: 77
End: 2022-06-29

## 2022-07-01 ENCOUNTER — APPOINTMENT (OUTPATIENT)
Dept: RESPIRATORY THERAPY | Facility: HOSPITAL | Age: 77
End: 2022-07-01

## 2022-07-01 ENCOUNTER — APPOINTMENT (OUTPATIENT)
Dept: CT IMAGING | Facility: HOSPITAL | Age: 77
End: 2022-07-01

## 2022-07-08 NOTE — PROGRESS NOTES
Subjective   Luna Jacobson is a 76 y.o. female is here for follow-up for lower back pain.  Last seen on 5/12/2022.  This is a 2-month follow-up.  Pain is unchanged and well-tolerated with pain medications. Some increased mid back pain, but well tolerated.     Lower back pain is 4/10 on VAS, maximum 5/10.  Pain is sharp and shooting in nature.  Referred intermittently to left lateral thigh and left ankle.  Pain is constant.  Improved by pain medications and caudal NIMA.  Worse with walking.    Previous Injection:   11/20/2020 - Caudal NIMA - 80% pain relief ongoing     PMH: COPD, GERD, HLD, HTN, Thoracic aortic aneurysm      Current Medications:   Norco 5-325 mg BID PRN   Flexeril 10 mg BID PRN  Fioricet     Past Medications:       Past Modalities:  TENS:                                                                          no                                                  Physical Therapy Within The Last 6 Months              no  Psychotherapy                                                            no  Massage Therapy                                                       no     Patient Complains Of:  Uro-Fecal Incontinence          no  Weight Gain/Loss                   no  Fever/Chills                             no  Weakness                               Yes (subjective weakness in left leg)            Current Outpatient Medications:   •  atorvastatin (LIPITOR) 40 MG tablet, TAKE 1 TABLET BY MOUTH DAILY, Disp: 90 tablet, Rfl: 1  •  butalbital-acetaminophen-caffeine (FIORICET, ESGIC) -40 MG per tablet, TAKE 1 TABLET BY MOUTH EVERY 12 HOURS AS NEEDED FOR HEADACHE, Disp: 30 tablet, Rfl: 2  •  Calcium Carbonate-Vitamin D 500-125 MG-UNIT tablet, Take  by mouth., Disp: , Rfl:   •  carvedilol (COREG) 25 MG tablet, TAKE 1 TABLET BY MOUTH TWICE DAILY WITH MEALS, Disp: 180 tablet, Rfl: 0  •  clobetasol (TEMOVATE) 0.05 % external solution, APPLY TOPICALLY TO THE SCALP EVERY DAY IN THE MORNING AND IN THE  EVENING, Disp: , Rfl:   •  coenzyme Q10 100 MG capsule, Take 100 mg by mouth Daily., Disp: , Rfl:   •  cyclobenzaprine (FLEXERIL) 10 MG tablet, Take 1 tablet by mouth 3 (Three) Times a Day As Needed for Muscle Spasms for up to 60 days., Disp: 90 tablet, Rfl: 1  •  dicyclomine (BENTYL) 20 MG tablet, Take 20 mg by mouth Daily., Disp: , Rfl:   •  FeroSul 325 (65 Fe) MG tablet, TAKE 1 TABLET BY MOUTH DAILY, Disp: 30 tablet, Rfl: 2  •  fexofenadine (ALLEGRA) 180 MG tablet, Take 180 mg by mouth Daily., Disp: , Rfl:   •  fluticasone (FLONASE) 50 MCG/ACT nasal spray, SHAKE LIQUID AND USE 2 SPRAYS IN EACH NOSTRIL EVERY DAY, Disp: 48 g, Rfl: 3  •  hydrALAZINE (APRESOLINE) 100 MG tablet, TAKE 1 TABLET BY MOUTH TWICE DAILY, Disp: 180 tablet, Rfl: 0  •  ketoconazole (NIZORAL) 2 % shampoo, , Disp: , Rfl: 3  •  levothyroxine (Synthroid) 100 MCG tablet, Take 1 tablet by mouth Daily., Disp: 90 tablet, Rfl: 0  •  Multiple Vitamin (MULTIVITAMIN) tablet, Take 1 tablet by mouth Daily., Disp: , Rfl:   •  pantoprazole (PROTONIX) 40 MG EC tablet, Take 40 mg by mouth Daily., Disp: , Rfl:   •  polyethylene glycol (MIRALAX) powder, , Disp: , Rfl:   •  tiotropium (Spiriva HandiHaler) 18 MCG per inhalation capsule, Place 1 capsule into inhaler and inhale Daily., Disp: 30 capsule, Rfl: 5  •  [START ON 7/12/2022] HYDROcodone-acetaminophen (NORCO) 5-325 MG per tablet, Take 1 tablet by mouth 2 (Two) Times a Day As Needed for Severe Pain  for up to 30 days., Disp: 60 tablet, Rfl: 0  •  [START ON 8/11/2022] HYDROcodone-acetaminophen (NORCO) 5-325 MG per tablet, Take 1 tablet by mouth 2 (Two) Times a Day As Needed for Severe Pain  for up to 30 days., Disp: 60 tablet, Rfl: 0    The following portions of the patient's history were reviewed and updated as appropriate: allergies, current medications, past family history, past medical history, past social history, past surgical history and problem list.      REVIEW OF PERTINENT MEDICAL DATA    Past Medical  History:   Diagnosis Date   • Anemia    • Anxiety    • Aortic aneurysm (HCC)     4 cm thoracic aorta   • COPD (chronic obstructive pulmonary disease) (HCC)    • Coronary artery calcification seen on CT scan 2012    mild calcification in the LAD with calcium score of 22. modere narrowing left subclavian origin   • Decreased diffusion capacity of lung    • Diverticulosis    • Gastroparesis    • GERD (gastroesophageal reflux disease)    • GIST (gastrointestinal stromal tumor), non-malignant    • Irritable bowel syndrome    • Low back pain    • Mild mitral regurgitation    • Osteoporosis    • PAD (peripheral artery disease) (HCC)     small vessel disease in feet   • Subclavian artery stenosis, left (HCC) 2016    50 percent   • Tubular adenoma of colon 2017     Past Surgical History:   Procedure Laterality Date   • APPENDECTOMY     • COLONOSCOPY  10/01/2019    dr mcintyre   • ENDOSCOPY  2019   • HEMORRHOIDECTOMY     • HYSTERECTOMY     • TONSILLECTOMY     • TOOTH EXTRACTION      8/3/21     Family History   Problem Relation Age of Onset   • Hypertension Mother    • Diabetes Mother    • Dementia Mother    • Stroke Mother    • Lung cancer Father    • Alcohol abuse Father    • COPD Father    • Breast cancer Sister    • Hypertension Sister    • Hypothyroidism Sister    • Stroke Sister    • Other Sister         AAA   • Bipolar disorder Daughter    • Fibromyalgia Daughter      Social History     Socioeconomic History   • Marital status:    Tobacco Use   • Smoking status: Former Smoker     Quit date: 2020     Years since quittin.9   • Smokeless tobacco: Never Used   • Tobacco comment: she has smoked intermittently for 40 years; she smoked over 2 ppd twenty years ago   Vaping Use   • Vaping Use: Never used   Substance and Sexual Activity   • Alcohol use: No   • Drug use: No   • Sexual activity: Defer         Review of Systems      Vitals:    22 1306   BP: 116/54   Pulse: 70   Resp: 16   SpO2: 94%  "  Weight: 67.6 kg (149 lb)   Height: 157.5 cm (62\")   PainSc:   5         Objective   Physical Exam  Musculoskeletal:      Lumbar back: Tenderness present. Decreased range of motion.        Legs:    Neurological:      Comments: Motor strength 5/5 b/l LE  Sensory intact b/l LE             Imaging Reviewed:  MRI done at [facility] - 9/2020:      L3-L4-a small broad based disc protrusion centrally.  There is moderate facet arthropathy.  This changes contribute to moderate central canal stenosis.  There is moderate right and severe left neural foraminal stenosis.  L4-L5-there is a posterior disc protrusion centrally.  Moderate facet arthropathy and mild limited flavum hypertrophy. Severe central canal stenosis.  Severe bilateral neural foraminal stenosis, right greater than left.  L5-S1-there is small left far lateral disc protrusion.  No central canal stenosis.  There is mild facet arthropathy.  There is moderate bilateral neural foraminal stenosis.     Lumbar X-ray: 10/1/2020   Multilevel degenerative spondylosis.          Assessment:    1. DDD (degenerative disc disease), lumbar    2. Chronic left-sided low back pain with sciatica, sciatica laterality unspecified    3. Lumbar spondylosis    4. Chronic pain syndrome          Plan:  1.  Repeat UDS- 7/11/2022.  Consistent with patient's therapy on 1/10/2022. Narcotic contract discussed on 10/9/2020. Narcan ordered on 10/9/2020.  2. Continue Norco 5-325 mg BID PRN (7/12; 8/11). Discussed with the patient regarding long-term side effects of opioids including but not limited to opioid induced hormonal suppression, hyperalgesia, fatigue, weight gain, possible opioid induced altered immune system, addiction, tolerance, dependence, risk of hearing loss and elevated risk of myocardial infarction. Proper use and potential life threatening side effects of over use discussed with patient. Patient states understanding of their use and risks.  3. Continue Flexeril to 10 mg BID PRN (2 " months). Common side effects of flexeril include blurred vision, dizziness or lightheadedness, drowsiness, dryness of mouth, bloated feeling or gas, indigestion, nausea or vomiting, or stomach cramps or pain, constipation, diarrhea, excitement or nervousness, frequent urination, general feeling of discomfort or illness, headache, muscle twitching, numbness, tingling, pain, or weakness in hands or feet, pounding heartbeat, problems in speaking, trembling, trouble in sleeping, unpleasant taste or other taste changes, unusual muscle weakness or unusual tiredness, especially during the first few days as your body adjusts to this medication.  4. Will repeat Caudal NIMA as needed in future. Patient will give us a call to schedule the injection.     RTC in 2 months.     Galileo Portillo DO  Pain Management   [facility]         INSPECT REPORT    As part of the patient's treatment plan, I may be prescribing controlled substances. The patient has been made aware of appropriate use of such medications, including potential risk of somnolence, limited ability to drive and/or work safely, and the potential for dependence or overdose. It has also been made clear that these medications are for use by this patient only, without concomitant use of alcohol or other substances unless prescribed.     Patient has completed prescribing agreement detailing terms of continued prescribing of controlled substances, including monitoring INSPECT reports, urine drug screening, and pill counts if necessary. The patient is aware that inappropriate use will results in cessation of prescribing such medications.    INSPECT report has been reviewed and scanned into the patient's chart.

## 2022-07-11 ENCOUNTER — OFFICE VISIT (OUTPATIENT)
Dept: PAIN MEDICINE | Facility: CLINIC | Age: 77
End: 2022-07-11

## 2022-07-11 VITALS
HEIGHT: 62 IN | DIASTOLIC BLOOD PRESSURE: 54 MMHG | BODY MASS INDEX: 27.42 KG/M2 | WEIGHT: 149 LBS | RESPIRATION RATE: 16 BRPM | HEART RATE: 70 BPM | OXYGEN SATURATION: 94 % | SYSTOLIC BLOOD PRESSURE: 116 MMHG

## 2022-07-11 DIAGNOSIS — M54.40 CHRONIC LEFT-SIDED LOW BACK PAIN WITH SCIATICA, SCIATICA LATERALITY UNSPECIFIED: ICD-10-CM

## 2022-07-11 DIAGNOSIS — G89.29 CHRONIC LEFT-SIDED LOW BACK PAIN WITH SCIATICA, SCIATICA LATERALITY UNSPECIFIED: ICD-10-CM

## 2022-07-11 DIAGNOSIS — M51.36 DDD (DEGENERATIVE DISC DISEASE), LUMBAR: ICD-10-CM

## 2022-07-11 DIAGNOSIS — Z79.899 HIGH RISK MEDICATION USE: Primary | ICD-10-CM

## 2022-07-11 DIAGNOSIS — M47.816 LUMBAR SPONDYLOSIS: ICD-10-CM

## 2022-07-11 DIAGNOSIS — G89.4 CHRONIC PAIN SYNDROME: ICD-10-CM

## 2022-07-11 PROCEDURE — 99214 OFFICE O/P EST MOD 30 MIN: CPT | Performed by: STUDENT IN AN ORGANIZED HEALTH CARE EDUCATION/TRAINING PROGRAM

## 2022-07-11 RX ORDER — HYDROCODONE BITARTRATE AND ACETAMINOPHEN 5; 325 MG/1; MG/1
1 TABLET ORAL 2 TIMES DAILY PRN
Qty: 60 TABLET | Refills: 0 | Status: SHIPPED | OUTPATIENT
Start: 2022-08-11 | End: 2022-09-07 | Stop reason: SDUPTHER

## 2022-07-11 RX ORDER — CYCLOBENZAPRINE HCL 10 MG
10 TABLET ORAL 3 TIMES DAILY PRN
Qty: 90 TABLET | Refills: 1 | Status: SHIPPED | OUTPATIENT
Start: 2022-07-11 | End: 2022-09-07 | Stop reason: SDUPTHER

## 2022-07-11 RX ORDER — HYDROCODONE BITARTRATE AND ACETAMINOPHEN 5; 325 MG/1; MG/1
1 TABLET ORAL 2 TIMES DAILY PRN
Qty: 60 TABLET | Refills: 0 | Status: SHIPPED | OUTPATIENT
Start: 2022-07-12 | End: 2022-07-12

## 2022-07-12 ENCOUNTER — OFFICE VISIT (OUTPATIENT)
Dept: INTERNAL MEDICINE | Facility: CLINIC | Age: 77
End: 2022-07-12

## 2022-07-12 ENCOUNTER — APPOINTMENT (OUTPATIENT)
Dept: RESPIRATORY THERAPY | Facility: HOSPITAL | Age: 77
End: 2022-07-12

## 2022-07-12 ENCOUNTER — APPOINTMENT (OUTPATIENT)
Dept: CT IMAGING | Facility: HOSPITAL | Age: 77
End: 2022-07-12

## 2022-07-12 VITALS
DIASTOLIC BLOOD PRESSURE: 80 MMHG | SYSTOLIC BLOOD PRESSURE: 150 MMHG | WEIGHT: 149 LBS | HEIGHT: 62 IN | BODY MASS INDEX: 27.42 KG/M2 | TEMPERATURE: 97.8 F

## 2022-07-12 DIAGNOSIS — I10 ESSENTIAL HYPERTENSION: Primary | Chronic | ICD-10-CM

## 2022-07-12 DIAGNOSIS — I10 ESSENTIAL HYPERTENSION: Chronic | ICD-10-CM

## 2022-07-12 PROCEDURE — 99214 OFFICE O/P EST MOD 30 MIN: CPT | Performed by: INTERNAL MEDICINE

## 2022-07-12 RX ORDER — CARVEDILOL 12.5 MG/1
12.5 TABLET ORAL 2 TIMES DAILY WITH MEALS
Qty: 60 TABLET | Refills: 4 | Status: SHIPPED | OUTPATIENT
Start: 2022-07-12 | End: 2022-07-12

## 2022-07-12 RX ORDER — CARVEDILOL 12.5 MG/1
TABLET ORAL
Qty: 180 TABLET | Refills: 3 | Status: SHIPPED | OUTPATIENT
Start: 2022-07-12 | End: 2022-10-14 | Stop reason: SDUPTHER

## 2022-07-12 RX ORDER — HYDRALAZINE HYDROCHLORIDE 50 MG/1
50 TABLET, FILM COATED ORAL 2 TIMES DAILY
Qty: 60 TABLET | Refills: 3 | Status: SHIPPED | OUTPATIENT
Start: 2022-07-12 | End: 2022-09-21 | Stop reason: SDUPTHER

## 2022-07-12 NOTE — PROGRESS NOTES
Subjective        Chief Complaint   Patient presents with   • Hypertension           Luna Jacobson is a 76 y.o. female who presents for    Patient Active Problem List   Diagnosis   • Essential hypertension   • Other hyperlipidemia   • Other specified hypothyroidism   • Other headache syndrome   • Prediabetes   • Iron deficiency anemia due to chronic blood loss   • Thoracic aortic aneurysm (HCC)   • Microscopic colitis   • Myalgia   • Irregular heart beat   • DDD (degenerative disc disease), lumbar   • Recurrent major depressive disorder, in partial remission (HCC)   • Moderate aortic regurgitation       History of Present Illness     Her BP has been /. She will have a low BP and feel like she has lead in her. She has checked it in the last few days. She cut her Coreg and Hydralazine in half. She feels better with that. She was to have a CT and holter today. She is going to r/s after her upcoming vacation. She denies tachycardia or diarrhea. She has headaches that is not new.  Allergies   Allergen Reactions   • Amlodipine Headache     Head tightness   • Itraconazole Hives     sporanax   • Lisinopril Hives   • Norvasc [Amlodipine Besylate] Other (See Comments)     Head tightness   • Sulfa Antibiotics Other (See Comments)     headache   • Hydrochlorothiazide Other (See Comments)     hyponatremia  Other reaction(s): Other (See Comments)  hyponatremia   • Sulfamethoxazole Nausea And Vomiting       Current Outpatient Medications on File Prior to Visit   Medication Sig Dispense Refill   • atorvastatin (LIPITOR) 40 MG tablet TAKE 1 TABLET BY MOUTH DAILY 90 tablet 1   • butalbital-acetaminophen-caffeine (FIORICET, ESGIC) -40 MG per tablet TAKE 1 TABLET BY MOUTH EVERY 12 HOURS AS NEEDED FOR HEADACHE 30 tablet 2   • Calcium Carbonate-Vitamin D 500-125 MG-UNIT tablet Take  by mouth.     • clobetasol (TEMOVATE) 0.05 % external solution APPLY TOPICALLY TO THE SCALP EVERY DAY IN THE MORNING AND IN THE  EVENING     • coenzyme Q10 100 MG capsule Take 100 mg by mouth Daily.     • cyclobenzaprine (FLEXERIL) 10 MG tablet Take 1 tablet by mouth 3 (Three) Times a Day As Needed for Muscle Spasms for up to 60 days. 90 tablet 1   • dicyclomine (BENTYL) 20 MG tablet Take 20 mg by mouth Daily.     • FeroSul 325 (65 Fe) MG tablet TAKE 1 TABLET BY MOUTH DAILY 30 tablet 2   • fexofenadine (ALLEGRA) 180 MG tablet Take 180 mg by mouth Daily.     • fluticasone (FLONASE) 50 MCG/ACT nasal spray SHAKE LIQUID AND USE 2 SPRAYS IN EACH NOSTRIL EVERY DAY 48 g 3   • [START ON 8/11/2022] HYDROcodone-acetaminophen (NORCO) 5-325 MG per tablet Take 1 tablet by mouth 2 (Two) Times a Day As Needed for Severe Pain  for up to 30 days. 60 tablet 0   • ketoconazole (NIZORAL) 2 % shampoo   3   • levothyroxine (Synthroid) 100 MCG tablet Take 1 tablet by mouth Daily. 90 tablet 0   • Multiple Vitamin (MULTIVITAMIN) tablet Take 1 tablet by mouth Daily.     • pantoprazole (PROTONIX) 40 MG EC tablet Take 40 mg by mouth Daily.     • polyethylene glycol (MIRALAX) powder      • tiotropium (Spiriva HandiHaler) 18 MCG per inhalation capsule Place 1 capsule into inhaler and inhale Daily. 30 capsule 5   • [DISCONTINUED] carvedilol (COREG) 25 MG tablet TAKE 1 TABLET BY MOUTH TWICE DAILY WITH MEALS 180 tablet 0   • [DISCONTINUED] hydrALAZINE (APRESOLINE) 100 MG tablet TAKE 1 TABLET BY MOUTH TWICE DAILY 180 tablet 0   • [DISCONTINUED] HYDROcodone-acetaminophen (NORCO) 5-325 MG per tablet Take 1 tablet by mouth 2 (Two) Times a Day As Needed for Severe Pain  for up to 30 days. 60 tablet 0     No current facility-administered medications on file prior to visit.       Past Medical History:   Diagnosis Date   • Anemia    • Anxiety    • Aortic aneurysm (HCC)     4 cm thoracic aorta   • COPD (chronic obstructive pulmonary disease) (HCC)    • Coronary artery calcification seen on CT scan 07/2012    mild calcification in the LAD with calcium score of 22. modere narrowing  left subclavian origin   • Decreased diffusion capacity of lung    • Diverticulosis    • Gastroparesis    • GERD (gastroesophageal reflux disease)    • GIST (gastrointestinal stromal tumor), non-malignant    • Irritable bowel syndrome    • Low back pain    • Mild mitral regurgitation    • Osteoporosis    • PAD (peripheral artery disease) (HCC)     small vessel disease in feet   • Subclavian artery stenosis, left (HCC) 2016    50 percent   • Tubular adenoma of colon 2017       Past Surgical History:   Procedure Laterality Date   • APPENDECTOMY     • COLONOSCOPY  10/01/2019    dr mcintyre   • ENDOSCOPY  2019   • HEMORRHOIDECTOMY     • HYSTERECTOMY     • TONSILLECTOMY     • TOOTH EXTRACTION      8/3/21       Family History   Problem Relation Age of Onset   • Hypertension Mother    • Diabetes Mother    • Dementia Mother    • Stroke Mother    • Lung cancer Father    • Alcohol abuse Father    • COPD Father    • Breast cancer Sister    • Hypertension Sister    • Hypothyroidism Sister    • Stroke Sister    • Other Sister         AAA   • Bipolar disorder Daughter    • Fibromyalgia Daughter        Social History     Socioeconomic History   • Marital status:    Tobacco Use   • Smoking status: Former Smoker     Quit date: 2020     Years since quittin.9   • Smokeless tobacco: Never Used   • Tobacco comment: she has smoked intermittently for 40 years; she smoked over 2 ppd twenty years ago   Vaping Use   • Vaping Use: Never used   Substance and Sexual Activity   • Alcohol use: No   • Drug use: No   • Sexual activity: Defer           The following portions of the patient's history were reviewed and updated as appropriate: problem list, allergies, current medications, past medical history, past family history, past social history and past surgical history.    Review of Systems    Immunization History   Administered Date(s) Administered   • COVID-19 (PFIZER) PURPLE CAP 2021, 2021   • FLUAD TRI  "65YR+ 11/01/2019   • Fluzone High Dose =>65 Years ([employer] ONLY) 11/29/2018   • Hepatitis A 05/01/2018, 12/02/2018   • Influenza TIV (IM) 11/07/2013, 10/13/2014   • Pneumococcal Conjugate 13-Valent (PCV13) 04/23/2015   • Pneumococcal Polysaccharide (PPSV23) 08/01/2008   • Tdap 01/01/2012   • Zostavax 08/25/2010       Objective   Vitals:    07/12/22 1402   BP: 150/80   Temp: 97.8 °F (36.6 °C)   Weight: 67.6 kg (149 lb)   Height: 157.5 cm (62.01\")     Body mass index is 27.25 kg/m².  Physical Exam  Vitals reviewed.   Constitutional:       Appearance: She is well-developed.   HENT:      Head: Normocephalic and atraumatic.   Cardiovascular:      Rate and Rhythm: Normal rate and regular rhythm.      Heart sounds: Normal heart sounds, S1 normal and S2 normal.   Pulmonary:      Effort: Pulmonary effort is normal.      Breath sounds: Normal breath sounds.   Skin:     General: Skin is warm.   Neurological:      Mental Status: She is alert.   Psychiatric:         Behavior: Behavior normal.         Procedures    Assessment & Plan   Diagnoses and all orders for this visit:    1. Essential hypertension (Primary)  -     hydrALAZINE (APRESOLINE) 50 MG tablet; Take 1 tablet by mouth 2 (Two) Times a Day.  Dispense: 60 tablet; Refill: 3  -     carvedilol (Coreg) 12.5 MG tablet; Take 1 tablet by mouth 2 (Two) Times a Day With Meals.  Dispense: 60 tablet; Refill: 4  -     Duplex Renal Artery - Bilateral Complete CAR; Future  -     Metanephrines, Frac. Free, Plasma  -     Aldosterone / Renin Ratio             She has not taken her meds today. I am hesitant to change anything. I will refill her reduced dose of hydralazine and coreg which she changed in the last week. She is going to r/s her CT and holter.    I do not have all of her old records from our past practice. I am going to do a secondary eval for her difficult to control HTN. She is not interested in a sleep study.    Return in about 6 weeks (around 8/23/2022), or 30 minutes, " for Lab Today.

## 2022-07-21 ENCOUNTER — APPOINTMENT (OUTPATIENT)
Dept: MAMMOGRAPHY | Facility: HOSPITAL | Age: 77
End: 2022-07-21

## 2022-07-21 ENCOUNTER — APPOINTMENT (OUTPATIENT)
Dept: BONE DENSITY | Facility: HOSPITAL | Age: 77
End: 2022-07-21

## 2022-07-21 LAB
ALDOST SERPL-MCNC: 5.4 NG/DL (ref 0–30)
ALDOST/RENIN PLAS-RTO: 28.7 {RATIO} (ref 0–30)
METANEPH FREE SERPL-MCNC: 19.6 PG/ML (ref 0–88)
NORMETANEPHRINE SERPL-MCNC: 50.2 PG/ML (ref 0–285.2)
RENIN PLAS-CCNC: 0.19 NG/ML/HR (ref 0.17–5.38)

## 2022-07-26 ENCOUNTER — APPOINTMENT (OUTPATIENT)
Dept: CT IMAGING | Facility: HOSPITAL | Age: 77
End: 2022-07-26

## 2022-07-26 ENCOUNTER — APPOINTMENT (OUTPATIENT)
Dept: RESPIRATORY THERAPY | Facility: HOSPITAL | Age: 77
End: 2022-07-26

## 2022-07-27 ENCOUNTER — TRANSCRIBE ORDERS (OUTPATIENT)
Dept: INTERNAL MEDICINE | Facility: CLINIC | Age: 77
End: 2022-07-27

## 2022-07-27 DIAGNOSIS — Z01.818 OTHER SPECIFIED PRE-OPERATIVE EXAMINATION: Primary | ICD-10-CM

## 2022-07-29 ENCOUNTER — APPOINTMENT (OUTPATIENT)
Dept: RESPIRATORY THERAPY | Facility: HOSPITAL | Age: 77
End: 2022-07-29

## 2022-08-08 ENCOUNTER — APPOINTMENT (OUTPATIENT)
Dept: MAMMOGRAPHY | Facility: HOSPITAL | Age: 77
End: 2022-08-08

## 2022-08-08 ENCOUNTER — HOSPITAL ENCOUNTER (OUTPATIENT)
Dept: BONE DENSITY | Facility: HOSPITAL | Age: 77
End: 2022-08-08

## 2022-08-10 ENCOUNTER — HOSPITAL ENCOUNTER (OUTPATIENT)
Dept: RESPIRATORY THERAPY | Facility: HOSPITAL | Age: 77
Discharge: HOME | End: 2022-08-10

## 2022-08-10 ENCOUNTER — HOSPITAL ENCOUNTER (OUTPATIENT)
Dept: CT IMAGING | Facility: HOSPITAL | Age: 77
Discharge: HOME | End: 2022-08-10

## 2022-08-10 DIAGNOSIS — R00.2 PALPITATIONS: ICD-10-CM

## 2022-08-10 LAB
CREAT BLDA-MCNC: 1 MG/DL (ref 0.6–1.3)
EGFRCR SERPLBLD CKD-EPI 2021: 58.9 ML/MIN/1.73

## 2022-08-10 PROCEDURE — 0 IOPAMIDOL PER 1 ML: Performed by: INTERNAL MEDICINE

## 2022-08-10 PROCEDURE — 71275 CT ANGIOGRAPHY CHEST: CPT

## 2022-08-10 PROCEDURE — 93225 XTRNL ECG REC<48 HRS REC: CPT

## 2022-08-10 PROCEDURE — 82565 ASSAY OF CREATININE: CPT

## 2022-08-10 RX ADMIN — IOPAMIDOL 100 ML: 755 INJECTION, SOLUTION INTRAVENOUS at 13:06

## 2022-08-16 ENCOUNTER — HOSPITAL ENCOUNTER (OUTPATIENT)
Dept: CARDIOLOGY | Facility: HOSPITAL | Age: 77
Discharge: HOME | End: 2022-08-16
Admitting: INTERNAL MEDICINE

## 2022-08-16 DIAGNOSIS — I10 ESSENTIAL HYPERTENSION: ICD-10-CM

## 2022-08-16 LAB
BH CV ECHO MEAS - DIST REN A EDV LEFT: 16.4 CM/S
BH CV ECHO MEAS - DIST REN A PSV LEFT: 77.4 CM/S
BH CV ECHO MEAS - MID REN A EDV LEFT: 12.3 CM/S
BH CV ECHO MEAS - MID REN A PSV LEFT: 47 CM/S
BH CV ECHO MEAS - PROX REN A EDV LEFT: 16.8 CM/S
BH CV ECHO MEAS - PROX REN A PSV LEFT: 79.6 CM/S
BH CV VAS RENAL AORTIC MID PSV: 71 CM/S
BH CV XLRA MEAS - KID L LEFT: 9.6 CM
BH CV XLRA MEAS DIST REN A EDV RIGHT: 14.1 CM/S
BH CV XLRA MEAS DIST REN A PSV RIGHT: 76.2 CM/S
BH CV XLRA MEAS KID L RIGHT: 9.6 CM
BH CV XLRA MEAS MID REN A EDV RIGHT: 13.7 CM/S
BH CV XLRA MEAS MID REN A PSV RIGHT: 65.9 CM/S
BH CV XLRA MEAS PROX REN A EDV RIGHT: 17.8 CM/S
BH CV XLRA MEAS PROX REN A PSV RIGHT: 95.1 CM/S
BH CV XLRA MEAS RAR LEFT: 1.1
BH CV XLRA MEAS RAR RIGHT: 1.3
MAXIMAL PREDICTED HEART RATE: 145 BPM
STRESS TARGET HR: 123 BPM

## 2022-08-16 PROCEDURE — 93975 VASCULAR STUDY: CPT

## 2022-08-18 ENCOUNTER — TELEPHONE (OUTPATIENT)
Dept: INTERNAL MEDICINE | Facility: CLINIC | Age: 77
End: 2022-08-18

## 2022-08-19 PROCEDURE — 93227 XTRNL ECG REC<48 HR R&I: CPT | Performed by: INTERNAL MEDICINE

## 2022-08-26 ENCOUNTER — APPOINTMENT (OUTPATIENT)
Dept: MAMMOGRAPHY | Facility: HOSPITAL | Age: 77
End: 2022-08-26

## 2022-08-26 ENCOUNTER — HOSPITAL ENCOUNTER (OUTPATIENT)
Dept: BONE DENSITY | Facility: HOSPITAL | Age: 77
End: 2022-08-26

## 2022-08-30 ENCOUNTER — OFFICE VISIT (OUTPATIENT)
Dept: INTERNAL MEDICINE | Facility: CLINIC | Age: 77
End: 2022-08-30

## 2022-08-30 VITALS
HEART RATE: 66 BPM | HEIGHT: 62 IN | WEIGHT: 146 LBS | BODY MASS INDEX: 26.87 KG/M2 | TEMPERATURE: 97.5 F | SYSTOLIC BLOOD PRESSURE: 132 MMHG | DIASTOLIC BLOOD PRESSURE: 74 MMHG

## 2022-08-30 DIAGNOSIS — I10 ESSENTIAL HYPERTENSION: Primary | Chronic | ICD-10-CM

## 2022-08-30 DIAGNOSIS — R73.03 PREDIABETES: ICD-10-CM

## 2022-08-30 DIAGNOSIS — E03.8 OTHER SPECIFIED HYPOTHYROIDISM: ICD-10-CM

## 2022-08-30 DIAGNOSIS — I71.20 THORACIC AORTIC ANEURYSM WITHOUT RUPTURE: Chronic | ICD-10-CM

## 2022-08-30 PROCEDURE — 99214 OFFICE O/P EST MOD 30 MIN: CPT | Performed by: INTERNAL MEDICINE

## 2022-08-30 RX ORDER — CLONIDINE HYDROCHLORIDE 0.1 MG/1
0.1 TABLET ORAL NIGHTLY
Qty: 90 TABLET | Refills: 1 | Status: SHIPPED | OUTPATIENT
Start: 2022-08-30

## 2022-08-30 RX ORDER — PAROXETINE HYDROCHLORIDE 40 MG/1
40 TABLET, FILM COATED ORAL EVERY MORNING
COMMUNITY

## 2022-08-30 NOTE — PROGRESS NOTES
Subjective        Chief Complaint   Patient presents with   • Hypertension           Luna Jacobson is a 76 y.o. female who presents for    Patient Active Problem List   Diagnosis   • Essential hypertension   • Other hyperlipidemia   • Other specified hypothyroidism   • Other headache syndrome   • Prediabetes   • Iron deficiency anemia due to chronic blood loss   • Thoracic aortic aneurysm (HCC)   • Microscopic colitis   • Myalgia   • DDD (degenerative disc disease), lumbar   • Recurrent major depressive disorder, in partial remission (HCC)   • Moderate aortic regurgitation       History of Present Illness     She has seen Dr. Hennessy and she has had two trigger fingers injected; it has helped. Her BP has been 118-204/. She sleeps 4-12 hours. Her  has chronic insomnia and that keeps her up sometimes. He has dementia. She sleeps better in another room. She is stable emotionally. She denies depression. She is overwhelmed taking care of her . She thinks clonidine helped her BP more but she was more tired with it.   Allergies   Allergen Reactions   • Amlodipine Headache     Head tightness   • Itraconazole Hives     sporanax   • Lisinopril Hives   • Norvasc [Amlodipine Besylate] Other (See Comments)     Head tightness   • Sulfa Antibiotics Other (See Comments)     headache   • Hydrochlorothiazide Other (See Comments)     hyponatremia  Other reaction(s): Other (See Comments)  hyponatremia   • Sulfamethoxazole Nausea And Vomiting       Current Outpatient Medications on File Prior to Visit   Medication Sig Dispense Refill   • atorvastatin (LIPITOR) 40 MG tablet TAKE 1 TABLET BY MOUTH DAILY 90 tablet 1   • butalbital-acetaminophen-caffeine (FIORICET, ESGIC) -40 MG per tablet TAKE 1 TABLET BY MOUTH EVERY 12 HOURS AS NEEDED FOR HEADACHE 30 tablet 2   • Calcium Carbonate-Vitamin D 500-125 MG-UNIT tablet Take  by mouth.     • carvedilol (COREG) 12.5 MG tablet TAKE 1 TABLET BY MOUTH TWICE DAILY WITH  MEALS 180 tablet 3   • clobetasol (TEMOVATE) 0.05 % external solution APPLY TOPICALLY TO THE SCALP EVERY DAY IN THE MORNING AND IN THE EVENING     • coenzyme Q10 100 MG capsule Take 100 mg by mouth Daily.     • cyclobenzaprine (FLEXERIL) 10 MG tablet Take 1 tablet by mouth 3 (Three) Times a Day As Needed for Muscle Spasms for up to 60 days. 90 tablet 1   • dicyclomine (BENTYL) 20 MG tablet Take 20 mg by mouth Daily.     • FeroSul 325 (65 Fe) MG tablet TAKE 1 TABLET BY MOUTH DAILY 30 tablet 2   • fexofenadine (ALLEGRA) 180 MG tablet Take 180 mg by mouth Daily.     • fluticasone (FLONASE) 50 MCG/ACT nasal spray SHAKE LIQUID AND USE 2 SPRAYS IN EACH NOSTRIL EVERY DAY 48 g 3   • hydrALAZINE (APRESOLINE) 50 MG tablet Take 1 tablet by mouth 2 (Two) Times a Day. 60 tablet 3   • HYDROcodone-acetaminophen (NORCO) 5-325 MG per tablet Take 1 tablet by mouth 2 (Two) Times a Day As Needed for Severe Pain  for up to 30 days. 60 tablet 0   • ketoconazole (NIZORAL) 2 % shampoo   3   • levothyroxine (Synthroid) 100 MCG tablet Take 1 tablet by mouth Daily. 90 tablet 0   • Multiple Vitamin (MULTIVITAMIN) tablet Take 1 tablet by mouth Daily.     • pantoprazole (PROTONIX) 40 MG EC tablet Take 40 mg by mouth Daily.     • PARoxetine (PAXIL) 40 MG tablet Take 40 mg by mouth Every Morning.     • polyethylene glycol (MIRALAX) powder      • tiotropium (Spiriva HandiHaler) 18 MCG per inhalation capsule Place 1 capsule into inhaler and inhale Daily. 30 capsule 5     No current facility-administered medications on file prior to visit.       Past Medical History:   Diagnosis Date   • Anemia    • Anxiety    • Aortic aneurysm (HCC)     4 cm thoracic aorta   • COPD (chronic obstructive pulmonary disease) (HCC)    • Coronary artery calcification seen on CT scan 07/2012    mild calcification in the LAD with calcium score of 22. modere narrowing left subclavian origin   • Decreased diffusion capacity of lung    • Diverticulosis    • Gastroparesis    •  GERD (gastroesophageal reflux disease)    • GIST (gastrointestinal stromal tumor), non-malignant    • Irritable bowel syndrome    • Low back pain    • Mild mitral regurgitation    • Osteoporosis    • PAD (peripheral artery disease) (HCC)     small vessel disease in feet   • Subclavian artery stenosis, left (HCC) 2016    50 percent   • Tubular adenoma of colon 2017       Past Surgical History:   Procedure Laterality Date   • APPENDECTOMY     • COLONOSCOPY  10/01/2019    dr mcintyre   • ENDOSCOPY  2019   • HEMORRHOIDECTOMY     • HYSTERECTOMY     • TONSILLECTOMY     • TOOTH EXTRACTION      8/3/21       Family History   Problem Relation Age of Onset   • Hypertension Mother    • Diabetes Mother    • Dementia Mother    • Stroke Mother    • Lung cancer Father    • Alcohol abuse Father    • COPD Father    • Breast cancer Sister    • Hypertension Sister    • Hypothyroidism Sister    • Stroke Sister    • Other Sister         AAA   • Bipolar disorder Daughter    • Fibromyalgia Daughter        Social History     Socioeconomic History   • Marital status:    Tobacco Use   • Smoking status: Former Smoker     Quit date: 2020     Years since quittin.0   • Smokeless tobacco: Never Used   • Tobacco comment: she has smoked intermittently for 40 years; she smoked over 2 ppd twenty years ago   Vaping Use   • Vaping Use: Never used   Substance and Sexual Activity   • Alcohol use: No   • Drug use: No   • Sexual activity: Defer           The following portions of the patient's history were reviewed and updated as appropriate: problem list, allergies, current medications, past medical history, past family history, past social history and past surgical history.    Review of Systems    Immunization History   Administered Date(s) Administered   • COVID-19 (PFIZER) PURPLE CAP 2021, 2021   • FLUAD TRI 65YR+ 2019   • Fluzone High Dose =>65 Years ([employer] ONLY) 2018   • Hepatitis A 2018,  "12/02/2018   • Influenza TIV (IM) 11/07/2013, 10/13/2014   • Pneumococcal Conjugate 13-Valent (PCV13) 04/23/2015   • Pneumococcal Polysaccharide (PPSV23) 08/01/2008   • Tdap 01/01/2012   • Zostavax 08/25/2010       Objective   Vitals:    08/30/22 1357   BP: 132/74   Pulse: 66   Temp: 97.5 °F (36.4 °C)   Weight: 66.2 kg (146 lb)   Height: 157.5 cm (62.01\")     Body mass index is 26.7 kg/m².  Physical Exam  Vitals reviewed.   Constitutional:       Appearance: She is well-developed.   HENT:      Head: Normocephalic and atraumatic.   Cardiovascular:      Rate and Rhythm: Normal rate and regular rhythm.      Heart sounds: Normal heart sounds, S1 normal and S2 normal.   Pulmonary:      Effort: Pulmonary effort is normal.      Breath sounds: Normal breath sounds.   Skin:     General: Skin is warm.   Neurological:      Mental Status: She is alert.   Psychiatric:         Behavior: Behavior normal.         Procedures    Assessment & Plan   Diagnoses and all orders for this visit:    1. Essential hypertension (Primary)  -     cloNIDine (Catapres) 0.1 MG tablet; Take 1 tablet by mouth Every Night.  Dispense: 90 tablet; Refill: 1  -     Basic Metabolic Panel; Future    2. Thoracic aortic aneurysm without rupture (HCC)    3. Other specified hypothyroidism  -     TSH; Future  -     T4, Free; Future    4. Prediabetes  -     Hemoglobin A1c; Future               Reviewed CT chest, renal artery USN and holter. She will r/s her MMG and DEXA. Add clonidine at the smallest dose at bedtime. She will have her BP checked in two weeks with Dr. Koenig.     I think her fatigue is related primarily to her not sleeping well at night with her  waking her up. She could have a component related to her meds.  Return in about 3 months (around 11/30/2022), or 30 minutes, for Lab Before FUP.  "

## 2022-09-07 NOTE — PROGRESS NOTES
Subjective   Luna Jacobson is a 77 y.o. female is here for follow-up for lower back pain.  Last seen on 7/11/2022.  This is 2 months follow-up.  No change in pain.  Doing well with medications and has functional improvement with pain medications.    Lower back pain is 4/10 on VAS, maximum 5/10.  Pain is sharp and shooting in nature.  Referred intermittently to left lateral thigh and left ankle.  Pain is constant.  Improved by pain medications and caudal NIMA.  Worse with walking.    Previous Injection:   11/20/2020 - Caudal NIMA - 80% pain relief ongoing     PMH: COPD, GERD, HLD, HTN, Thoracic aortic aneurysm      Current Medications:   Norco 5-325 mg BID PRN   Flexeril 10 mg BID PRN  Fioricet     Past Medications:       Past Modalities:  TENS:                                                                          no                                                  Physical Therapy Within The Last 6 Months              no  Psychotherapy                                                            no  Massage Therapy                                                       no     Patient Complains Of:  Uro-Fecal Incontinence          no  Weight Gain/Loss                   no  Fever/Chills                             no  Weakness                               Yes (subjective weakness in left leg)            Current Outpatient Medications:   •  atorvastatin (LIPITOR) 40 MG tablet, TAKE 1 TABLET BY MOUTH DAILY, Disp: 90 tablet, Rfl: 1  •  butalbital-acetaminophen-caffeine (FIORICET, ESGIC) -40 MG per tablet, TAKE 1 TABLET BY MOUTH EVERY 12 HOURS AS NEEDED FOR HEADACHE, Disp: 30 tablet, Rfl: 2  •  Calcium Carbonate-Vitamin D 500-125 MG-UNIT tablet, Take  by mouth., Disp: , Rfl:   •  carvedilol (COREG) 12.5 MG tablet, TAKE 1 TABLET BY MOUTH TWICE DAILY WITH MEALS, Disp: 180 tablet, Rfl: 3  •  clobetasol (TEMOVATE) 0.05 % external solution, APPLY TOPICALLY TO THE SCALP EVERY DAY IN THE MORNING AND IN THE EVENING,  Disp: , Rfl:   •  cloNIDine (Catapres) 0.1 MG tablet, Take 1 tablet by mouth Every Night., Disp: 90 tablet, Rfl: 1  •  coenzyme Q10 100 MG capsule, Take 100 mg by mouth Daily., Disp: , Rfl:   •  cyclobenzaprine (FLEXERIL) 10 MG tablet, Take 1 tablet by mouth 3 (Three) Times a Day As Needed for Muscle Spasms for up to 60 days., Disp: 90 tablet, Rfl: 1  •  dicyclomine (BENTYL) 20 MG tablet, Take 20 mg by mouth Daily., Disp: , Rfl:   •  FeroSul 325 (65 Fe) MG tablet, TAKE 1 TABLET BY MOUTH DAILY, Disp: 30 tablet, Rfl: 2  •  fexofenadine (ALLEGRA) 180 MG tablet, Take 180 mg by mouth Daily., Disp: , Rfl:   •  fluticasone (FLONASE) 50 MCG/ACT nasal spray, SHAKE LIQUID AND USE 2 SPRAYS IN EACH NOSTRIL EVERY DAY, Disp: 48 g, Rfl: 3  •  hydrALAZINE (APRESOLINE) 50 MG tablet, Take 1 tablet by mouth 2 (Two) Times a Day., Disp: 60 tablet, Rfl: 3  •  [START ON 9/10/2022] HYDROcodone-acetaminophen (NORCO) 5-325 MG per tablet, Take 1 tablet by mouth 2 (Two) Times a Day As Needed for Severe Pain for up to 30 days., Disp: 60 tablet, Rfl: 0  •  [START ON 10/10/2022] HYDROcodone-acetaminophen (NORCO) 5-325 MG per tablet, Take 1 tablet by mouth 2 (Two) Times a Day As Needed for Severe Pain for up to 30 days., Disp: 60 tablet, Rfl: 0  •  ketoconazole (NIZORAL) 2 % shampoo, , Disp: , Rfl: 3  •  levothyroxine (Synthroid) 100 MCG tablet, Take 1 tablet by mouth Daily., Disp: 90 tablet, Rfl: 0  •  Multiple Vitamin (MULTIVITAMIN) tablet, Take 1 tablet by mouth Daily., Disp: , Rfl:   •  pantoprazole (PROTONIX) 40 MG EC tablet, Take 40 mg by mouth Daily., Disp: , Rfl:   •  PARoxetine (PAXIL) 40 MG tablet, Take 40 mg by mouth Every Morning., Disp: , Rfl:   •  polyethylene glycol (MIRALAX) powder, , Disp: , Rfl:   •  tiotropium (Spiriva HandiHaler) 18 MCG per inhalation capsule, Place 1 capsule into inhaler and inhale Daily., Disp: 30 capsule, Rfl: 5    The following portions of the patient's history were reviewed and updated as appropriate:  allergies, current medications, past family history, past medical history, past social history, past surgical history and problem list.      REVIEW OF PERTINENT MEDICAL DATA    Past Medical History:   Diagnosis Date   • Anemia    • Anxiety    • Aortic aneurysm (HCC)     4 cm thoracic aorta   • COPD (chronic obstructive pulmonary disease) (HCC)    • Coronary artery calcification seen on CT scan 2012    mild calcification in the LAD with calcium score of 22. modere narrowing left subclavian origin   • Decreased diffusion capacity of lung    • Diverticulosis    • Gastroparesis    • GERD (gastroesophageal reflux disease)    • GIST (gastrointestinal stromal tumor), non-malignant    • Irritable bowel syndrome    • Low back pain    • Mild mitral regurgitation    • Osteoporosis    • PAD (peripheral artery disease) (HCC)     small vessel disease in feet   • Subclavian artery stenosis, left (HCC) 2016    50 percent   • Tubular adenoma of colon 2017     Past Surgical History:   Procedure Laterality Date   • APPENDECTOMY     • COLONOSCOPY  10/01/2019    dr mcintyre   • ENDOSCOPY  2019   • HEMORRHOIDECTOMY     • HYSTERECTOMY     • TONSILLECTOMY     • TOOTH EXTRACTION      8/3/21     Family History   Problem Relation Age of Onset   • Hypertension Mother    • Diabetes Mother    • Dementia Mother    • Stroke Mother    • Lung cancer Father    • Alcohol abuse Father    • COPD Father    • Breast cancer Sister    • Hypertension Sister    • Hypothyroidism Sister    • Stroke Sister    • Other Sister         AAA   • Bipolar disorder Daughter    • Fibromyalgia Daughter      Social History     Socioeconomic History   • Marital status:    Tobacco Use   • Smoking status: Former Smoker     Quit date: 2020     Years since quittin.0   • Smokeless tobacco: Never Used   • Tobacco comment: she has smoked intermittently for 40 years; she smoked over 2 ppd twenty years ago   Vaping Use   • Vaping Use: Never used   Substance  and Sexual Activity   • Alcohol use: No   • Drug use: No   • Sexual activity: Defer         Review of Systems      Vitals:    09/08/22 1434   BP: 121/59   Pulse: 63   Resp: 16   SpO2: 96%   PainSc:   3         Objective   Physical Exam  Musculoskeletal:      Lumbar back: Tenderness present. Decreased range of motion.        Legs:    Neurological:      Comments: Motor strength 5/5 b/l LE  Sensory intact b/l LE             Imaging Reviewed:  [facility] done at [facility]  - 9/2020:      L3-L4-a small broad based disc protrusion centrally.  There is moderate facet arthropathy.  This changes contribute to moderate central canal stenosis.  There is moderate right and severe left neural foraminal stenosis.  L4-L5-there is a posterior disc protrusion centrally.  Moderate facet arthropathy and mild limited flavum hypertrophy. Severe central canal stenosis.  Severe bilateral neural foraminal stenosis, right greater than left.  L5-S1-there is small left far lateral disc protrusion.  No central canal stenosis.  There is mild facet arthropathy.  There is moderate bilateral neural foraminal stenosis.     Lumbar X-ray: 10/1/2020   Multilevel degenerative spondylosis.          Assessment:    1. DDD (degenerative disc disease), lumbar    2. Chronic left-sided low back pain with sciatica, sciatica laterality unspecified    3. Lumbar spondylosis    4. Chronic pain syndrome          Plan:  1.  UDS on 7/11/2022 is consistent with patient's interview.. Narcotic contract discussed on 10/9/2020. Narcan ordered on 10/9/2020.  2. Continue Norco 5-325 mg BID PRN (9/10; 10/10). Discussed with the patient regarding long-term side effects of opioids including but not limited to opioid induced hormonal suppression, hyperalgesia, fatigue, weight gain, possible opioid induced altered immune system, addiction, tolerance, dependence, risk of hearing loss and elevated risk of myocardial infarction. Proper use and potential life threatening side effects of over  use discussed with patient. Patient states understanding of their use and risks.  3. Continue Flexeril to 10 mg BID PRN (2 months). Common side effects of flexeril include blurred vision, dizziness or lightheadedness, drowsiness, dryness of mouth, bloated feeling or gas, indigestion, nausea or vomiting, or stomach cramps or pain, constipation, diarrhea, excitement or nervousness, frequent urination, general feeling of discomfort or illness, headache, muscle twitching, numbness, tingling, pain, or weakness in hands or feet, pounding heartbeat, problems in speaking, trembling, trouble in sleeping, unpleasant taste or other taste changes, unusual muscle weakness or unusual tiredness, especially during the first few days as your body adjusts to this medication.  4. Will repeat Caudal NIMA as needed in future. Patient will give us a call to schedule the injection.     RTC in 2 months.     Galileo Portillo DO  Pain Management   [facility]         INSPECT REPORT    As part of the patient's treatment plan, I may be prescribing controlled substances. The patient has been made aware of appropriate use of such medications, including potential risk of somnolence, limited ability to drive and/or work safely, and the potential for dependence or overdose. It has also been made clear that these medications are for use by this patient only, without concomitant use of alcohol or other substances unless prescribed.     Patient has completed prescribing agreement detailing terms of continued prescribing of controlled substances, including monitoring INSPECT reports, urine drug screening, and pill counts if necessary. The patient is aware that inappropriate use will results in cessation of prescribing such medications.    INSPECT report has been reviewed and scanned into the patient's chart.

## 2022-09-08 ENCOUNTER — OFFICE VISIT (OUTPATIENT)
Dept: PAIN MEDICINE | Facility: CLINIC | Age: 77
End: 2022-09-08

## 2022-09-08 VITALS
DIASTOLIC BLOOD PRESSURE: 59 MMHG | HEART RATE: 63 BPM | RESPIRATION RATE: 16 BRPM | SYSTOLIC BLOOD PRESSURE: 121 MMHG | OXYGEN SATURATION: 96 %

## 2022-09-08 DIAGNOSIS — G89.29 CHRONIC LEFT-SIDED LOW BACK PAIN WITH SCIATICA, SCIATICA LATERALITY UNSPECIFIED: ICD-10-CM

## 2022-09-08 DIAGNOSIS — M51.36 DDD (DEGENERATIVE DISC DISEASE), LUMBAR: ICD-10-CM

## 2022-09-08 DIAGNOSIS — G89.4 CHRONIC PAIN SYNDROME: ICD-10-CM

## 2022-09-08 DIAGNOSIS — M47.816 LUMBAR SPONDYLOSIS: ICD-10-CM

## 2022-09-08 DIAGNOSIS — M54.40 CHRONIC LEFT-SIDED LOW BACK PAIN WITH SCIATICA, SCIATICA LATERALITY UNSPECIFIED: ICD-10-CM

## 2022-09-08 PROCEDURE — 99214 OFFICE O/P EST MOD 30 MIN: CPT | Performed by: STUDENT IN AN ORGANIZED HEALTH CARE EDUCATION/TRAINING PROGRAM

## 2022-09-08 RX ORDER — CYCLOBENZAPRINE HCL 10 MG
10 TABLET ORAL 3 TIMES DAILY PRN
Qty: 90 TABLET | Refills: 1 | Status: SHIPPED | OUTPATIENT
Start: 2022-09-08 | End: 2022-11-02 | Stop reason: SDUPTHER

## 2022-09-08 RX ORDER — HYDROCODONE BITARTRATE AND ACETAMINOPHEN 5; 325 MG/1; MG/1
1 TABLET ORAL 2 TIMES DAILY PRN
Qty: 60 TABLET | Refills: 0 | Status: SHIPPED | OUTPATIENT
Start: 2022-09-10 | End: 2022-10-10

## 2022-09-08 RX ORDER — HYDROCODONE BITARTRATE AND ACETAMINOPHEN 5; 325 MG/1; MG/1
1 TABLET ORAL 2 TIMES DAILY PRN
Qty: 60 TABLET | Refills: 0 | Status: SHIPPED | OUTPATIENT
Start: 2022-10-10 | End: 2022-11-02 | Stop reason: SDUPTHER

## 2022-09-13 ENCOUNTER — APPOINTMENT (OUTPATIENT)
Dept: LAB | Facility: HOSPITAL | Age: 77
End: 2022-09-13

## 2022-09-14 DIAGNOSIS — E78.49 OTHER HYPERLIPIDEMIA: ICD-10-CM

## 2022-09-15 ENCOUNTER — APPOINTMENT (OUTPATIENT)
Dept: RESPIRATORY THERAPY | Facility: HOSPITAL | Age: 77
End: 2022-09-15

## 2022-09-15 RX ORDER — ATORVASTATIN CALCIUM 40 MG/1
40 TABLET, FILM COATED ORAL DAILY
Qty: 90 TABLET | Refills: 1 | Status: SHIPPED | OUTPATIENT
Start: 2022-09-15 | End: 2023-03-17

## 2022-09-16 DIAGNOSIS — G44.89 OTHER HEADACHE SYNDROME: ICD-10-CM

## 2022-09-16 RX ORDER — BUTALBITAL, ACETAMINOPHEN AND CAFFEINE 50; 325; 40 MG/1; MG/1; MG/1
TABLET ORAL
Qty: 30 TABLET | Refills: 1 | Status: SHIPPED | OUTPATIENT
Start: 2022-09-16 | End: 2023-01-09

## 2022-09-19 DIAGNOSIS — D50.0 IRON DEFICIENCY ANEMIA DUE TO CHRONIC BLOOD LOSS: ICD-10-CM

## 2022-09-20 RX ORDER — FERROUS SULFATE 325(65) MG
325 TABLET ORAL DAILY
Qty: 30 TABLET | Refills: 2 | Status: SHIPPED | OUTPATIENT
Start: 2022-09-20 | End: 2022-12-12

## 2022-09-21 DIAGNOSIS — I10 ESSENTIAL HYPERTENSION: Chronic | ICD-10-CM

## 2022-09-21 RX ORDER — HYDRALAZINE HYDROCHLORIDE 50 MG/1
50 TABLET, FILM COATED ORAL 2 TIMES DAILY
Qty: 60 TABLET | Refills: 3 | Status: SHIPPED | OUTPATIENT
Start: 2022-09-21 | End: 2022-11-03 | Stop reason: SDUPTHER

## 2022-10-14 DIAGNOSIS — I10 ESSENTIAL HYPERTENSION: Chronic | ICD-10-CM

## 2022-10-14 RX ORDER — CARVEDILOL 12.5 MG/1
12.5 TABLET ORAL 2 TIMES DAILY WITH MEALS
Qty: 180 TABLET | Refills: 3 | Status: SHIPPED | OUTPATIENT
Start: 2022-10-14

## 2022-10-19 ENCOUNTER — APPOINTMENT (OUTPATIENT)
Dept: RESPIRATORY THERAPY | Facility: HOSPITAL | Age: 77
End: 2022-10-19

## 2022-10-27 ENCOUNTER — HOSPITAL ENCOUNTER (OUTPATIENT)
Dept: MAMMOGRAPHY | Facility: HOSPITAL | Age: 77
Discharge: HOME | End: 2022-10-27

## 2022-10-27 ENCOUNTER — HOSPITAL ENCOUNTER (OUTPATIENT)
Dept: BONE DENSITY | Facility: HOSPITAL | Age: 77
Discharge: HOME | End: 2022-10-27

## 2022-10-27 DIAGNOSIS — Z12.31 VISIT FOR SCREENING MAMMOGRAM: ICD-10-CM

## 2022-10-27 PROCEDURE — 77067 SCR MAMMO BI INCL CAD: CPT

## 2022-10-27 PROCEDURE — 77080 DXA BONE DENSITY AXIAL: CPT

## 2022-10-27 PROCEDURE — 77063 BREAST TOMOSYNTHESIS BI: CPT

## 2022-10-28 ENCOUNTER — TELEPHONE (OUTPATIENT)
Dept: INTERNAL MEDICINE | Facility: CLINIC | Age: 77
End: 2022-10-28

## 2022-11-02 NOTE — PROGRESS NOTES
Subjective   Luna Jacobson is a 77 y.o. female is here for follow-up for lower back pain.  Last seen on 9/8/2022.  Doing well with medication with functional improvement.      Lower back pain is 4/10 on VAS, at maximum 5/10.  Pain is shooting and sharp in nature.  Pain is referred intermittently to left lateral thigh and left ankle.  Constant pain.  Pain is improved by caudal NIMA previously and now with pain medications.  Worse with walking.      Previous Injection:   11/20/2020 - Caudal NIMA - 80% pain relief ongoing     PMH: COPD, GERD, HLD, HTN, Thoracic aortic aneurysm      Current Medications:   Norco 5-325 mg BID PRN   Flexeril 10 mg BID PRN  Fioricet     Past Medications:       Past Modalities:  TENS:                                                                          no                                                  Physical Therapy Within The Last 6 Months              no  Psychotherapy                                                            no  Massage Therapy                                                       no     Patient Complains Of:  Uro-Fecal Incontinence          no  Weight Gain/Loss                   no  Fever/Chills                             no  Weakness                               Yes (subjective weakness in left leg)            Current Outpatient Medications:   •  atorvastatin (LIPITOR) 40 MG tablet, TAKE 1 TABLET BY MOUTH DAILY, Disp: 90 tablet, Rfl: 1  •  butalbital-acetaminophen-caffeine (FIORICET, ESGIC) -40 MG per tablet, TAKE 1 TABLET BY MOUTH EVERY 12 HOURS AS NEEDED FOR HEADACHE, Disp: 30 tablet, Rfl: 1  •  Calcium Carbonate-Vitamin D 500-125 MG-UNIT tablet, Take  by mouth., Disp: , Rfl:   •  carvedilol (COREG) 12.5 MG tablet, Take 1 tablet by mouth 2 (Two) Times a Day With Meals., Disp: 180 tablet, Rfl: 3  •  clobetasol (TEMOVATE) 0.05 % external solution, APPLY TOPICALLY TO THE SCALP EVERY DAY IN THE MORNING AND IN THE EVENING, Disp: , Rfl:   •   cloNIDine (Catapres) 0.1 MG tablet, Take 1 tablet by mouth Every Night., Disp: 90 tablet, Rfl: 1  •  coenzyme Q10 100 MG capsule, Take 100 mg by mouth Daily., Disp: , Rfl:   •  cyclobenzaprine (FLEXERIL) 10 MG tablet, Take 1 tablet by mouth 3 (Three) Times a Day As Needed for Muscle Spasms for up to 60 days., Disp: 90 tablet, Rfl: 1  •  dicyclomine (BENTYL) 20 MG tablet, Take 20 mg by mouth Daily., Disp: , Rfl:   •  FeroSul 325 (65 Fe) MG tablet, TAKE 1 TABLET BY MOUTH DAILY, Disp: 30 tablet, Rfl: 2  •  fexofenadine (ALLEGRA) 180 MG tablet, Take 180 mg by mouth Daily., Disp: , Rfl:   •  fluticasone (FLONASE) 50 MCG/ACT nasal spray, SHAKE LIQUID AND USE 2 SPRAYS IN EACH NOSTRIL EVERY DAY, Disp: 48 g, Rfl: 3  •  hydrALAZINE (APRESOLINE) 25 MG tablet, Take 1 tablet by mouth., Disp: , Rfl:   •  [START ON 11/9/2022] HYDROcodone-acetaminophen (NORCO) 5-325 MG per tablet, Take 1 tablet by mouth 2 (Two) Times a Day As Needed for Severe Pain for up to 30 days., Disp: 60 tablet, Rfl: 0  •  [START ON 12/9/2022] HYDROcodone-acetaminophen (NORCO) 5-325 MG per tablet, Take 1 tablet by mouth 2 (Two) Times a Day As Needed for Severe Pain for up to 30 days., Disp: 60 tablet, Rfl: 0  •  ketoconazole (NIZORAL) 2 % shampoo, , Disp: , Rfl: 3  •  levothyroxine (Synthroid) 100 MCG tablet, Take 1 tablet by mouth Daily., Disp: 90 tablet, Rfl: 0  •  Multiple Vitamin (MULTIVITAMIN) tablet, Take 1 tablet by mouth Daily., Disp: , Rfl:   •  pantoprazole (PROTONIX) 40 MG EC tablet, Take 40 mg by mouth Daily., Disp: , Rfl:   •  PARoxetine (PAXIL) 40 MG tablet, Take 40 mg by mouth Every Morning., Disp: , Rfl:   •  polyethylene glycol (MIRALAX) powder, , Disp: , Rfl:   •  tiotropium (Spiriva HandiHaler) 18 MCG per inhalation capsule, Place 1 capsule into inhaler and inhale Daily., Disp: 30 capsule, Rfl: 5    The following portions of the patient's history were reviewed and updated as appropriate: allergies, current medications, past family history,  past medical history, past social history, past surgical history and problem list.      REVIEW OF PERTINENT MEDICAL DATA    Past Medical History:   Diagnosis Date   • Anemia    • Anxiety    • Aortic aneurysm (HCC)     4 cm thoracic aorta   • COPD (chronic obstructive pulmonary disease) (HCC)    • Coronary artery calcification seen on CT scan 2012    mild calcification in the LAD with calcium score of 22. modere narrowing left subclavian origin   • Decreased diffusion capacity of lung    • Diverticulosis    • Gastroparesis    • GERD (gastroesophageal reflux disease)    • GIST (gastrointestinal stromal tumor), non-malignant    • Irritable bowel syndrome    • Low back pain    • Mild mitral regurgitation    • Osteoporosis    • PAD (peripheral artery disease) (HCC)     small vessel disease in feet   • Subclavian artery stenosis, left (HCC) 2016    50 percent   • Tubular adenoma of colon 2017     Past Surgical History:   Procedure Laterality Date   • APPENDECTOMY     • COLONOSCOPY  10/01/2019    dr mcintyre   • ENDOSCOPY  2019   • HEMORRHOIDECTOMY     • HYSTERECTOMY     • TONSILLECTOMY     • TOOTH EXTRACTION      8/3/21     Family History   Problem Relation Age of Onset   • Hypertension Mother    • Diabetes Mother    • Dementia Mother    • Stroke Mother    • Lung cancer Father    • Alcohol abuse Father    • COPD Father    • Breast cancer Sister    • Hypertension Sister    • Hypothyroidism Sister    • Stroke Sister    • Other Sister         AAA   • Bipolar disorder Daughter    • Fibromyalgia Daughter      Social History     Socioeconomic History   • Marital status:    Tobacco Use   • Smoking status: Former     Types: Cigarettes     Quit date: 2020     Years since quittin.2   • Smokeless tobacco: Never   • Tobacco comments:     she has smoked intermittently for 40 years; she smoked over 2 ppd twenty years ago   Vaping Use   • Vaping Use: Never used   Substance and Sexual Activity   • Alcohol use:  No   • Drug use: No   • Sexual activity: Defer         Review of Systems      Vitals:    11/03/22 1353   BP: 122/51   Pulse: 67   Resp: 16   SpO2: 97%   PainSc:   4         Objective   Physical Exam  Musculoskeletal:      Lumbar back: Tenderness present. Decreased range of motion.        Legs:    Neurological:      Comments: Motor strength 5/5 b/l LE  Sensory intact b/l LE             Imaging Reviewed:  [facility]  - 9/2020:      L3-L4-a small broad based disc protrusion centrally.  There is moderate facet arthropathy.  This changes contribute to moderate central canal stenosis.  There is moderate right and severe left neural foraminal stenosis.  L4-L5-there is a posterior disc protrusion centrally.  Moderate facet arthropathy and mild limited flavum hypertrophy. Severe central canal stenosis.  Severe bilateral neural foraminal stenosis, right greater than left.  L5-S1-there is small left far lateral disc protrusion.  No central canal stenosis.  There is mild facet arthropathy.  There is moderate bilateral neural foraminal stenosis.     Lumbar X-ray: 10/1/2020   Multilevel degenerative spondylosis.          Assessment:    1. DDD (degenerative disc disease), lumbar    2. Chronic left-sided low back pain with sciatica, sciatica laterality unspecified    3. Lumbar spondylosis    4. Chronic pain syndrome          Plan:  1.  UDS on 7/11/2022 is consistent with patient's interview.. Narcotic contract discussed on 11/3/22. Narcan ordered on 10/9/2020.  2. Continue Norco 5-325 mg BID PRN (11/9; 12/9). Discussed with the patient regarding long-term side effects of opioids including but not limited to opioid induced hormonal suppression, hyperalgesia, fatigue, weight gain, possible opioid induced altered immune system, addiction, tolerance, dependence, risk of hearing loss and elevated risk of myocardial infarction. Proper use and potential life threatening side effects of over use discussed with patient. Patient  states understanding of their use and risks.  3. Continue Flexeril to 10 mg BID PRN (2 months). Common side effects of flexeril include blurred vision, dizziness or lightheadedness, drowsiness, dryness of mouth, bloated feeling or gas, indigestion, nausea or vomiting, or stomach cramps or pain, constipation, diarrhea, excitement or nervousness, frequent urination, general feeling of discomfort or illness, headache, muscle twitching, numbness, tingling, pain, or weakness in hands or feet, pounding heartbeat, problems in speaking, trembling, trouble in sleeping, unpleasant taste or other taste changes, unusual muscle weakness or unusual tiredness, especially during the first few days as your body adjusts to this medication.  4. Will repeat Caudal NIMA as needed in future. Patient will give us a call to schedule the injection.     RTC in 2 months.     Galileo Portillo DO  Pain Management   [facility]         INSPECT REPORT    As part of the patient's treatment plan, I may be prescribing controlled substances. The patient has been made aware of appropriate use of such medications, including potential risk of somnolence, limited ability to drive and/or work safely, and the potential for dependence or overdose. It has also been made clear that these medications are for use by this patient only, without concomitant use of alcohol or other substances unless prescribed.     Patient has completed prescribing agreement detailing terms of continued prescribing of controlled substances, including monitoring INSPECT reports, urine drug screening, and pill counts if necessary. The patient is aware that inappropriate use will results in cessation of prescribing such medications.    INSPECT report has been reviewed and scanned into the patient's chart.

## 2022-11-03 ENCOUNTER — OFFICE VISIT (OUTPATIENT)
Dept: PAIN MEDICINE | Facility: CLINIC | Age: 77
End: 2022-11-03

## 2022-11-03 VITALS
SYSTOLIC BLOOD PRESSURE: 122 MMHG | HEART RATE: 67 BPM | OXYGEN SATURATION: 97 % | DIASTOLIC BLOOD PRESSURE: 51 MMHG | RESPIRATION RATE: 16 BRPM

## 2022-11-03 DIAGNOSIS — M47.816 LUMBAR SPONDYLOSIS: ICD-10-CM

## 2022-11-03 DIAGNOSIS — M51.36 DDD (DEGENERATIVE DISC DISEASE), LUMBAR: ICD-10-CM

## 2022-11-03 DIAGNOSIS — M54.40 CHRONIC LEFT-SIDED LOW BACK PAIN WITH SCIATICA, SCIATICA LATERALITY UNSPECIFIED: ICD-10-CM

## 2022-11-03 DIAGNOSIS — G89.29 CHRONIC LEFT-SIDED LOW BACK PAIN WITH SCIATICA, SCIATICA LATERALITY UNSPECIFIED: ICD-10-CM

## 2022-11-03 DIAGNOSIS — G89.4 CHRONIC PAIN SYNDROME: ICD-10-CM

## 2022-11-03 PROCEDURE — 99214 OFFICE O/P EST MOD 30 MIN: CPT | Performed by: STUDENT IN AN ORGANIZED HEALTH CARE EDUCATION/TRAINING PROGRAM

## 2022-11-03 RX ORDER — CYCLOBENZAPRINE HCL 10 MG
10 TABLET ORAL 3 TIMES DAILY PRN
Qty: 90 TABLET | Refills: 1 | Status: SHIPPED | OUTPATIENT
Start: 2022-11-03 | End: 2023-01-02

## 2022-11-03 RX ORDER — HYDRALAZINE HYDROCHLORIDE 25 MG/1
25 TABLET, FILM COATED ORAL
COMMUNITY
Start: 2022-05-18 | End: 2022-12-12

## 2022-11-03 RX ORDER — HYDROCODONE BITARTRATE AND ACETAMINOPHEN 5; 325 MG/1; MG/1
1 TABLET ORAL 2 TIMES DAILY PRN
Qty: 60 TABLET | Refills: 0 | Status: SHIPPED | OUTPATIENT
Start: 2022-12-09 | End: 2022-12-02 | Stop reason: SDUPTHER

## 2022-11-03 RX ORDER — HYDROCODONE BITARTRATE AND ACETAMINOPHEN 5; 325 MG/1; MG/1
1 TABLET ORAL 2 TIMES DAILY PRN
Qty: 60 TABLET | Refills: 0 | Status: SHIPPED | OUTPATIENT
Start: 2022-11-09 | End: 2022-12-09

## 2022-11-04 ENCOUNTER — TELEPHONE (OUTPATIENT)
Dept: INTERNAL MEDICINE | Facility: CLINIC | Age: 77
End: 2022-11-04

## 2022-11-04 RX ORDER — VARENICLINE TARTRATE 1 MG/1
1 TABLET, FILM COATED ORAL 2 TIMES DAILY
Qty: 60 TABLET | Refills: 5 | Status: SHIPPED | OUTPATIENT
Start: 2022-11-04

## 2022-11-04 NOTE — TELEPHONE ENCOUNTER
Caller: Luna Jacobson    Relationship: Self    Best call back number: [phone]    What medication are you requesting: VARENACLINE 1 MG TABLET    If a prescription is needed, what is your preferred pharmacy and phone number: [id number] [place] - [address] AT SEC OF [place] [address] & [facility] LINE  - [phone]  - [phone]      Additional notes:PATIENT IS REQUESTING SMOKING CESSATION MEDICATION SINCE CHANTIX HAS BEEN DISCONTINUED. PLEASE ADVISE.

## 2022-11-16 ENCOUNTER — HOSPITAL ENCOUNTER (OUTPATIENT)
Dept: RESPIRATORY THERAPY | Facility: HOSPITAL | Age: 77
Discharge: HOME | End: 2022-11-16
Admitting: INTERNAL MEDICINE

## 2022-11-16 PROCEDURE — 63710000001 ALBUTEROL SULFATE HFA 108 (90 BASE) MCG/ACT AEROSOL SOLUTION 6.7 G INHALER: Performed by: INTERNAL MEDICINE

## 2022-11-16 PROCEDURE — 94729 DIFFUSING CAPACITY: CPT

## 2022-11-16 PROCEDURE — A9270 NON-COVERED ITEM OR SERVICE: HCPCS | Performed by: INTERNAL MEDICINE

## 2022-11-16 PROCEDURE — 94060 EVALUATION OF WHEEZING: CPT

## 2022-11-16 PROCEDURE — 94726 PLETHYSMOGRAPHY LUNG VOLUMES: CPT

## 2022-11-16 RX ORDER — ALBUTEROL SULFATE 90 UG/1
2 AEROSOL, METERED RESPIRATORY (INHALATION) ONCE
Status: COMPLETED | OUTPATIENT
Start: 2022-11-16 | End: 2022-11-16

## 2022-11-16 RX ADMIN — ALBUTEROL SULFATE 2 PUFF: 108 INHALANT RESPIRATORY (INHALATION) at 16:12

## 2022-12-02 ENCOUNTER — OFFICE VISIT (OUTPATIENT)
Dept: INTERNAL MEDICINE | Facility: CLINIC | Age: 77
End: 2022-12-02

## 2022-12-02 ENCOUNTER — TELEPHONE (OUTPATIENT)
Dept: INTERNAL MEDICINE | Facility: CLINIC | Age: 77
End: 2022-12-02

## 2022-12-02 VITALS
HEART RATE: 68 BPM | WEIGHT: 150 LBS | BODY MASS INDEX: 27.6 KG/M2 | HEIGHT: 62 IN | OXYGEN SATURATION: 97 % | DIASTOLIC BLOOD PRESSURE: 76 MMHG | SYSTOLIC BLOOD PRESSURE: 122 MMHG | TEMPERATURE: 98.2 F

## 2022-12-02 DIAGNOSIS — I10 ESSENTIAL HYPERTENSION: ICD-10-CM

## 2022-12-02 DIAGNOSIS — D50.0 IRON DEFICIENCY ANEMIA DUE TO CHRONIC BLOOD LOSS: ICD-10-CM

## 2022-12-02 DIAGNOSIS — R09.89 GLOBUS SENSATION: ICD-10-CM

## 2022-12-02 DIAGNOSIS — R94.2 DIFFUSION CAPACITY OF LUNG (DL), DECREASED: ICD-10-CM

## 2022-12-02 DIAGNOSIS — E03.8 OTHER SPECIFIED HYPOTHYROIDISM: ICD-10-CM

## 2022-12-02 DIAGNOSIS — M81.0 AGE-RELATED OSTEOPOROSIS WITHOUT CURRENT PATHOLOGICAL FRACTURE: Primary | Chronic | ICD-10-CM

## 2022-12-02 DIAGNOSIS — R06.09 OTHER FORM OF DYSPNEA: ICD-10-CM

## 2022-12-02 DIAGNOSIS — R73.03 PREDIABETES: ICD-10-CM

## 2022-12-02 PROCEDURE — 99215 OFFICE O/P EST HI 40 MIN: CPT | Performed by: INTERNAL MEDICINE

## 2022-12-02 PROCEDURE — G0008 ADMIN INFLUENZA VIRUS VAC: HCPCS | Performed by: INTERNAL MEDICINE

## 2022-12-02 PROCEDURE — 90662 IIV NO PRSV INCREASED AG IM: CPT | Performed by: INTERNAL MEDICINE

## 2022-12-02 NOTE — PROGRESS NOTES
Subjective        Chief Complaint   Patient presents with   • Hypertension           Luna Jacobson is a 77 y.o. female who presents for    Patient Active Problem List   Diagnosis   • Essential hypertension   • Other hyperlipidemia   • Other specified hypothyroidism   • Other headache syndrome   • Prediabetes   • Iron deficiency anemia due to chronic blood loss   • Thoracic aortic aneurysm   • Microscopic colitis   • Myalgia   • DDD (degenerative disc disease), lumbar   • Recurrent major depressive disorder, in partial remission (HCC)   • Moderate aortic regurgitation   • Diffusion capacity of lung (dl), decreased   • Age-related osteoporosis without current pathological fracture       History of Present Illness     She says her BP has not been high; it usually runs below 140/90. She has dyspnea three times per day. It can occur with talking. It does not always happen with activity. She is not wheezing. She does not cough regularly unless she has post nasal drip. She has something in the back of her throat that she needs to cough up. She is not hoarse. She thinks Spiriva helps. She is down to 2 cigarettes per day.  Allergies   Allergen Reactions   • Amlodipine Headache     Head tightness   • Itraconazole Hives     sporanax   • Lisinopril Hives   • Norvasc [Amlodipine Besylate] Other (See Comments)     Head tightness   • Sulfa Antibiotics Other (See Comments)     headache   • Hydrochlorothiazide Other (See Comments)     hyponatremia  Other reaction(s): Other (See Comments)  hyponatremia   • Sulfamethoxazole Nausea And Vomiting       Current Outpatient Medications on File Prior to Visit   Medication Sig Dispense Refill   • atorvastatin (LIPITOR) 40 MG tablet TAKE 1 TABLET BY MOUTH DAILY 90 tablet 1   • butalbital-acetaminophen-caffeine (FIORICET, ESGIC) -40 MG per tablet TAKE 1 TABLET BY MOUTH EVERY 12 HOURS AS NEEDED FOR HEADACHE 30 tablet 1   • Calcium Carbonate-Vitamin D 500-125 MG-UNIT tablet Take  by  mouth.     • carvedilol (COREG) 12.5 MG tablet Take 1 tablet by mouth 2 (Two) Times a Day With Meals. 180 tablet 3   • clobetasol (TEMOVATE) 0.05 % external solution APPLY TOPICALLY TO THE SCALP EVERY DAY IN THE MORNING AND IN THE EVENING     • cloNIDine (Catapres) 0.1 MG tablet Take 1 tablet by mouth Every Night. 90 tablet 1   • coenzyme Q10 100 MG capsule Take 100 mg by mouth Daily.     • cyclobenzaprine (FLEXERIL) 10 MG tablet Take 1 tablet by mouth 3 (Three) Times a Day As Needed for Muscle Spasms for up to 60 days. 90 tablet 1   • dicyclomine (BENTYL) 20 MG tablet Take 20 mg by mouth Daily.     • FeroSul 325 (65 Fe) MG tablet TAKE 1 TABLET BY MOUTH DAILY 30 tablet 2   • fexofenadine (ALLEGRA) 180 MG tablet Take 180 mg by mouth Daily.     • fluticasone (FLONASE) 50 MCG/ACT nasal spray SHAKE LIQUID AND USE 2 SPRAYS IN EACH NOSTRIL EVERY DAY 48 g 3   • hydrALAZINE (APRESOLINE) 25 MG tablet Take 1 tablet by mouth.     • HYDROcodone-acetaminophen (NORCO) 5-325 MG per tablet Take 1 tablet by mouth 2 (Two) Times a Day As Needed for Severe Pain for up to 30 days. 60 tablet 0   • ketoconazole (NIZORAL) 2 % shampoo   3   • levothyroxine (Synthroid) 100 MCG tablet Take 1 tablet by mouth Daily. 90 tablet 0   • Multiple Vitamin (MULTIVITAMIN) tablet Take 1 tablet by mouth Daily.     • pantoprazole (PROTONIX) 40 MG EC tablet Take 40 mg by mouth Daily.     • PARoxetine (PAXIL) 40 MG tablet Take 40 mg by mouth Every Morning.     • polyethylene glycol (MIRALAX) powder      • tiotropium (Spiriva HandiHaler) 18 MCG per inhalation capsule Place 1 capsule into inhaler and inhale Daily. 30 capsule 5   • varenicline (CHANTIX) 1 MG tablet Take 1 tablet by mouth 2 (Two) Times a Day. 60 tablet 5   • [DISCONTINUED] HYDROcodone-acetaminophen (NORCO) 5-325 MG per tablet Take 1 tablet by mouth 2 (Two) Times a Day As Needed for Severe Pain for up to 30 days. 60 tablet 0     No current facility-administered medications on file prior to visit.        Past Medical History:   Diagnosis Date   • Anemia    • Anxiety    • COPD (chronic obstructive pulmonary disease) (HCC)    • Coronary artery calcification seen on CT scan 2012    mild calcification in the LAD with calcium score of 22. modere narrowing left subclavian origin   • Decreased diffusion capacity of lung    • Diverticulosis    • Gastroparesis    • GERD (gastroesophageal reflux disease)    • GIST (gastrointestinal stromal tumor), non-malignant    • Irritable bowel syndrome    • Low back pain    • Mild mitral regurgitation    • Osteoporosis    • PAD (peripheral artery disease) ([facility])     small vessel disease in feet   • Subclavian artery stenosis, left (HCC) 2016    50 percent   • Tubular adenoma of colon 2017       Past Surgical History:   Procedure Laterality Date   • APPENDECTOMY     • COLONOSCOPY  10/01/2019    dr mcintyre   • ENDOSCOPY  2019   • HEMORRHOIDECTOMY     • HYSTERECTOMY     • TONSILLECTOMY     • TOOTH EXTRACTION      8/3/21       Family History   Problem Relation Age of Onset   • Hypertension Mother    • Diabetes Mother    • Dementia Mother    • Stroke Mother    • Lung cancer Father    • Alcohol abuse Father    • COPD Father    • Breast cancer Sister    • Hypertension Sister    • Hypothyroidism Sister    • Stroke Sister    • Other Sister         AAA   • Bipolar disorder Daughter    • Fibromyalgia Daughter        Social History     Socioeconomic History   • Marital status:    Tobacco Use   • Smoking status: Former     Types: Cigarettes     Quit date: 2020     Years since quittin.3   • Smokeless tobacco: Never   • Tobacco comments:     she has smoked intermittently for 40 years; she smoked over 2 ppd twenty years ago   Vaping Use   • Vaping Use: Never used   Substance and Sexual Activity   • Alcohol use: No   • Drug use: No   • Sexual activity: Defer           The following portions of the patient's history were reviewed and updated as appropriate: problem list,  "allergies, current medications, past medical history, past family history, past social history and past surgical history.    Review of Systems    Immunization History   Administered Date(s) Administered   • COVID-19 (PFIZER) PURPLE CAP 02/07/2021, 02/28/2021   • FLUAD TRI 65YR+ 11/01/2019   • Fluzone High Dose =>65 Years ([employer] ONLY) 11/29/2018   • Fluzone High-Dose 65+yrs 12/02/2022   • Hepatitis A 05/01/2018, 12/02/2018   • Influenza TIV (IM) 11/07/2013, 10/13/2014   • Pneumococcal Conjugate 13-Valent (PCV13) 04/23/2015   • Pneumococcal Polysaccharide (PPSV23) 08/01/2008   • Tdap 01/01/2012   • Zostavax 08/25/2010       Objective   Vitals:    12/02/22 1512   BP: 122/76   Pulse: 68   Temp: 98.2 °F (36.8 °C)   SpO2: 97%   Weight: 68 kg (150 lb)   Height: 157.5 cm (62.01\")     Body mass index is 27.43 kg/m².  Physical Exam  Vitals reviewed.   Constitutional:       Appearance: She is well-developed.   HENT:      Head: Normocephalic and atraumatic.      Mouth/Throat:      Mouth: Mucous membranes are moist.      Pharynx: Oropharynx is clear.   Cardiovascular:      Rate and Rhythm: Normal rate and regular rhythm.      Heart sounds: Normal heart sounds, S1 normal and S2 normal.   Pulmonary:      Effort: Pulmonary effort is normal.      Breath sounds: Normal breath sounds.   Skin:     General: Skin is warm.   Neurological:      Mental Status: She is alert.   Psychiatric:         Behavior: Behavior normal.         Procedures    Assessment & Plan   Diagnoses and all orders for this visit:    1. Age-related osteoporosis without current pathological fracture (Primary)  Comments:  Set up Reclast at [place]    2. Prediabetes  -     Hemoglobin A1c; Future    3. Other specified hypothyroidism  -     TSH; Future    4. Essential hypertension  -     Comprehensive Metabolic Panel; Future  -     Lipid Panel With / Chol / HDL Ratio; Future    5. Iron deficiency anemia due to chronic blood loss  -     CBC & Differential; Future  -     " Ferritin; Future    6. Diffusion capacity of lung (dl), decreased  -     CT Chest Hi Resolution; Future    7. Other form of dyspnea  -     Ambulatory Referral to Pulmonology  -     CT Chest Hi Resolution; Future    8. Globus sensation  -     Ambulatory Referral to ENT (Otolaryngology)    Other orders  -     Fluzone High-Dose 65+yrs ([id number])             Reviewed DEXA, PFT and CTA. I spoke with Dr. Bib Hart at [place] and she says her CTA did not show any blood clots in August. I am not inclined to do another PE protocol scan at this point. I do think getting a hi res CT to look for fibrosis is warranted with her DLCO; this may all be potential COPD.    BP is good. Labs today. She saw cardiology in the last 2 months.    45 minutes spent today including time reviewing DEXA and CTA with radiologist.     Return in about 3 months (around 3/2/2023), or 30 minutes, for Lab Today, Lab Before FUP.

## 2022-12-02 NOTE — TELEPHONE ENCOUNTER
Please let her know that I am also referring her to an ENT given the sensation she has in her throat.

## 2022-12-07 ENCOUNTER — OFFICE (AMBULATORY)
Dept: URBAN - METROPOLITAN AREA CLINIC 64 | Facility: CLINIC | Age: 77
End: 2022-12-07

## 2022-12-07 VITALS
SYSTOLIC BLOOD PRESSURE: 109 MMHG | HEART RATE: 66 BPM | HEIGHT: 63 IN | WEIGHT: 149 LBS | DIASTOLIC BLOOD PRESSURE: 61 MMHG

## 2022-12-07 DIAGNOSIS — K62.5 HEMORRHAGE OF ANUS AND RECTUM: ICD-10-CM

## 2022-12-07 DIAGNOSIS — R10.11 RIGHT UPPER QUADRANT PAIN: ICD-10-CM

## 2022-12-07 DIAGNOSIS — D50.0 IRON DEFICIENCY ANEMIA SECONDARY TO BLOOD LOSS (CHRONIC): ICD-10-CM

## 2022-12-07 PROCEDURE — 99214 OFFICE O/P EST MOD 30 MIN: CPT | Performed by: INTERNAL MEDICINE

## 2022-12-07 RX ORDER — DICYCLOMINE HYDROCHLORIDE 20 MG/1
TABLET ORAL
Qty: 360 | Refills: 1 | Status: ACTIVE

## 2022-12-10 DIAGNOSIS — I10 ESSENTIAL HYPERTENSION: Chronic | ICD-10-CM

## 2022-12-10 DIAGNOSIS — D50.0 IRON DEFICIENCY ANEMIA DUE TO CHRONIC BLOOD LOSS: ICD-10-CM

## 2022-12-12 DIAGNOSIS — M81.0 AGE-RELATED OSTEOPOROSIS WITHOUT CURRENT PATHOLOGICAL FRACTURE: Primary | ICD-10-CM

## 2022-12-12 RX ORDER — SODIUM CHLORIDE 9 MG/ML
250 INJECTION, SOLUTION INTRAVENOUS ONCE
OUTPATIENT
Start: 2022-12-12

## 2022-12-12 RX ORDER — ZOLEDRONIC ACID 5 MG/100ML
5 INJECTION, SOLUTION INTRAVENOUS ONCE
OUTPATIENT
Start: 2022-12-12

## 2022-12-12 RX ORDER — FERROUS SULFATE 325(65) MG
325 TABLET ORAL DAILY
Qty: 30 TABLET | Refills: 2 | Status: SHIPPED | OUTPATIENT
Start: 2022-12-12 | End: 2023-03-06

## 2022-12-12 RX ORDER — HYDRALAZINE HYDROCHLORIDE 50 MG/1
TABLET, FILM COATED ORAL
Qty: 60 TABLET | Refills: 3 | Status: SHIPPED | OUTPATIENT
Start: 2022-12-12

## 2023-01-04 NOTE — PROGRESS NOTES
Subjective   Luna Jacobson is a 77 y.o. female is here for follow-up for lower back pain.  Last seen on 11/3/2022. Complaining of increased lower back pain for several weeks.     Lower back pain is 4/10 on VAS, and maximum 5/10.  Pain is shooting and sharp in nature.  Pain is referred to intermittently left lateral thigh and left ankle.  Constant pain.  Pain is improved by caudal NIMA previously and now with pain medications.  Pain is worse with walking.    Previous Injection:   11/20/2020 - Caudal NIMA - 80% pain relief - 2 years.     PMH: COPD, GERD, HLD, HTN, Thoracic aortic aneurysm      Current Medications:   Norco 5-325 mg BID PRN   Flexeril 10 mg BID PRN  Fioricet     Past Medications:       Past Modalities:  TENS:                                                                          no                                                  Physical Therapy Within The Last 6 Months              no  Psychotherapy                                                            no  Massage Therapy                                                       no     Patient Complains Of:  Uro-Fecal Incontinence          no  Weight Gain/Loss                   no  Fever/Chills                             no  Weakness                               Yes (subjective weakness in left leg)            Current Outpatient Medications:   •  atorvastatin (LIPITOR) 40 MG tablet, TAKE 1 TABLET BY MOUTH DAILY, Disp: 90 tablet, Rfl: 1  •  butalbital-acetaminophen-caffeine (FIORICET, ESGIC) -40 MG per tablet, TAKE 1 TABLET BY MOUTH EVERY 12 HOURS AS NEEDED FOR HEADACHE, Disp: 30 tablet, Rfl: 1  •  Calcium Carbonate-Vitamin D 500-125 MG-UNIT tablet, Take  by mouth., Disp: , Rfl:   •  carvedilol (COREG) 12.5 MG tablet, Take 1 tablet by mouth 2 (Two) Times a Day With Meals., Disp: 180 tablet, Rfl: 3  •  clobetasol (TEMOVATE) 0.05 % external solution, APPLY TOPICALLY TO THE SCALP EVERY DAY IN THE MORNING AND IN THE EVENING, Disp: , Rfl:    •  cloNIDine (Catapres) 0.1 MG tablet, Take 1 tablet by mouth Every Night., Disp: 90 tablet, Rfl: 1  •  coenzyme Q10 100 MG capsule, Take 100 mg by mouth Daily., Disp: , Rfl:   •  dicyclomine (BENTYL) 20 MG tablet, Take 20 mg by mouth Daily., Disp: , Rfl:   •  FeroSul 325 (65 Fe) MG tablet, TAKE 1 TABLET BY MOUTH DAILY, Disp: 30 tablet, Rfl: 2  •  fexofenadine (ALLEGRA) 180 MG tablet, Take 180 mg by mouth Daily., Disp: , Rfl:   •  fluticasone (FLONASE) 50 MCG/ACT nasal spray, SHAKE LIQUID AND USE 2 SPRAYS IN EACH NOSTRIL EVERY DAY, Disp: 48 g, Rfl: 3  •  hydrALAZINE (APRESOLINE) 50 MG tablet, TAKE 1 TABLET BY MOUTH TWICE DAILY, Disp: 60 tablet, Rfl: 3  •  ketoconazole (NIZORAL) 2 % shampoo, , Disp: , Rfl: 3  •  levothyroxine (Synthroid) 100 MCG tablet, Take 1 tablet by mouth Daily., Disp: 90 tablet, Rfl: 0  •  Multiple Vitamin (MULTIVITAMIN) tablet, Take 1 tablet by mouth Daily., Disp: , Rfl:   •  pantoprazole (PROTONIX) 40 MG EC tablet, Take 40 mg by mouth Daily., Disp: , Rfl:   •  PARoxetine (PAXIL) 40 MG tablet, Take 40 mg by mouth Every Morning., Disp: , Rfl:   •  polyethylene glycol (MIRALAX) powder, , Disp: , Rfl:   •  tiotropium (Spiriva HandiHaler) 18 MCG per inhalation capsule, Place 1 capsule into inhaler and inhale Daily., Disp: 30 capsule, Rfl: 5  •  varenicline (CHANTIX) 1 MG tablet, Take 1 tablet by mouth 2 (Two) Times a Day., Disp: 60 tablet, Rfl: 5  •  cyclobenzaprine (FLEXERIL) 10 MG tablet, Take 1 tablet by mouth 2 (Two) Times a Day As Needed for Muscle Spasms for up to 60 days., Disp: 120 tablet, Rfl: 0  •  [START ON 1/9/2023] HYDROcodone-acetaminophen (NORCO) 5-325 MG per tablet, Take 1 tablet by mouth 2 (Two) Times a Day As Needed for Severe Pain for up to 30 days., Disp: 30 tablet, Rfl: 0  •  [START ON 2/8/2023] HYDROcodone-acetaminophen (NORCO) 5-325 MG per tablet, Take 1 tablet by mouth 2 (Two) Times a Day As Needed for Severe Pain for up to 30 days., Disp: 30 tablet, Rfl: 0    The  following portions of the patient's history were reviewed and updated as appropriate: allergies, current medications, past family history, past medical history, past social history, past surgical history and problem list.      REVIEW OF PERTINENT MEDICAL DATA    Past Medical History:   Diagnosis Date   • Anemia    • Anxiety    • COPD (chronic obstructive pulmonary disease) ([facility])    • Coronary artery calcification seen on CT scan 2012    mild calcification in the LAD with calcium score of 22. modere narrowing left subclavian origin   • Decreased diffusion capacity of lung    • Diverticulosis    • Gastroparesis    • GERD (gastroesophageal reflux disease)    • GIST (gastrointestinal stromal tumor), non-malignant    • Irritable bowel syndrome    • Low back pain    • Mild mitral regurgitation    • Osteoporosis    • PAD (peripheral artery disease) ([facility])     small vessel disease in feet   • Subclavian artery stenosis, left ([facility]) 2016    50 percent   • Tubular adenoma of colon 2017     Past Surgical History:   Procedure Laterality Date   • APPENDECTOMY     • COLONOSCOPY  10/01/2019    dr mcintyre   • ENDOSCOPY  2019   • HEMORRHOIDECTOMY     • HYSTERECTOMY     • TONSILLECTOMY     • TOOTH EXTRACTION      8/3/21     Family History   Problem Relation Age of Onset   • Hypertension Mother    • Diabetes Mother    • Dementia Mother    • Stroke Mother    • Lung cancer Father    • Alcohol abuse Father    • COPD Father    • Breast cancer Sister    • Hypertension Sister    • Hypothyroidism Sister    • Stroke Sister    • Other Sister         AAA   • Bipolar disorder Daughter    • Fibromyalgia Daughter      Social History     Socioeconomic History   • Marital status:    Tobacco Use   • Smoking status: Former     Types: Cigarettes     Quit date: 2020     Years since quittin.3   • Smokeless tobacco: Never   • Tobacco comments:     she has smoked intermittently for 40 years; she smoked over 2 ppd twenty years ago    Vaping Use   • Vaping Use: Never used   Substance and Sexual Activity   • Alcohol use: No   • Drug use: No   • Sexual activity: Defer         Review of Systems      Vitals:    01/05/23 1310   BP: 143/67   Pulse: 66   Resp: 16   SpO2: 96%   PainSc:   4         Objective   Physical Exam  Musculoskeletal:      Lumbar back: Tenderness present. Decreased range of motion.        Legs:    Neurological:      Comments: Motor strength 5/5 b/l LE  Sensory intact b/l LE             Imaging Reviewed:  [facility] done at [facility] - 9/2020:    L3-L4-a small broad based disc protrusion centrally.  There is moderate facet arthropathy.  This changes contribute to moderate central canal stenosis.  There is moderate right and severe left neural foraminal stenosis.  L4-L5-there is a posterior disc protrusion centrally.  Moderate facet arthropathy and mild limited flavum hypertrophy. Severe central canal stenosis.  Severe bilateral neural foraminal stenosis, right greater than left.  L5-S1-there is small left far lateral disc protrusion.  No central canal stenosis.  There is mild facet arthropathy.  There is moderate bilateral neural foraminal stenosis.     Lumbar X-ray: 10/1/2020   Multilevel degenerative spondylosis.          Assessment:    1. DDD (degenerative disc disease), lumbar    2. Chronic left-sided low back pain with sciatica, sciatica laterality unspecified    3. Lumbar spondylosis          Plan:  1.  Repeat UDS- 1/5/2023.  UDS on 7/11/2022 is consistent with patient's interview.. Narcotic contract discussed on 11/3/22. Narcan ordered on 10/9/2020.  2. Continue Norco 5-325 mg BID PRN (1/9; 2/8). Discussed with the patient regarding long-term side effects of opioids including but not limited to opioid induced hormonal suppression, hyperalgesia, fatigue, weight gain, possible opioid induced altered immune system, addiction, tolerance, dependence, risk of hearing loss and elevated risk of myocardial infarction. Proper use and potential  life threatening side effects of over use discussed with patient. Patient states understanding of their use and risks.  3. Continue Flexeril to 10 mg BID PRN (2 months). Common side effects of flexeril include blurred vision, dizziness or lightheadedness, drowsiness, dryness of mouth, bloated feeling or gas, indigestion, nausea or vomiting, or stomach cramps or pain, constipation, diarrhea, excitement or nervousness, frequent urination, general feeling of discomfort or illness, headache, muscle twitching, numbness, tingling, pain, or weakness in hands or feet, pounding heartbeat, problems in speaking, trembling, trouble in sleeping, unpleasant taste or other taste changes, unusual muscle weakness or unusual tiredness, especially during the first few days as your body adjusts to this medication.  4. Excellent relief with Caudal NIMA but previously returning now. Will repeat Caudal NIMA with sedation.     RTC for injection and then 3 weeks follow up.    Galileo Portillo DO  Pain Management   [facility]         INSPECT REPORT    As part of the patient's treatment plan, I may be prescribing controlled substances. The patient has been made aware of appropriate use of such medications, including potential risk of somnolence, limited ability to drive and/or work safely, and the potential for dependence or overdose. It has also been made clear that these medications are for use by this patient only, without concomitant use of alcohol or other substances unless prescribed.     Patient has completed prescribing agreement detailing terms of continued prescribing of controlled substances, including monitoring INSPECT reports, urine drug screening, and pill counts if necessary. The patient is aware that inappropriate use will results in cessation of prescribing such medications.    INSPECT report has been reviewed and scanned into the patient's chart.

## 2023-01-05 ENCOUNTER — OFFICE VISIT (OUTPATIENT)
Dept: PAIN MEDICINE | Facility: CLINIC | Age: 78
End: 2023-01-05
Payer: MEDICARE

## 2023-01-05 VITALS
RESPIRATION RATE: 16 BRPM | HEART RATE: 66 BPM | DIASTOLIC BLOOD PRESSURE: 67 MMHG | OXYGEN SATURATION: 96 % | SYSTOLIC BLOOD PRESSURE: 143 MMHG

## 2023-01-05 DIAGNOSIS — G89.29 CHRONIC LEFT-SIDED LOW BACK PAIN WITH SCIATICA, SCIATICA LATERALITY UNSPECIFIED: ICD-10-CM

## 2023-01-05 DIAGNOSIS — Z79.899 HIGH RISK MEDICATION USE: Primary | ICD-10-CM

## 2023-01-05 DIAGNOSIS — M47.816 LUMBAR SPONDYLOSIS: ICD-10-CM

## 2023-01-05 DIAGNOSIS — M54.40 CHRONIC LEFT-SIDED LOW BACK PAIN WITH SCIATICA, SCIATICA LATERALITY UNSPECIFIED: ICD-10-CM

## 2023-01-05 DIAGNOSIS — M51.36 DDD (DEGENERATIVE DISC DISEASE), LUMBAR: ICD-10-CM

## 2023-01-05 PROCEDURE — 99214 OFFICE O/P EST MOD 30 MIN: CPT | Performed by: STUDENT IN AN ORGANIZED HEALTH CARE EDUCATION/TRAINING PROGRAM

## 2023-01-05 RX ORDER — CYCLOBENZAPRINE HCL 10 MG
10 TABLET ORAL 2 TIMES DAILY PRN
Qty: 120 TABLET | Refills: 0 | Status: SHIPPED | OUTPATIENT
Start: 2023-01-05 | End: 2023-02-15 | Stop reason: SDUPTHER

## 2023-01-05 RX ORDER — HYDROCODONE BITARTRATE AND ACETAMINOPHEN 5; 325 MG/1; MG/1
1 TABLET ORAL 2 TIMES DAILY PRN
Qty: 30 TABLET | Refills: 0 | Status: SHIPPED | OUTPATIENT
Start: 2023-01-09 | End: 2023-01-12 | Stop reason: SDUPTHER

## 2023-01-05 RX ORDER — HYDROCODONE BITARTRATE AND ACETAMINOPHEN 5; 325 MG/1; MG/1
1 TABLET ORAL 2 TIMES DAILY PRN
Qty: 30 TABLET | Refills: 0 | Status: SHIPPED | OUTPATIENT
Start: 2023-02-08 | End: 2023-01-12

## 2023-01-09 ENCOUNTER — TELEPHONE (OUTPATIENT)
Dept: PAIN MEDICINE | Facility: CLINIC | Age: 78
End: 2023-01-09

## 2023-01-09 DIAGNOSIS — G44.89 OTHER HEADACHE SYNDROME: ICD-10-CM

## 2023-01-09 RX ORDER — BUTALBITAL, ACETAMINOPHEN AND CAFFEINE 50; 325; 40 MG/1; MG/1; MG/1
TABLET ORAL
Qty: 30 TABLET | Refills: 1 | Status: SHIPPED | OUTPATIENT
Start: 2023-01-09

## 2023-01-09 NOTE — TELEPHONE ENCOUNTER
Provider:  DR. OLYA ROMANO  Caller:  MELBA GARCIA  Relationship to Patient: SELF  Pharmacy:  [facility] [phone]  Phone Number:  [phone]  Reason for Call:  PATIENT ASKING TO SPEAK WITH SOMEONE ABOUT HER PAIN MEDICATION.  When was the patient last seen: 1/5/23

## 2023-01-10 NOTE — TELEPHONE ENCOUNTER
PATIENT ATTEMPTED TO RETURN CALL TO [facility]; [facility] ATTEMPTED TO WARM TRANSFER BUT WAS UNSUCCESSFUL. PLEASE CALL HER BACK [phone].

## 2023-01-10 NOTE — TELEPHONE ENCOUNTER
1/10/23--  left pt a vm to give us a call back-- returning her gómez on question about pain medication

## 2023-01-12 ENCOUNTER — TELEPHONE (OUTPATIENT)
Dept: PAIN MEDICINE | Facility: CLINIC | Age: 78
End: 2023-01-12
Payer: MEDICARE

## 2023-01-12 DIAGNOSIS — G89.29 CHRONIC LEFT-SIDED LOW BACK PAIN WITH SCIATICA, SCIATICA LATERALITY UNSPECIFIED: ICD-10-CM

## 2023-01-12 DIAGNOSIS — M47.816 LUMBAR SPONDYLOSIS: ICD-10-CM

## 2023-01-12 DIAGNOSIS — M51.36 DDD (DEGENERATIVE DISC DISEASE), LUMBAR: ICD-10-CM

## 2023-01-12 DIAGNOSIS — M54.40 CHRONIC LEFT-SIDED LOW BACK PAIN WITH SCIATICA, SCIATICA LATERALITY UNSPECIFIED: ICD-10-CM

## 2023-01-12 RX ORDER — HYDROCODONE BITARTRATE AND ACETAMINOPHEN 5; 325 MG/1; MG/1
1 TABLET ORAL 2 TIMES DAILY PRN
Qty: 60 TABLET | Refills: 0 | Status: SHIPPED | OUTPATIENT
Start: 2023-01-24 | End: 2023-02-15 | Stop reason: SDUPTHER

## 2023-01-12 NOTE — TELEPHONE ENCOUNTER
Patient states she receive 15 days supply of her pain medicine on the 9th and is not going to get her through her epidural schedule. Needs another refill.

## 2023-01-12 NOTE — TELEPHONE ENCOUNTER
another prescription for #60 tabs on 1/24/23.     Galileo Portillo DO  Pain Management   [facility]

## 2023-01-26 ENCOUNTER — HOSPITAL ENCOUNTER (OUTPATIENT)
Dept: PAIN MEDICINE | Facility: HOSPITAL | Age: 78
Discharge: HOME | End: 2023-01-26
Payer: MEDICARE

## 2023-01-26 VITALS
SYSTOLIC BLOOD PRESSURE: 134 MMHG | RESPIRATION RATE: 14 BRPM | OXYGEN SATURATION: 96 % | HEART RATE: 56 BPM | TEMPERATURE: 97.5 F | BODY MASS INDEX: 27.6 KG/M2 | DIASTOLIC BLOOD PRESSURE: 59 MMHG | WEIGHT: 150 LBS | HEIGHT: 62 IN

## 2023-01-26 DIAGNOSIS — R52 PAIN: ICD-10-CM

## 2023-01-26 DIAGNOSIS — M51.36 DDD (DEGENERATIVE DISC DISEASE), LUMBAR: ICD-10-CM

## 2023-01-26 PROCEDURE — 99152 MOD SED SAME PHYS/QHP 5/>YRS: CPT | Performed by: STUDENT IN AN ORGANIZED HEALTH CARE EDUCATION/TRAINING PROGRAM

## 2023-01-26 PROCEDURE — 0 IOPAMIDOL 41 % SOLUTION: Performed by: STUDENT IN AN ORGANIZED HEALTH CARE EDUCATION/TRAINING PROGRAM

## 2023-01-26 PROCEDURE — 25010000002 MIDAZOLAM PER 1 MG: Performed by: STUDENT IN AN ORGANIZED HEALTH CARE EDUCATION/TRAINING PROGRAM

## 2023-01-26 PROCEDURE — 25010000002 METHYLPREDNISOLONE PER 80 MG: Performed by: STUDENT IN AN ORGANIZED HEALTH CARE EDUCATION/TRAINING PROGRAM

## 2023-01-26 PROCEDURE — 77003 FLUOROGUIDE FOR SPINE INJECT: CPT

## 2023-01-26 PROCEDURE — 62323 NJX INTERLAMINAR LMBR/SAC: CPT | Performed by: STUDENT IN AN ORGANIZED HEALTH CARE EDUCATION/TRAINING PROGRAM

## 2023-01-26 RX ORDER — MIDAZOLAM HYDROCHLORIDE 1 MG/ML
2 INJECTION INTRAMUSCULAR; INTRAVENOUS ONCE
Status: COMPLETED | OUTPATIENT
Start: 2023-01-26 | End: 2023-01-26

## 2023-01-26 RX ORDER — METHYLPREDNISOLONE ACETATE 80 MG/ML
80 INJECTION, SUSPENSION INTRA-ARTICULAR; INTRALESIONAL; INTRAMUSCULAR; SOFT TISSUE ONCE
Status: COMPLETED | OUTPATIENT
Start: 2023-01-26 | End: 2023-01-26

## 2023-01-26 RX ADMIN — MIDAZOLAM 1.5 MG: 1 INJECTION INTRAMUSCULAR; INTRAVENOUS at 09:20

## 2023-01-26 RX ADMIN — IOPAMIDOL 3 ML: 408 INJECTION, SOLUTION INTRATHECAL at 09:25

## 2023-01-26 RX ADMIN — METHYLPREDNISOLONE ACETATE 80 MG: 80 INJECTION, SUSPENSION INTRA-ARTICULAR; INTRALESIONAL; INTRAMUSCULAR; SOFT TISSUE at 09:25

## 2023-01-26 NOTE — DISCHARGE INSTRUCTIONS
EPIDURAL STEROID INJECTION          An epidural steroid injection is a shot of steroid medicine and numbing medicine that is given into the space between the spinal cord and the bones of the back (epidural space).  The injection helps relieve pain by an irritated or swollen nerve root.    TELL YOUR HEALTH CARE PROVIDER ABOUT:  Any allergies you have  All medicines you are taking including any over the counter medicines  Any blood disorders you have  Any surgeries you have had  Any medical conditions you have  Whether you are pregnant or may be pregnant    WHAT ARE THE RISK?  Generally, this is a safe procedure. However,problems may occur, including  Headache  Bleeding  Infection  Allergic Reaction  Nerve Damage    WHAT CAN I EXPECT AFTER THE PROCEDURE?    INJECTION SITE  Remove the Band-Aid/s after 24 hours  Check your injection site every day for signs of infection.  Check for:             Redness             Bleeding (small amt is normal)             Warmth             Pus or bad odor  Some numbness may be experienced for several hours following the procedure.  Avoid using heat on the injection site for 24 hours. You may use ice intermittently if needed by placing a         towel between your skin and the ice bag and using the ice for 20 minutes 2-3 times a day.  Do not take baths, swim or use a hot tub for 24 hours.    ACTIVITY  No strenuous activity for 24 hours then return to normal activity as tolerated.  If your leg is numb, no driving until full sensation and strength has returned.    GENERAL INSTRUCTIONS:  The injection site may feel numb, use ice with caution if numbness is present and no heat for 24 hours or until numbness is gone.   If you have numbness or weakness in your arm or leg, use those areas with caution until normal sensation returns.  It is not uncommon to notice an increase in discomfort or a change in the location of discomfort for 3-4 days after the procedure.  If discomfort is noticed  at the injection site, ice may be            applied to that area for 20 min 2-3 times a day.  Take the pain medicine your physician has prescribed or over the counter pain relievers as long as you do not have any contraindications.  If you are a diabetic, monitor your blood sugar closely.  The steroids used in your procedure may increase your blood sugar level up to 36 hours after the injection.  If your blood sugar is greater than 250, call the physician that helps you monitor your blood sugar.  Keep all follow-up visits as scheduled by your health care provider. This is important.    CONTACT OUR OFFICE IF:  You have any of these signs of infection            -Redness, swelling, or warmth around your injection site.            -Fluid or blood coming from your injection site (small amt of blood is normal)            -Pus or a bad odor from your injection site            -A fever  You develop a severe headache or a stiff neck  You lose control of your bladder or bowel movements      PAIN MANAGEMENT CENTER HOURS   Monday-Friday 7:30 am. - 4:00 pm.  For any problem related to your procedure we can be reached at [phone]  If you experience an emergency with your procedure, call [phone] or go to the emergency room.      Moderate Conscious Sedation, Adult, Care After    This sheet gives you information about how to care for yourself after your procedure. Your health care provider may also give you more specific instructions. If you have problems or questions, contact your health care provider.    What can I expect after the procedure?  After the procedure, it is common to have:  Sleepiness for several hours.  Impaired judgment for several hours.  Difficulty with balance.  Vomiting if you eat too soon.    Follow these instructions at home:  For the next 24 hours:         Rest.  Do not participate in activities where you could fall or become injured.  Do not drive or use machinery.  Do not drink alcohol.  Do not  take sleeping pills or medicines that cause drowsiness.  Do not make important decisions or sign legal documents.  Do not take care of children on your own.    Eating and drinking    Follow the diet recommended by your health care provider.  Drink enough fluid to keep your urine pale yellow.  If you vomit:  Drink water, juice, or soup when you can drink without vomiting.  Make sure you have little or no nausea before eating solid foods.     General instructions  Take over-the-counter and prescription medicines only as told by your health care provider.  Have a responsible adult stay with you for 24 hours. It is important to have someone help care for you until you are awake and alert.  Do not smoke.  Keep all follow-up visits as told by your health care provider. This is important.    Contact a health care provider if:  You are still sleepy or having trouble with balance after 24 hours.  You feel light-headed.  You keep feeling nauseous or you keep vomiting.  You develop a rash.  You have a fever.  You have redness or swelling around the IV site.    Get help right away if:  You have trouble breathing.  You have new-onset confusion at home.    Summary  After the procedure, it is common to feel sleepy, have impaired judgment, or feel nauseous if you eat too soon.  Rest after you get home. Know the things you should not do after the procedure.  Follow the diet recommended by your health care provider and drink enough fluid to keep your urine pale yellow.  Get help right away if you have trouble breathing or new-onset confusion at home.    This information is not intended to replace advice given to you by your health care provider. Make sure you discuss any questions you have with your health care provider.    Document Revised: 04/16/2021 Document Reviewed: 11/12/2020  Elsevier Patient Education © 2021 [employer]

## 2023-01-26 NOTE — H&P
H and P reviewed from previous visit and no changes to patient's clinical presentation. Will proceed with procedure as planned. Patient denies history of DM and being on blood thinners.      ASA-3  MP-2    Galileo Portillo DO  Pain Management   [facility]

## 2023-01-26 NOTE — PROCEDURES
PREOPERATIVE DIAGNOS  1.         Lumbar DDD     POSTOPERATIVE DIAGNOSIS:  1.  Same     PROCEDURE:  Caudal Epidural Steroid Injection      PROCEDURE NOTE:  After obtaining written informed consent patient was taken to the procedure room. Pre-procedure blood pressure and pulse were stable and recorded in patients clinic chart.      The patient was placed in the prone position on fluoroscopy table.  The lower back was prepped with chloraprep times three and draped in the usual sterile fashion.  The skin over the sacral hiatus was identified under fluoroscopic guidance and infiltrated with 1% lidocaine for local anesthesia via 25 gauge needle.  An 20-g spinal needle was used to access the epidural space under fluoroscopic guidance. 2 cc of the omnipaque dye was injected through the catheter with good spread seen from L5-S2 area.  80 mg of depomedrol  with 4 cc of saline as injected. There was no evidence of CSF, paresthesia or heme. The needle was cleared with preservative free local anesthetic and removed. Skin was cleaned and a sterile dressing was applied.     Following the procedure the patient's vital signs were stable. The patient was discharged home in good condition after being given discharge instructions.     COMPLICATIONS: None    Moderate sedation: 1.5 mg Versed;  15 minutes of moderate sedation was done and vitals were monitored closely during this periods..        Galileo Portillo DO  Pain Management   [facility]

## 2023-01-27 ENCOUNTER — TELEPHONE (OUTPATIENT)
Dept: PAIN MEDICINE | Facility: HOSPITAL | Age: 78
End: 2023-01-27
Payer: MEDICARE

## 2023-01-28 ENCOUNTER — HOSPITAL ENCOUNTER (OUTPATIENT)
Dept: CT IMAGING | Facility: HOSPITAL | Age: 78
Discharge: HOME | End: 2023-01-28
Admitting: INTERNAL MEDICINE
Payer: MEDICARE

## 2023-01-28 DIAGNOSIS — R94.2 DIFFUSION CAPACITY OF LUNG (DL), DECREASED: ICD-10-CM

## 2023-01-28 DIAGNOSIS — R06.09 OTHER FORM OF DYSPNEA: ICD-10-CM

## 2023-01-28 PROCEDURE — 71250 CT THORAX DX C-: CPT

## 2023-02-01 ENCOUNTER — ON CAMPUS - OUTPATIENT (AMBULATORY)
Dept: URBAN - METROPOLITAN AREA HOSPITAL 2 | Facility: HOSPITAL | Age: 78
End: 2023-02-01

## 2023-02-01 VITALS
HEART RATE: 66 BPM | DIASTOLIC BLOOD PRESSURE: 75 MMHG | HEART RATE: 69 BPM | DIASTOLIC BLOOD PRESSURE: 67 MMHG | DIASTOLIC BLOOD PRESSURE: 61 MMHG | HEART RATE: 56 BPM | SYSTOLIC BLOOD PRESSURE: 151 MMHG | HEART RATE: 73 BPM | SYSTOLIC BLOOD PRESSURE: 130 MMHG | DIASTOLIC BLOOD PRESSURE: 86 MMHG | OXYGEN SATURATION: 99 % | SYSTOLIC BLOOD PRESSURE: 136 MMHG | RESPIRATION RATE: 14 BRPM | WEIGHT: 146 LBS | TEMPERATURE: 98 F | HEART RATE: 55 BPM | OXYGEN SATURATION: 97 % | RESPIRATION RATE: 22 BRPM | HEART RATE: 58 BPM | SYSTOLIC BLOOD PRESSURE: 141 MMHG | SYSTOLIC BLOOD PRESSURE: 126 MMHG | RESPIRATION RATE: 18 BRPM | DIASTOLIC BLOOD PRESSURE: 80 MMHG | SYSTOLIC BLOOD PRESSURE: 122 MMHG | HEIGHT: 63 IN | RESPIRATION RATE: 16 BRPM | OXYGEN SATURATION: 98 % | DIASTOLIC BLOOD PRESSURE: 81 MMHG

## 2023-02-01 DIAGNOSIS — D50.0 IRON DEFICIENCY ANEMIA SECONDARY TO BLOOD LOSS (CHRONIC): ICD-10-CM

## 2023-02-01 DIAGNOSIS — R10.11 RIGHT UPPER QUADRANT PAIN: ICD-10-CM

## 2023-02-01 PROCEDURE — 43235 EGD DIAGNOSTIC BRUSH WASH: CPT | Performed by: INTERNAL MEDICINE

## 2023-02-15 NOTE — PROGRESS NOTES
Subjective   Luna Jacobson is a 77 y.o. female is here for follow-up for lower back pain.  Last seen on 1/26/2023 for caudal NIMA with 80% pain relief. Her main complaint is axial lower back pain which is different that pain before.      Lower back pain is 4/10 on VAS, maximum 5/10.  Pain is dull, achy, sometimes sharp in nature.  Pain is Not referred. Before intermittently left lateral thigh and left ankle- NOT anymore after caudal ES.  Constant pain.  Improved by caudal NIMA previously and now with pain medications.  Worse with walking.    Previous Injection:   1/26/2023-caudal NIMA with sedation- 80% pain relief with radicular pain.  11/20/2020 - Caudal NIMA - 80% pain relief - 2 years.     PMH: COPD, GERD, HLD, HTN, Thoracic aortic aneurysm      Current Medications:   Norco 5-325 mg BID PRN   Flexeril 10 mg BID PRN  Fioricet     Past Medications:       Past Modalities:  TENS:                                                                          no                                                  Physical Therapy Within The Last 6 Months              no  Psychotherapy                                                            no  Massage Therapy                                                       no     Patient Complains Of:  Uro-Fecal Incontinence          no  Weight Gain/Loss                   no  Fever/Chills                             no  Weakness                               Yes (subjective weakness in left leg)            Current Outpatient Medications:   •  atorvastatin (LIPITOR) 40 MG tablet, TAKE 1 TABLET BY MOUTH DAILY, Disp: 90 tablet, Rfl: 1  •  butalbital-acetaminophen-caffeine (FIORICET, ESGIC) -40 MG per tablet, TAKE 1 TABLET BY MOUTH EVERY 12 HOURS AS NEEDED FOR HEADACHE, Disp: 30 tablet, Rfl: 1  •  Calcium Carbonate-Vitamin D 500-125 MG-UNIT tablet, Take  by mouth., Disp: , Rfl:   •  carvedilol (COREG) 12.5 MG tablet, Take 1 tablet by mouth 2 (Two) Times a Day With Meals., Disp:  180 tablet, Rfl: 3  •  clobetasol (TEMOVATE) 0.05 % external solution, APPLY TOPICALLY TO THE SCALP EVERY DAY IN THE MORNING AND IN THE EVENING, Disp: , Rfl:   •  cloNIDine (Catapres) 0.1 MG tablet, Take 1 tablet by mouth Every Night., Disp: 90 tablet, Rfl: 1  •  coenzyme Q10 100 MG capsule, Take 100 mg by mouth Daily., Disp: , Rfl:   •  cyclobenzaprine (FLEXERIL) 10 MG tablet, Take 1 tablet by mouth 2 (Two) Times a Day As Needed for Muscle Spasms for up to 60 days., Disp: 120 tablet, Rfl: 0  •  dicyclomine (BENTYL) 20 MG tablet, Take 20 mg by mouth Daily., Disp: , Rfl:   •  FeroSul 325 (65 Fe) MG tablet, TAKE 1 TABLET BY MOUTH DAILY, Disp: 30 tablet, Rfl: 2  •  fexofenadine (ALLEGRA) 180 MG tablet, Take 180 mg by mouth Daily., Disp: , Rfl:   •  fluticasone (FLONASE) 50 MCG/ACT nasal spray, SHAKE LIQUID AND USE 2 SPRAYS IN EACH NOSTRIL EVERY DAY, Disp: 48 g, Rfl: 3  •  hydrALAZINE (APRESOLINE) 50 MG tablet, TAKE 1 TABLET BY MOUTH TWICE DAILY, Disp: 60 tablet, Rfl: 3  •  [START ON 2/23/2023] HYDROcodone-acetaminophen (NORCO) 5-325 MG per tablet, Take 1 tablet by mouth 2 (Two) Times a Day As Needed for Severe Pain for up to 30 days., Disp: 60 tablet, Rfl: 0  •  [START ON 3/25/2023] HYDROcodone-acetaminophen (NORCO) 5-325 MG per tablet, Take 1 tablet by mouth 2 (Two) Times a Day As Needed for Severe Pain for up to 30 days., Disp: 60 tablet, Rfl: 0  •  ketoconazole (NIZORAL) 2 % shampoo, , Disp: , Rfl: 3  •  levothyroxine (Synthroid) 100 MCG tablet, Take 1 tablet by mouth Daily., Disp: 90 tablet, Rfl: 0  •  Multiple Vitamin (MULTIVITAMIN) tablet, Take 1 tablet by mouth Daily., Disp: , Rfl:   •  pantoprazole (PROTONIX) 40 MG EC tablet, Take 40 mg by mouth Daily., Disp: , Rfl:   •  PARoxetine (PAXIL) 40 MG tablet, Take 40 mg by mouth Every Morning., Disp: , Rfl:   •  polyethylene glycol (MIRALAX) powder, , Disp: , Rfl:   •  tiotropium (Spiriva HandiHaler) 18 MCG per inhalation capsule, Place 1 capsule into inhaler and  inhale Daily., Disp: 30 capsule, Rfl: 5  •  varenicline (CHANTIX) 1 MG tablet, Take 1 tablet by mouth 2 (Two) Times a Day., Disp: 60 tablet, Rfl: 5    The following portions of the patient's history were reviewed and updated as appropriate: allergies, current medications, past family history, past medical history, past social history, past surgical history and problem list.      REVIEW OF PERTINENT MEDICAL DATA    Past Medical History:   Diagnosis Date   • Anemia    • Anxiety    • COPD (chronic obstructive pulmonary disease) ([facility])    • Coronary artery calcification seen on CT scan 07/2012    mild calcification in the LAD with calcium score of 22. modere narrowing left subclavian origin   • Decreased diffusion capacity of lung    • Diverticulosis    • Gastroparesis    • GERD (gastroesophageal reflux disease)    • GIST (gastrointestinal stromal tumor), non-malignant    • Irritable bowel syndrome    • Low back pain    • Mild mitral regurgitation    • Osteoporosis    • PAD (peripheral artery disease) ([facility])     small vessel disease in feet   • Subclavian artery stenosis, left ([facility]) 05/2016    50 percent   • Tubular adenoma of colon 01/2017     Past Surgical History:   Procedure Laterality Date   • APPENDECTOMY     • COLONOSCOPY  10/01/2019    dr [email or web address]   • ENDOSCOPY  02/2019   • HEMORRHOIDECTOMY     • HYSTERECTOMY     • TONSILLECTOMY     • TOOTH EXTRACTION      8/3/21     Family History   Problem Relation Age of Onset   • Hypertension Mother    • Diabetes Mother    • Dementia Mother    • Stroke Mother    • Lung cancer Father    • Alcohol abuse Father    • COPD Father    • Breast cancer Sister    • Hypertension Sister    • Hypothyroidism Sister    • Stroke Sister    • Other Sister         AAA   • Bipolar disorder Daughter    • Fibromyalgia Daughter      Social History     Socioeconomic History   • Marital status:    Tobacco Use   • Smoking status: Former     Types: Cigarettes     Quit date: 8/13/2020     Years  since quittin.5   • Smokeless tobacco: Never   • Tobacco comments:     she has smoked intermittently for 40 years; she smoked over 2 ppd twenty years ago   Vaping Use   • Vaping Use: Never used   Substance and Sexual Activity   • Alcohol use: No   • Drug use: No   • Sexual activity: Defer         Review of Systems      Vitals:    23 1308   BP: 106/46   Pulse: 72   Resp: 16   SpO2: 97%   PainSc:   4         Objective   Physical Exam  Musculoskeletal:      Lumbar back: Tenderness present. Decreased range of motion.        Legs:    Neurological:      Comments: Motor strength 5/5 b/l LE  Sensory intact b/l LE             Imaging Reviewed:  MRI done at [facility] - 2020:    L3-L4-a small broad based disc protrusion centrally.  There is moderate facet arthropathy.  This changes contribute to moderate central canal stenosis.  There is moderate right and severe left neural foraminal stenosis.  L4-L5-there is a posterior disc protrusion centrally.  Moderate facet arthropathy and mild limited flavum hypertrophy. Severe central canal stenosis.  Severe bilateral neural foraminal stenosis, right greater than left.  L5-S1-there is small left far lateral disc protrusion.  No central canal stenosis.  There is mild facet arthropathy.  There is moderate bilateral neural foraminal stenosis.     Lumbar X-ray: 10/1/2020   Multilevel degenerative spondylosis.          Assessment:    1. Myofascial pain syndrome    2. DDD (degenerative disc disease), lumbar    3. Chronic left-sided low back pain with sciatica, sciatica laterality unspecified    4. Lumbar spondylosis    5. Chronic narcotic use          Plan:  1.  UDS on 2023 is consistent with patient's interview.. Narcotic contract discussed on 11/3/22. Narcan ordered on 10/9/2020.  2. Continue Norco 5-325 mg BID PRN (; 3/25). Discussed with the patient regarding long-term side effects of opioids including but not limited to opioid induced hormonal suppression,  hyperalgesia, fatigue, weight gain, possible opioid induced altered immune system, addiction, tolerance, dependence, risk of hearing loss and elevated risk of myocardial infarction. Proper use and potential life threatening side effects of over use discussed with patient. Patient states understanding of their use and risks.  3. Continue Flexeril to 10 mg BID PRN (2 months). Common side effects of flexeril include blurred vision, dizziness or lightheadedness, drowsiness, dryness of mouth, bloated feeling or gas, indigestion, nausea or vomiting, or stomach cramps or pain, constipation, diarrhea, excitement or nervousness, frequent urination, general feeling of discomfort or illness, headache, muscle twitching, numbness, tingling, pain, or weakness in hands or feet, pounding heartbeat, problems in speaking, trembling, trouble in sleeping, unpleasant taste or other taste changes, unusual muscle weakness or unusual tiredness, especially during the first few days as your body adjusts to this medication.  4. I believe most of her lower back pain is myofascial in nature. Recommend taking Flexeril and start home stretches. If it doesn't improve, we will consider TPI.      RTC in 2 months.    Galileo Portillo DO  Pain Management   [facility]         INSPECT REPORT    As part of the patient's treatment plan, I may be prescribing controlled substances. The patient has been made aware of appropriate use of such medications, including potential risk of somnolence, limited ability to drive and/or work safely, and the potential for dependence or overdose. It has also been made clear that these medications are for use by this patient only, without concomitant use of alcohol or other substances unless prescribed.     Patient has completed prescribing agreement detailing terms of continued prescribing of controlled substances, including monitoring INSPECT reports, urine drug screening, and pill counts if necessary. The patient is  aware that inappropriate use will results in cessation of prescribing such medications.    INSPECT report has been reviewed and scanned into the patient's chart.

## 2023-02-16 ENCOUNTER — OFFICE VISIT (OUTPATIENT)
Dept: PAIN MEDICINE | Facility: CLINIC | Age: 78
End: 2023-02-16
Payer: MEDICARE

## 2023-02-16 VITALS
HEART RATE: 72 BPM | SYSTOLIC BLOOD PRESSURE: 106 MMHG | OXYGEN SATURATION: 97 % | DIASTOLIC BLOOD PRESSURE: 46 MMHG | RESPIRATION RATE: 16 BRPM

## 2023-02-16 DIAGNOSIS — F11.90 CHRONIC NARCOTIC USE: ICD-10-CM

## 2023-02-16 DIAGNOSIS — G89.29 CHRONIC LEFT-SIDED LOW BACK PAIN WITH SCIATICA, SCIATICA LATERALITY UNSPECIFIED: ICD-10-CM

## 2023-02-16 DIAGNOSIS — M51.36 DDD (DEGENERATIVE DISC DISEASE), LUMBAR: ICD-10-CM

## 2023-02-16 DIAGNOSIS — M79.18 MYOFASCIAL PAIN SYNDROME: Primary | ICD-10-CM

## 2023-02-16 DIAGNOSIS — M54.40 CHRONIC LEFT-SIDED LOW BACK PAIN WITH SCIATICA, SCIATICA LATERALITY UNSPECIFIED: ICD-10-CM

## 2023-02-16 DIAGNOSIS — M47.816 LUMBAR SPONDYLOSIS: ICD-10-CM

## 2023-02-16 PROCEDURE — 99214 OFFICE O/P EST MOD 30 MIN: CPT | Performed by: STUDENT IN AN ORGANIZED HEALTH CARE EDUCATION/TRAINING PROGRAM

## 2023-02-16 RX ORDER — CYCLOBENZAPRINE HCL 10 MG
10 TABLET ORAL 2 TIMES DAILY PRN
Qty: 120 TABLET | Refills: 0 | Status: SHIPPED | OUTPATIENT
Start: 2023-02-16 | End: 2023-04-17

## 2023-02-16 RX ORDER — HYDROCODONE BITARTRATE AND ACETAMINOPHEN 5; 325 MG/1; MG/1
1 TABLET ORAL 2 TIMES DAILY PRN
Qty: 60 TABLET | Refills: 0 | Status: SHIPPED | OUTPATIENT
Start: 2023-02-23 | End: 2023-03-25

## 2023-02-16 RX ORDER — HYDROCODONE BITARTRATE AND ACETAMINOPHEN 5; 325 MG/1; MG/1
1 TABLET ORAL 2 TIMES DAILY PRN
Qty: 60 TABLET | Refills: 0 | Status: SHIPPED | OUTPATIENT
Start: 2023-03-25 | End: 2023-03-07

## 2023-02-16 NOTE — PATIENT INSTRUCTIONS
Back Exercises  The following exercises strengthen the muscles that help to support the back. They also help to keep the lower back flexible. Doing these exercises can help to prevent back pain or lessen existing pain.  If you have back pain or discomfort, try doing these exercises 2-3 times each day or as told by your health care provider. When the pain goes away, do them once each day, but increase the number of times that you repeat the steps for each exercise (do more repetitions). If you do not have back pain or discomfort, do these exercises once each day or as told by your health care provider.    Exercises  Single Knee to Chest  Repeat these steps 3-5 times for each leg:  Lie on your back on a firm bed or the floor with your legs extended.  Bring one knee to your chest. Your other leg should stay extended and in contact with the floor.  Hold your knee in place by grabbing your knee or thigh.  Pull on your knee until you feel a gentle stretch in your lower back.  Hold the stretch for 10-30 seconds.  Slowly release and straighten your leg.     Pelvic Tilt  Repeat these steps 5-10 times:  Lie on your back on a firm bed or the floor with your legs extended.  Bend your knees so they are pointing toward the ceiling and your feet are flat on the floor.  Tighten your lower abdominal muscles to press your lower back against the floor. This motion will tilt your pelvis so your tailbone points up toward the ceiling instead of pointing to your feet or the floor.  With gentle tension and even breathing, hold this position for 5-10 seconds.     Cat-Cow    Repeat these steps until your lower back becomes more flexible:  Get into a hands-and-knees position on a firm surface. Keep your hands under your shoulders, and keep your knees under your hips. You may place padding under your knees for comfort.  Let your head hang down, and point your tailbone toward the floor so your lower back becomes rounded like the back of a  cat.  Hold this position for 5 seconds.  Slowly lift your head and point your tailbone up toward the ceiling so your back forms a sagging arch like the back of a cow.  Hold this position for 5 seconds.     Press-Ups    Repeat these steps 5-10 times:  Lie on your abdomen (face-down) on the floor.  Place your palms near your head, about shoulder-width apart.  While you keep your back as relaxed as possible and keep your hips on the floor, slowly straighten your arms to raise the top half of your body and lift your shoulders. Do not use your back muscles to raise your upper torso. You may adjust the placement of your hands to make yourself more comfortable.  Hold this position for 5 seconds while you keep your back relaxed.  Slowly return to lying flat on the floor.     Bridges    Repeat these steps 10 times:  Lie on your back on a firm surface.  Bend your knees so they are pointing toward the ceiling and your feet are flat on the floor.  Tighten your buttocks muscles and lift your buttocks off of the floor until your waist is at almost the same height as your knees. You should feel the muscles working in your buttocks and the back of your thighs. If you do not feel these muscles, slide your feet 1-2 inches farther away from your buttocks.  Hold this position for 3-5 seconds.  Slowly lower your hips to the starting position, and allow your buttocks muscles to relax completely.     If this exercise is too easy, try doing it with your arms crossed over your chest.  Abdominal Crunches  Repeat these steps 5-10 times:  Lie on your back on a firm bed or the floor with your legs extended.  Bend your knees so they are pointing toward the ceiling and your feet are flat on the floor.  Cross your arms over your chest.  Tip your chin slightly toward your chest without bending your neck.  Tighten your abdominal muscles and slowly raise your trunk (torso) high enough to lift your shoulder blades a tiny bit off of the floor. Avoid  raising your torso higher than that, because it can put too much stress on your low back and it does not help to strengthen your abdominal muscles.  Slowly return to your starting position.     Back Lifts  Repeat these steps 5-10 times:  Lie on your abdomen (face-down) with your arms at your sides, and rest your forehead on the floor.  Tighten the muscles in your legs and your buttocks.  Slowly lift your chest off of the floor while you keep your hips pressed to the floor. Keep the back of your head in line with the curve in your back. Your eyes should be looking at the floor.  Hold this position for 3-5 seconds.  Slowly return to your starting position.

## 2023-03-06 DIAGNOSIS — D50.0 IRON DEFICIENCY ANEMIA DUE TO CHRONIC BLOOD LOSS: ICD-10-CM

## 2023-03-06 RX ORDER — FERROUS SULFATE 325(65) MG
325 TABLET ORAL DAILY
Qty: 30 TABLET | Refills: 2 | Status: SHIPPED | OUTPATIENT
Start: 2023-03-06

## 2023-03-07 ENCOUNTER — OFFICE VISIT (OUTPATIENT)
Dept: INTERNAL MEDICINE | Facility: CLINIC | Age: 78
End: 2023-03-07
Payer: MEDICARE

## 2023-03-07 VITALS
SYSTOLIC BLOOD PRESSURE: 124 MMHG | TEMPERATURE: 96.9 F | BODY MASS INDEX: 27.2 KG/M2 | DIASTOLIC BLOOD PRESSURE: 82 MMHG | HEIGHT: 62 IN | WEIGHT: 147.8 LBS

## 2023-03-07 DIAGNOSIS — M81.0 AGE-RELATED OSTEOPOROSIS WITHOUT CURRENT PATHOLOGICAL FRACTURE: Primary | ICD-10-CM

## 2023-03-07 DIAGNOSIS — E03.8 OTHER SPECIFIED HYPOTHYROIDISM: Chronic | ICD-10-CM

## 2023-03-07 DIAGNOSIS — M81.0 AGE-RELATED OSTEOPOROSIS WITHOUT CURRENT PATHOLOGICAL FRACTURE: Chronic | ICD-10-CM

## 2023-03-07 DIAGNOSIS — D50.0 IRON DEFICIENCY ANEMIA DUE TO CHRONIC BLOOD LOSS: ICD-10-CM

## 2023-03-07 DIAGNOSIS — I10 ESSENTIAL HYPERTENSION: Primary | Chronic | ICD-10-CM

## 2023-03-07 DIAGNOSIS — R94.2 DIFFUSION CAPACITY OF LUNG (DL), DECREASED: ICD-10-CM

## 2023-03-07 DIAGNOSIS — R73.03 PREDIABETES: ICD-10-CM

## 2023-03-07 PROCEDURE — 99214 OFFICE O/P EST MOD 30 MIN: CPT | Performed by: INTERNAL MEDICINE

## 2023-03-07 PROCEDURE — 1160F RVW MEDS BY RX/DR IN RCRD: CPT | Performed by: INTERNAL MEDICINE

## 2023-03-07 PROCEDURE — 3079F DIAST BP 80-89 MM HG: CPT | Performed by: INTERNAL MEDICINE

## 2023-03-07 PROCEDURE — 1159F MED LIST DOCD IN RCRD: CPT | Performed by: INTERNAL MEDICINE

## 2023-03-07 PROCEDURE — 3074F SYST BP LT 130 MM HG: CPT | Performed by: INTERNAL MEDICINE

## 2023-03-07 RX ORDER — SODIUM CHLORIDE 9 MG/ML
250 INJECTION, SOLUTION INTRAVENOUS ONCE
OUTPATIENT
Start: 2023-03-07

## 2023-03-07 RX ORDER — ZOLEDRONIC ACID 5 MG/100ML
5 INJECTION, SOLUTION INTRAVENOUS ONCE
OUTPATIENT
Start: 2023-03-07

## 2023-03-07 NOTE — PROGRESS NOTES
Subjective        Chief Complaint   Patient presents with   • Hypertension           Luna Jacobson is a 77 y.o. female who presents for    Patient Active Problem List   Diagnosis   • Essential hypertension   • Other hyperlipidemia   • Other specified hypothyroidism   • Other headache syndrome   • Prediabetes   • Iron deficiency anemia due to chronic blood loss   • Thoracic aortic aneurysm   • Microscopic colitis   • Myalgia   • DDD (degenerative disc disease), lumbar   • Recurrent major depressive disorder, in partial remission (HCC)   • Moderate aortic regurgitation   • Diffusion capacity of lung (dl), decreased   • Age-related osteoporosis without current pathological fracture       History of Present Illness     She has decreased her smoking to a puff every 3 days. She has been jittery with Chantix; she has been on it for 2-3 months. Her Bp has been 116-152/66-89. She still has dyspnea. There is no pattern to it. She has been using her spiriva. She reports a nl EGD with Dr. Owen. She has an appt with pul in April.  Allergies   Allergen Reactions   • Amlodipine Headache     Head tightness   • Itraconazole Hives     sporanax   • Lisinopril Hives   • Norvasc [Amlodipine Besylate] Other (See Comments)     Head tightness   • Sulfa Antibiotics Other (See Comments)     headache   • Hydrochlorothiazide Other (See Comments)     hyponatremia  Other reaction(s): Other (See Comments)  hyponatremia   • Sulfamethoxazole Nausea And Vomiting       Current Outpatient Medications on File Prior to Visit   Medication Sig Dispense Refill   • atorvastatin (LIPITOR) 40 MG tablet TAKE 1 TABLET BY MOUTH DAILY 90 tablet 1   • butalbital-acetaminophen-caffeine (FIORICET, ESGIC) -40 MG per tablet TAKE 1 TABLET BY MOUTH EVERY 12 HOURS AS NEEDED FOR HEADACHE 30 tablet 1   • Calcium Carbonate-Vitamin D 500-125 MG-UNIT tablet Take  by mouth.     • carvedilol (COREG) 12.5 MG tablet Take 1 tablet by mouth 2 (Two) Times a Day With  Meals. 180 tablet 3   • clobetasol (TEMOVATE) 0.05 % external solution APPLY TOPICALLY TO THE SCALP EVERY DAY IN THE MORNING AND IN THE EVENING     • cloNIDine (Catapres) 0.1 MG tablet Take 1 tablet by mouth Every Night. 90 tablet 1   • coenzyme Q10 100 MG capsule Take 1 capsule by mouth Daily.     • cyclobenzaprine (FLEXERIL) 10 MG tablet Take 1 tablet by mouth 2 (Two) Times a Day As Needed for Muscle Spasms for up to 60 days. 120 tablet 0   • dicyclomine (BENTYL) 20 MG tablet Take 1 tablet by mouth Daily.     • FeroSul 325 (65 Fe) MG tablet TAKE 1 TABLET BY MOUTH DAILY 30 tablet 2   • fexofenadine (ALLEGRA) 180 MG tablet Take 1 tablet by mouth Daily.     • fluticasone (FLONASE) 50 MCG/ACT nasal spray SHAKE LIQUID AND USE 2 SPRAYS IN EACH NOSTRIL EVERY DAY 48 g 3   • hydrALAZINE (APRESOLINE) 50 MG tablet TAKE 1 TABLET BY MOUTH TWICE DAILY 60 tablet 3   • HYDROcodone-acetaminophen (NORCO) 5-325 MG per tablet Take 1 tablet by mouth 2 (Two) Times a Day As Needed for Severe Pain for up to 30 days. 60 tablet 0   • ketoconazole (NIZORAL) 2 % shampoo   3   • levothyroxine (Synthroid) 100 MCG tablet Take 1 tablet by mouth Daily. 90 tablet 0   • Multiple Vitamin (MULTIVITAMIN) tablet Take 1 tablet by mouth Daily.     • pantoprazole (PROTONIX) 40 MG EC tablet Take 1 tablet by mouth Daily.     • PARoxetine (PAXIL) 40 MG tablet Take 1 tablet by mouth Every Morning.     • polyethylene glycol (MIRALAX) powder      • varenicline (CHANTIX) 1 MG tablet Take 1 tablet by mouth 2 (Two) Times a Day. 60 tablet 5   • [DISCONTINUED] HYDROcodone-acetaminophen (NORCO) 5-325 MG per tablet Take 1 tablet by mouth 2 (Two) Times a Day As Needed for Severe Pain for up to 30 days. 60 tablet 0   • [DISCONTINUED] tiotropium (Spiriva HandiHaler) 18 MCG per inhalation capsule Place 1 capsule into inhaler and inhale Daily. 30 capsule 5     No current facility-administered medications on file prior to visit.       Past Medical History:   Diagnosis Date    • COPD (chronic obstructive pulmonary disease) (HCC)    • Coronary artery calcification seen on CT scan 2012    mild calcification in the LAD with calcium score of 22. modere narrowing left subclavian origin   • Diverticulosis    • Gastroparesis    • GERD (gastroesophageal reflux disease)    • GIST (gastrointestinal stromal tumor), non-malignant    • Irritable bowel syndrome    • Mild mitral regurgitation    • PAD (peripheral artery disease) (HCC)     small vessel disease in feet   • Subclavian artery stenosis, left (HCC) 2016    50 percent   • Tubular adenoma of colon 2017       Past Surgical History:   Procedure Laterality Date   • APPENDECTOMY     • COLONOSCOPY  10/01/2019    dr owen   • ENDOSCOPY  2019   • ENDOSCOPY  2023    Dr. Owen   • HEMORRHOIDECTOMY     • HYSTERECTOMY     • TONSILLECTOMY     • TOOTH EXTRACTION      8/3/21       Family History   Problem Relation Age of Onset   • Hypertension Mother    • Diabetes Mother    • Dementia Mother    • Stroke Mother    • Lung cancer Father    • Alcohol abuse Father    • COPD Father    • Breast cancer Sister    • Hypertension Sister    • Hypothyroidism Sister    • Stroke Sister    • Other Sister         AAA   • Bipolar disorder Daughter    • Fibromyalgia Daughter        Social History     Socioeconomic History   • Marital status:    Tobacco Use   • Smoking status: Former     Types: Cigarettes     Quit date: 2020     Years since quittin.5   • Smokeless tobacco: Never   • Tobacco comments:     she has smoked intermittently for 40 years; she smoked over 2 ppd twenty years ago   Vaping Use   • Vaping Use: Never used   Substance and Sexual Activity   • Alcohol use: No   • Drug use: No   • Sexual activity: Defer           The following portions of the patient's history were reviewed and updated as appropriate: problem list, allergies, current medications, past medical history, past family history, past social history and past  "surgical history.    Review of Systems    Immunization History   Administered Date(s) Administered   • COVID-19 (PFIZER) PURPLE CAP 02/07/2021, 02/28/2021   • FLUAD TRI 65YR+ 11/01/2019   • Fluzone High Dose =>65 Years ([employer] ONLY) 11/29/2018   • Fluzone High-Dose 65+yrs 12/02/2022   • Hepatitis A 05/01/2018, 12/02/2018   • Influenza TIV (IM) 11/07/2013, 10/13/2014   • Pneumococcal Conjugate 13-Valent (PCV13) 04/23/2015   • Pneumococcal Polysaccharide (PPSV23) 08/01/2008   • Tdap 01/01/2012   • Zostavax 08/25/2010       Objective   Vitals:    03/07/23 1509   BP: 124/82   Temp: 96.9 °F (36.1 °C)   Weight: 67 kg (147 lb 12.8 oz)   Height: 157.5 cm (62\")     Body mass index is 27.03 kg/m².  Physical Exam  Vitals reviewed.   Constitutional:       Appearance: She is well-developed.   HENT:      Head: Normocephalic and atraumatic.   Cardiovascular:      Rate and Rhythm: Normal rate and regular rhythm.      Heart sounds: Normal heart sounds, S1 normal and S2 normal.   Pulmonary:      Effort: Pulmonary effort is normal.      Breath sounds: Normal breath sounds.   Skin:     General: Skin is warm.   Neurological:      Mental Status: She is alert.   Psychiatric:         Behavior: Behavior normal.         Procedures    Assessment & Plan   Diagnoses and all orders for this visit:    1. Essential hypertension (Primary)  Comments:  CMP and FLp today  Orders:  -     Basic Metabolic Panel; Future    2. Other specified hypothyroidism  Comments:  TSH today  Orders:  -     TSH; Future    3. Prediabetes  -     Hemoglobin A1c; Future    4. Age-related osteoporosis without current pathological fracture  Comments:  send order for Reclast to [facility]    5. Diffusion capacity of lung (dl), decreased  Comments:  I think has COPD. Encouraged her to take Spiriva    6. Iron deficiency anemia due to chronic blood loss  -     CBC & Differential; Future  -     Ferritin; Future    Other orders  -     tiotropium (Spiriva HandiHaler) 18 MCG per " inhalation capsule; Place 1 capsule into inhaler and inhale Daily.  Dispense: 30 capsule; Refill: 5             Labs today. Set up Reclast. See pulm soon; start spiriva. Stay off tobacco. Bp is excellent.    Return in about 4 months (around 7/7/2023) for Medicare Wellness, Lab Today, Lab Before FUP.

## 2023-03-16 DIAGNOSIS — E78.49 OTHER HYPERLIPIDEMIA: ICD-10-CM

## 2023-03-17 RX ORDER — ATORVASTATIN CALCIUM 40 MG/1
40 TABLET, FILM COATED ORAL DAILY
Qty: 90 TABLET | Refills: 1 | Status: SHIPPED | OUTPATIENT
Start: 2023-03-17

## 2023-04-11 DIAGNOSIS — G44.89 OTHER HEADACHE SYNDROME: ICD-10-CM

## 2023-04-11 RX ORDER — BUTALBITAL, ACETAMINOPHEN AND CAFFEINE 50; 325; 40 MG/1; MG/1; MG/1
TABLET ORAL
Qty: 30 TABLET | Refills: 0 | Status: SHIPPED | OUTPATIENT
Start: 2023-04-11

## 2023-04-18 NOTE — PROGRESS NOTES
Subjective   Luna Jacobson is a 77 y.o. female is here for follow-up for lower back pain.  Last seen on 2/16/2023. Her main complaint is neck pain. It has been going on for years but has worsened significantly. This was improved previously with chiropractor care.     Lower back pain is 4/10 on VAS, maximum 5/10.  Pain is dull, achy, sometimes sharp in nature.  Pain is not referred.  Before intermittently left lateral thigh and left ankle but resolved after caudal NIMA.  Constant pain.  Improved by pain medications and NIMA.  Worse with walking.    Neck pain is 7/10 on VAS, at maximum is 8/10. Pain is tightnes in nature. Pain is not referred, but does have numbness in b/l hands. The pain is constant. The pain is improved by pain meds, chiropractor care. The pain is worse with flexion in neck. Chiropractor care really helped in past but last few tx hasn't helped her. +headaches starting from occipital region radiating to top of her head. Daily headaches.  She was on Fioricet before.       Previous Injection:   1/26/2023-caudal NIMA with sedation- 80% pain relief with radicular pain.  11/20/2020 - Caudal NIMA - 80% pain relief - 2 years.     PMH: COPD, GERD, HLD, HTN, Thoracic aortic aneurysm      Current Medications:   Norco 5-325 mg BID PRN   Flexeril 10 mg BID PRN  Fioricet     Past Medications:       Past Modalities:  TENS:                                                                          no                                                  Physical Therapy Within The Last 6 Months              Yes- chiropractor care >6 weeks.  Psychotherapy                                                            no  Massage Therapy                                                       no     Patient Complains Of:  Uro-Fecal Incontinence          no  Weight Gain/Loss                   no  Fever/Chills                             no  Weakness                               Yes (subjective weakness in left leg)             Current Outpatient Medications:   •  albuterol sulfate  (90 Base) MCG/ACT inhaler, Inhale 2 puffs Every 4 (Four) Hours As Needed., Disp: , Rfl:   •  atorvastatin (LIPITOR) 40 MG tablet, TAKE 1 TABLET BY MOUTH DAILY, Disp: 90 tablet, Rfl: 1  •  butalbital-acetaminophen-caffeine (FIORICET, ESGIC) -40 MG per tablet, TAKE 1 TABLET BY MOUTH EVERY 12 HOURS AS NEEDED FOR HEADACHE, Disp: 30 tablet, Rfl: 0  •  Calcium Carbonate-Vitamin D 500-125 MG-UNIT tablet, Take  by mouth., Disp: , Rfl:   •  carvedilol (COREG) 12.5 MG tablet, Take 1 tablet by mouth 2 (Two) Times a Day With Meals., Disp: 180 tablet, Rfl: 3  •  clobetasol (TEMOVATE) 0.05 % external solution, APPLY TOPICALLY TO THE SCALP EVERY DAY IN THE MORNING AND IN THE EVENING, Disp: , Rfl:   •  cloNIDine (Catapres) 0.1 MG tablet, Take 1 tablet by mouth Every Night., Disp: 90 tablet, Rfl: 1  •  coenzyme Q10 100 MG capsule, Take 1 capsule by mouth Daily., Disp: , Rfl:   •  cyclobenzaprine (FLEXERIL) 10 MG tablet, Take 1 tablet by mouth 2 (Two) Times a Day As Needed for Muscle Spasms for up to 60 days., Disp: 120 tablet, Rfl: 0  •  dicyclomine (BENTYL) 20 MG tablet, Take 1 tablet by mouth Daily., Disp: , Rfl:   •  FeroSul 325 (65 Fe) MG tablet, TAKE 1 TABLET BY MOUTH DAILY, Disp: 30 tablet, Rfl: 2  •  fexofenadine (ALLEGRA) 180 MG tablet, Take 1 tablet by mouth Daily., Disp: , Rfl:   •  fluticasone (FLONASE) 50 MCG/ACT nasal spray, SHAKE LIQUID AND USE 2 SPRAYS IN EACH NOSTRIL EVERY DAY, Disp: 48 g, Rfl: 3  •  hydrALAZINE (APRESOLINE) 50 MG tablet, TAKE 1 TABLET BY MOUTH TWICE DAILY, Disp: 60 tablet, Rfl: 3  •  HYDROcodone-acetaminophen (NORCO) 5-325 MG per tablet, Take 1 tablet by mouth 2 (Two) Times a Day As Needed., Disp: , Rfl:   •  ketoconazole (NIZORAL) 2 % shampoo, , Disp: , Rfl: 3  •  levothyroxine (Synthroid) 100 MCG tablet, Take 1 tablet by mouth Daily., Disp: 90 tablet, Rfl: 0  •  Multiple Vitamin (MULTIVITAMIN) tablet, Take 1 tablet by mouth  Daily., Disp: , Rfl:   •  pantoprazole (PROTONIX) 40 MG EC tablet, Take 1 tablet by mouth Daily., Disp: , Rfl:   •  PARoxetine (PAXIL) 40 MG tablet, Take 1 tablet by mouth Every Morning., Disp: , Rfl:   •  polyethylene glycol (MIRALAX) powder, , Disp: , Rfl:   •  tiotropium (Spiriva HandiHaler) 18 MCG per inhalation capsule, Place 1 capsule into inhaler and inhale Daily., Disp: 30 capsule, Rfl: 5  •  varenicline (CHANTIX) 1 MG tablet, Take 1 tablet by mouth 2 (Two) Times a Day., Disp: 60 tablet, Rfl: 5  •  [START ON 4/24/2023] HYDROcodone-acetaminophen (NORCO) 5-325 MG per tablet, Take 1 tablet by mouth 3 (Three) Times a Day As Needed for Moderate Pain for up to 30 days., Disp: 90 tablet, Rfl: 0  •  [START ON 5/24/2023] HYDROcodone-acetaminophen (NORCO) 5-325 MG per tablet, Take 1 tablet by mouth 3 (Three) Times a Day As Needed for Moderate Pain for up to 30 days., Disp: 90 tablet, Rfl: 0    The following portions of the patient's history were reviewed and updated as appropriate: allergies, current medications, past family history, past medical history, past social history, past surgical history and problem list.      REVIEW OF PERTINENT MEDICAL DATA    Past Medical History:   Diagnosis Date   • COPD (chronic obstructive pulmonary disease)    • Coronary artery calcification seen on CT scan 07/2012    mild calcification in the LAD with calcium score of 22. modere narrowing left subclavian origin   • Diverticulosis    • Gastroparesis    • GERD (gastroesophageal reflux disease)    • GIST (gastrointestinal stromal tumor), non-malignant    • Irritable bowel syndrome    • Low back pain    • Mild mitral regurgitation    • PAD (peripheral artery disease)     small vessel disease in feet   • Subclavian artery stenosis, left 05/2016    50 percent   • Tubular adenoma of colon 01/2017     Past Surgical History:   Procedure Laterality Date   • APPENDECTOMY     • COLONOSCOPY  10/01/2019    dr mcintyre   • ENDOSCOPY  02/2019   •  ENDOSCOPY  2023    Dr. Owen   • HEMORRHOIDECTOMY     • HYSTERECTOMY     • TONSILLECTOMY     • TOOTH EXTRACTION      8/3/21     Family History   Problem Relation Age of Onset   • Hypertension Mother    • Diabetes Mother    • Dementia Mother    • Stroke Mother    • Lung cancer Father    • Alcohol abuse Father    • COPD Father    • Breast cancer Sister    • Hypertension Sister    • Hypothyroidism Sister    • Stroke Sister    • Other Sister         AAA   • Bipolar disorder Daughter    • Fibromyalgia Daughter      Social History     Socioeconomic History   • Marital status:    Tobacco Use   • Smoking status: Former     Types: Cigarettes     Quit date: 2020     Years since quittin.6   • Smokeless tobacco: Never   • Tobacco comments:     she has smoked intermittently for 40 years; she smoked over 2 ppd twenty years ago   Vaping Use   • Vaping Use: Never used   Substance and Sexual Activity   • Alcohol use: No   • Drug use: No   • Sexual activity: Defer         Review of Systems   Musculoskeletal: Positive for back pain and neck pain.   Neurological: Positive for headaches.         Vitals:    23 1306   BP: 111/48   Pulse: 68   Resp: 16   SpO2: 96%   PainSc:   7         Objective   Physical Exam  Neck:     Musculoskeletal:         General: Tenderness present.      Lumbar back: Tenderness present. Decreased range of motion.        Legs:    Neurological:      Comments: Motor strength 5/5 b/l LE  Sensory intact b/l LE             Imaging Reviewed:  MRI done at [facility] - 2020:    L3-L4-a small broad based disc protrusion centrally.  There is moderate facet arthropathy.  This changes contribute to moderate central canal stenosis.  There is moderate right and severe left neural foraminal stenosis.  L4-L5-there is a posterior disc protrusion centrally.  Moderate facet arthropathy and mild limited flavum hypertrophy. Severe central canal stenosis.  Severe bilateral neural foraminal stenosis, right  greater than left.  L5-S1-there is small left far lateral disc protrusion.  No central canal stenosis.  There is mild facet arthropathy.  There is moderate bilateral neural foraminal stenosis.     Lumbar X-ray: 10/1/2020   Multilevel degenerative spondylosis.          Assessment:    1. Cervical spondylosis    2. DDD (degenerative disc disease), lumbar    3. Chronic left-sided low back pain with sciatica, sciatica laterality unspecified    4. Lumbar spondylosis    5. Bilateral occipital neuralgia          Plan:  1.  UDS on 1/5/2023 is consistent with patient's interview.. Narcotic contract discussed on 11/3/22. Narcan ordered on 10/9/2020.  2. Due to increased pain, increase Norco 5-325 mg TID PRN (4/24; 5/24). Discussed with the patient regarding long-term side effects of opioids including but not limited to opioid induced hormonal suppression, hyperalgesia, fatigue, weight gain, possible opioid induced altered immune system, addiction, tolerance, dependence, risk of hearing loss and elevated risk of myocardial infarction. Proper use and potential life threatening side effects of over use discussed with patient. Patient states understanding of their use and risks.  3. Continue Flexeril to 10 mg BID PRN (2 months). Common side effects of flexeril include blurred vision, dizziness or lightheadedness, drowsiness, dryness of mouth, bloated feeling or gas, indigestion, nausea or vomiting, or stomach cramps or pain, constipation, diarrhea, excitement or nervousness, frequent urination, general feeling of discomfort or illness, headache, muscle twitching, numbness, tingling, pain, or weakness in hands or feet, pounding heartbeat, problems in speaking, trembling, trouble in sleeping, unpleasant taste or other taste changes, unusual muscle weakness or unusual tiredness, especially during the first few days as your body adjusts to this medication.  4. Will obtain cervical xray to evaluate her neck pain further.   5. Patient  has pain in the head with referred pain on the top of the head. Bilateral occipital tenderness present. Discussed bilateral greater and lesser occipital nerve block. Discussed the possibility of infection, bleeding, nerve damage, headache, increased pain. Patient understands and agrees.     RTC for injections and then 3 weeks follow up.    Galileo Portillo DO  Pain Management   [facility]         INSPECT REPORT    As part of the patient's treatment plan, I may be prescribing controlled substances. The patient has been made aware of appropriate use of such medications, including potential risk of somnolence, limited ability to drive and/or work safely, and the potential for dependence or overdose. It has also been made clear that these medications are for use by this patient only, without concomitant use of alcohol or other substances unless prescribed.     Patient has completed prescribing agreement detailing terms of continued prescribing of controlled substances, including monitoring INSPECT reports, urine drug screening, and pill counts if necessary. The patient is aware that inappropriate use will results in cessation of prescribing such medications.    INSPECT report has been reviewed and scanned into the patient's chart.

## 2023-04-20 ENCOUNTER — OFFICE VISIT (OUTPATIENT)
Dept: PAIN MEDICINE | Facility: CLINIC | Age: 78
End: 2023-04-20
Payer: MEDICARE

## 2023-04-20 ENCOUNTER — TELEPHONE (OUTPATIENT)
Dept: PAIN MEDICINE | Facility: CLINIC | Age: 78
End: 2023-04-20
Payer: MEDICARE

## 2023-04-20 ENCOUNTER — HOSPITAL ENCOUNTER (OUTPATIENT)
Dept: GENERAL RADIOLOGY | Facility: HOSPITAL | Age: 78
Discharge: HOME | End: 2023-04-20
Payer: MEDICARE

## 2023-04-20 VITALS
OXYGEN SATURATION: 96 % | RESPIRATION RATE: 16 BRPM | SYSTOLIC BLOOD PRESSURE: 111 MMHG | DIASTOLIC BLOOD PRESSURE: 48 MMHG | HEART RATE: 68 BPM

## 2023-04-20 DIAGNOSIS — M47.812 CERVICAL SPONDYLOSIS: ICD-10-CM

## 2023-04-20 DIAGNOSIS — M47.812 CERVICAL SPONDYLOSIS: Primary | ICD-10-CM

## 2023-04-20 DIAGNOSIS — G89.29 CHRONIC LEFT-SIDED LOW BACK PAIN WITH SCIATICA, SCIATICA LATERALITY UNSPECIFIED: ICD-10-CM

## 2023-04-20 DIAGNOSIS — M54.40 CHRONIC LEFT-SIDED LOW BACK PAIN WITH SCIATICA, SCIATICA LATERALITY UNSPECIFIED: ICD-10-CM

## 2023-04-20 DIAGNOSIS — M51.36 DDD (DEGENERATIVE DISC DISEASE), LUMBAR: ICD-10-CM

## 2023-04-20 DIAGNOSIS — M54.81 BILATERAL OCCIPITAL NEURALGIA: ICD-10-CM

## 2023-04-20 DIAGNOSIS — M47.816 LUMBAR SPONDYLOSIS: ICD-10-CM

## 2023-04-20 PROCEDURE — 72050 X-RAY EXAM NECK SPINE 4/5VWS: CPT

## 2023-04-20 RX ORDER — ALBUTEROL SULFATE 90 UG/1
2 AEROSOL, METERED RESPIRATORY (INHALATION) EVERY 4 HOURS PRN
COMMUNITY
Start: 2023-03-28

## 2023-04-20 RX ORDER — HYDROCODONE BITARTRATE AND ACETAMINOPHEN 5; 325 MG/1; MG/1
1 TABLET ORAL 3 TIMES DAILY PRN
Qty: 90 TABLET | Refills: 0 | Status: SHIPPED | OUTPATIENT
Start: 2023-04-24 | End: 2023-05-24

## 2023-04-20 RX ORDER — HYDROCODONE BITARTRATE AND ACETAMINOPHEN 5; 325 MG/1; MG/1
1 TABLET ORAL 3 TIMES DAILY PRN
Qty: 90 TABLET | Refills: 0 | Status: SHIPPED | OUTPATIENT
Start: 2023-05-24 | End: 2023-06-23

## 2023-04-20 RX ORDER — CYCLOBENZAPRINE HCL 10 MG
10 TABLET ORAL 2 TIMES DAILY PRN
Qty: 120 TABLET | Refills: 0 | Status: SHIPPED | OUTPATIENT
Start: 2023-04-20 | End: 2023-06-19

## 2023-04-20 RX ORDER — HYDROCODONE BITARTRATE AND ACETAMINOPHEN 5; 325 MG/1; MG/1
1 TABLET ORAL 2 TIMES DAILY PRN
COMMUNITY
Start: 2023-03-25

## 2023-04-25 ENCOUNTER — HOSPITAL ENCOUNTER (OUTPATIENT)
Dept: ONCOLOGY | Facility: HOSPITAL | Age: 78
Discharge: HOME | End: 2023-04-25
Payer: MEDICARE

## 2023-04-26 ENCOUNTER — TELEPHONE (OUTPATIENT)
Dept: PAIN MEDICINE | Facility: CLINIC | Age: 78
End: 2023-04-26
Payer: MEDICARE

## 2023-04-26 NOTE — TELEPHONE ENCOUNTER
Caller: Luna Jacobson    Relationship to patient: Self    Best call back number: [phone]    Patient is needing:CX INJECTION APPT FOR 4.27.23. SHE WANTED TO BE SEDATED AND SINCE THAT ISN'T AN OPTION SHE WOULD LIKE TO CX AND JUST COME IN 6.12.23 FOR HER NORMAL FOLLOW UP

## 2023-04-26 NOTE — TELEPHONE ENCOUNTER
Caller: Luna Jacobson    Relationship: Self    Best call back number:     Who are you requesting to speak with (clinical staff, provider,  specific staff member): BEVERLY    What was the call regarding:CANCELLING  INJECTIONS    Do you require a callback: YES

## 2023-04-30 DIAGNOSIS — I10 ESSENTIAL HYPERTENSION: Chronic | ICD-10-CM

## 2023-05-01 RX ORDER — HYDRALAZINE HYDROCHLORIDE 50 MG/1
TABLET, FILM COATED ORAL
Qty: 60 TABLET | Refills: 3 | Status: SHIPPED | OUTPATIENT
Start: 2023-05-01

## 2023-05-23 DIAGNOSIS — G44.89 OTHER HEADACHE SYNDROME: ICD-10-CM

## 2023-05-23 RX ORDER — BUTALBITAL, ACETAMINOPHEN AND CAFFEINE 50; 325; 40 MG/1; MG/1; MG/1
TABLET ORAL
Qty: 30 TABLET | Refills: 0 | Status: SHIPPED | OUTPATIENT
Start: 2023-05-23

## 2023-05-30 RX ORDER — FLUTICASONE PROPIONATE 50 MCG
SPRAY, SUSPENSION (ML) NASAL
Qty: 48 G | Refills: 3 | Status: SHIPPED | OUTPATIENT
Start: 2023-05-30

## 2023-06-09 NOTE — PROGRESS NOTES
Subjective   Luna Jacobson is a 77 y.o. female is here for follow-up for lower back pain.  Last seen on 4/20/2023.  She was scheduled for occipital nerve block but canceled as patient wanted sedation.  Continues to complain of severe neck pain at the base of her neck.    Lower back pain is 4/10 on VAS, maximum 5/10.  Pain is dull, achy, sometimes sharp in nature.  Pain is not referred.  Before intermittently radiated to left lateral thigh and left ankle but resolved after caudal epidural.  Constant pain.  Improved by pain medications and caudal INMA.  Worse with walking.    Neck pain is 7/10 on VAS, maximum 8/10.  Pain is tightness in nature.  Not referred but does have numbness in bilateral hands.  Pain is constant.  Improved by pain meds and chiropractic care.  Worse with flexion of the neck.  Chiropractic care really helped in the past but last few sessions have not helped her.  + Headaches starting from occipital region radiating to the top of her head.  Daily headaches.  She was on Fioricet before.    Previous Injection:   1/26/2023-caudal NIMA with sedation- 80% pain relief with radicular pain.  11/20/2020 - Caudal NIMA - 80% pain relief - 2 years.     PMH: COPD, GERD, HLD, HTN, Thoracic aortic aneurysm      Current Medications:   Norco 5-325 mg BID PRN   Flexeril 10 mg BID PRN  Fioricet     Past Medications:       Past Modalities:  TENS:                                                                          no                                                  Physical Therapy Within The Last 6 Months              Yes- chiropractor care >6 weeks.  Psychotherapy                                                            no  Massage Therapy                                                       no     Patient Complains Of:  Uro-Fecal Incontinence          no  Weight Gain/Loss                   no  Fever/Chills                             no  Weakness                               Yes (subjective weakness  in left leg)            Current Outpatient Medications:     albuterol sulfate  (90 Base) MCG/ACT inhaler, Inhale 2 puffs Every 4 (Four) Hours As Needed., Disp: , Rfl:     atorvastatin (LIPITOR) 40 MG tablet, TAKE 1 TABLET BY MOUTH DAILY, Disp: 90 tablet, Rfl: 1    butalbital-acetaminophen-caffeine (FIORICET, ESGIC) -40 MG per tablet, TAKE 1 TABLET BY MOUTH EVERY 12 HOURS AS NEEDED FOR HEADACHE, Disp: 30 tablet, Rfl: 0    Calcium Carbonate-Vitamin D 500-125 MG-UNIT tablet, Take  by mouth., Disp: , Rfl:     carvedilol (COREG) 12.5 MG tablet, Take 1 tablet by mouth 2 (Two) Times a Day With Meals., Disp: 180 tablet, Rfl: 3    clobetasol (TEMOVATE) 0.05 % external solution, APPLY TOPICALLY TO THE SCALP EVERY DAY IN THE MORNING AND IN THE EVENING, Disp: , Rfl:     cloNIDine (Catapres) 0.1 MG tablet, Take 1 tablet by mouth Every Night., Disp: 90 tablet, Rfl: 1    coenzyme Q10 100 MG capsule, Take 1 capsule by mouth Daily., Disp: , Rfl:     cyclobenzaprine (FLEXERIL) 10 MG tablet, Take 1 tablet by mouth 2 (Two) Times a Day As Needed for Muscle Spasms for up to 60 days., Disp: 120 tablet, Rfl: 0    dicyclomine (BENTYL) 20 MG tablet, Take 1 tablet by mouth Daily., Disp: , Rfl:     FeroSul 325 (65 Fe) MG tablet, TAKE 1 TABLET BY MOUTH DAILY, Disp: 30 tablet, Rfl: 2    fexofenadine (ALLEGRA) 180 MG tablet, Take 1 tablet by mouth Daily., Disp: , Rfl:     fluticasone (FLONASE) 50 MCG/ACT nasal spray, SHAKE LIQUID AND USE 2 SPRAYS IN EACH NOSTRIL EVERY DAY, Disp: 48 g, Rfl: 3    hydrALAZINE (APRESOLINE) 50 MG tablet, TAKE 1 TABLET BY MOUTH TWICE DAILY, Disp: 60 tablet, Rfl: 3    HYDROcodone-acetaminophen (NORCO) 5-325 MG per tablet, Take 1 tablet by mouth 2 (Two) Times a Day As Needed., Disp: , Rfl:     [START ON 6/23/2023] HYDROcodone-acetaminophen (NORCO) 5-325 MG per tablet, Take 1 tablet by mouth 3 (Three) Times a Day As Needed for Moderate Pain for up to 30 days., Disp: 90 tablet, Rfl: 0    [START ON 7/23/2023]  HYDROcodone-acetaminophen (NORCO) 5-325 MG per tablet, Take 1 tablet by mouth 3 (Three) Times a Day As Needed for Moderate Pain for up to 30 days., Disp: 90 tablet, Rfl: 0    ketoconazole (NIZORAL) 2 % shampoo, , Disp: , Rfl: 3    levothyroxine (Synthroid) 100 MCG tablet, Take 1 tablet by mouth Daily., Disp: 90 tablet, Rfl: 0    Multiple Vitamin (MULTIVITAMIN) tablet, Take 1 tablet by mouth Daily., Disp: , Rfl:     pantoprazole (PROTONIX) 40 MG EC tablet, Take 1 tablet by mouth Daily., Disp: , Rfl:     PARoxetine (PAXIL) 40 MG tablet, Take 1 tablet by mouth Every Morning., Disp: , Rfl:     polyethylene glycol (MIRALAX) powder, , Disp: , Rfl:     tiotropium (Spiriva HandiHaler) 18 MCG per inhalation capsule, Place 1 capsule into inhaler and inhale Daily., Disp: 30 capsule, Rfl: 5    varenicline (CHANTIX) 1 MG tablet, Take 1 tablet by mouth 2 (Two) Times a Day., Disp: 60 tablet, Rfl: 5    The following portions of the patient's history were reviewed and updated as appropriate: allergies, current medications, past family history, past medical history, past social history, past surgical history and problem list.      REVIEW OF PERTINENT MEDICAL DATA    Past Medical History:   Diagnosis Date    COPD (chronic obstructive pulmonary disease)     Coronary artery calcification seen on CT scan 07/2012    mild calcification in the LAD with calcium score of 22. modere narrowing left subclavian origin    Diverticulosis     Gastroparesis     GERD (gastroesophageal reflux disease)     GIST (gastrointestinal stromal tumor), non-malignant     Irritable bowel syndrome     Low back pain     Mild mitral regurgitation     PAD (peripheral artery disease)     small vessel disease in feet    Subclavian artery stenosis, left 05/2016    50 percent    Tubular adenoma of colon 01/2017     Past Surgical History:   Procedure Laterality Date    APPENDECTOMY      COLONOSCOPY  10/01/2019    dr mcintyre    ENDOSCOPY  02/2019    ENDOSCOPY  02/01/2023     Dr. Owen    HEMORRHOIDECTOMY      HYSTERECTOMY      TONSILLECTOMY      TOOTH EXTRACTION      8/3/21     Family History   Problem Relation Age of Onset    Hypertension Mother     Diabetes Mother     Dementia Mother     Stroke Mother     Lung cancer Father     Alcohol abuse Father     COPD Father     Breast cancer Sister     Hypertension Sister     Hypothyroidism Sister     Stroke Sister     Other Sister         AAA    Bipolar disorder Daughter     Fibromyalgia Daughter      Social History     Socioeconomic History    Marital status:    Tobacco Use    Smoking status: Former     Types: Cigarettes     Quit date: 2020     Years since quittin.8    Smokeless tobacco: Never    Tobacco comments:     she has smoked intermittently for 40 years; she smoked over 2 ppd twenty years ago   Vaping Use    Vaping Use: Never used   Substance and Sexual Activity    Alcohol use: No    Drug use: No    Sexual activity: Defer         Review of Systems   Musculoskeletal:  Positive for back pain and neck pain.   Neurological:  Positive for headaches.       Vitals:    23 1453   BP: 142/61   Pulse: 61   Resp: 16   SpO2: 96%   PainSc:   4           Objective   Physical Exam  Neck:     Musculoskeletal:         General: Tenderness present.      Lumbar back: Tenderness present. Decreased range of motion.        Legs:    Neurological:      Comments: Motor strength 5/5 b/l LE  Sensory intact b/l LE           Imaging Reviewed:  MRI done at [facility] - 2020:    L3-L4-a small broad based disc protrusion centrally.  There is moderate facet arthropathy.  This changes contribute to moderate central canal stenosis.  There is moderate right and severe left neural foraminal stenosis.  L4-L5-there is a posterior disc protrusion centrally.  Moderate facet arthropathy and mild limited flavum hypertrophy. Severe central canal stenosis.  Severe bilateral neural foraminal stenosis, right greater than left.  L5-S1-there is small left far  lateral disc protrusion.  No central canal stenosis.  There is mild facet arthropathy.  There is moderate bilateral neural foraminal stenosis.     Lumbar X-ray: 10/1/2020   Multilevel degenerative spondylosis.      Cervical x-ray-4/20/2023  - Mild degenerative disc and endplate changes in cervical spine most notable at C4-5 through C6-7  - Mild right C4-5 facet arthropathy    Assessment:    1. Bilateral occipital neuralgia    2. Cervical spondylosis    3. Lumbar spondylosis    4. DDD (degenerative disc disease), lumbar    5. Chronic left-sided low back pain with sciatica, sciatica laterality unspecified          Plan:  1.  UDS on 1/5/2023 is consistent with patient's interview.. Narcotic contract discussed on 11/3/22. Narcan ordered on 10/9/2020.  2. Due to increased pain, continue Norco 5-325 mg TID PRN (6/23; 7/23). Discussed with the patient regarding long-term side effects of opioids including but not limited to opioid induced hormonal suppression, hyperalgesia, fatigue, weight gain, possible opioid induced altered immune system, addiction, tolerance, dependence, risk of hearing loss and elevated risk of myocardial infarction. Proper use and potential life threatening side effects of over use discussed with patient. Patient states understanding of their use and risks.  3. Continue Flexeril to 10 mg BID PRN (2 months). Common side effects of flexeril include blurred vision, dizziness or lightheadedness, drowsiness, dryness of mouth, bloated feeling or gas, indigestion, nausea or vomiting, or stomach cramps or pain, constipation, diarrhea, excitement or nervousness, frequent urination, general feeling of discomfort or illness, headache, muscle twitching, numbness, tingling, pain, or weakness in hands or feet, pounding heartbeat, problems in speaking, trembling, trouble in sleeping, unpleasant taste or other taste changes, unusual muscle weakness or unusual tiredness, especially during the first few days as your  body adjusts to this medication.  4. . Patient has pain in the head with referred pain on the top of the head. Bilateral occipital tenderness present. Discussed bilateral greater and lesser occipital nerve block. Discussed the possibility of infection, bleeding, nerve damage, headache, increased pain. Patient understands and agrees.  Discussed with patient that it is not appropriate to do IV sedation for minor procedure like occipital nerve block.  I did offer Xanax to be taken before the procedure but patient is not sure and would like to think about it.    RTC in 2 months.    Galileo Portillo DO  Pain Management   [facility]         INSPECT REPORT    As part of the patient's treatment plan, I may be prescribing controlled substances. The patient has been made aware of appropriate use of such medications, including potential risk of somnolence, limited ability to drive and/or work safely, and the potential for dependence or overdose. It has also been made clear that these medications are for use by this patient only, without concomitant use of alcohol or other substances unless prescribed.     Patient has completed prescribing agreement detailing terms of continued prescribing of controlled substances, including monitoring INSPECT reports, urine drug screening, and pill counts if necessary. The patient is aware that inappropriate use will results in cessation of prescribing such medications.    INSPECT report has been reviewed and scanned into the patient's chart.

## 2023-06-12 ENCOUNTER — OFFICE VISIT (OUTPATIENT)
Dept: PAIN MEDICINE | Facility: CLINIC | Age: 78
End: 2023-06-12
Payer: MEDICARE

## 2023-06-12 VITALS
OXYGEN SATURATION: 96 % | HEART RATE: 61 BPM | DIASTOLIC BLOOD PRESSURE: 61 MMHG | SYSTOLIC BLOOD PRESSURE: 142 MMHG | RESPIRATION RATE: 16 BRPM

## 2023-06-12 DIAGNOSIS — M47.812 CERVICAL SPONDYLOSIS: ICD-10-CM

## 2023-06-12 DIAGNOSIS — G89.29 CHRONIC LEFT-SIDED LOW BACK PAIN WITH SCIATICA, SCIATICA LATERALITY UNSPECIFIED: ICD-10-CM

## 2023-06-12 DIAGNOSIS — M54.40 CHRONIC LEFT-SIDED LOW BACK PAIN WITH SCIATICA, SCIATICA LATERALITY UNSPECIFIED: ICD-10-CM

## 2023-06-12 DIAGNOSIS — M54.81 BILATERAL OCCIPITAL NEURALGIA: Primary | ICD-10-CM

## 2023-06-12 DIAGNOSIS — M51.36 DDD (DEGENERATIVE DISC DISEASE), LUMBAR: ICD-10-CM

## 2023-06-12 DIAGNOSIS — M47.816 LUMBAR SPONDYLOSIS: ICD-10-CM

## 2023-06-12 PROCEDURE — 3078F DIAST BP <80 MM HG: CPT | Performed by: STUDENT IN AN ORGANIZED HEALTH CARE EDUCATION/TRAINING PROGRAM

## 2023-06-12 PROCEDURE — 1125F AMNT PAIN NOTED PAIN PRSNT: CPT | Performed by: STUDENT IN AN ORGANIZED HEALTH CARE EDUCATION/TRAINING PROGRAM

## 2023-06-12 PROCEDURE — 99214 OFFICE O/P EST MOD 30 MIN: CPT | Performed by: STUDENT IN AN ORGANIZED HEALTH CARE EDUCATION/TRAINING PROGRAM

## 2023-06-12 PROCEDURE — 3077F SYST BP >= 140 MM HG: CPT | Performed by: STUDENT IN AN ORGANIZED HEALTH CARE EDUCATION/TRAINING PROGRAM

## 2023-06-12 RX ORDER — CYCLOBENZAPRINE HCL 10 MG
10 TABLET ORAL 2 TIMES DAILY PRN
Qty: 120 TABLET | Refills: 0 | Status: SHIPPED | OUTPATIENT
Start: 2023-06-12 | End: 2023-08-11

## 2023-06-12 RX ORDER — HYDROCODONE BITARTRATE AND ACETAMINOPHEN 5; 325 MG/1; MG/1
1 TABLET ORAL 3 TIMES DAILY PRN
Qty: 90 TABLET | Refills: 0 | Status: SHIPPED | OUTPATIENT
Start: 2023-07-23 | End: 2023-08-22

## 2023-06-12 RX ORDER — HYDROCODONE BITARTRATE AND ACETAMINOPHEN 5; 325 MG/1; MG/1
1 TABLET ORAL 3 TIMES DAILY PRN
Qty: 90 TABLET | Refills: 0 | Status: SHIPPED | OUTPATIENT
Start: 2023-06-23 | End: 2023-07-23

## 2023-06-18 DIAGNOSIS — D50.0 IRON DEFICIENCY ANEMIA DUE TO CHRONIC BLOOD LOSS: ICD-10-CM

## 2023-06-19 RX ORDER — FERROUS SULFATE 325(65) MG
325 TABLET ORAL DAILY
Qty: 30 TABLET | Refills: 2 | Status: SHIPPED | OUTPATIENT
Start: 2023-06-19

## 2023-06-21 ENCOUNTER — OFFICE (AMBULATORY)
Dept: URBAN - METROPOLITAN AREA CLINIC 64 | Facility: CLINIC | Age: 78
End: 2023-06-21

## 2023-06-21 VITALS
DIASTOLIC BLOOD PRESSURE: 82 MMHG | WEIGHT: 150.8 LBS | SYSTOLIC BLOOD PRESSURE: 149 MMHG | HEART RATE: 64 BPM | HEIGHT: 63 IN

## 2023-06-21 DIAGNOSIS — K21.9 GASTRO-ESOPHAGEAL REFLUX DISEASE WITHOUT ESOPHAGITIS: ICD-10-CM

## 2023-06-21 DIAGNOSIS — D50.0 IRON DEFICIENCY ANEMIA SECONDARY TO BLOOD LOSS (CHRONIC): ICD-10-CM

## 2023-06-21 PROCEDURE — 99213 OFFICE O/P EST LOW 20 MIN: CPT | Performed by: INTERNAL MEDICINE

## 2023-06-21 RX ORDER — PANTOPRAZOLE SODIUM 40 MG/1
TABLET, DELAYED RELEASE ORAL
Qty: 90 | Refills: 0 | Status: ACTIVE

## 2023-07-27 ENCOUNTER — OFFICE VISIT (OUTPATIENT)
Dept: INTERNAL MEDICINE | Facility: CLINIC | Age: 78
End: 2023-07-27
Payer: MEDICARE

## 2023-07-27 VITALS
HEIGHT: 62 IN | SYSTOLIC BLOOD PRESSURE: 126 MMHG | TEMPERATURE: 97.1 F | WEIGHT: 146.2 LBS | BODY MASS INDEX: 26.91 KG/M2 | DIASTOLIC BLOOD PRESSURE: 80 MMHG

## 2023-07-27 DIAGNOSIS — M81.0 AGE-RELATED OSTEOPOROSIS WITHOUT CURRENT PATHOLOGICAL FRACTURE: Chronic | ICD-10-CM

## 2023-07-27 DIAGNOSIS — Z12.31 ENCOUNTER FOR SCREENING MAMMOGRAM FOR MALIGNANT NEOPLASM OF BREAST: ICD-10-CM

## 2023-07-27 DIAGNOSIS — E03.8 OTHER SPECIFIED HYPOTHYROIDISM: Chronic | ICD-10-CM

## 2023-07-27 DIAGNOSIS — Z00.00 ENCOUNTER FOR SUBSEQUENT ANNUAL WELLNESS VISIT (AWV) IN MEDICARE PATIENT: Primary | ICD-10-CM

## 2023-07-27 DIAGNOSIS — R73.03 PREDIABETES: ICD-10-CM

## 2023-07-27 DIAGNOSIS — I71.23 ANEURYSM OF DESCENDING THORACIC AORTA WITHOUT RUPTURE: ICD-10-CM

## 2023-07-27 DIAGNOSIS — I10 ESSENTIAL HYPERTENSION: Chronic | ICD-10-CM

## 2023-07-27 DIAGNOSIS — D50.0 IRON DEFICIENCY ANEMIA DUE TO CHRONIC BLOOD LOSS: ICD-10-CM

## 2023-07-27 RX ORDER — TIOTROPIUM BROMIDE AND OLODATEROL 3.124; 2.736 UG/1; UG/1
SPRAY, METERED RESPIRATORY (INHALATION)
COMMUNITY

## 2023-07-27 NOTE — PROGRESS NOTES
The ABCs of the Annual Wellness Visit  Subsequent Medicare Wellness Visit    Subjective    Luna Jacobson is a 77 y.o. female who presents for a Subsequent Medicare Wellness Visit.  She has decreased her cigarettes to one qod. She has no issues with Chantix. Her BP has been 117-171/77-99. She has not been exercising. She had a trigger finger injection. She does not add salt to her food. She thinks her breathing is doing well. She does not use her inhaler  daily.  The following portions of the patient's history were reviewed and   updated as appropriate: allergies, current medications, past family history, past medical history, past social history, past surgical history, and problem list.    Compared to one year ago, the patient feels her physical   health is the same.    Compared to one year ago, the patient feels her mental   health is the same.    Recent Hospitalizations:  She was not admitted to the hospital during the last year.       Current Medical Providers:  Patient Care Team:  Danish Serrano MD as PCP - General (Internal Medicine)  Danish Serrano MD as PCP - Family Medicine  Galileo Portillo DO as Consulting Physician (Anesthesiology)  Adelita Koenig MD as Consulting Physician (Cardiology)    Outpatient Medications Prior to Visit   Medication Sig Dispense Refill    albuterol sulfate  (90 Base) MCG/ACT inhaler Inhale 2 puffs Every 4 (Four) Hours As Needed.      atorvastatin (LIPITOR) 40 MG tablet TAKE 1 TABLET BY MOUTH DAILY 90 tablet 1    butalbital-acetaminophen-caffeine (FIORICET, ESGIC) -40 MG per tablet TAKE 1 TABLET BY MOUTH EVERY 12 HOURS AS NEEDED FOR HEADACHE 30 tablet 0    Calcium Carbonate-Vitamin D 500-125 MG-UNIT tablet Take  by mouth.      carvedilol (COREG) 12.5 MG tablet Take 1 tablet by mouth 2 (Two) Times a Day With Meals. 180 tablet 3    clobetasol (TEMOVATE) 0.05 % external solution APPLY TOPICALLY TO THE SCALP EVERY DAY IN THE MORNING AND IN THE EVENING       cloNIDine (CATAPRES) 0.1 MG tablet TAKE 1 TABLET BY MOUTH EVERY NIGHT 90 tablet 1    coenzyme Q10 100 MG capsule Take 1 capsule by mouth Daily.      cyclobenzaprine (FLEXERIL) 10 MG tablet Take 1 tablet by mouth 2 (Two) Times a Day As Needed for Muscle Spasms for up to 60 days. 120 tablet 0    dicyclomine (BENTYL) 20 MG tablet Take 1 tablet by mouth Daily.      FeroSul 325 (65 Fe) MG tablet TAKE 1 TABLET BY MOUTH DAILY 30 tablet 2    fexofenadine (ALLEGRA) 180 MG tablet Take 1 tablet by mouth Daily.      fluticasone (FLONASE) 50 MCG/ACT nasal spray SHAKE LIQUID AND USE 2 SPRAYS IN EACH NOSTRIL EVERY DAY 48 g 3    hydrALAZINE (APRESOLINE) 50 MG tablet TAKE 1 TABLET BY MOUTH TWICE DAILY 60 tablet 3    HYDROcodone-acetaminophen (NORCO) 5-325 MG per tablet Take 1 tablet by mouth 3 (Three) Times a Day As Needed for Moderate Pain for up to 30 days. 90 tablet 0    ketoconazole (NIZORAL) 2 % shampoo   3    levothyroxine (SYNTHROID, LEVOTHROID) 100 MCG tablet TAKE 1 TABLET BY MOUTH DAILY 90 tablet 0    Multiple Vitamin (MULTIVITAMIN) tablet Take 1 tablet by mouth Daily.      pantoprazole (PROTONIX) 40 MG EC tablet Take 1 tablet by mouth Daily.      polyethylene glycol (MIRALAX) powder       varenicline (CHANTIX) 1 MG tablet Take 1 tablet by mouth 2 (Two) Times a Day. 60 tablet 5    HYDROcodone-acetaminophen (NORCO) 5-325 MG per tablet Take 1 tablet by mouth 2 (Two) Times a Day As Needed.      tiotropium (Spiriva HandiHaler) 18 MCG per inhalation capsule Place 1 capsule into inhaler and inhale Daily. 30 capsule 5    tiotropium bromide-olodaterol (Stiolto Respimat) 2.5-2.5 MCG/ACT aerosol solution inhaler Inhale Daily.      PARoxetine (PAXIL) 40 MG tablet Take 1 tablet by mouth Every Morning. (Patient not taking: Reported on 7/27/2023)       No facility-administered medications prior to visit.       Opioid medication/s are on active medication list.  and I have evaluated her active treatment plan and pain score trends (see  "table).  There were no vitals filed for this visit.  I have reviewed the chart for potential of high risk medication and harmful drug interactions in the elderly.          Aspirin is not on active medication list.  Aspirin use is not indicated based on review of current medical condition/s. Risk of harm outweighs potential benefits.  .    Patient Active Problem List   Diagnosis    Essential hypertension    Other hyperlipidemia    Other specified hypothyroidism    Other headache syndrome    Prediabetes    Iron deficiency anemia due to chronic blood loss    Thoracic aortic aneurysm    Microscopic colitis    Myalgia    DDD (degenerative disc disease), lumbar    Recurrent major depressive disorder, in partial remission    Moderate aortic regurgitation    Diffusion capacity of lung (dl), decreased    Age-related osteoporosis without current pathological fracture     Advance Care Planning   Advance Care Planning     Advance Directive is not on file.  ACP discussion was held with the patient during this visit. Patient has an advance directive (not in EMR), copy requested.     Objective    Vitals:    23 1423   BP: 126/80   Temp: 97.1 °F (36.2 °C)   TempSrc: Temporal   Weight: 66.3 kg (146 lb 3.2 oz)   Height: 157.5 cm (62.01\")     Estimated body mass index is 26.73 kg/m² as calculated from the following:    Height as of this encounter: 157.5 cm (62.01\").    Weight as of this encounter: 66.3 kg (146 lb 3.2 oz).    BMI is >= 25 and <30. (Overweight) The following options were offered after discussion;: exercise counseling/recommendations      Does the patient have evidence of cognitive impairment? No          HEALTH RISK ASSESSMENT    Smoking Status:  Social History     Tobacco Use   Smoking Status Former    Types: Cigarettes    Quit date: 2020    Years since quittin.9   Smokeless Tobacco Never   Tobacco Comments    she has smoked intermittently for 40 years; she smoked over 2 ppd twenty years ago     Alcohol " Consumption:  Social History     Substance and Sexual Activity   Alcohol Use No     Fall Risk Screen:    SARABJITADI Fall Risk Assessment was completed, and patient is at LOW risk for falls.Assessment completed on:2023    Depression Screenin/27/2023     2:29 PM   PHQ-2/PHQ-9 Depression Screening   Little Interest or Pleasure in Doing Things 0-->not at all   Feeling Down, Depressed or Hopeless 0-->not at all   PHQ-9: Brief Depression Severity Measure Score 0       Health Habits and Functional and Cognitive Screenin/27/2023     2:28 PM   Functional & Cognitive Status   Do you have difficulty preparing food and eating? No   Do you have difficulty bathing yourself, getting dressed or grooming yourself? No   Do you have difficulty using the toilet? No   Do you have difficulty moving around from place to place? No   Do you have trouble with steps or getting out of a bed or a chair? No   Current Diet Limited Junk Food   Dental Exam Up to date   Eye Exam Not up to date   Exercise (times per week) 4 times per week   Current Exercises Include Walking;House Cleaning   Do you need help using the phone?  No   Are you deaf or do you have serious difficulty hearing?  No   Do you need help to go to places out of walking distance? No   Do you need help shopping? No   Do you need help preparing meals?  No   Do you need help with housework?  No   Do you need help with laundry? No   Do you need help taking your medications? No   Do you need help managing money? No   Do you ever drive or ride in a car without wearing a seat belt? No   Have you felt unusual stress, anger or loneliness in the last month? No   Who do you live with? Spouse   If you need help, do you have trouble finding someone available to you? No   Have you been bothered in the last four weeks by sexual problems? No   Do you have difficulty concentrating, remembering or making decisions? No       Age-appropriate Screening Schedule:  Refer to the list  "below for future screening recommendations based on patient's age, sex and/or medical conditions. Orders for these recommended tests are listed in the plan section. The patient has been provided with a written plan.    Health Maintenance   Topic Date Due    ZOSTER VACCINE (2 of 3) 10/20/2010    COVID-19 Vaccine (3 - Pfizer series) 04/25/2021    TDAP/TD VACCINES (2 - Td or Tdap) 01/01/2022    INFLUENZA VACCINE  10/01/2023    LIPID PANEL  03/07/2024    ANNUAL WELLNESS VISIT  07/27/2024    DXA SCAN  10/27/2024    HEPATITIS C SCREENING  Completed    Pneumococcal Vaccine 65+  Discontinued    COLORECTAL CANCER SCREENING  Discontinued                  CMS Preventative Services Quick Reference  Risk Factors Identified During Encounter  Immunizations Discussed/Encouraged: Tdap and Shingrix  The above risks/problems have been discussed with the patient.  Pertinent information has been shared with the patient in the After Visit Summary.  An After Visit Summary and PPPS were made available to the patient.    Follow Up:   Next Medicare Wellness visit to be scheduled in 1 year.       Additional E&M Note during same encounter follows:  Patient has multiple medical problems which are significant and separately identifiable that require additional work above and beyond the Medicare Wellness Visit.      Chief Complaint  Medicare Wellness-subsequent    Subjective        HPI  Luna Jacobson is also being seen today for Medicare Wellness and HTN  She has decreased her cigarettes to one qod. She has no issues with Chantix. Her BP has been 117-171/77-99. She has not been exercising. She had a trigger finger injection. She does not add salt to her food. She thinks her breathing is doing well. She does not use her inhaler  daily.       Objective   Vital Signs:  /80   Temp 97.1 °F (36.2 °C) (Temporal)   Ht 157.5 cm (62.01\")   Wt 66.3 kg (146 lb 3.2 oz)   BMI 26.73 kg/m²     Physical Exam  Vitals reviewed.   Constitutional:  "      Appearance: She is well-developed.   HENT:      Head: Normocephalic and atraumatic.   Cardiovascular:      Rate and Rhythm: Normal rate and regular rhythm.      Heart sounds: Normal heart sounds, S1 normal and S2 normal.    with a grade of 1/6.   Pulmonary:      Effort: Pulmonary effort is normal.      Breath sounds: Normal breath sounds.   Skin:     General: Skin is warm.   Neurological:      Mental Status: She is alert.   Psychiatric:         Behavior: Behavior normal.                       Assessment and Plan   Diagnoses and all orders for this visit:    1. Subsequent medicare wellness visit (Primary)    2. Essential hypertension  Comments:  BP good here today. She will take an extra clonidine prn  Orders:  -     Comprehensive Metabolic Panel; Future  -     Lipid Panel With / Chol / HDL Ratio; Future    3. Prediabetes  -     Hemoglobin A1c; Future    4. Other specified hypothyroidism  -     TSH; Future    5. Iron deficiency anemia due to chronic blood loss  Comments:  labs today  Orders:  -     CBC & Differential; Future  -     Ferritin; Future    6. Age-related osteoporosis without current pathological fracture  Comments:  To get reclast    7. Encounter for screening mammogram for malignant neoplasm of breast  -     Mammo Screening Bilateral With CAD; Future    8. Aneurysm of descending thoracic aorta without rupture  -     CT Angiogram Chest With Contrast; Future             Follow Up   Return in about 6 months (around 1/27/2024), or 30 minutes, for Lab Today, Lab Before FUP.  Patient was given instructions and counseling regarding her condition or for health maintenance advice. Please see specific information pulled into the AVS if appropriate.         She has Reclast scheduled. Labs today including TSH, CBC, A1c, ferritin and bmp. I am not inclined to change BP meds today. She has significantly reduced her tobacco use.

## 2023-07-31 ENCOUNTER — TELEPHONE (OUTPATIENT)
Dept: INTERNAL MEDICINE | Facility: CLINIC | Age: 78
End: 2023-07-31

## 2023-07-31 NOTE — TELEPHONE ENCOUNTER
Hub staff attempted to follow warm transfer process and was unsuccessful     Caller: Luna Jacobson    Relationship to patient: Self      Patient is needing: PATIENT STATES SHE HAS MISSED A CALL TODAY AND FRIDAY WITH SOMEONE FROM THE OFFICE CALLING HER WITH LAB RESULTS.  PATIENT CAN BE REACHED  [phone]

## 2023-08-02 ENCOUNTER — TELEPHONE (OUTPATIENT)
Dept: ONCOLOGY | Facility: HOSPITAL | Age: 78
End: 2023-08-02
Payer: MEDICARE

## 2023-08-03 ENCOUNTER — HOSPITAL ENCOUNTER (OUTPATIENT)
Dept: ONCOLOGY | Facility: HOSPITAL | Age: 78
Discharge: HOME | End: 2023-08-03
Admitting: INTERNAL MEDICINE
Payer: MEDICARE

## 2023-08-03 VITALS
DIASTOLIC BLOOD PRESSURE: 70 MMHG | WEIGHT: 147.1 LBS | SYSTOLIC BLOOD PRESSURE: 157 MMHG | BODY MASS INDEX: 26.9 KG/M2 | HEART RATE: 62 BPM | OXYGEN SATURATION: 97 % | TEMPERATURE: 97.7 F

## 2023-08-03 DIAGNOSIS — M81.0 AGE-RELATED OSTEOPOROSIS WITHOUT CURRENT PATHOLOGICAL FRACTURE: Primary | ICD-10-CM

## 2023-08-03 LAB
ALBUMIN SERPL-MCNC: 3.9 G/DL (ref 3.5–5.2)
ALBUMIN/GLOB SERPL: 1.6 G/DL
ALP SERPL-CCNC: 100 U/L (ref 39–117)
ALT SERPL W P-5'-P-CCNC: 19 U/L (ref 1–33)
ANION GAP SERPL CALCULATED.3IONS-SCNC: 12 MMOL/L (ref 5–15)
AST SERPL-CCNC: 22 U/L (ref 1–32)
BASOPHILS # BLD AUTO: 0.03 10*3/MM3 (ref 0–0.2)
BASOPHILS NFR BLD AUTO: 0.4 % (ref 0–1.5)
BILIRUB SERPL-MCNC: 0.2 MG/DL (ref 0–1.2)
BUN SERPL-MCNC: 16 MG/DL (ref 8–23)
BUN/CREAT SERPL: 17.2 (ref 7–25)
CALCIUM SPEC-SCNC: 8.6 MG/DL (ref 8.6–10.5)
CHLORIDE SERPL-SCNC: 101 MMOL/L (ref 98–107)
CO2 SERPL-SCNC: 22 MMOL/L (ref 22–29)
CREAT SERPL-MCNC: 0.93 MG/DL (ref 0.57–1)
DEPRECATED RDW RBC AUTO: 44.9 FL (ref 37–54)
EGFRCR SERPLBLD CKD-EPI 2021: 63.8 ML/MIN/1.73
EOSINOPHIL # BLD AUTO: 0.17 10*3/MM3 (ref 0–0.4)
EOSINOPHIL NFR BLD AUTO: 2.3 % (ref 0.3–6.2)
ERYTHROCYTE [DISTWIDTH] IN BLOOD BY AUTOMATED COUNT: 13.6 % (ref 12.3–15.4)
GLOBULIN UR ELPH-MCNC: 2.4 GM/DL
GLUCOSE SERPL-MCNC: 157 MG/DL (ref 65–99)
HCT VFR BLD AUTO: 35.1 % (ref 34–46.6)
HGB BLD-MCNC: 11.8 G/DL (ref 12–15.9)
LYMPHOCYTES # BLD AUTO: 2.07 10*3/MM3 (ref 0.7–3.1)
LYMPHOCYTES NFR BLD AUTO: 27.7 % (ref 19.6–45.3)
MAGNESIUM SERPL-MCNC: 1.9 MG/DL (ref 1.6–2.4)
MCH RBC QN AUTO: 31.5 PG (ref 26.6–33)
MCHC RBC AUTO-ENTMCNC: 33.6 G/DL (ref 31.5–35.7)
MCV RBC AUTO: 93.6 FL (ref 79–97)
MONOCYTES # BLD AUTO: 0.74 10*3/MM3 (ref 0.1–0.9)
MONOCYTES NFR BLD AUTO: 9.9 % (ref 5–12)
NEUTROPHILS NFR BLD AUTO: 4.46 10*3/MM3 (ref 1.7–7)
NEUTROPHILS NFR BLD AUTO: 59.7 % (ref 42.7–76)
PHOSPHATE SERPL-MCNC: 3.2 MG/DL (ref 2.5–4.5)
PLATELET # BLD AUTO: 221 10*3/MM3 (ref 140–450)
PMV BLD AUTO: 9.6 FL (ref 6–12)
POTASSIUM SERPL-SCNC: 3.8 MMOL/L (ref 3.5–5.2)
PROT SERPL-MCNC: 6.3 G/DL (ref 6–8.5)
RBC # BLD AUTO: 3.75 10*6/MM3 (ref 3.77–5.28)
SODIUM SERPL-SCNC: 135 MMOL/L (ref 136–145)
WBC NRBC COR # BLD: 7.47 10*3/MM3 (ref 3.4–10.8)

## 2023-08-03 PROCEDURE — 83735 ASSAY OF MAGNESIUM: CPT | Performed by: INTERNAL MEDICINE

## 2023-08-03 PROCEDURE — 25010000002 ZOLEDRONIC ACID 5 MG/100ML SOLUTION: Performed by: INTERNAL MEDICINE

## 2023-08-03 PROCEDURE — 96365 THER/PROPH/DIAG IV INF INIT: CPT

## 2023-08-03 PROCEDURE — 80053 COMPREHEN METABOLIC PANEL: CPT | Performed by: INTERNAL MEDICINE

## 2023-08-03 PROCEDURE — 85025 COMPLETE CBC W/AUTO DIFF WBC: CPT | Performed by: INTERNAL MEDICINE

## 2023-08-03 PROCEDURE — 96374 THER/PROPH/DIAG INJ IV PUSH: CPT

## 2023-08-03 PROCEDURE — 84100 ASSAY OF PHOSPHORUS: CPT | Performed by: INTERNAL MEDICINE

## 2023-08-03 RX ORDER — ZOLEDRONIC ACID 5 MG/100ML
5 INJECTION, SOLUTION INTRAVENOUS ONCE
Status: CANCELLED | OUTPATIENT
Start: 2024-08-02

## 2023-08-03 RX ORDER — SODIUM CHLORIDE 9 MG/ML
250 INJECTION, SOLUTION INTRAVENOUS ONCE
OUTPATIENT
Start: 2024-08-02

## 2023-08-03 RX ORDER — SODIUM CHLORIDE 9 MG/ML
250 INJECTION, SOLUTION INTRAVENOUS ONCE
Status: COMPLETED | OUTPATIENT
Start: 2023-08-03 | End: 2023-08-03

## 2023-08-03 RX ORDER — ZOLEDRONIC ACID 5 MG/100ML
5 INJECTION, SOLUTION INTRAVENOUS ONCE
Status: COMPLETED | OUTPATIENT
Start: 2023-08-03 | End: 2023-08-03

## 2023-08-03 RX ADMIN — ZOLEDRONIC ACID 5 MG: 0.05 INJECTION, SOLUTION INTRAVENOUS at 12:24

## 2023-08-03 RX ADMIN — SODIUM CHLORIDE 250 ML: 9 INJECTION, SOLUTION INTRAVENOUS at 12:22

## 2023-08-04 DIAGNOSIS — G44.89 OTHER HEADACHE SYNDROME: ICD-10-CM

## 2023-08-04 RX ORDER — BUTALBITAL, ACETAMINOPHEN AND CAFFEINE 50; 325; 40 MG/1; MG/1; MG/1
TABLET ORAL
Qty: 30 TABLET | Refills: 2 | Status: SHIPPED | OUTPATIENT
Start: 2023-08-04

## 2023-08-09 DIAGNOSIS — Z12.31 ENCOUNTER FOR SCREENING MAMMOGRAM FOR MALIGNANT NEOPLASM OF BREAST: Primary | ICD-10-CM

## 2023-08-09 NOTE — PROGRESS NOTES
Subjective   Luna Jacobson is a 77 y.o. female is here for follow-up for lower back pain.  Last seen on 6/12/2023. Some increased pain in lower back. Patient thinks that this is due to stress with  having memory issues.       Lower back pain is 4/10 VAS, maximum 5/10.  Pain is dull, aching, sometimes sharp in nature.  Not referred.  Before intermittently radiated to left lateral thigh and left ankle resolved after caudal epidural.  Constant pain but improved with pain medications and caudal NIMA.  Worse with walking.      Neck pain is 7/10 on VAS, maximum 8/10.  Pain is tightness in nature.  Not referred but does have numbness in bilateral hands.  Pain is constant.  Improved by pain meds and chiropractic care.  Worse with flexion of the neck.  Chiropractic care really helped in the past but last few sessions have not helped her.  + Headaches starting from occipital region radiating to the top of her head.  Daily headaches.  She was on Fioricet before.    Previous Injection:   1/26/2023-caudal NIMA with sedation- 80% pain relief with radicular pain.  11/20/2020 - Caudal NIMA - 80% pain relief - 2 years.     PMH: COPD, GERD, HLD, HTN, Thoracic aortic aneurysm      Current Medications:   Norco 5-325 mg BID PRN   Flexeril 10 mg BID PRN  Fioricet     Past Medications:       Past Modalities:  TENS:                                                                          no                                                  Physical Therapy Within The Last 6 Months              Yes- chiropractor care >6 weeks.  Psychotherapy                                                            no  Massage Therapy                                                       no     Patient Complains Of:  Uro-Fecal Incontinence          no  Weight Gain/Loss                   no  Fever/Chills                             no  Weakness                               Yes (subjective weakness in left leg)            Current Outpatient  Medications:     albuterol sulfate  (90 Base) MCG/ACT inhaler, Inhale 2 puffs Every 4 (Four) Hours As Needed., Disp: , Rfl:     atorvastatin (LIPITOR) 40 MG tablet, TAKE 1 TABLET BY MOUTH DAILY, Disp: 90 tablet, Rfl: 1    butalbital-acetaminophen-caffeine (FIORICET, ESGIC) -40 MG per tablet, TAKE 1 TABLET BY MOUTH EVERY 12 HOURS AS NEEDED FOR HEADACHE, Disp: 30 tablet, Rfl: 2    Calcium Carbonate-Vitamin D 500-125 MG-UNIT tablet, Take  by mouth., Disp: , Rfl:     carvedilol (COREG) 12.5 MG tablet, Take 1 tablet by mouth 2 (Two) Times a Day With Meals., Disp: 180 tablet, Rfl: 3    clobetasol (TEMOVATE) 0.05 % external solution, APPLY TOPICALLY TO THE SCALP EVERY DAY IN THE MORNING AND IN THE EVENING, Disp: , Rfl:     cloNIDine (CATAPRES) 0.1 MG tablet, TAKE 1 TABLET BY MOUTH EVERY NIGHT, Disp: 90 tablet, Rfl: 1    coenzyme Q10 100 MG capsule, Take 1 capsule by mouth Daily., Disp: , Rfl:     cyclobenzaprine (FLEXERIL) 10 MG tablet, Take 1 tablet by mouth 2 (Two) Times a Day As Needed for Muscle Spasms for up to 180 days., Disp: 180 tablet, Rfl: 1    dicyclomine (BENTYL) 20 MG tablet, Take 1 tablet by mouth Daily., Disp: , Rfl:     FeroSul 325 (65 Fe) MG tablet, TAKE 1 TABLET BY MOUTH DAILY, Disp: 30 tablet, Rfl: 2    fexofenadine (ALLEGRA) 180 MG tablet, Take 1 tablet by mouth Daily., Disp: , Rfl:     fluticasone (FLONASE) 50 MCG/ACT nasal spray, SHAKE LIQUID AND USE 2 SPRAYS IN EACH NOSTRIL EVERY DAY, Disp: 48 g, Rfl: 3    hydrALAZINE (APRESOLINE) 50 MG tablet, TAKE 1 TABLET BY MOUTH TWICE DAILY, Disp: 60 tablet, Rfl: 3    [START ON 8/22/2023] HYDROcodone-acetaminophen (NORCO) 5-325 MG per tablet, Take 1 tablet by mouth 3 (Three) Times a Day As Needed for Moderate Pain for up to 30 days., Disp: 90 tablet, Rfl: 0    [START ON 9/21/2023] HYDROcodone-acetaminophen (NORCO) 5-325 MG per tablet, Take 1 tablet by mouth 3 (Three) Times a Day As Needed for Moderate Pain for up to 30 days., Disp: 90 tablet, Rfl:  0    ketoconazole (NIZORAL) 2 % shampoo, , Disp: , Rfl: 3    levothyroxine (SYNTHROID, LEVOTHROID) 100 MCG tablet, TAKE 1 TABLET BY MOUTH DAILY, Disp: 90 tablet, Rfl: 0    Multiple Vitamin (MULTIVITAMIN) tablet, Take 1 tablet by mouth Daily., Disp: , Rfl:     pantoprazole (PROTONIX) 40 MG EC tablet, Take 1 tablet by mouth Daily., Disp: , Rfl:     polyethylene glycol (MIRALAX) powder, , Disp: , Rfl:     tiotropium bromide-olodaterol (Stiolto Respimat) 2.5-2.5 MCG/ACT aerosol solution inhaler, Inhale Daily., Disp: , Rfl:     varenicline (CHANTIX) 1 MG tablet, Take 1 tablet by mouth 2 (Two) Times a Day., Disp: 60 tablet, Rfl: 5    The following portions of the patient's history were reviewed and updated as appropriate: allergies, current medications, past family history, past medical history, past social history, past surgical history and problem list.      REVIEW OF PERTINENT MEDICAL DATA    Past Medical History:   Diagnosis Date    COPD (chronic obstructive pulmonary disease)     Coronary artery calcification seen on CT scan 07/2012    mild calcification in the LAD with calcium score of 22. modere narrowing left subclavian origin    Diverticulosis     Gastroparesis     GERD (gastroesophageal reflux disease)     GIST (gastrointestinal stromal tumor), non-malignant     Irritable bowel syndrome     Low back pain     Mild mitral regurgitation     PAD (peripheral artery disease)     small vessel disease in feet    Subclavian artery stenosis, left 05/2016    50 percent    Tubular adenoma of colon 01/2017     Past Surgical History:   Procedure Laterality Date    APPENDECTOMY      COLONOSCOPY  10/01/2019    dr owen    ENDOSCOPY  02/2019    ENDOSCOPY  02/01/2023    Dr. Owen    HEMORRHOIDECTOMY      HYSTERECTOMY      TONSILLECTOMY      TOOTH EXTRACTION      8/3/21     Family History   Problem Relation Age of Onset    Hypertension Mother     Diabetes Mother     Dementia Mother     Stroke Mother     Lung cancer Father      Alcohol abuse Father     COPD Father     Breast cancer Sister     Hypertension Sister     Hypothyroidism Sister     Stroke Sister     Other Sister         AAA    Bipolar disorder Daughter     Fibromyalgia Daughter      Social History     Socioeconomic History    Marital status:    Tobacco Use    Smoking status: Former     Types: Cigarettes     Quit date: 2020     Years since quittin.9    Smokeless tobacco: Never    Tobacco comments:     she has smoked intermittently for 40 years; she smoked over 2 ppd twenty years ago   Vaping Use    Vaping Use: Never used   Substance and Sexual Activity    Alcohol use: No    Drug use: No    Sexual activity: Defer         Review of Systems   Musculoskeletal:  Positive for back pain and neck pain.   Neurological:  Positive for headaches.       Vitals:    08/10/23 1307   BP: 126/72   Pulse: 64   Resp: 16   SpO2: 96%   PainSc:   4           Objective   Physical Exam  Neck:     Musculoskeletal:         General: Tenderness present.      Lumbar back: Tenderness present. Decreased range of motion.        Legs:    Neurological:      Comments: Motor strength 5/5 b/l LE  Sensory intact b/l LE           Imaging Reviewed:  MRI done at [facility] - 2020:    L3-L4-a small broad based disc protrusion centrally.  There is moderate facet arthropathy.  This changes contribute to moderate central canal stenosis.  There is moderate right and severe left neural foraminal stenosis.  L4-L5-there is a posterior disc protrusion centrally.  Moderate facet arthropathy and mild limited flavum hypertrophy. Severe central canal stenosis.  Severe bilateral neural foraminal stenosis, right greater than left.  L5-S1-there is small left far lateral disc protrusion.  No central canal stenosis.  There is mild facet arthropathy.  There is moderate bilateral neural foraminal stenosis.     Lumbar X-ray: 10/1/2020   Multilevel degenerative spondylosis.      Cervical x-ray-2023  - Mild degenerative disc  and endplate changes in cervical spine most notable at C4-5 through C6-7  - Mild right C4-5 facet arthropathy    Assessment:    1. High risk medication use    2. Cervical spondylosis    3. Lumbar spondylosis    4. DDD (degenerative disc disease), lumbar    5. Bilateral occipital neuralgia    6. Chronic left-sided low back pain with sciatica, sciatica laterality unspecified          Plan:  1.  Repeat UDS 08/10/2023.UDS on 1/5/2023 is consistent with patient's interview.. Narcotic contract discussed on 11/3/22. Narcan ordered on 10/9/2020.  2. Due to increased pain, continue Norco 5-325 mg TID PRN (8/22; 9/21). Discussed with the patient regarding long-term side effects of opioids including but not limited to opioid induced hormonal suppression, hyperalgesia, fatigue, weight gain, possible opioid induced altered immune system, addiction, tolerance, dependence, risk of hearing loss and elevated risk of myocardial infarction. Proper use and potential life threatening side effects of over use discussed with patient. Patient states understanding of their use and risks.  3. Continue Flexeril to 10 mg BID PRN (2/2024). Common side effects of flexeril include blurred vision, dizziness or lightheadedness, drowsiness, dryness of mouth, bloated feeling or gas, indigestion, nausea or vomiting, or stomach cramps or pain, constipation, diarrhea, excitement or nervousness, frequent urination, general feeling of discomfort or illness, headache, muscle twitching, numbness, tingling, pain, or weakness in hands or feet, pounding heartbeat, problems in speaking, trembling, trouble in sleeping, unpleasant taste or other taste changes, unusual muscle weakness or unusual tiredness, especially during the first few days as your body adjusts to this medication.  4. . Patient has pain in the head with referred pain on the top of the head. Bilateral occipital tenderness present. Discussed bilateral greater and lesser occipital nerve block.  Discussed the possibility of infection, bleeding, nerve damage, headache, increased pain. Patient understands and agrees.  Discussed with patient that it is not appropriate to do IV sedation for minor procedure like occipital nerve block.  I did offer Xanax to be taken before the procedure but patient is not sure and would like to think about it.    RTC in 2 months.    Galileo Portillo DO  Pain Management   [facility]         INSPECT REPORT    As part of the patient's treatment plan, I may be prescribing controlled substances. The patient has been made aware of appropriate use of such medications, including potential risk of somnolence, limited ability to drive and/or work safely, and the potential for dependence or overdose. It has also been made clear that these medications are for use by this patient only, without concomitant use of alcohol or other substances unless prescribed.     Patient has completed prescribing agreement detailing terms of continued prescribing of controlled substances, including monitoring INSPECT reports, urine drug screening, and pill counts if necessary. The patient is aware that inappropriate use will results in cessation of prescribing such medications.    INSPECT report has been reviewed and scanned into the patient's chart.

## 2023-08-10 ENCOUNTER — OFFICE VISIT (OUTPATIENT)
Dept: PAIN MEDICINE | Facility: CLINIC | Age: 78
End: 2023-08-10
Payer: MEDICARE

## 2023-08-10 VITALS
RESPIRATION RATE: 16 BRPM | SYSTOLIC BLOOD PRESSURE: 126 MMHG | HEART RATE: 64 BPM | DIASTOLIC BLOOD PRESSURE: 72 MMHG | OXYGEN SATURATION: 96 %

## 2023-08-10 DIAGNOSIS — M51.36 DDD (DEGENERATIVE DISC DISEASE), LUMBAR: ICD-10-CM

## 2023-08-10 DIAGNOSIS — Z79.899 HIGH RISK MEDICATION USE: Primary | ICD-10-CM

## 2023-08-10 DIAGNOSIS — M47.816 LUMBAR SPONDYLOSIS: ICD-10-CM

## 2023-08-10 DIAGNOSIS — M54.40 CHRONIC LEFT-SIDED LOW BACK PAIN WITH SCIATICA, SCIATICA LATERALITY UNSPECIFIED: ICD-10-CM

## 2023-08-10 DIAGNOSIS — M54.81 BILATERAL OCCIPITAL NEURALGIA: ICD-10-CM

## 2023-08-10 DIAGNOSIS — G89.29 CHRONIC LEFT-SIDED LOW BACK PAIN WITH SCIATICA, SCIATICA LATERALITY UNSPECIFIED: ICD-10-CM

## 2023-08-10 DIAGNOSIS — M47.812 CERVICAL SPONDYLOSIS: ICD-10-CM

## 2023-08-10 RX ORDER — HYDROCODONE BITARTRATE AND ACETAMINOPHEN 5; 325 MG/1; MG/1
1 TABLET ORAL 3 TIMES DAILY PRN
Qty: 90 TABLET | Refills: 0 | Status: SHIPPED | OUTPATIENT
Start: 2023-09-21 | End: 2023-10-21

## 2023-08-10 RX ORDER — CYCLOBENZAPRINE HCL 10 MG
10 TABLET ORAL 2 TIMES DAILY PRN
Qty: 180 TABLET | Refills: 1 | Status: SHIPPED | OUTPATIENT
Start: 2023-08-10 | End: 2024-02-06

## 2023-08-10 RX ORDER — HYDROCODONE BITARTRATE AND ACETAMINOPHEN 5; 325 MG/1; MG/1
1 TABLET ORAL 3 TIMES DAILY PRN
Qty: 90 TABLET | Refills: 0 | Status: SHIPPED | OUTPATIENT
Start: 2023-08-22 | End: 2023-09-21

## 2023-08-19 DIAGNOSIS — I10 ESSENTIAL HYPERTENSION: Chronic | ICD-10-CM

## 2023-08-21 RX ORDER — CARVEDILOL 12.5 MG/1
TABLET ORAL
Qty: 180 TABLET | Refills: 3 | Status: SHIPPED | OUTPATIENT
Start: 2023-08-21

## 2023-08-30 ENCOUNTER — HOSPITAL ENCOUNTER (OUTPATIENT)
Facility: HOSPITAL | Age: 78
Discharge: HOME | End: 2023-08-30
Attending: EMERGENCY MEDICINE | Admitting: EMERGENCY MEDICINE
Payer: MEDICARE

## 2023-08-30 VITALS
HEART RATE: 60 BPM | SYSTOLIC BLOOD PRESSURE: 130 MMHG | WEIGHT: 153 LBS | OXYGEN SATURATION: 95 % | RESPIRATION RATE: 18 BRPM | HEIGHT: 63 IN | DIASTOLIC BLOOD PRESSURE: 67 MMHG | BODY MASS INDEX: 27.11 KG/M2 | TEMPERATURE: 97.6 F

## 2023-08-30 DIAGNOSIS — J06.9 UPPER RESPIRATORY INFECTION, ACUTE: Primary | ICD-10-CM

## 2023-08-30 PROCEDURE — G0463 HOSPITAL OUTPT CLINIC VISIT: HCPCS | Performed by: NURSE PRACTITIONER

## 2023-08-30 RX ORDER — AMOXICILLIN AND CLAVULANATE POTASSIUM 875; 125 MG/1; MG/1
1 TABLET, FILM COATED ORAL 2 TIMES DAILY
Qty: 14 TABLET | Refills: 0 | Status: SHIPPED | OUTPATIENT
Start: 2023-08-30 | End: 2023-09-06

## 2023-08-30 NOTE — DISCHARGE INSTRUCTIONS
Return for any new or worsening symptoms.    Follow-up with primary care for further evaluation and treatment    Medications as prescribed,     Continue taking your allegra and flonase,

## 2023-08-30 NOTE — FSED PROVIDER NOTE
Subjective   History of Present Illness  The patient is a 77-year-old female who presents to the ER with bilateral ear fullness for the past 7-10 days. Patient reports she has also had some congestion. Patient reports she has been taking allegra and using flonase, but is not helping. Patient denies any fevers.     History provided by:  Patient   used: No      Review of Systems   HENT:  Positive for congestion, sinus pressure and sinus pain.         Patient reports ear fullness, and pressure.      Past Medical History:   Diagnosis Date    COPD (chronic obstructive pulmonary disease)     Coronary artery calcification seen on CT scan 07/2012    mild calcification in the LAD with calcium score of 22. modere narrowing left subclavian origin    Diverticulosis     Gastroparesis     GERD (gastroesophageal reflux disease)     GIST (gastrointestinal stromal tumor), non-malignant     Irritable bowel syndrome     Low back pain     Mild mitral regurgitation     PAD (peripheral artery disease)     small vessel disease in feet    Subclavian artery stenosis, left 05/2016    50 percent    Tubular adenoma of colon 01/2017       Allergies   Allergen Reactions    Amlodipine Headache     Head tightness    Itraconazole Hives     sporanax    Lisinopril Hives    Norvasc [Amlodipine Besylate] Other (See Comments)     Head tightness    Sulfa Antibiotics Other (See Comments)     headache    Hydrochlorothiazide Other (See Comments)     hyponatremia  Other reaction(s): Other (See Comments)  hyponatremia    Sulfamethoxazole Nausea And Vomiting       Past Surgical History:   Procedure Laterality Date    APPENDECTOMY      COLONOSCOPY  10/01/2019    dr owen    ENDOSCOPY  02/2019    ENDOSCOPY  02/01/2023    Dr. Owen    HEMORRHOIDECTOMY      HYSTERECTOMY      TONSILLECTOMY      TOOTH EXTRACTION      8/3/21       Family History   Problem Relation Age of Onset    Hypertension Mother     Diabetes Mother     Dementia Mother      Stroke Mother     Lung cancer Father     Alcohol abuse Father     COPD Father     Breast cancer Sister     Hypertension Sister     Hypothyroidism Sister     Stroke Sister     Other Sister         AAA    Bipolar disorder Daughter     Fibromyalgia Daughter        Social History     Socioeconomic History    Marital status:    Tobacco Use    Smoking status: Former     Types: Cigarettes     Quit date: 8/13/2020     Years since quitting: 3.0    Smokeless tobacco: Never    Tobacco comments:     she has smoked intermittently for 40 years; she smoked over 2 ppd twenty years ago   Vaping Use    Vaping Use: Never used   Substance and Sexual Activity    Alcohol use: No    Drug use: No    Sexual activity: Defer           Objective   Physical Exam  Vitals and nursing note reviewed.   Constitutional:       Appearance: Normal appearance. She is normal weight.   HENT:      Head: Normocephalic.      Right Ear: Tympanic membrane normal.      Left Ear: Tympanic membrane normal.      Nose: Nose normal.      Mouth/Throat:      Mouth: Mucous membranes are moist.      Pharynx: Oropharynx is clear. Uvula midline.   Eyes:      Extraocular Movements: Extraocular movements intact.      Conjunctiva/sclera: Conjunctivae normal.      Pupils: Pupils are equal, round, and reactive to light.   Cardiovascular:      Rate and Rhythm: Normal rate and regular rhythm.      Heart sounds: Normal heart sounds.   Pulmonary:      Effort: Pulmonary effort is normal.      Breath sounds: Normal breath sounds.   Abdominal:      General: Bowel sounds are normal.      Palpations: Abdomen is soft.   Musculoskeletal:         General: Normal range of motion.      Cervical back: Normal range of motion and neck supple.   Skin:     General: Skin is warm and dry.   Neurological:      General: No focal deficit present.      Mental Status: She is alert and oriented to person, place, and time.   Psychiatric:         Mood and Affect: Mood normal.         Behavior:  Behavior normal. Behavior is cooperative.       Procedures           ED Course                                           Medical Decision Making  The patient is a 77-year-old female who presents to the ER with bilateral ear fullness for the past 7-10 days. Patient reports she has also had some congestion. Patient reports she has been taking allegra and using flonase, but is not helping. Patient denies any fevers. Suspect with patient having symptoms for the past 7-10 days and has tried over the counter medications, suspect patient may have URI vs sinusitis. Patient has hx of HTN, so will not RX for any decongestants, or have patient take any OTC decongestants.  Will RX patient for augmentin to see if this helps, advised patient that if this is viral, she may not see a difference.       Problems Addressed:  Upper respiratory infection, acute: self-limited or minor problem    Risk  Prescription drug management.        Final diagnoses:   Upper respiratory infection, acute       ED Disposition  ED Disposition       ED Disposition   Discharge    Condition   Stable    Comment   Patient given discharge instructions and verbalized understanding. Patient discharged home.               Danish Serrano MD  [address] [place] [address]  [phone]    Schedule an appointment as soon as possible for a visit   If symptoms worsen         Medication List        New Prescriptions      amoxicillin-clavulanate 875-125 MG per tablet  Commonly known as: AUGMENTIN  Take 1 tablet by mouth 2 (Two) Times a Day for 7 days.               Where to Get Your Medications        These medications were sent to [id number] - [facility] [place] - [address] AT SEC OF [place] [address] & [facility] - [phone]  - [phone]   [address] [facility] [address]      Phone: [phone]   amoxicillin-clavulanate 875-125 MG per tablet

## 2023-09-10 DIAGNOSIS — D50.0 IRON DEFICIENCY ANEMIA DUE TO CHRONIC BLOOD LOSS: ICD-10-CM

## 2023-09-11 RX ORDER — FERROUS SULFATE 325(65) MG
325 TABLET ORAL DAILY
Qty: 30 TABLET | Refills: 2 | Status: SHIPPED | OUTPATIENT
Start: 2023-09-11

## 2023-09-29 DIAGNOSIS — I10 ESSENTIAL HYPERTENSION: Chronic | ICD-10-CM

## 2023-09-29 DIAGNOSIS — E78.49 OTHER HYPERLIPIDEMIA: ICD-10-CM

## 2023-10-02 RX ORDER — HYDRALAZINE HYDROCHLORIDE 50 MG/1
TABLET, FILM COATED ORAL
Qty: 60 TABLET | Refills: 3 | Status: SHIPPED | OUTPATIENT
Start: 2023-10-02

## 2023-10-02 RX ORDER — ATORVASTATIN CALCIUM 40 MG/1
40 TABLET, FILM COATED ORAL DAILY
Qty: 90 TABLET | Refills: 1 | Status: SHIPPED | OUTPATIENT
Start: 2023-10-02

## 2023-10-19 ENCOUNTER — OFFICE VISIT (OUTPATIENT)
Dept: PAIN MEDICINE | Facility: CLINIC | Age: 78
End: 2023-10-19
Payer: MEDICARE

## 2023-10-19 VITALS
SYSTOLIC BLOOD PRESSURE: 135 MMHG | RESPIRATION RATE: 16 BRPM | OXYGEN SATURATION: 95 % | DIASTOLIC BLOOD PRESSURE: 66 MMHG | HEART RATE: 65 BPM

## 2023-10-19 DIAGNOSIS — M54.40 CHRONIC LEFT-SIDED LOW BACK PAIN WITH SCIATICA, SCIATICA LATERALITY UNSPECIFIED: ICD-10-CM

## 2023-10-19 DIAGNOSIS — G89.29 CHRONIC LEFT-SIDED LOW BACK PAIN WITH SCIATICA, SCIATICA LATERALITY UNSPECIFIED: ICD-10-CM

## 2023-10-19 DIAGNOSIS — M47.816 LUMBAR SPONDYLOSIS: ICD-10-CM

## 2023-10-19 DIAGNOSIS — M54.81 BILATERAL OCCIPITAL NEURALGIA: ICD-10-CM

## 2023-10-19 DIAGNOSIS — M51.36 DDD (DEGENERATIVE DISC DISEASE), LUMBAR: Primary | ICD-10-CM

## 2023-10-19 DIAGNOSIS — Z79.899 HIGH RISK MEDICATION USE: ICD-10-CM

## 2023-10-19 RX ORDER — HYDROCODONE BITARTRATE AND ACETAMINOPHEN 5; 325 MG/1; MG/1
1 TABLET ORAL 2 TIMES DAILY PRN
Qty: 60 TABLET | Refills: 0 | Status: SHIPPED | OUTPATIENT
Start: 2023-10-20 | End: 2023-10-19

## 2023-10-19 RX ORDER — HYDROCODONE BITARTRATE AND ACETAMINOPHEN 5; 325 MG/1; MG/1
1 TABLET ORAL 3 TIMES DAILY PRN
Qty: 90 TABLET | Refills: 0 | Status: SHIPPED | OUTPATIENT
Start: 2023-10-20 | End: 2023-11-19

## 2023-10-19 RX ORDER — HYDROCODONE BITARTRATE AND ACETAMINOPHEN 5; 325 MG/1; MG/1
1 TABLET ORAL 3 TIMES DAILY PRN
Qty: 90 TABLET | Refills: 0 | Status: SHIPPED | OUTPATIENT
Start: 2023-11-19 | End: 2023-12-19

## 2023-10-19 RX ORDER — HYDROCODONE BITARTRATE AND ACETAMINOPHEN 5; 325 MG/1; MG/1
1 TABLET ORAL 2 TIMES DAILY PRN
Qty: 60 TABLET | Refills: 0 | Status: SHIPPED | OUTPATIENT
Start: 2023-11-20 | End: 2023-10-19 | Stop reason: SDUPTHER

## 2023-10-19 RX ORDER — HYDROCODONE BITARTRATE AND ACETAMINOPHEN 5; 325 MG/1; MG/1
1 TABLET ORAL 2 TIMES DAILY PRN
Qty: 60 TABLET | Refills: 0 | Status: SHIPPED | OUTPATIENT
Start: 2023-12-20 | End: 2023-10-19 | Stop reason: SDUPTHER

## 2023-10-19 RX ORDER — HYDROCODONE BITARTRATE AND ACETAMINOPHEN 5; 325 MG/1; MG/1
1 TABLET ORAL 3 TIMES DAILY PRN
Qty: 90 TABLET | Refills: 0 | Status: SHIPPED | OUTPATIENT
Start: 2023-12-19 | End: 2024-01-18

## 2023-10-19 NOTE — PROGRESS NOTES
Subjective   Luna Jacobson is a 78 y.o. female is here for follow-up for lower back pain.  Last seen on 8/10/2023.  No significant changes in pain since last visit. Significantly increased lower back pain and b/l leg pain since last few weeks.     Lower back pain is 8/10 on VAS, maximum 9/10.  Pain is dull aching and sharp in nature.  B/l hips, b/l posterior leg to ankle.   Constant pain improved by pain medications and caudal NIMA.  Pain worsens with walking.    Neck pain is 6/10 on VAS, maximum 7/10.  Tightness in nature.  Not referred but that has numbness in bilateral hands.  Improved with pain medications and chiropractic care.  Worse with flexion of the neck.  Chiropractic care really helped in the past but the last few sessions have not helped.  + Headaches starting from occipital region radiating to the top of her head.  Daily headaches.  Had taken Fioricet before with some relief.    Previous Injection:   1/26/2023-caudal NIMA with sedation- 80% pain relief with radicular pain- 9 months of relief.   11/20/2020 - Caudal NIMA - 80% pain relief - 2 years.     PMH: COPD, GERD, HLD, HTN, Thoracic aortic aneurysm      Current Medications:   Norco 5-325 mg BID PRN   Flexeril 10 mg BID PRN  Fioricet     Past Medications:       Past Modalities:  TENS:                                                                          no                                                  Physical Therapy Within The Last 6 Months              Yes- chiropractor care >6 weeks.  Psychotherapy                                                            no  Massage Therapy                                                       no     Patient Complains Of:  Uro-Fecal Incontinence          no  Weight Gain/Loss                   no  Fever/Chills                             no  Weakness                               Yes (subjective weakness in left leg)            Current Outpatient Medications:     albuterol sulfate  (90 Base)  MCG/ACT inhaler, Inhale 2 puffs Every 4 (Four) Hours As Needed., Disp: , Rfl:     atorvastatin (LIPITOR) 40 MG tablet, TAKE 1 TABLET BY MOUTH DAILY, Disp: 90 tablet, Rfl: 1    butalbital-acetaminophen-caffeine (FIORICET, ESGIC) -40 MG per tablet, TAKE 1 TABLET BY MOUTH EVERY 12 HOURS AS NEEDED FOR HEADACHE, Disp: 30 tablet, Rfl: 2    Calcium Carbonate-Vitamin D 500-125 MG-UNIT tablet, Take  by mouth., Disp: , Rfl:     carvedilol (COREG) 12.5 MG tablet, TAKE 1 TABLET BY MOUTH TWICE DAILY WITH MEALS, Disp: 180 tablet, Rfl: 3    clobetasol (TEMOVATE) 0.05 % external solution, APPLY TOPICALLY TO THE SCALP EVERY DAY IN THE MORNING AND IN THE EVENING, Disp: , Rfl:     cloNIDine (CATAPRES) 0.1 MG tablet, TAKE 1 TABLET BY MOUTH EVERY NIGHT, Disp: 90 tablet, Rfl: 1    coenzyme Q10 100 MG capsule, Take 1 capsule by mouth Daily., Disp: , Rfl:     cyclobenzaprine (FLEXERIL) 10 MG tablet, Take 1 tablet by mouth 2 (Two) Times a Day As Needed for Muscle Spasms for up to 180 days., Disp: 180 tablet, Rfl: 1    dicyclomine (BENTYL) 20 MG tablet, Take 1 tablet by mouth Daily., Disp: , Rfl:     FeroSul 325 (65 Fe) MG tablet, TAKE 1 TABLET BY MOUTH DAILY, Disp: 30 tablet, Rfl: 2    fexofenadine (ALLEGRA) 180 MG tablet, Take 1 tablet by mouth Daily., Disp: , Rfl:     fluticasone (FLONASE) 50 MCG/ACT nasal spray, SHAKE LIQUID AND USE 2 SPRAYS IN EACH NOSTRIL EVERY DAY, Disp: 48 g, Rfl: 3    hydrALAZINE (APRESOLINE) 50 MG tablet, TAKE 1 TABLET BY MOUTH TWICE DAILY, Disp: 60 tablet, Rfl: 3    HYDROcodone-acetaminophen (NORCO) 5-325 MG per tablet, Take 1 tablet by mouth 3 (Three) Times a Day As Needed for Moderate Pain for up to 30 days., Disp: 90 tablet, Rfl: 0    [START ON 10/20/2023] HYDROcodone-acetaminophen (NORCO) 5-325 MG per tablet, Take 1 tablet by mouth 3 (Three) Times a Day As Needed for Severe Pain for up to 30 days., Disp: 90 tablet, Rfl: 0    [START ON 11/19/2023] HYDROcodone-acetaminophen (NORCO) 5-325 MG per tablet,  Take 1 tablet by mouth 3 (Three) Times a Day As Needed for Severe Pain for up to 30 days., Disp: 90 tablet, Rfl: 0    [START ON 12/19/2023] HYDROcodone-acetaminophen (NORCO) 5-325 MG per tablet, Take 1 tablet by mouth 3 (Three) Times a Day As Needed for Severe Pain for up to 30 days., Disp: 90 tablet, Rfl: 0    ketoconazole (NIZORAL) 2 % shampoo, , Disp: , Rfl: 3    levothyroxine (SYNTHROID, LEVOTHROID) 100 MCG tablet, TAKE 1 TABLET BY MOUTH DAILY, Disp: 90 tablet, Rfl: 0    Multiple Vitamin (MULTIVITAMIN) tablet, Take 1 tablet by mouth Daily., Disp: , Rfl:     pantoprazole (PROTONIX) 40 MG EC tablet, Take 1 tablet by mouth Daily., Disp: , Rfl:     polyethylene glycol (MIRALAX) powder, , Disp: , Rfl:     tiotropium bromide-olodaterol (Stiolto Respimat) 2.5-2.5 MCG/ACT aerosol solution inhaler, Inhale Daily., Disp: , Rfl:     varenicline (CHANTIX) 1 MG tablet, Take 1 tablet by mouth 2 (Two) Times a Day., Disp: 60 tablet, Rfl: 5    The following portions of the patient's history were reviewed and updated as appropriate: allergies, current medications, past family history, past medical history, past social history, past surgical history and problem list.      REVIEW OF PERTINENT MEDICAL DATA    Past Medical History:   Diagnosis Date    COPD (chronic obstructive pulmonary disease)     Coronary artery calcification seen on CT scan 07/2012    mild calcification in the LAD with calcium score of 22. modere narrowing left subclavian origin    Diverticulosis     Gastroparesis     GERD (gastroesophageal reflux disease)     GIST (gastrointestinal stromal tumor), non-malignant     Hip pain, bilateral     Irritable bowel syndrome     Low back pain     Mild mitral regurgitation     Neck pain     PAD (peripheral artery disease)     small vessel disease in feet    Subclavian artery stenosis, left 05/2016    50 percent    Tubular adenoma of colon 01/2017     Past Surgical History:   Procedure Laterality Date    APPENDECTOMY       COLONOSCOPY  10/01/2019    dr owen    ENDOSCOPY  02/2019    ENDOSCOPY  02/01/2023    Dr. Owen    HEMORRHOIDECTOMY      HYSTERECTOMY      TONSILLECTOMY      TOOTH EXTRACTION      8/3/21     Family History   Problem Relation Age of Onset    Hypertension Mother     Diabetes Mother     Dementia Mother     Stroke Mother     Lung cancer Father     Alcohol abuse Father     COPD Father     Breast cancer Sister     Hypertension Sister     Hypothyroidism Sister     Stroke Sister     Other Sister         AAA    Bipolar disorder Daughter     Fibromyalgia Daughter      Social History     Socioeconomic History    Marital status:    Tobacco Use    Smoking status: Former     Types: Cigarettes     Quit date: 8/13/2020     Years since quitting: 3.1    Smokeless tobacco: Never    Tobacco comments:     she has smoked intermittently for 40 years; she smoked over 2 ppd twenty years ago   Vaping Use    Vaping Use: Never used   Substance and Sexual Activity    Alcohol use: No    Drug use: No    Sexual activity: Defer         Review of Systems   Musculoskeletal:  Positive for back pain and neck pain.   Neurological:  Positive for headaches.         Vitals:    10/19/23 1330   BP: 135/66   Pulse: 65   Resp: 16   SpO2: 95%   PainSc:   8             Objective   Physical Exam  Neck:     Musculoskeletal:         General: Tenderness present.      Lumbar back: Tenderness present. Decreased range of motion.        Legs:    Neurological:      Comments: Motor strength 5/5 b/l LE  Sensory intact b/l LE             Imaging Reviewed:  MRI done at [facility] - 9/2020:    L3-L4-a small broad based disc protrusion centrally.  There is moderate facet arthropathy.  This changes contribute to moderate central canal stenosis.  There is moderate right and severe left neural foraminal stenosis.  L4-L5-there is a posterior disc protrusion centrally.  Moderate facet arthropathy and mild limited flavum hypertrophy. Severe central canal stenosis.  Severe  bilateral neural foraminal stenosis, right greater than left.  L5-S1-there is small left far lateral disc protrusion.  No central canal stenosis.  There is mild facet arthropathy.  There is moderate bilateral neural foraminal stenosis.     Lumbar X-ray: 10/1/2020   Multilevel degenerative spondylosis.      Cervical x-ray-4/20/2023  - Mild degenerative disc and endplate changes in cervical spine most notable at C4-5 through C6-7  - Mild right C4-5 facet arthropathy    Assessment:    1. DDD (degenerative disc disease), lumbar    2. Bilateral occipital neuralgia    3. Chronic left-sided low back pain with sciatica, sciatica laterality unspecified    4. Lumbar spondylosis    5. High risk medication use          Plan:  1.  UDS on 8/11/2023 is consistent with patient's interview.. Narcotic contract discussed on 11/3/22. Narcan ordered on 10/9/2020.  2. Due to increased pain, continue Norco 5-325 mg TID PRN (10/20; 11/19; 12/19). Discussed with the patient regarding long-term side effects of opioids including but not limited to opioid induced hormonal suppression, hyperalgesia, fatigue, weight gain, possible opioid induced altered immune system, addiction, tolerance, dependence, risk of hearing loss and elevated risk of myocardial infarction. Proper use and potential life threatening side effects of over use discussed with patient. Patient states understanding of their use and risks.  3. Continue Flexeril to 10 mg BID PRN (2/2024). Common side effects of flexeril include blurred vision, dizziness or lightheadedness, drowsiness, dryness of mouth, bloated feeling or gas, indigestion, nausea or vomiting, or stomach cramps or pain, constipation, diarrhea, excitement or nervousness, frequent urination, general feeling of discomfort or illness, headache, muscle twitching, numbness, tingling, pain, or weakness in hands or feet, pounding heartbeat, problems in speaking, trembling, trouble in sleeping, unpleasant taste or other  taste changes, unusual muscle weakness or unusual tiredness, especially during the first few days as your body adjusts to this medication.  4. . Patient has pain in the head with referred pain on the top of the head. Bilateral occipital tenderness present. Discussed bilateral greater and lesser occipital nerve block. Discussed the possibility of infection, bleeding, nerve damage, headache, increased pain. Patient understands and agrees.  Discussed with patient that it is not appropriate to do IV sedation for minor procedure like occipital nerve block.  I did offer Xanax to be taken before the procedure but patient is not sure and would like to think about it.  5. Excellent relief with Caudal NIMA previously, but pain is returning. Repeat Caudal NIMA with sedation.     RTC for injection.     Galileo Portillo DO  Pain Management   [facility]         INSPECT REPORT    As part of the patient's treatment plan, I may be prescribing controlled substances. The patient has been made aware of appropriate use of such medications, including potential risk of somnolence, limited ability to drive and/or work safely, and the potential for dependence or overdose. It has also been made clear that these medications are for use by this patient only, without concomitant use of alcohol or other substances unless prescribed.     Patient has completed prescribing agreement detailing terms of continued prescribing of controlled substances, including monitoring INSPECT reports, urine drug screening, and pill counts if necessary. The patient is aware that inappropriate use will results in cessation of prescribing such medications.    INSPECT report has been reviewed and scanned into the patient's chart.

## 2023-10-23 ENCOUNTER — TELEPHONE (OUTPATIENT)
Dept: PAIN MEDICINE | Facility: CLINIC | Age: 78
End: 2023-10-23
Payer: MEDICARE

## 2023-10-23 DIAGNOSIS — M51.36 DDD (DEGENERATIVE DISC DISEASE), LUMBAR: Primary | ICD-10-CM

## 2023-10-23 RX ORDER — HYDROCODONE BITARTRATE AND ACETAMINOPHEN 5; 325 MG/1; MG/1
1 TABLET ORAL EVERY 8 HOURS PRN
Qty: 30 TABLET | Refills: 0 | Status: SHIPPED | OUTPATIENT
Start: 2023-11-09 | End: 2023-11-19

## 2023-10-23 RX ORDER — HYDROCODONE BITARTRATE AND ACETAMINOPHEN 5; 325 MG/1; MG/1
1 TABLET ORAL EVERY 6 HOURS PRN
COMMUNITY
End: 2023-10-23 | Stop reason: SDUPTHER

## 2023-10-23 NOTE — TELEPHONE ENCOUNTER
Did we check with pharmacy?  You can see it is written for TID and for #90 tabs. Nothing has changed on my prescription.     Galileo Portillo DO  Pain Management   [facility]

## 2023-10-23 NOTE — TELEPHONE ENCOUNTER
Hub staff attempted to follow warm transfer process and was unsuccessful     Caller: Luna Jacobson    Relationship to patient: Self    Best call back number: [phone] (home)       Patient is needing: WHEN PATIENT WENT TO  [facility] FROM [facility] ON FRIDAY 10-20-23 WAS TOLD TWO RX WAS SENT, AND THE RX WAS WRITTEN FOR 2/DAY VS 3/DAY THAT SHE'S BEEN GETTING. PATIENT ONLY GOT 60 PILLS FOR A 30DAY SUPPLY AND FEELS THAT IS INCORRECT FROM THE CHECKOUT PAPER WORK SHE GOT FROM LAST VISIT. SERVE PAIN.

## 2023-10-23 NOTE — TELEPHONE ENCOUNTER
Pharmacy receive 2 different rx 1 for 60 and the other one for 90.  She will need the remainder #30 pills.

## 2023-10-23 NOTE — TELEPHONE ENCOUNTER
Sent. Please make sure there is no prescription there for 60 days. There is a prescription for her on 11/9/23 for 30 tablets and then on 11/19 and 12/19 for 90 tabs.     Galileo Portillo DO  Pain Management   [facility]

## 2023-11-02 ENCOUNTER — HOSPITAL ENCOUNTER (OUTPATIENT)
Dept: MAMMOGRAPHY | Facility: HOSPITAL | Age: 78
Discharge: HOME | End: 2023-11-02
Admitting: INTERNAL MEDICINE
Payer: MEDICARE

## 2023-11-02 DIAGNOSIS — Z12.31 ENCOUNTER FOR SCREENING MAMMOGRAM FOR MALIGNANT NEOPLASM OF BREAST: ICD-10-CM

## 2023-11-02 PROCEDURE — 77067 SCR MAMMO BI INCL CAD: CPT

## 2023-11-02 PROCEDURE — 77063 BREAST TOMOSYNTHESIS BI: CPT

## 2023-11-07 ENCOUNTER — HOSPITAL ENCOUNTER (OUTPATIENT)
Dept: PAIN MEDICINE | Facility: HOSPITAL | Age: 78
Discharge: HOME | End: 2023-11-07
Payer: MEDICARE

## 2023-11-07 VITALS
WEIGHT: 153 LBS | DIASTOLIC BLOOD PRESSURE: 60 MMHG | BODY MASS INDEX: 27.11 KG/M2 | TEMPERATURE: 97.3 F | HEIGHT: 63 IN | RESPIRATION RATE: 14 BRPM | HEART RATE: 59 BPM | OXYGEN SATURATION: 94 % | SYSTOLIC BLOOD PRESSURE: 114 MMHG

## 2023-11-07 DIAGNOSIS — M51.36 DDD (DEGENERATIVE DISC DISEASE), LUMBAR: Primary | ICD-10-CM

## 2023-11-07 DIAGNOSIS — R52 PAIN: ICD-10-CM

## 2023-11-07 PROCEDURE — 25010000002 MIDAZOLAM PER 1 MG: Performed by: STUDENT IN AN ORGANIZED HEALTH CARE EDUCATION/TRAINING PROGRAM

## 2023-11-07 PROCEDURE — 77003 FLUOROGUIDE FOR SPINE INJECT: CPT

## 2023-11-07 PROCEDURE — 25510000001 IOPAMIDOL 41 % SOLUTION: Performed by: STUDENT IN AN ORGANIZED HEALTH CARE EDUCATION/TRAINING PROGRAM

## 2023-11-07 PROCEDURE — 25010000002 METHYLPREDNISOLONE PER 80 MG: Performed by: STUDENT IN AN ORGANIZED HEALTH CARE EDUCATION/TRAINING PROGRAM

## 2023-11-07 PROCEDURE — 99152 MOD SED SAME PHYS/QHP 5/>YRS: CPT | Performed by: STUDENT IN AN ORGANIZED HEALTH CARE EDUCATION/TRAINING PROGRAM

## 2023-11-07 PROCEDURE — 62323 NJX INTERLAMINAR LMBR/SAC: CPT | Performed by: STUDENT IN AN ORGANIZED HEALTH CARE EDUCATION/TRAINING PROGRAM

## 2023-11-07 RX ORDER — METHYLPREDNISOLONE ACETATE 80 MG/ML
80 INJECTION, SUSPENSION INTRA-ARTICULAR; INTRALESIONAL; INTRAMUSCULAR; SOFT TISSUE ONCE
Status: COMPLETED | OUTPATIENT
Start: 2023-11-07 | End: 2023-11-07

## 2023-11-07 RX ORDER — IOPAMIDOL 408 MG/ML
3 INJECTION, SOLUTION INTRATHECAL
Status: COMPLETED | OUTPATIENT
Start: 2023-11-07 | End: 2023-11-07

## 2023-11-07 RX ORDER — MIDAZOLAM HYDROCHLORIDE 1 MG/ML
2 INJECTION INTRAMUSCULAR; INTRAVENOUS ONCE
Status: COMPLETED | OUTPATIENT
Start: 2023-11-07 | End: 2023-11-07

## 2023-11-07 RX ADMIN — IOPAMIDOL 3 ML: 408 INJECTION, SOLUTION INTRATHECAL at 14:38

## 2023-11-07 RX ADMIN — MIDAZOLAM 2 MG: 1 INJECTION INTRAMUSCULAR; INTRAVENOUS at 14:34

## 2023-11-07 RX ADMIN — METHYLPREDNISOLONE ACETATE 80 MG: 80 INJECTION, SUSPENSION INTRA-ARTICULAR; INTRALESIONAL; INTRAMUSCULAR; SOFT TISSUE at 14:39

## 2023-11-07 NOTE — ADDENDUM NOTE
Encounter addended by: Galileo Portillo DO on: 11/7/2023 4:04 PM   Actions taken: SmartForm saved, Clinical Note Signed, Problem List reviewed, Medication List reviewed, Allergies reviewed, Charge Capture section accepted

## 2023-11-07 NOTE — DISCHARGE INSTRUCTIONS
EPIDURAL STEROID INJECTION          An epidural steroid injection is a shot of steroid medicine and numbing medicine that is given into the space between the spinal cord and the bones of the back (epidural space).  The injection helps relieve pain by an irritated or swollen nerve root.    TELL YOUR HEALTH CARE PROVIDER ABOUT:  Any allergies you have  All medicines you are taking including any over the counter medicines  Any blood disorders you have  Any surgeries you have had  Any medical conditions you have  Whether you are pregnant or may be pregnant    WHAT ARE THE RISK?  Generally, this is a safe procedure. However,problems may occur, including  Headache  Bleeding  Infection  Allergic Reaction  Nerve Damage    WHAT CAN I EXPECT AFTER THE PROCEDURE?    INJECTION SITE  Remove the Band-Aid/s after 24 hours  Check your injection site every day for signs of infection.  Check for:             Redness             Bleeding (small amt is normal)             Warmth             Pus or bad odor  Some numbness may be experienced for several hours following the procedure.  Avoid using heat on the injection site for 24 hours. You may use ice intermittently if needed by placing a         towel between your skin and the ice bag and using the ice for 20 minutes 2-3 times a day.  Do not take baths, swim or use a hot tub for 24 hours.    ACTIVITY  No strenuous activity for 24 hours then return to normal activity as tolerated.  If your leg is numb, no driving until full sensation and strength has returned.    GENERAL INSTRUCTIONS:  The injection site may feel numb, use ice with caution if numbness is present and no heat for 24 hours or until numbness is gone.   If you have numbness or weakness in your arm or leg, use those areas with caution until normal sensation returns.  It is not uncommon to notice an increase in discomfort or a change in the location of discomfort for 3-4 days after the procedure.  If discomfort is noticed  at the injection site, ice may be            applied to that area for 20 min 2-3 times a day.  Take the pain medicine your physician has prescribed or over the counter pain relievers as long as you do not have any contraindications.  If you are a diabetic, monitor your blood sugar closely.  The steroids used in your procedure may increase your blood sugar level up to 36 hours after the injection.  If your blood sugar is greater than 250, call the physician that helps you monitor your blood sugar.  Keep all follow-up visits as scheduled by your health care provider. This is important.    CONTACT OUR OFFICE IF:  You have any of these signs of infection            -Redness, swelling, or warmth around your injection site.            -Fluid or blood coming from your injection site (small amt of blood is normal)            -Pus or a bad odor from your injection site            -A fever  You develop a severe headache or a stiff neck  You lose control of your bladder or bowel movements      PAIN MANAGEMENT [facility]ENTER HOURS   Monday-Friday 7:30 am. - 4:00 pm.  For any problem related to your procedure we can be reached at [phone]  If you experience an emergency with your procedure, call [phone] or go to the emergency room.                               Moderate Conscious Sedation, Adult, Care After    This sheet gives you information about how to care for yourself after your procedure. Your health care provider may also give you more specific instructions. If you have problems or questions, contact your health care provider.    What can I expect after the procedure?  After the procedure, it is common to have:  Sleepiness for several hours.  Impaired judgment for several hours.  Difficulty with balance.  Vomiting if you eat too soon.    Follow these instructions at home:  For the next 24 hours:         Rest.  Do not participate in activities where you could fall or become injured.  Do not drive or use machinery.  Do not  drink alcohol.  Do not take sleeping pills or medicines that cause drowsiness.  Do not make important decisions or sign legal documents.  Do not take care of children on your own.    Eating and drinking    Follow the diet recommended by your health care provider.  Drink enough fluid to keep your urine pale yellow.  If you vomit:  Drink water, juice, or soup when you can drink without vomiting.  Make sure you have little or no nausea before eating solid foods.     General instructions  Take over-the-counter and prescription medicines only as told by your health care provider.  Have a responsible adult stay with you for 24 hours. It is important to have someone help care for you until you are awake and alert.  Do not smoke.  Keep all follow-up visits as told by your health care provider. This is important.    Contact a health care provider if:  You are still sleepy or having trouble with balance after 24 hours.  You feel light-headed.  You keep feeling nauseous or you keep vomiting.  You develop a rash.  You have a fever.  You have redness or swelling around the IV site.    Get help right away if:  You have trouble breathing.  You have new-onset confusion at home.    Summary  After the procedure, it is common to feel sleepy, have impaired judgment, or feel nauseous if you eat too soon.  Rest after you get home. Know the things you should not do after the procedure.  Follow the diet recommended by your health care provider and drink enough fluid to keep your urine pale yellow.  Get help right away if you have trouble breathing or new-onset confusion at home.    This information is not intended to replace advice given to you by your health care provider. Make sure you discuss any questions you have with your health care provider.    Document Revised: 04/16/2021 Document Reviewed: 11/12/2020  Elsevier Patient Education © 2021 [employer]

## 2023-11-07 NOTE — H&P
H and P reviewed from previous visit and no changes to patient's clinical presentation. Will proceed with procedure as planned. Patient denies history of DM and being on blood thinners.    Sedation Plan    ASA 3     Mallampati class: II.    Risks, benefits, and alternatives discussed with patient.          Galileo Portillo DO  Pain Management   [facility]

## 2023-11-07 NOTE — ADDENDUM NOTE
Encounter addended by: Tsering Philip RN on: 11/7/2023 2:59 PM   Actions taken: LDA removed, Flowsheet accepted

## 2023-11-07 NOTE — PROCEDURES
PREOPERATIVE DIAGNOS  1.         Lumbar DDD     POSTOPERATIVE DIAGNOSIS:  1.  Same     PROCEDURE:  Caudal Epidural Steroid Injection      PROCEDURE NOTE:  After obtaining written informed consent patient was taken to the procedure room. Pre-procedure blood pressure and pulse were stable and recorded in patients clinic chart.      The patient was placed in the prone position on fluoroscopy table.  The lower back was prepped with chloraprep times three and draped in the usual sterile fashion.  The skin over the sacral hiatus was identified under fluoroscopic guidance and infiltrated with 1% lidocaine for local anesthesia via 25 gauge needle.  An 20-g spinal needle was used to access the epidural space under fluoroscopic guidance. 2 cc of the omnipaque dye was injected through the catheter with good spread seen from L5-S2 area.  80 mg of depomedrol  with 4 cc of saline as injected. There was no evidence of CSF, paresthesia or heme. The needle was cleared with preservative free local anesthetic and removed. Skin was cleaned and a sterile dressing was applied.     Following the procedure the patient's vital signs were stable. The patient was discharged home in good condition after being given discharge instructions.     COMPLICATIONS: None    Moderate sedation:   Start time: 1434  End time: 1506    0 mcg Fentanly; 2 mg Versed ; 15 minutes of moderate sedation was done and vitals were monitored closely during this periods.  ETCO2, RR, BP, HR, O2 were monitored closely throughout sedation period.        Galileo Portillo DO  Pain Management   [facility]

## 2023-11-08 ENCOUNTER — TELEPHONE (OUTPATIENT)
Dept: PAIN MEDICINE | Facility: HOSPITAL | Age: 78
End: 2023-11-08
Payer: MEDICARE

## 2023-11-15 DIAGNOSIS — G44.89 OTHER HEADACHE SYNDROME: ICD-10-CM

## 2023-11-16 RX ORDER — BUTALBITAL, ACETAMINOPHEN AND CAFFEINE 50; 325; 40 MG/1; MG/1; MG/1
TABLET ORAL
Qty: 30 TABLET | Refills: 0 | Status: SHIPPED | OUTPATIENT
Start: 2023-11-16

## 2023-11-21 NOTE — PROGRESS NOTES
Subjective   Luna Jacobson is a 78 y.o. female is here for follow-up for lower back pain.  Patient was last seen on 10/19/2023.  No significant changes in physical exam since last visit.    Lower back pain is 8/10 on VAS, maximum 9/10.  Pain is dull and aching and sharp in nature.  Referred to bilateral hips, bilateral posterior legs to ankle.  Constant pain improved by pain medications and caudal NIMA.  Pain worsens with walking.    Neck pain is 6/10 on VAS, maximum 7/10.  Tightness in nature.  Not referred but that has numbness in bilateral hands.  Improved with pain medications and chiropractic care.  Worse with flexion of the neck.  Chiropractic care really helped in the past but the last few sessions have not helped.  + Headaches starting from occipital region radiating to the top of her head.  Daily headaches.  Had taken Fioricet before with some relief.    Previous Injection:   11/7/2023-caudal NIMA with sedation - no significant relief.   1/26/2023-caudal NIMA with sedation- 80% pain relief with radicular pain- 9 months of relief.   11/20/2020 - Caudal NIMA - 80% pain relief - 2 years.     PMH: COPD, GERD, HLD, HTN, Thoracic aortic aneurysm      Current Medications:   Norco 5-325 mg BID PRN   Flexeril 10 mg BID PRN  Fioricet     Past Medications:       Past Modalities:  TENS:                                                                          no                                                  Physical Therapy Within The Last 6 Months              Yes- chiropractor care >6 weeks.  Psychotherapy                                                            no  Massage Therapy                                                       no     Patient Complains Of:  Uro-Fecal Incontinence          no  Weight Gain/Loss                   no  Fever/Chills                             no  Weakness                               Yes (subjective weakness in left leg)            Current Outpatient Medications:      albuterol sulfate  (90 Base) MCG/ACT inhaler, Inhale 2 puffs Every 4 (Four) Hours As Needed., Disp: , Rfl:     atorvastatin (LIPITOR) 40 MG tablet, TAKE 1 TABLET BY MOUTH DAILY, Disp: 90 tablet, Rfl: 1    butalbital-acetaminophen-caffeine (FIORICET, ESGIC) -40 MG per tablet, TAKE 1 TABLET BY MOUTH EVERY 12 HOURS AS NEEDED FOR HEADACHE, Disp: 30 tablet, Rfl: 0    Calcium Carbonate-Vitamin D 500-125 MG-UNIT tablet, Take  by mouth., Disp: , Rfl:     carvedilol (COREG) 12.5 MG tablet, TAKE 1 TABLET BY MOUTH TWICE DAILY WITH MEALS, Disp: 180 tablet, Rfl: 3    clobetasol (TEMOVATE) 0.05 % external solution, APPLY TOPICALLY TO THE SCALP EVERY DAY IN THE MORNING AND IN THE EVENING, Disp: , Rfl:     cloNIDine (CATAPRES) 0.1 MG tablet, TAKE 1 TABLET BY MOUTH EVERY NIGHT, Disp: 90 tablet, Rfl: 1    coenzyme Q10 100 MG capsule, Take 1 capsule by mouth Daily., Disp: , Rfl:     cyclobenzaprine (FLEXERIL) 10 MG tablet, Take 1 tablet by mouth 2 (Two) Times a Day As Needed for Muscle Spasms for up to 180 days., Disp: 180 tablet, Rfl: 1    dicyclomine (BENTYL) 20 MG tablet, Take 1 tablet by mouth Daily., Disp: , Rfl:     FeroSul 325 (65 Fe) MG tablet, TAKE 1 TABLET BY MOUTH DAILY, Disp: 30 tablet, Rfl: 2    fexofenadine (ALLEGRA) 180 MG tablet, Take 1 tablet by mouth Daily., Disp: , Rfl:     fluticasone (FLONASE) 50 MCG/ACT nasal spray, SHAKE LIQUID AND USE 2 SPRAYS IN EACH NOSTRIL EVERY DAY, Disp: 48 g, Rfl: 3    hydrALAZINE (APRESOLINE) 50 MG tablet, TAKE 1 TABLET BY MOUTH TWICE DAILY, Disp: 60 tablet, Rfl: 3    HYDROcodone-acetaminophen (NORCO) 5-325 MG per tablet, Take 1 tablet by mouth 3 (Three) Times a Day As Needed for Severe Pain for up to 30 days., Disp: 90 tablet, Rfl: 0    [START ON 12/19/2023] HYDROcodone-acetaminophen (NORCO) 5-325 MG per tablet, Take 1 tablet by mouth 3 (Three) Times a Day As Needed for Severe Pain for up to 30 days., Disp: 90 tablet, Rfl: 0    ketoconazole (NIZORAL) 2 % shampoo, , Disp: ,  Rfl: 3    levothyroxine (SYNTHROID, LEVOTHROID) 100 MCG tablet, TAKE 1 TABLET BY MOUTH DAILY, Disp: 90 tablet, Rfl: 0    Multiple Vitamin (MULTIVITAMIN) tablet, Take 1 tablet by mouth Daily., Disp: , Rfl:     pantoprazole (PROTONIX) 40 MG EC tablet, Take 1 tablet by mouth Daily., Disp: , Rfl:     polyethylene glycol (MIRALAX) powder, , Disp: , Rfl:     tiotropium bromide-olodaterol (Stiolto Respimat) 2.5-2.5 MCG/ACT aerosol solution inhaler, Inhale Daily., Disp: , Rfl:     varenicline (CHANTIX) 1 MG tablet, Take 1 tablet by mouth 2 (Two) Times a Day., Disp: 60 tablet, Rfl: 5    The following portions of the patient's history were reviewed and updated as appropriate: allergies, current medications, past family history, past medical history, past social history, past surgical history and problem list.      REVIEW OF PERTINENT MEDICAL DATA    Past Medical History:   Diagnosis Date    COPD (chronic obstructive pulmonary disease)     Coronary artery calcification seen on CT scan 07/2012    mild calcification in the LAD with calcium score of 22. modere narrowing left subclavian origin    Diverticulosis     Gastroparesis     GERD (gastroesophageal reflux disease)     GIST (gastrointestinal stromal tumor), non-malignant     Hip pain, bilateral     Irritable bowel syndrome     Low back pain     Mild mitral regurgitation     Neck pain     PAD (peripheral artery disease)     small vessel disease in feet    Subclavian artery stenosis, left 05/2016    50 percent    Tubular adenoma of colon 01/2017     Past Surgical History:   Procedure Laterality Date    APPENDECTOMY      COLONOSCOPY  10/01/2019    dr owen    ENDOSCOPY  02/2019    ENDOSCOPY  02/01/2023    Dr. Owen    HEMORRHOIDECTOMY      HYSTERECTOMY      TONSILLECTOMY      TOOTH EXTRACTION      8/3/21     Family History   Problem Relation Age of Onset    Hypertension Mother     Diabetes Mother     Dementia Mother     Stroke Mother     Lung cancer Father     Alcohol abuse  Father     COPD Father     Breast cancer Sister     Hypertension Sister     Hypothyroidism Sister     Stroke Sister     Other Sister         AAA    Bipolar disorder Daughter     Fibromyalgia Daughter      Social History     Socioeconomic History    Marital status:    Tobacco Use    Smoking status: Former     Types: Cigarettes     Quit date: 8/13/2020     Years since quitting: 3.2    Smokeless tobacco: Never    Tobacco comments:     she has smoked intermittently for 40 years; she smoked over 2 ppd twenty years ago   Vaping Use    Vaping Use: Never used   Substance and Sexual Activity    Alcohol use: No    Drug use: No    Sexual activity: Defer         Review of Systems   Musculoskeletal:  Positive for back pain and neck pain.   Neurological:  Positive for headaches.         Vitals:    11/27/23 1411   BP: 141/68   Pulse: 64   Resp: 16   SpO2: 95%   PainSc:   7               Objective   Physical Exam  Neck:     Musculoskeletal:         General: Tenderness present.      Lumbar back: Tenderness present. Decreased range of motion.        Legs:    Neurological:      Comments: Motor strength 5/5 b/l LE  Sensory intact b/l LE             Imaging Reviewed:  [facility]  - 9/2020:    L3-L4-a small broad based disc protrusion centrally.  There is moderate facet arthropathy.  This changes contribute to moderate central canal stenosis.  There is moderate right and severe left neural foraminal stenosis.  L4-L5-there is a posterior disc protrusion centrally.  Moderate facet arthropathy and mild limited flavum hypertrophy. Severe central canal stenosis.  Severe bilateral neural foraminal stenosis, right greater than left.  L5-S1-there is small left far lateral disc protrusion.  No central canal stenosis.  There is mild facet arthropathy.  There is moderate bilateral neural foraminal stenosis.     Lumbar X-ray: 10/1/2020   Multilevel degenerative spondylosis.      Cervical x-ray-4/20/2023  - Mild degenerative disc and  endplate changes in cervical spine most notable at C4-5 through C6-7  - Mild right C4-5 facet arthropathy    Assessment:    1. Chronic left-sided low back pain with sciatica, sciatica laterality unspecified    2. Bilateral occipital neuralgia    3. Lumbar spondylosis    4. High risk medication use            Plan:  1.  UDS on 8/11/2023 is consistent with patient's interview.. Narcotic contract discussed on 11/3/22. Narcan ordered on 10/9/2020.  2. Due to increased pain, continue Norco 5-325 mg TID PRN ( 11/19; 12/19). Discussed with the patient regarding long-term side effects of opioids including but not limited to opioid induced hormonal suppression, hyperalgesia, fatigue, weight gain, possible opioid induced altered immune system, addiction, tolerance, dependence, risk of hearing loss and elevated risk of myocardial infarction. Proper use and potential life threatening side effects of over use discussed with patient. Patient states understanding of their use and risks.  3. Continue Flexeril to 10 mg BID PRN (2/2024). Common side effects of flexeril include blurred vision, dizziness or lightheadedness, drowsiness, dryness of mouth, bloated feeling or gas, indigestion, nausea or vomiting, or stomach cramps or pain, constipation, diarrhea, excitement or nervousness, frequent urination, general feeling of discomfort or illness, headache, muscle twitching, numbness, tingling, pain, or weakness in hands or feet, pounding heartbeat, problems in speaking, trembling, trouble in sleeping, unpleasant taste or other taste changes, unusual muscle weakness or unusual tiredness, especially during the first few days as your body adjusts to this medication.  4. . Patient has pain in the head with referred pain on the top of the head. Bilateral occipital tenderness present. Discussed bilateral greater and lesser occipital nerve block. Discussed the possibility of infection, bleeding, nerve damage, headache, increased pain. Patient  understands and agrees.  Discussed with patient that it is not appropriate to do IV sedation for minor procedure like occipital nerve block.  I did offer Xanax to be taken before the procedure but patient is not sure and would like to think about it.  5. Patient continues to have lower back pain with raiculopathy without much improvement with caudal NIMA. Patient has pain in the lower back with referred pain in the leg, patient has failed conservative therapy including PT and pharmacological management for more than 6 weeks and pain interferes with activities of daily living. MRI shows severe stenosis at L4-5. Discussed lumbar NIMA L4-5 with sedation.  Discussed the possibility of infection, bleeding, nerve damage, post dural puncture headache, increased pain, paraplegia. Patient understands and agrees.       RTC for LESI L4-5 with sedation and then f/u before 1/18/2024    Galileo Portillo DO  Pain Management   [facility]         INSPECT REPORT    As part of the patient's treatment plan, I may be prescribing controlled substances. The patient has been made aware of appropriate use of such medications, including potential risk of somnolence, limited ability to drive and/or work safely, and the potential for dependence or overdose. It has also been made clear that these medications are for use by this patient only, without concomitant use of alcohol or other substances unless prescribed.     Patient has completed prescribing agreement detailing terms of continued prescribing of controlled substances, including monitoring INSPECT reports, urine drug screening, and pill counts if necessary. The patient is aware that inappropriate use will results in cessation of prescribing such medications.    INSPECT report has been reviewed and scanned into the patient's chart.

## 2023-11-27 ENCOUNTER — OFFICE VISIT (OUTPATIENT)
Dept: PAIN MEDICINE | Facility: CLINIC | Age: 78
End: 2023-11-27
Payer: MEDICARE

## 2023-11-27 VITALS
HEART RATE: 64 BPM | OXYGEN SATURATION: 95 % | SYSTOLIC BLOOD PRESSURE: 141 MMHG | DIASTOLIC BLOOD PRESSURE: 68 MMHG | RESPIRATION RATE: 16 BRPM

## 2023-11-27 DIAGNOSIS — M54.40 CHRONIC LEFT-SIDED LOW BACK PAIN WITH SCIATICA, SCIATICA LATERALITY UNSPECIFIED: Primary | ICD-10-CM

## 2023-11-27 DIAGNOSIS — G89.29 CHRONIC LEFT-SIDED LOW BACK PAIN WITH SCIATICA, SCIATICA LATERALITY UNSPECIFIED: Primary | ICD-10-CM

## 2023-11-27 DIAGNOSIS — M54.81 BILATERAL OCCIPITAL NEURALGIA: ICD-10-CM

## 2023-11-27 DIAGNOSIS — M47.816 LUMBAR SPONDYLOSIS: ICD-10-CM

## 2023-11-27 DIAGNOSIS — Z79.899 HIGH RISK MEDICATION USE: ICD-10-CM

## 2023-11-27 PROCEDURE — 99214 OFFICE O/P EST MOD 30 MIN: CPT | Performed by: STUDENT IN AN ORGANIZED HEALTH CARE EDUCATION/TRAINING PROGRAM

## 2023-11-27 PROCEDURE — 1125F AMNT PAIN NOTED PAIN PRSNT: CPT | Performed by: STUDENT IN AN ORGANIZED HEALTH CARE EDUCATION/TRAINING PROGRAM

## 2023-11-27 PROCEDURE — 3077F SYST BP >= 140 MM HG: CPT | Performed by: STUDENT IN AN ORGANIZED HEALTH CARE EDUCATION/TRAINING PROGRAM

## 2023-11-27 PROCEDURE — 3078F DIAST BP <80 MM HG: CPT | Performed by: STUDENT IN AN ORGANIZED HEALTH CARE EDUCATION/TRAINING PROGRAM

## 2023-11-27 PROCEDURE — 1160F RVW MEDS BY RX/DR IN RCRD: CPT | Performed by: STUDENT IN AN ORGANIZED HEALTH CARE EDUCATION/TRAINING PROGRAM

## 2023-11-27 PROCEDURE — 1159F MED LIST DOCD IN RCRD: CPT | Performed by: STUDENT IN AN ORGANIZED HEALTH CARE EDUCATION/TRAINING PROGRAM

## 2023-11-30 ENCOUNTER — TELEPHONE (OUTPATIENT)
Dept: PAIN MEDICINE | Facility: HOSPITAL | Age: 78
End: 2023-11-30
Payer: MEDICARE

## 2023-11-30 NOTE — TELEPHONE ENCOUNTER
Pre procedure call made, LMOM asking patient to arrive by 1400 and that a  is required. NPO status also reviewed.

## 2023-12-01 ENCOUNTER — HOSPITAL ENCOUNTER (OUTPATIENT)
Dept: PAIN MEDICINE | Facility: HOSPITAL | Age: 78
Discharge: HOME | End: 2023-12-01
Payer: MEDICARE

## 2023-12-01 VITALS
HEIGHT: 63 IN | BODY MASS INDEX: 27.11 KG/M2 | OXYGEN SATURATION: 93 % | DIASTOLIC BLOOD PRESSURE: 70 MMHG | WEIGHT: 153 LBS | TEMPERATURE: 97.1 F | RESPIRATION RATE: 19 BRPM | SYSTOLIC BLOOD PRESSURE: 112 MMHG | HEART RATE: 54 BPM

## 2023-12-01 DIAGNOSIS — R52 PAIN: ICD-10-CM

## 2023-12-01 DIAGNOSIS — G89.29 CHRONIC LEFT-SIDED LOW BACK PAIN WITH SCIATICA, SCIATICA LATERALITY UNSPECIFIED: Primary | ICD-10-CM

## 2023-12-01 DIAGNOSIS — M54.40 CHRONIC LEFT-SIDED LOW BACK PAIN WITH SCIATICA, SCIATICA LATERALITY UNSPECIFIED: Primary | ICD-10-CM

## 2023-12-01 PROCEDURE — 25510000001 IOPAMIDOL 41 % SOLUTION: Performed by: STUDENT IN AN ORGANIZED HEALTH CARE EDUCATION/TRAINING PROGRAM

## 2023-12-01 PROCEDURE — 77003 FLUOROGUIDE FOR SPINE INJECT: CPT

## 2023-12-01 PROCEDURE — 25010000002 MIDAZOLAM PER 1 MG: Performed by: STUDENT IN AN ORGANIZED HEALTH CARE EDUCATION/TRAINING PROGRAM

## 2023-12-01 PROCEDURE — 25010000002 METHYLPREDNISOLONE PER 40 MG: Performed by: STUDENT IN AN ORGANIZED HEALTH CARE EDUCATION/TRAINING PROGRAM

## 2023-12-01 RX ORDER — IOPAMIDOL 408 MG/ML
3 INJECTION, SOLUTION INTRATHECAL
Status: COMPLETED | OUTPATIENT
Start: 2023-12-01 | End: 2023-12-01

## 2023-12-01 RX ORDER — METHYLPREDNISOLONE ACETATE 40 MG/ML
40 INJECTION, SUSPENSION INTRA-ARTICULAR; INTRALESIONAL; INTRAMUSCULAR; SOFT TISSUE ONCE
Status: COMPLETED | OUTPATIENT
Start: 2023-12-01 | End: 2023-12-01

## 2023-12-01 RX ORDER — MIDAZOLAM HYDROCHLORIDE 1 MG/ML
2 INJECTION INTRAMUSCULAR; INTRAVENOUS ONCE
Status: COMPLETED | OUTPATIENT
Start: 2023-12-01 | End: 2023-12-01

## 2023-12-01 RX ADMIN — MIDAZOLAM 2 MG: 1 INJECTION INTRAMUSCULAR; INTRAVENOUS at 14:56

## 2023-12-01 RX ADMIN — IOPAMIDOL 3 ML: 408 INJECTION, SOLUTION INTRATHECAL at 15:00

## 2023-12-01 RX ADMIN — METHYLPREDNISOLONE ACETATE 40 MG: 40 INJECTION, SUSPENSION INTRA-ARTICULAR; INTRALESIONAL; INTRAMUSCULAR; INTRASYNOVIAL; SOFT TISSUE at 15:01

## 2023-12-01 NOTE — PROCEDURES
PREOPERATIVE DIAGNOSIS:    1. Lumbar DDD    POSTOPERATIVE DIAGNOSIS: Same    PROCEDURE:  Lumbar epidural steroid injection L 4-5    PROCEDURE NOTE:  After obtaining written informed consent patient was taken to the procedure room. Pre-procedure blood pressure and pulse were stable and recorded in patients clinic chart.     The patient was placed in the prone position. The lower back was prepped with antiseptic solution and draped in the usual sterile fashion.  The skin over the L4-5 space was identified under fluoroscopic guidance and infiltrated with 1% lidocaine for local anesthesia via 25 gauge needle.  A 20 gauge tuohy needle was used to access the epidural space using loss of resistance to air technique. Following negative aspiration, 2 cc of the omnipaque dye was injected.  There was good spread of the dye from L3-L5 area. A mixture containing  3 ml of saline with 40 mg of dep-medrol was injected. There was no evidence of CSF, paresthesia or vascular spread. The needle was removed. Skin was cleaned and band aid was applied.    Following the procedure the patient's vital signs were stable. The patient was discharged home in good condition after being given discharge instructions.    COMPLICATIONS: None    Moderate sedation:   Start time: 1456 PM  End time: 1526 PM     2 mg Versed ; 15 minutes of moderate sedation was done and vitals were monitored closely during this periods.  ETCO2, RR, BP, HR, O2 were monitored closely throughout sedation period.     Galileo Portillo DO  Pain Management   [facility]

## 2023-12-01 NOTE — DISCHARGE INSTRUCTIONS

## 2023-12-01 NOTE — H&P
H and P reviewed from previous visit and no changes to patient's clinical presentation. Will proceed with procedure as planned. Patient denies history of DM and being on blood thinners.    Galileo Portillo DO  Pain Management   [facility]

## 2023-12-01 NOTE — ADDENDUM NOTE
Encounter addended by: Galileo Portillo DO on: 12/1/2023 4:20 PM   Actions taken: Charge Capture section accepted

## 2023-12-04 ENCOUNTER — TELEPHONE (OUTPATIENT)
Dept: PAIN MEDICINE | Facility: HOSPITAL | Age: 78
End: 2023-12-04
Payer: MEDICARE

## 2023-12-04 NOTE — TELEPHONE ENCOUNTER
Attempted post procedure phone call but no answer.  No message left on answering machine . Pt. did not identify self on answering machine so no message left.

## 2023-12-10 DIAGNOSIS — D50.0 IRON DEFICIENCY ANEMIA DUE TO CHRONIC BLOOD LOSS: ICD-10-CM

## 2023-12-11 RX ORDER — VARENICLINE TARTRATE 1 MG/1
1 TABLET, FILM COATED ORAL 2 TIMES DAILY
Qty: 60 TABLET | Refills: 5 | Status: SHIPPED | OUTPATIENT
Start: 2023-12-11

## 2023-12-11 RX ORDER — FERROUS SULFATE 325(65) MG
325 TABLET ORAL DAILY
Qty: 30 TABLET | Refills: 2 | Status: SHIPPED | OUTPATIENT
Start: 2023-12-11

## 2023-12-17 DIAGNOSIS — G44.89 OTHER HEADACHE SYNDROME: ICD-10-CM

## 2023-12-18 RX ORDER — BUTALBITAL, ACETAMINOPHEN AND CAFFEINE 50; 325; 40 MG/1; MG/1; MG/1
TABLET ORAL
Qty: 30 TABLET | Refills: 0 | Status: SHIPPED | OUTPATIENT
Start: 2023-12-18

## 2024-01-15 ENCOUNTER — HOSPITAL ENCOUNTER (OUTPATIENT)
Dept: CT IMAGING | Facility: HOSPITAL | Age: 79
Discharge: HOME | End: 2024-01-15
Admitting: INTERNAL MEDICINE
Payer: MEDICARE

## 2024-01-15 DIAGNOSIS — I71.23 ANEURYSM OF DESCENDING THORACIC AORTA WITHOUT RUPTURE: ICD-10-CM

## 2024-01-15 LAB
CREAT BLDA-MCNC: 0.9 MG/DL (ref 0.6–1.3)
EGFRCR SERPLBLD CKD-EPI 2021: 66 ML/MIN/1.73

## 2024-01-15 PROCEDURE — 82565 ASSAY OF CREATININE: CPT

## 2024-01-15 PROCEDURE — 25510000001 IOPAMIDOL PER 1 ML: Performed by: INTERNAL MEDICINE

## 2024-01-15 PROCEDURE — 71275 CT ANGIOGRAPHY CHEST: CPT

## 2024-01-15 RX ADMIN — IOPAMIDOL 100 ML: 755 INJECTION, SOLUTION INTRAVENOUS at 15:38

## 2024-01-16 NOTE — PROGRESS NOTES
Subjective   Luna Jacobson is a 78 y.o. female is here for follow-up for lower back pain. Last seen for LESI L4-5.     Lower back pain is 5/10 on VAS, maximum 6/10.  Pain is dull and aching and sharp in nature.  Referred to bilateral hips, bilateral posterior legs to ankle- radicular pain better after LESI.  Constant pain improved by pain medications and caudal NIMA.  Pain worsens with walking. Main complaint is axial back pain and buttock pain.    Neck pain is 6/10 on VAS, maximum 7/10.  Tightness in nature.  Not referred but that has numbness in bilateral hands.  Improved with pain medications and chiropractic care.  Worse with flexion of the neck.  Chiropractic care really helped in the past but the last few sessions have not helped.  + Headaches starting from occipital region radiating to the top of her head.  Daily headaches.  Had taken Fioricet before with some relief.    Previous Injection:   12/1/23 - LESI L4-5- 80% pain relief for first few weeks; 40% pain relief for now. VAS down from 8/10 on 5/10 on VAS.  11/7/2023-caudal NIMA with sedation - no significant relief.   1/26/2023-caudal NIMA with sedation- 80% pain relief with radicular pain- 9 months of relief.   11/20/2020 - Caudal NIMA - 80% pain relief - 2 years.     PMH: COPD, GERD, HLD, HTN, Thoracic aortic aneurysm      Current Medications:   Norco 5-325 mg BID PRN   Flexeril 10 mg BID PRN  Fioricet     Past Medications:       Past Modalities:  TENS:                                                                          no                                                  Physical Therapy Within The Last 6 Months              Yes- chiropractor care >6 weeks.  Psychotherapy                                                            no  Massage Therapy                                                       no     Patient Complains Of:  Uro-Fecal Incontinence          no  Weight Gain/Loss                   no  Fever/Chills                              no  Weakness                               Yes (subjective weakness in left leg)            Current Outpatient Medications:     albuterol sulfate  (90 Base) MCG/ACT inhaler, Inhale 2 puffs Every 4 (Four) Hours As Needed., Disp: , Rfl:     atorvastatin (LIPITOR) 40 MG tablet, TAKE 1 TABLET BY MOUTH DAILY, Disp: 90 tablet, Rfl: 1    butalbital-acetaminophen-caffeine (FIORICET, ESGIC) -40 MG per tablet, TAKE 1 TABLET BY MOUTH EVERY 12 HOURS AS NEEDED FOR HEADACHE, Disp: 30 tablet, Rfl: 0    Calcium Carbonate-Vitamin D 500-125 MG-UNIT tablet, Take  by mouth., Disp: , Rfl:     carvedilol (COREG) 12.5 MG tablet, TAKE 1 TABLET BY MOUTH TWICE DAILY WITH MEALS, Disp: 180 tablet, Rfl: 3    clobetasol (TEMOVATE) 0.05 % external solution, APPLY TOPICALLY TO THE SCALP EVERY DAY IN THE MORNING AND IN THE EVENING, Disp: , Rfl:     cloNIDine (CATAPRES) 0.1 MG tablet, TAKE 1 TABLET BY MOUTH EVERY NIGHT, Disp: 90 tablet, Rfl: 1    coenzyme Q10 100 MG capsule, Take 1 capsule by mouth Daily., Disp: , Rfl:     cyclobenzaprine (FLEXERIL) 10 MG tablet, Take 1 tablet by mouth 2 (Two) Times a Day As Needed for Muscle Spasms for up to 180 days., Disp: 180 tablet, Rfl: 1    dicyclomine (BENTYL) 20 MG tablet, Take 1 tablet by mouth Daily., Disp: , Rfl:     FeroSul 325 (65 Fe) MG tablet, TAKE 1 TABLET BY MOUTH DAILY, Disp: 30 tablet, Rfl: 2    fexofenadine (ALLEGRA) 180 MG tablet, Take 1 tablet by mouth Daily., Disp: , Rfl:     fluticasone (FLONASE) 50 MCG/ACT nasal spray, SHAKE LIQUID AND USE 2 SPRAYS IN EACH NOSTRIL EVERY DAY, Disp: 48 g, Rfl: 3    hydrALAZINE (APRESOLINE) 50 MG tablet, TAKE 1 TABLET BY MOUTH TWICE DAILY, Disp: 60 tablet, Rfl: 3    HYDROcodone-acetaminophen (NORCO) 5-325 MG per tablet, Take 1 tablet by mouth 3 (Three) Times a Day As Needed for Severe Pain for up to 30 days., Disp: 90 tablet, Rfl: 0    ketoconazole (NIZORAL) 2 % shampoo, , Disp: , Rfl: 3    levothyroxine (SYNTHROID, LEVOTHROID) 100 MCG tablet,  TAKE 1 TABLET BY MOUTH DAILY, Disp: 90 tablet, Rfl: 0    Multiple Vitamin (MULTIVITAMIN) tablet, Take 1 tablet by mouth Daily., Disp: , Rfl:     pantoprazole (PROTONIX) 40 MG EC tablet, Take 1 tablet by mouth Daily., Disp: , Rfl:     polyethylene glycol (MIRALAX) powder, , Disp: , Rfl:     tiotropium bromide-olodaterol (Stiolto Respimat) 2.5-2.5 MCG/ACT aerosol solution inhaler, Inhale Daily., Disp: , Rfl:     varenicline (CHANTIX) 1 MG tablet, TAKE 1 TABLET BY MOUTH TWICE DAILY, Disp: 60 tablet, Rfl: 5    The following portions of the patient's history were reviewed and updated as appropriate: allergies, current medications, past family history, past medical history, past social history, past surgical history and problem list.      REVIEW OF PERTINENT MEDICAL DATA    Past Medical History:   Diagnosis Date    COPD (chronic obstructive pulmonary disease)     Coronary artery calcification seen on CT scan 07/2012    mild calcification in the LAD with calcium score of 22. modere narrowing left subclavian origin    Diverticulosis     Gastroparesis     GERD (gastroesophageal reflux disease)     GIST (gastrointestinal stromal tumor), non-malignant     Hip pain, bilateral     Irritable bowel syndrome     Low back pain     Mild mitral regurgitation     Neck pain     PAD (peripheral artery disease)     small vessel disease in feet    Subclavian artery stenosis, left 05/2016    50 percent    Tubular adenoma of colon 01/2017     Past Surgical History:   Procedure Laterality Date    APPENDECTOMY      COLONOSCOPY  10/01/2019    dr owen    ENDOSCOPY  02/2019    ENDOSCOPY  02/01/2023    Dr. Owen    HEMORRHOIDECTOMY      HYSTERECTOMY      TONSILLECTOMY      TOOTH EXTRACTION      8/3/21     Family History   Problem Relation Age of Onset    Hypertension Mother     Diabetes Mother     Dementia Mother     Stroke Mother     Lung cancer Father     Alcohol abuse Father     COPD Father     Breast cancer Sister     Hypertension Sister      Hypothyroidism Sister     Stroke Sister     Other Sister         AAA    Bipolar disorder Daughter     Fibromyalgia Daughter      Social History     Socioeconomic History    Marital status:    Tobacco Use    Smoking status: Former     Types: Cigarettes     Quit date: 8/13/2020     Years since quitting: 3.4    Smokeless tobacco: Never    Tobacco comments:     she has smoked intermittently for 40 years; she smoked over 2 ppd twenty years ago   Vaping Use    Vaping Use: Never used   Substance and Sexual Activity    Alcohol use: No    Drug use: No    Sexual activity: Defer         Review of Systems   Musculoskeletal:  Positive for back pain and neck pain.   Neurological:  Positive for headaches.         Vitals:    01/17/24 1536   BP: 135/68   Pulse: 69   Resp: 16   SpO2: 96%   Weight: 64 kg (141 lb)   PainSc:   5                 Objective   Physical Exam  Neck:     Musculoskeletal:         General: Tenderness present.      Lumbar back: Tenderness present. Decreased range of motion.        Legs:    Neurological:      Comments: Motor strength 5/5 b/l LE  Sensory intact b/l LE             Imaging Reviewed:  [facility]:    L3-L4-a small broad based disc protrusion centrally.  There is moderate facet arthropathy.  This changes contribute to moderate central canal stenosis.  There is moderate right and severe left neural foraminal stenosis.  L4-L5-there is a posterior disc protrusion centrally.  Moderate facet arthropathy and mild limited flavum hypertrophy. Severe central canal stenosis.  Severe bilateral neural foraminal stenosis, right greater than left.  L5-S1-there is small left far lateral disc protrusion.  No central canal stenosis.  There is mild facet arthropathy.  There is moderate bilateral neural foraminal stenosis.     Lumbar X-ray: 10/1/2020   Multilevel degenerative spondylosis.      Cervical x-ray-4/20/2023  - Mild degenerative disc and endplate changes in cervical spine most notable  at C4-5 through C6-7  - Mild right C4-5 facet arthropathy    Assessment:    1. DDD (degenerative disc disease), lumbar    2. Bilateral occipital neuralgia    3. Chronic left-sided low back pain with sciatica, sciatica laterality unspecified    4. Lumbar spondylosis    5. High risk medication use      Plan:  1.  Repeat UDS - 1/17/24. UDS on 8/11/2023 is consistent with patient's interview.. Narcotic contract discussed on 11/3/22. Narcan ordered on 10/9/2020.  2. Due to increased pain, continue Norco 5-325 mg TID PRN ( 1/17; 2/16). Discussed with the patient regarding long-term side effects of opioids including but not limited to opioid induced hormonal suppression, hyperalgesia, fatigue, weight gain, possible opioid induced altered immune system, addiction, tolerance, dependence, risk of hearing loss and elevated risk of myocardial infarction. Proper use and potential life threatening side effects of over use discussed with patient. Patient states understanding of their use and risks.  3. Continue Flexeril to 10 mg BID PRN (7/15/2024). Common side effects of flexeril include blurred vision, dizziness or lightheadedness, drowsiness, dryness of mouth, bloated feeling or gas, indigestion, nausea or vomiting, or stomach cramps or pain, constipation, diarrhea, excitement or nervousness, frequent urination, general feeling of discomfort or illness, headache, muscle twitching, numbness, tingling, pain, or weakness in hands or feet, pounding heartbeat, problems in speaking, trembling, trouble in sleeping, unpleasant taste or other taste changes, unusual muscle weakness or unusual tiredness, especially during the first few days as your body adjusts to this medication.  4. . Patient has pain in the head with referred pain on the top of the head. Bilateral occipital tenderness present. Discussed bilateral greater and lesser occipital nerve block. Discussed the possibility of infection, bleeding, nerve damage, headache,  increased pain. Patient understands and agrees.  Discussed with patient that it is not appropriate to do IV sedation for minor procedure like occipital nerve block.  I did offer Xanax to be taken before the procedure but patient is not sure and would like to think about it.  5. Excellent relief with LESI L4-5. Will repeat PRN.       RTC before 3/17/24.     Galileo Portillo DO  Pain Management   [facility]         INSPECT REPORT    As part of the patient's treatment plan, I may be prescribing controlled substances. The patient has been made aware of appropriate use of such medications, including potential risk of somnolence, limited ability to drive and/or work safely, and the potential for dependence or overdose. It has also been made clear that these medications are for use by this patient only, without concomitant use of alcohol or other substances unless prescribed.     Patient has completed prescribing agreement detailing terms of continued prescribing of controlled substances, including monitoring INSPECT reports, urine drug screening, and pill counts if necessary. The patient is aware that inappropriate use will results in cessation of prescribing such medications.    INSPECT report has been reviewed and scanned into the patient's chart.

## 2024-01-17 ENCOUNTER — OFFICE VISIT (OUTPATIENT)
Dept: PAIN MEDICINE | Facility: CLINIC | Age: 79
End: 2024-01-17
Payer: MEDICARE

## 2024-01-17 VITALS
RESPIRATION RATE: 16 BRPM | BODY MASS INDEX: 24.98 KG/M2 | OXYGEN SATURATION: 96 % | WEIGHT: 141 LBS | SYSTOLIC BLOOD PRESSURE: 135 MMHG | DIASTOLIC BLOOD PRESSURE: 68 MMHG | HEART RATE: 69 BPM

## 2024-01-17 DIAGNOSIS — G89.29 CHRONIC LEFT-SIDED LOW BACK PAIN WITH SCIATICA, SCIATICA LATERALITY UNSPECIFIED: ICD-10-CM

## 2024-01-17 DIAGNOSIS — M54.81 BILATERAL OCCIPITAL NEURALGIA: ICD-10-CM

## 2024-01-17 DIAGNOSIS — M51.36 DDD (DEGENERATIVE DISC DISEASE), LUMBAR: ICD-10-CM

## 2024-01-17 DIAGNOSIS — M54.40 CHRONIC LEFT-SIDED LOW BACK PAIN WITH SCIATICA, SCIATICA LATERALITY UNSPECIFIED: ICD-10-CM

## 2024-01-17 DIAGNOSIS — M47.816 LUMBAR SPONDYLOSIS: ICD-10-CM

## 2024-01-17 DIAGNOSIS — Z79.899 HIGH RISK MEDICATION USE: Primary | ICD-10-CM

## 2024-01-17 RX ORDER — HYDROCODONE BITARTRATE AND ACETAMINOPHEN 5; 325 MG/1; MG/1
1 TABLET ORAL 3 TIMES DAILY PRN
Qty: 90 TABLET | Refills: 0 | Status: SHIPPED | OUTPATIENT
Start: 2024-02-16 | End: 2024-03-17

## 2024-01-17 RX ORDER — HYDROCODONE BITARTRATE AND ACETAMINOPHEN 5; 325 MG/1; MG/1
1 TABLET ORAL 3 TIMES DAILY PRN
Qty: 90 TABLET | Refills: 0 | Status: SHIPPED | OUTPATIENT
Start: 2024-01-17 | End: 2024-02-16

## 2024-01-17 RX ORDER — CYCLOBENZAPRINE HCL 10 MG
10 TABLET ORAL 2 TIMES DAILY PRN
Qty: 180 TABLET | Refills: 1 | Status: SHIPPED | OUTPATIENT
Start: 2024-01-17 | End: 2024-07-15

## 2024-01-19 DIAGNOSIS — G44.89 OTHER HEADACHE SYNDROME: ICD-10-CM

## 2024-01-19 RX ORDER — BUTALBITAL, ACETAMINOPHEN AND CAFFEINE 50; 325; 40 MG/1; MG/1; MG/1
TABLET ORAL
Qty: 30 TABLET | Refills: 1 | Status: SHIPPED | OUTPATIENT
Start: 2024-01-19 | End: 2024-01-19 | Stop reason: SDUPTHER

## 2024-01-19 RX ORDER — BUTALBITAL, ACETAMINOPHEN AND CAFFEINE 50; 325; 40 MG/1; MG/1; MG/1
1 TABLET ORAL EVERY 6 HOURS PRN
Qty: 30 TABLET | Refills: 1 | Status: SHIPPED | OUTPATIENT
Start: 2024-01-19

## 2024-01-24 ENCOUNTER — APPOINTMENT (OUTPATIENT)
Dept: CT IMAGING | Facility: HOSPITAL | Age: 79
End: 2024-01-24
Payer: MEDICARE

## 2024-01-24 ENCOUNTER — HOSPITAL ENCOUNTER (EMERGENCY)
Facility: HOSPITAL | Age: 79
Discharge: HOME | End: 2024-01-24
Attending: EMERGENCY MEDICINE
Payer: MEDICARE

## 2024-01-24 VITALS
BODY MASS INDEX: 25.2 KG/M2 | TEMPERATURE: 97.9 F | HEART RATE: 56 BPM | WEIGHT: 142.2 LBS | RESPIRATION RATE: 18 BRPM | OXYGEN SATURATION: 95 % | HEIGHT: 63 IN | DIASTOLIC BLOOD PRESSURE: 70 MMHG | SYSTOLIC BLOOD PRESSURE: 147 MMHG

## 2024-01-24 DIAGNOSIS — R07.89 CHEST WALL PAIN: ICD-10-CM

## 2024-01-24 DIAGNOSIS — R10.9 ABDOMINAL WALL PAIN: ICD-10-CM
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
## 2024-01-24 DIAGNOSIS — I10 ESSENTIAL HYPERTENSION: Chronic | ICD-10-CM

## 2024-01-24 LAB
ALBUMIN SERPL-MCNC: 4.1 G/DL (ref 3.5–5.2)
ALBUMIN/GLOB SERPL: 1.5 G/DL
ALP SERPL-CCNC: 96 U/L (ref 39–117)
ALT SERPL W P-5'-P-CCNC: 16 U/L (ref 1–33)
ANION GAP SERPL CALCULATED.3IONS-SCNC: 11 MMOL/L (ref 5–15)
AST SERPL-CCNC: 25 U/L (ref 1–32)
BASOPHILS # BLD AUTO: 0.1 10*3/MM3 (ref 0–0.2)
BASOPHILS NFR BLD AUTO: 0.9 % (ref 0–1.5)
BILIRUB SERPL-MCNC: 0.3 MG/DL (ref 0–1.2)
BUN SERPL-MCNC: 13 MG/DL (ref 8–23)
BUN/CREAT SERPL: 14.3 (ref 7–25)
CALCIUM SPEC-SCNC: 9.7 MG/DL (ref 8.6–10.5)
CHLORIDE SERPL-SCNC: 97 MMOL/L (ref 98–107)
CO2 SERPL-SCNC: 25 MMOL/L (ref 22–29)
CREAT SERPL-MCNC: 0.91 MG/DL (ref 0.57–1)
DEPRECATED RDW RBC AUTO: 47.7 FL (ref 37–54)
EGFRCR SERPLBLD CKD-EPI 2021: 65.1 ML/MIN/1.73
EOSINOPHIL # BLD AUTO: 0.4 10*3/MM3 (ref 0–0.4)
EOSINOPHIL NFR BLD AUTO: 4.2 % (ref 0.3–6.2)
ERYTHROCYTE [DISTWIDTH] IN BLOOD BY AUTOMATED COUNT: 14.5 % (ref 12.3–15.4)
GLOBULIN UR ELPH-MCNC: 2.7 GM/DL
GLUCOSE SERPL-MCNC: 118 MG/DL (ref 65–99)
HCT VFR BLD AUTO: 33.9 % (ref 34–46.6)
HGB BLD-MCNC: 11.5 G/DL (ref 12–15.9)
HOLD SPECIMEN: NORMAL
LYMPHOCYTES # BLD AUTO: 1.9 10*3/MM3 (ref 0.7–3.1)
LYMPHOCYTES NFR BLD AUTO: 21.3 % (ref 19.6–45.3)
MCH RBC QN AUTO: 29.9 PG (ref 26.6–33)
MCHC RBC AUTO-ENTMCNC: 33.8 G/DL (ref 31.5–35.7)
MCV RBC AUTO: 88.5 FL (ref 79–97)
MONOCYTES # BLD AUTO: 0.9 10*3/MM3 (ref 0.1–0.9)
MONOCYTES NFR BLD AUTO: 9.5 % (ref 5–12)
NEUTROPHILS NFR BLD AUTO: 5.8 10*3/MM3 (ref 1.7–7)
NEUTROPHILS NFR BLD AUTO: 64.1 % (ref 42.7–76)
NRBC BLD AUTO-RTO: 0.1 /100 WBC (ref 0–0.2)
PLATELET # BLD AUTO: 305 10*3/MM3 (ref 140–450)
PMV BLD AUTO: 7.5 FL (ref 6–12)
POTASSIUM SERPL-SCNC: 4.2 MMOL/L (ref 3.5–5.2)
PROT SERPL-MCNC: 6.8 G/DL (ref 6–8.5)
RBC # BLD AUTO: 3.83 10*6/MM3 (ref 3.77–5.28)
SODIUM SERPL-SCNC: 133 MMOL/L (ref 136–145)
WBC NRBC COR # BLD AUTO: 9 10*3/MM3 (ref 3.4–10.8)
WHOLE BLOOD HOLD COAG: NORMAL
WHOLE BLOOD HOLD SPECIMEN: NORMAL

## 2024-01-24 PROCEDURE — 74177 CT ABD & PELVIS W/CONTRAST: CPT

## 2024-01-24 PROCEDURE — 25010000002 MORPHINE PER 10 MG: Performed by: PHYSICIAN ASSISTANT

## 2024-01-24 PROCEDURE — 85025 COMPLETE CBC W/AUTO DIFF WBC: CPT | Performed by: EMERGENCY MEDICINE

## 2024-01-24 PROCEDURE — 96374 THER/PROPH/DIAG INJ IV PUSH: CPT

## 2024-01-24 PROCEDURE — 80053 COMPREHEN METABOLIC PANEL: CPT | Performed by: EMERGENCY MEDICINE

## 2024-01-24 PROCEDURE — 25510000001 IOPAMIDOL PER 1 ML: Performed by: EMERGENCY MEDICINE

## 2024-01-24 PROCEDURE — 96376 TX/PRO/DX INJ SAME DRUG ADON: CPT

## 2024-01-24 PROCEDURE — 96375 TX/PRO/DX INJ NEW DRUG ADDON: CPT

## 2024-01-24 PROCEDURE — 25010000002 MORPHINE PER 10 MG: Performed by: EMERGENCY MEDICINE

## 2024-01-24 PROCEDURE — 25010000002 ONDANSETRON PER 1 MG: Performed by: EMERGENCY MEDICINE

## 2024-01-24 PROCEDURE — 99285 EMERGENCY DEPT VISIT HI MDM: CPT

## 2024-01-24 PROCEDURE — 71260 CT THORAX DX C+: CPT

## 2024-01-24 RX ORDER — ONDANSETRON 2 MG/ML
4 INJECTION INTRAMUSCULAR; INTRAVENOUS ONCE
Status: COMPLETED | OUTPATIENT
Start: 2024-01-24 | End: 2024-01-24

## 2024-01-24 RX ORDER — MORPHINE SULFATE 2 MG/ML
2 INJECTION, SOLUTION INTRAMUSCULAR; INTRAVENOUS ONCE
Status: COMPLETED | OUTPATIENT
Start: 2024-01-24 | End: 2024-01-24

## 2024-01-24 RX ADMIN — ONDANSETRON 4 MG: 2 INJECTION INTRAMUSCULAR; INTRAVENOUS at 05:59

## 2024-01-24 RX ADMIN — MORPHINE SULFATE 2 MG: 2 INJECTION, SOLUTION INTRAMUSCULAR; INTRAVENOUS at 05:58

## 2024-01-24 RX ADMIN — IOPAMIDOL 100 ML: 755 INJECTION, SOLUTION INTRAVENOUS at 07:03

## 2024-01-24 RX ADMIN — MORPHINE SULFATE 2 MG: 2 INJECTION, SOLUTION INTRAMUSCULAR; INTRAVENOUS at 09:11

## 2024-01-24 RX ADMIN — MORPHINE SULFATE 4 MG: 4 INJECTION, SOLUTION INTRAMUSCULAR; INTRAVENOUS at 07:01

## 2024-01-24 NOTE — DISCHARGE INSTRUCTIONS
Continue pain medicine as previously directed.  You may apply ice for 20 minutes at a time for the next 72 hours help with swelling and pain.    Follow-up with your primary care provider in 3-5 days.  If you do not have a primary care provider call [phone] for help in finding one, or you may follow up with [facility] at [phone].    Return to ED for any new or worsening symptoms

## 2024-01-24 NOTE — ED NOTES
Pt sitting on the side of the bed.   laying int he bed.  Pt educated on how morphine will make her drowsy.  Nurse worries that she is a fall risk w/ narcotics on board.  Pt educated on risks of sitting in a chair.  Pt sts she will be fine sitting in the chair.

## 2024-01-24 NOTE — ED PROVIDER NOTES
Subjective   History of Present Illness  78-year-old female slipped and fell from standing 4 hours ago striking her right flank on some furniture.  No head injury, no neck or back pain.  Patient complains of moderate to severe right lateral inferior rib and right upper quadrant abdominal pain.        Review of Systems   Cardiovascular:  Positive for chest pain.   Gastrointestinal:  Positive for abdominal pain.       Past Medical History:   Diagnosis Date    COPD (chronic obstructive pulmonary disease)     Coronary artery calcification seen on CT scan 07/2012    mild calcification in the LAD with calcium score of 22. modere narrowing left subclavian origin    Diverticulosis     Gastroparesis     GERD (gastroesophageal reflux disease)     GIST (gastrointestinal stromal tumor), non-malignant     Hip pain, bilateral     Irritable bowel syndrome     Low back pain     Mild mitral regurgitation     Neck pain     PAD (peripheral artery disease)     small vessel disease in feet    Subclavian artery stenosis, left 05/2016    50 percent    Tubular adenoma of colon 01/2017       Allergies   Allergen Reactions    Amlodipine Headache     Head tightness    Itraconazole Hives     sporanax    Lisinopril Hives    Norvasc [Amlodipine Besylate] Other (See Comments)     Head tightness    Sulfa Antibiotics Other (See Comments)     headache    Hydrochlorothiazide Other (See Comments)     hyponatremia  Other reaction(s): Other (See Comments)  hyponatremia    Sulfamethoxazole Nausea And Vomiting       Past Surgical History:   Procedure Laterality Date    APPENDECTOMY      COLONOSCOPY  10/01/2019    dr owen    ENDOSCOPY  02/2019    ENDOSCOPY  02/01/2023    Dr. Owen    HEMORRHOIDECTOMY      HYSTERECTOMY      TONSILLECTOMY      TOOTH EXTRACTION      8/3/21       Family History   Problem Relation Age of Onset    Hypertension Mother     Diabetes Mother     Dementia Mother     Stroke Mother     Lung cancer Father     Alcohol abuse Father      COPD Father     Breast cancer Sister     Hypertension Sister     Hypothyroidism Sister     Stroke Sister     Other Sister         AAA    Bipolar disorder Daughter     Fibromyalgia Daughter        Social History     Socioeconomic History    Marital status:    Tobacco Use    Smoking status: Former     Types: Cigarettes     Quit date: 8/13/2020     Years since quitting: 3.4    Smokeless tobacco: Never    Tobacco comments:     she has smoked intermittently for 40 years; she smoked over 2 ppd twenty years ago   Vaping Use    Vaping Use: Never used   Substance and Sexual Activity    Alcohol use: No    Drug use: No    Sexual activity: Defer           Objective   Physical Exam  Constitutional:       Appearance: Normal appearance.   HENT:      Head: Normocephalic and atraumatic.   Cardiovascular:      Rate and Rhythm: Normal rate and regular rhythm.      Heart sounds: Normal heart sounds.   Pulmonary:      Effort: Pulmonary effort is normal.      Breath sounds: Normal breath sounds.      Comments: Right lateral inferior rib tenderness, normal inspection, no overlying ecchymosis or crepitus appreciated  Abdominal:      General: Bowel sounds are normal.      Palpations: Abdomen is soft.      Comments: Normal inspection, there is right upper quadrant tenderness, no rebound or guarding   Musculoskeletal:         General: Normal range of motion.   Skin:     General: Skin is warm and dry.      Capillary Refill: Capillary refill takes less than 2 seconds.   Neurological:      General: No focal deficit present.      Mental Status: She is alert and oriented to person, place, and time.   Psychiatric:         Mood and Affect: Mood normal.         Behavior: Behavior normal.         Procedures           ED Course  ED Course as of 01/29/24 0923 Wed Jan 24, 2024   0716 Patient care transferred to me pending CT results and disposition [AA]   0725 Patient sitting in chair resting quietly.  Discussed results of labs and what we  are waiting on. [AA]   0829 ED stay prolonged secondary to difficulty getting radiology reading of CT scans.  Patient made aware of issue [AA]   0848 CT scans negative for any acute abnormalities as described above.  Findings were discussed with the patient and family at bedside who voiced understanding of discharge along with signs and symptoms to return.  They are in agreement plan all questions were answered. [AA]      ED Course User Index  [AA] Fermín Ruff PA        CT Abdomen Pelvis With Contrast   Final Result   Chronic findings as detailed. No acute process.      Electronically Signed: Daily Mccormick MD     1/24/2024 8:40 AM EST     Workstation ID: [id number]      CT Chest With Contrast Diagnostic   Final Result   Chronic findings as detailed. No acute process.      Electronically Signed: Daily Mccormick MD     1/24/2024 8:40 AM EST     Workstation ID: [id number]        Labs Reviewed   COMPREHENSIVE METABOLIC PANEL - Abnormal; Notable for the following components:       Result Value    Glucose 118 (*)     Sodium 133 (*)     Chloride 97 (*)     All other components within normal limits    Narrative:     GFR Normal >60  Chronic Kidney Disease <60  Kidney Failure <15    The GFR formula is only valid for adults with stable renal function between ages 18 and 70.   CBC WITH AUTO DIFFERENTIAL - Abnormal; Notable for the following components:    Hemoglobin 11.5 (*)     Hematocrit 33.9 (*)     All other components within normal limits   RAINBOW DRAW    Narrative:     The following orders were created for panel order [place] Draw.  Procedure                               Abnormality         Status                     ---------                               -----------         ------                     Green Top (Gel)[[id number]]                                  Final result               Lavender Top[[id number]]                                     Final result               Gold Top - SST[[id number]]                                    Final result               Light Blue Top[[id number]]                                   Final result                 Please view results for these tests on the individual orders.   CBC AND DIFFERENTIAL    Narrative:     The following orders were created for panel order CBC & Differential.  Procedure                               Abnormality         Status                     ---------                               -----------         ------                     CBC Auto Differential[[id number]]        Abnormal            Final result                 Please view results for these tests on the individual orders.   GREEN TOP   LAVENDER TOP   GOLD TOP - SST   LIGHT BLUE TOP     Medications   morphine injection 2 mg (2 mg Intravenous Given 1/24/24 0558)   ondansetron (ZOFRAN) injection 4 mg (4 mg Intravenous Given 1/24/24 0559)   morphine injection 4 mg (4 mg Intravenous Given 1/24/24 0701)   iopamidol (ISOVUE-370) 76 % injection 100 mL (100 mL Intravenous Given 1/24/24 0703)   morphine injection 2 mg (2 mg Intravenous Given 1/24/24 0911)                                            Medical Decision Making  Patient care transferred to me from Dr. Gallego pending CT results and physician.  Labs reviewed by myself and fairly unremarkable no signs of severe infection electrolyte abnormality or dehydration CT scans had no acute abnormalities as above findings were discussed with the patient at bedside voiced understanding and discharge along with signs and symptoms to return to.    This document is intended for medical expert use only. Reading of this document by patients and/or patient's family without participating medical staff guidance may result in misinterpretation and unintended morbidity.  Any interpretation of such data is the responsibility of the patient and/or family member responsible for the patient in concert with their primary or specialist providers, not to be left for sources of online searches  such as T[employer], [employer] or similar queries. Relying on these approaches to knowledge may result in misinterpretation, misguided goals of care and even death should patients or family members try recommendations outside of the realm of professional medical care in a supervised inpatient environment.       Problems Addressed:  Abdominal wall pain: complicated acute illness or injury  Chest wall pain: complicated acute illness or injury  Fall, initial encounter: complicated acute illness or injury    Amount and/or Complexity of Data Reviewed  Labs: ordered.  Radiology: ordered.    Risk  Prescription drug management.        Final diagnoses:   Fall, initial encounter   Abdominal wall pain   Chest wall pain       ED Disposition  ED Disposition       ED Disposition   Discharge    Condition   Stable    Comment   --               Danish Serrano MD  [address] [address]  [phone]    Schedule an appointment as soon as possible for a visit in 3 days      [facility] EMERGENCY DEPARTMENT  [address] [id number] [phone]  Go to   If symptoms worsen         Medication List      No changes were made to your prescriptions during this visit.            Fermín Ruff PA  01/29/24 0935

## 2024-01-25 ENCOUNTER — TELEPHONE (OUTPATIENT)
Dept: PAIN MEDICINE | Facility: CLINIC | Age: 79
End: 2024-01-25

## 2024-01-25 RX ORDER — CLONIDINE HYDROCHLORIDE 0.1 MG/1
0.1 TABLET ORAL NIGHTLY
Qty: 90 TABLET | Refills: 1 | Status: SHIPPED | OUTPATIENT
Start: 2024-01-25

## 2024-01-25 NOTE — TELEPHONE ENCOUNTER
1/25/24-- pt was called and left a vm that  pt needs office visit  for further evaluation of condition and pain ( hub can tell pt)

## 2024-01-25 NOTE — TELEPHONE ENCOUNTER
Looks like she had a fall yesterday with ED visit. For any medication changes, she needs an office appointment.     Galileo Portillo DO  Pain Management   [facility]

## 2024-01-25 NOTE — TELEPHONE ENCOUNTER
DELETE AFTER REVIEWING: Telephone encounter to be sent to the clinical pool     Caller: Luna Jacobson    Relationship: Self    Best call back number: [phone] (home)       What medication are you requesting: PATIENT DID NOT SPECIFY ONLY SAID WHAT SHE HAS ISNT WORKING AND THAT SHE WANTS SOMETHING DIFFERENT    What are your current symptoms: PAIN WHEN I MOVE. ALMOST ANY MOVEMENT CAUSES ME PAIN    How long have you been experiencing symptoms: THIS IS SINCE HER FALL     Have you had these symptoms before:    [] Yes  [x] No    Have you been treated for these symptoms before:   [] Yes  [x] No    If a prescription is needed, what is your preferred pharmacy and phone number:  [id number] - [place] [id number]  AT [place] [address] & [address] - [phone]  - [phone]     Additional notes:  PATIENT INJURED HERSELF IN A FALL. SHE WAS SEEN AT [facility] YESTERDAY 01/24/2024 WHERE SHE WAS GIVEN MORPHINE. (SHE WANTED TO MAKE THE DOCTOR AWARE OF THIS AS WELL) PATIENT SAYS SHE IS IN EXCRUCIATING PAIN.   PATIENT IS TAKING HYDROCODONE 5MG 3X A DAY  FOR PAIN CURRENTLY BUT WANTS TO KNOW IF SHE CAN GET SOMETHING ELSE. IT IS NOT HELPING.     PLEASE NOTE: PATIENT SAYS SHE HAS MADE SEVERAL CALLS TO THE HUB AND LEFT MESSAGES IN THE PAST AND NO ONE FROM THE OFFICE EVER CALLS HER BACK.

## 2024-01-26 DIAGNOSIS — I10 ESSENTIAL HYPERTENSION: Chronic | ICD-10-CM

## 2024-01-26 RX ORDER — HYDRALAZINE HYDROCHLORIDE 50 MG/1
50 TABLET, FILM COATED ORAL 2 TIMES DAILY
Qty: 60 TABLET | Refills: 3 | Status: SHIPPED | OUTPATIENT
Start: 2024-01-26

## 2024-03-12 NOTE — PROGRESS NOTES
Subjective   Luna Jacobson is a 78 y.o. female is here for follow-up for lower back pain.  Last seen 1/17/2024.  ED visit in January for mechanical fall hitting ribs and abdominal area.  Increased lower back pain with radiculopathy to b/l hips.     Lower back pain is 5/10 on VAS, maximum 6/10.  Pain is dull and aching and sharp in nature.  Referred to bilateral hips, bilateral posterior legs to ankle- radicular pain better after LESI.  Constant pain improved by pain medications and caudal NIMA.  Pain worsens with walking. Main complaint is axial back pain and buttock pain.    Neck pain is 6/10 on VAS, maximum 7/10.  Tightness in nature.  Not referred but that has numbness in bilateral hands.  Improved with pain medications and chiropractic care.  Worse with flexion of the neck.  Chiropractic care really helped in the past but the last few sessions have not helped.  + Headaches starting from occipital region radiating to the top of her head.  Daily headaches.  Had taken Fioricet before with some relief.    Previous Injection:   12/1/23 - LESI L4-5- 80% pain relief for first few weeks; 40% pain relief for now. VAS down from 8/10 on 5/10 on VAS- 2.5 months.  11/7/2023-caudal NIMA with sedation - no significant relief.   1/26/2023-caudal NIMA with sedation- 80% pain relief with radicular pain- 9 months of relief.   11/20/2020 - Caudal NIMA - 80% pain relief - 2 years.     PMH: COPD, GERD, HLD, HTN, Thoracic aortic aneurysm      Current Medications:   Norco 5-325 mg BID PRN   Flexeril 10 mg BID PRN  Fioricet     Past Medications:       Past Modalities:  TENS:                                                                          no                                                  Physical Therapy Within The Last 6 Months              Yes- chiropractor care >6 weeks.  Psychotherapy                                                            no  Massage Therapy                                                        no     Patient Complains Of:  Uro-Fecal Incontinence          no  Weight Gain/Loss                   no  Fever/Chills                             no  Weakness                               Yes (subjective weakness in left leg)            Current Outpatient Medications:     albuterol sulfate  (90 Base) MCG/ACT inhaler, Inhale 2 puffs Every 4 (Four) Hours As Needed., Disp: , Rfl:     atorvastatin (LIPITOR) 40 MG tablet, TAKE 1 TABLET BY MOUTH DAILY, Disp: 90 tablet, Rfl: 1    butalbital-acetaminophen-caffeine (FIORICET, ESGIC) -40 MG per tablet, Take 1 tablet by mouth Every 6 (Six) Hours As Needed for Headache., Disp: 30 tablet, Rfl: 1    Calcium Carbonate-Vitamin D 500-125 MG-UNIT tablet, Take  by mouth., Disp: , Rfl:     carvedilol (COREG) 12.5 MG tablet, TAKE 1 TABLET BY MOUTH TWICE DAILY WITH MEALS, Disp: 180 tablet, Rfl: 3    clobetasol (TEMOVATE) 0.05 % external solution, APPLY TOPICALLY TO THE SCALP EVERY DAY IN THE MORNING AND IN THE EVENING, Disp: , Rfl:     cloNIDine (CATAPRES) 0.1 MG tablet, TAKE 1 TABLET BY MOUTH EVERY NIGHT, Disp: 90 tablet, Rfl: 1    coenzyme Q10 100 MG capsule, Take 1 capsule by mouth Daily., Disp: , Rfl:     cyclobenzaprine (FLEXERIL) 10 MG tablet, Take 1 tablet by mouth 2 (Two) Times a Day As Needed for Muscle Spasms for up to 180 days., Disp: 180 tablet, Rfl: 1    dicyclomine (BENTYL) 20 MG tablet, Take 1 tablet by mouth Daily., Disp: , Rfl:     FeroSul 325 (65 Fe) MG tablet, TAKE 1 TABLET BY MOUTH DAILY, Disp: 30 tablet, Rfl: 2    fexofenadine (ALLEGRA) 180 MG tablet, Take 1 tablet by mouth Daily., Disp: , Rfl:     fluticasone (FLONASE) 50 MCG/ACT nasal spray, SHAKE LIQUID AND USE 2 SPRAYS IN EACH NOSTRIL EVERY DAY, Disp: 48 g, Rfl: 3    hydrALAZINE (APRESOLINE) 50 MG tablet, Take 1 tablet by mouth 2 (Two) Times a Day., Disp: 60 tablet, Rfl: 3    [START ON 3/15/2024] HYDROcodone-acetaminophen (NORCO) 5-325 MG per tablet, Take 1 tablet by mouth 3 (Three) Times a Day As  Needed for Severe Pain for up to 30 days., Disp: 90 tablet, Rfl: 0    [START ON 4/14/2024] HYDROcodone-acetaminophen (NORCO) 5-325 MG per tablet, Take 1 tablet by mouth 3 (Three) Times a Day As Needed for Severe Pain for up to 30 days., Disp: 90 tablet, Rfl: 0    ketoconazole (NIZORAL) 2 % shampoo, , Disp: , Rfl: 3    levothyroxine (SYNTHROID, LEVOTHROID) 100 MCG tablet, TAKE 1 TABLET BY MOUTH DAILY, Disp: 90 tablet, Rfl: 0    Multiple Vitamin (MULTIVITAMIN) tablet, Take 1 tablet by mouth Daily., Disp: , Rfl:     pantoprazole (PROTONIX) 40 MG EC tablet, Take 1 tablet by mouth Daily., Disp: , Rfl:     polyethylene glycol (MIRALAX) powder, , Disp: , Rfl:     tiotropium bromide-olodaterol (Stiolto Respimat) 2.5-2.5 MCG/ACT aerosol solution inhaler, Inhale Daily., Disp: , Rfl:     varenicline (CHANTIX) 1 MG tablet, TAKE 1 TABLET BY MOUTH TWICE DAILY, Disp: 60 tablet, Rfl: 5    The following portions of the patient's history were reviewed and updated as appropriate: allergies, current medications, past family history, past medical history, past social history, past surgical history and problem list.      REVIEW OF PERTINENT MEDICAL DATA    Past Medical History:   Diagnosis Date    COPD (chronic obstructive pulmonary disease)     Coronary artery calcification seen on CT scan 07/2012    mild calcification in the LAD with calcium score of 22. modere narrowing left subclavian origin    Diverticulosis     Gastroparesis     GERD (gastroesophageal reflux disease)     GIST (gastrointestinal stromal tumor), non-malignant     Hip pain, bilateral     Irregular heartbeat     Irritable bowel syndrome     Low back pain     Mild mitral regurgitation     Neck pain     PAD (peripheral artery disease)     small vessel disease in feet    Subclavian artery stenosis, left 05/2016    50 percent    Tubular adenoma of colon 01/2017     Past Surgical History:   Procedure Laterality Date    APPENDECTOMY      COLONOSCOPY  10/01/2019    dr mcintyre     ENDOSCOPY  02/2019    ENDOSCOPY  02/01/2023    Dr. Owen    HEMORRHOIDECTOMY      HYSTERECTOMY      TONSILLECTOMY      TOOTH EXTRACTION      8/3/21     Family History   Problem Relation Age of Onset    Hypertension Mother     Diabetes Mother     Dementia Mother     Stroke Mother     Lung cancer Father     Alcohol abuse Father     COPD Father     Breast cancer Sister     Hypertension Sister     Hypothyroidism Sister     Stroke Sister     Other Sister         AAA    Bipolar disorder Daughter     Fibromyalgia Daughter      Social History     Socioeconomic History    Marital status:    Tobacco Use    Smoking status: Former     Current packs/day: 0.00     Types: Cigarettes     Quit date: 8/13/2020     Years since quitting: 3.5    Smokeless tobacco: Never    Tobacco comments:     she has smoked intermittently for 40 years; she smoked over 2 ppd twenty years ago   Vaping Use    Vaping status: Never Used   Substance and Sexual Activity    Alcohol use: No    Drug use: No    Sexual activity: Defer         Review of Systems   Musculoskeletal:  Positive for back pain and neck pain.   Neurological:  Positive for headaches.         Vitals:    03/13/24 1435   BP: 157/72   Pulse: 65   Resp: 16   SpO2: 94%   Weight: 62.6 kg (138 lb)   PainSc:   4                   Objective   Physical Exam  Neck:     Musculoskeletal:         General: Tenderness present.      Lumbar back: Tenderness present. Decreased range of motion.        Legs:    Neurological:      Comments: Motor strength 5/5 b/l LE  Sensory intact b/l LE             Imaging Reviewed:  MRI done at [facility] - 9/2020:    L3-L4-a small broad based disc protrusion centrally.  There is moderate facet arthropathy.  This changes contribute to moderate central canal stenosis.  There is moderate right and severe left neural foraminal stenosis.  L4-L5-there is a posterior disc protrusion centrally.  Moderate facet arthropathy and mild limited flavum hypertrophy. Severe central  canal stenosis.  Severe bilateral neural foraminal stenosis, right greater than left.  L5-S1-there is small left far lateral disc protrusion.  No central canal stenosis.  There is mild facet arthropathy.  There is moderate bilateral neural foraminal stenosis.     Lumbar X-ray: 10/1/2020   Multilevel degenerative spondylosis.      Cervical x-ray-4/20/2023  - Mild degenerative disc and endplate changes in cervical spine most notable at C4-5 through C6-7  - Mild right C4-5 facet arthropathy    Assessment:    1. DDD (degenerative disc disease), lumbar    2. Bilateral occipital neuralgia    3. Chronic left-sided low back pain with sciatica, sciatica laterality unspecified    4. Lumbar spondylosis        Plan:  1.   UDS on 1/17/24 is consistent with patient's interview.. Narcotic contract discussed on 11/3/22. Narcan ordered on 10/9/2020.  2. Due to increased pain, continue Norco 5-325 mg TID PRN (3/15; 4/14). Discussed with the patient regarding long-term side effects of opioids including but not limited to opioid induced hormonal suppression, hyperalgesia, fatigue, weight gain, possible opioid induced altered immune system, addiction, tolerance, dependence, risk of hearing loss and elevated risk of myocardial infarction. Proper use and potential life threatening side effects of over use discussed with patient. Patient states understanding of their use and risks.  3. Continue Flexeril to 10 mg BID PRN (7/15/2024). Common side effects of flexeril include blurred vision, dizziness or lightheadedness, drowsiness, dryness of mouth, bloated feeling or gas, indigestion, nausea or vomiting, or stomach cramps or pain, constipation, diarrhea, excitement or nervousness, frequent urination, general feeling of discomfort or illness, headache, muscle twitching, numbness, tingling, pain, or weakness in hands or feet, pounding heartbeat, problems in speaking, trembling, trouble in sleeping, unpleasant taste or other taste changes,  unusual muscle weakness or unusual tiredness, especially during the first few days as your body adjusts to this medication.  4. . Patient has pain in the head with referred pain on the top of the head. Bilateral occipital tenderness present. Discussed bilateral greater and lesser occipital nerve block. Discussed the possibility of infection, bleeding, nerve damage, headache, increased pain. Patient understands and agrees.  Discussed with patient that it is not appropriate to do IV sedation for minor procedure like occipital nerve block.  I did offer Xanax to be taken before the procedure but patient is not sure and would like to think about it.  5.Patient has pain in the lower back with referred pain in the leg, patient has failed conservative therapy including PT and pharmacological management for more than 6 weeks and pain interferes with activities of daily living. MRI shows severe stenosis at L4-5. Discussed lumbar NIMA L4-5 with sedation due to severe procedural anxiety.  Discussed the possibility of infection, bleeding, nerve damage, post dural puncture headache, increased pain, paraplegia. Patient understands and agrees.     RTC for NAVA Portillo DO  Pain Management   [facility]         INSPECT REPORT    As part of the patient's treatment plan, I may be prescribing controlled substances. The patient has been made aware of appropriate use of such medications, including potential risk of somnolence, limited ability to drive and/or work safely, and the potential for dependence or overdose. It has also been made clear that these medications are for use by this patient only, without concomitant use of alcohol or other substances unless prescribed.     Patient has completed prescribing agreement detailing terms of continued prescribing of controlled substances, including monitoring INSPECT reports, urine drug screening, and pill counts if necessary. The patient is aware that inappropriate use will  results in cessation of prescribing such medications.    INSPECT report has been reviewed and scanned into the patient's chart.

## 2024-03-13 ENCOUNTER — OFFICE VISIT (OUTPATIENT)
Dept: PAIN MEDICINE | Facility: CLINIC | Age: 79
End: 2024-03-13
Payer: MEDICARE

## 2024-03-13 VITALS
HEART RATE: 65 BPM | RESPIRATION RATE: 16 BRPM | BODY MASS INDEX: 24.45 KG/M2 | OXYGEN SATURATION: 94 % | WEIGHT: 138 LBS | SYSTOLIC BLOOD PRESSURE: 157 MMHG | DIASTOLIC BLOOD PRESSURE: 72 MMHG

## 2024-03-13 DIAGNOSIS — M51.36 DDD (DEGENERATIVE DISC DISEASE), LUMBAR: ICD-10-CM

## 2024-03-13 DIAGNOSIS — G89.29 CHRONIC LEFT-SIDED LOW BACK PAIN WITH SCIATICA, SCIATICA LATERALITY UNSPECIFIED: ICD-10-CM

## 2024-03-13 DIAGNOSIS — M54.81 BILATERAL OCCIPITAL NEURALGIA: ICD-10-CM

## 2024-03-13 DIAGNOSIS — M47.816 LUMBAR SPONDYLOSIS: ICD-10-CM

## 2024-03-13 DIAGNOSIS — M54.40 CHRONIC LEFT-SIDED LOW BACK PAIN WITH SCIATICA, SCIATICA LATERALITY UNSPECIFIED: ICD-10-CM

## 2024-03-13 PROCEDURE — 1125F AMNT PAIN NOTED PAIN PRSNT: CPT | Performed by: STUDENT IN AN ORGANIZED HEALTH CARE EDUCATION/TRAINING PROGRAM

## 2024-03-13 PROCEDURE — 1159F MED LIST DOCD IN RCRD: CPT | Performed by: STUDENT IN AN ORGANIZED HEALTH CARE EDUCATION/TRAINING PROGRAM

## 2024-03-13 PROCEDURE — 1160F RVW MEDS BY RX/DR IN RCRD: CPT | Performed by: STUDENT IN AN ORGANIZED HEALTH CARE EDUCATION/TRAINING PROGRAM

## 2024-03-13 PROCEDURE — 3078F DIAST BP <80 MM HG: CPT | Performed by: STUDENT IN AN ORGANIZED HEALTH CARE EDUCATION/TRAINING PROGRAM

## 2024-03-13 PROCEDURE — 3077F SYST BP >= 140 MM HG: CPT | Performed by: STUDENT IN AN ORGANIZED HEALTH CARE EDUCATION/TRAINING PROGRAM

## 2024-03-13 PROCEDURE — 99214 OFFICE O/P EST MOD 30 MIN: CPT | Performed by: STUDENT IN AN ORGANIZED HEALTH CARE EDUCATION/TRAINING PROGRAM

## 2024-03-13 RX ORDER — HYDROCODONE BITARTRATE AND ACETAMINOPHEN 5; 325 MG/1; MG/1
1 TABLET ORAL 3 TIMES DAILY PRN
Qty: 90 TABLET | Refills: 0 | Status: SHIPPED | OUTPATIENT
Start: 2024-04-14 | End: 2024-05-14

## 2024-03-13 RX ORDER — HYDROCODONE BITARTRATE AND ACETAMINOPHEN 5; 325 MG/1; MG/1
1 TABLET ORAL 3 TIMES DAILY PRN
Qty: 90 TABLET | Refills: 0 | Status: SHIPPED | OUTPATIENT
Start: 2024-03-15 | End: 2024-04-14

## 2024-03-25 ENCOUNTER — TELEPHONE (OUTPATIENT)
Dept: PAIN MEDICINE | Facility: HOSPITAL | Age: 79
End: 2024-03-25
Payer: MEDICARE

## 2024-03-25 NOTE — TELEPHONE ENCOUNTER
Pre procedure call attempted, LMOM asking patient to arrive by 1400 and that a  is required. NPO status also reviewed.

## 2024-03-26 ENCOUNTER — HOSPITAL ENCOUNTER (OUTPATIENT)
Dept: PAIN MEDICINE | Facility: HOSPITAL | Age: 79
Discharge: HOME | End: 2024-03-26
Payer: MEDICARE

## 2024-03-26 ENCOUNTER — TELEPHONE (OUTPATIENT)
Dept: PAIN MEDICINE | Facility: CLINIC | Age: 79
End: 2024-03-26
Payer: MEDICARE

## 2024-03-26 VITALS
OXYGEN SATURATION: 95 % | HEART RATE: 60 BPM | BODY MASS INDEX: 24.45 KG/M2 | TEMPERATURE: 97.1 F | WEIGHT: 138 LBS | HEIGHT: 63 IN | DIASTOLIC BLOOD PRESSURE: 74 MMHG | SYSTOLIC BLOOD PRESSURE: 120 MMHG | RESPIRATION RATE: 16 BRPM

## 2024-03-26 DIAGNOSIS — R52 PAIN: ICD-10-CM

## 2024-03-26 DIAGNOSIS — M54.16 LUMBAR RADICULOPATHY: Primary | ICD-10-CM

## 2024-03-26 PROCEDURE — 25510000001 IOPAMIDOL 41 % SOLUTION: Performed by: STUDENT IN AN ORGANIZED HEALTH CARE EDUCATION/TRAINING PROGRAM

## 2024-03-26 PROCEDURE — 25010000002 METHYLPREDNISOLONE PER 40 MG: Performed by: STUDENT IN AN ORGANIZED HEALTH CARE EDUCATION/TRAINING PROGRAM

## 2024-03-26 PROCEDURE — 77003 FLUOROGUIDE FOR SPINE INJECT: CPT

## 2024-03-26 RX ORDER — IOPAMIDOL 408 MG/ML
3 INJECTION, SOLUTION INTRATHECAL
Status: COMPLETED | OUTPATIENT
Start: 2024-03-26 | End: 2024-03-26

## 2024-03-26 RX ORDER — METHYLPREDNISOLONE ACETATE 40 MG/ML
40 INJECTION, SUSPENSION INTRA-ARTICULAR; INTRALESIONAL; INTRAMUSCULAR; SOFT TISSUE ONCE
Status: COMPLETED | OUTPATIENT
Start: 2024-03-26 | End: 2024-03-26

## 2024-03-26 RX ADMIN — IOPAMIDOL 3 ML: 408 INJECTION, SOLUTION INTRATHECAL at 14:24

## 2024-03-26 RX ADMIN — METHYLPREDNISOLONE ACETATE 40 MG: 40 INJECTION, SUSPENSION INTRA-ARTICULAR; INTRALESIONAL; INTRAMUSCULAR; INTRASYNOVIAL; SOFT TISSUE at 14:24

## 2024-03-26 NOTE — TELEPHONE ENCOUNTER
Hub staff attempted to follow warm transfer process and was unsuccessful     Caller: Luna Jacobson    Relationship to patient: Self    Best call back number: [phone]    Patient is needing: PATIENT STATES SHE RECEIVED A CALL FROM RYAN SCRUGGS STATING TO CALL OFFICE SO SHE COULD BE TRANSFERRED TO HOSPITAL REGARDING HER APPT TODAY.

## 2024-03-26 NOTE — DISCHARGE INSTRUCTIONS

## 2024-03-26 NOTE — H&P
H and P reviewed from previous visit and no changes to patient's clinical presentation. Will proceed with procedure as planned. Patient denies history of DM and being on blood thinners.    Galileo Portillo DO  Pain Management   [facility]

## 2024-03-26 NOTE — PROCEDURES
PREOPERATIVE DIAGNOSIS:    1. Lumbar DDD    POSTOPERATIVE DIAGNOSIS: Same    PROCEDURE:  Lumbar epidural steroid injection L 4-5    PROCEDURE NOTE:  After obtaining written informed consent patient was taken to the procedure room. Pre-procedure blood pressure and pulse were stable and recorded in patients clinic chart.     The patient was placed in the prone position. The lower back was prepped with antiseptic solution and draped in the usual sterile fashion.  The skin over the L4-5 space was identified under fluoroscopic guidance and infiltrated with 1% lidocaine for local anesthesia via 25 gauge needle.  A 20 gauge tuohy needle was used to access the epidural space using loss of resistance to air technique. Following negative aspiration, 2 cc of the omnipaque dye was injected.  There was good spread of the dye from L3-L5 area. A mixture containing  3 ml of saline with 40 mg of dep-medrol was injected. There was no evidence of CSF, paresthesia or vascular spread. The needle was removed. Skin was cleaned and band aid was applied.    Following the procedure the patient's vital signs were stable. The patient was discharged home in good condition after being given discharge instructions.    COMPLICATIONS: None    Galileo Portillo DO  Pain Management   [facility]

## 2024-03-27 ENCOUNTER — TELEPHONE (OUTPATIENT)
Dept: PAIN MEDICINE | Facility: HOSPITAL | Age: 79
End: 2024-03-27
Payer: MEDICARE

## 2024-03-28 ENCOUNTER — OFFICE VISIT (OUTPATIENT)
Dept: INTERNAL MEDICINE | Facility: CLINIC | Age: 79
End: 2024-03-28
Payer: MEDICARE

## 2024-03-28 VITALS
DIASTOLIC BLOOD PRESSURE: 80 MMHG | SYSTOLIC BLOOD PRESSURE: 180 MMHG | WEIGHT: 137 LBS | BODY MASS INDEX: 24.27 KG/M2 | HEIGHT: 63 IN

## 2024-03-28 DIAGNOSIS — I49.3 PVC'S (PREMATURE VENTRICULAR CONTRACTIONS): ICD-10-CM

## 2024-03-28 DIAGNOSIS — E04.1 THYROID NODULE: Primary | ICD-10-CM

## 2024-03-28 DIAGNOSIS — R73.03 PREDIABETES: ICD-10-CM

## 2024-03-28 DIAGNOSIS — R91.1 LUNG NODULE SEEN ON IMAGING STUDY: ICD-10-CM

## 2024-03-28 DIAGNOSIS — I10 ESSENTIAL HYPERTENSION: Chronic | ICD-10-CM

## 2024-03-28 DIAGNOSIS — E03.8 OTHER SPECIFIED HYPOTHYROIDISM: Chronic | ICD-10-CM

## 2024-03-28 DIAGNOSIS — E78.49 OTHER HYPERLIPIDEMIA: Chronic | ICD-10-CM

## 2024-03-28 DIAGNOSIS — I71.20 THORACIC AORTIC ANEURYSM (TAA), UNSPECIFIED PART, UNSPECIFIED WHETHER RUPTURED: Chronic | ICD-10-CM

## 2024-03-28 DIAGNOSIS — D50.0 IRON DEFICIENCY ANEMIA DUE TO CHRONIC BLOOD LOSS: ICD-10-CM

## 2024-03-28 PROCEDURE — 1160F RVW MEDS BY RX/DR IN RCRD: CPT | Performed by: INTERNAL MEDICINE

## 2024-03-28 PROCEDURE — 93000 ELECTROCARDIOGRAM COMPLETE: CPT | Performed by: INTERNAL MEDICINE

## 2024-03-28 PROCEDURE — 3077F SYST BP >= 140 MM HG: CPT | Performed by: INTERNAL MEDICINE

## 2024-03-28 PROCEDURE — 99214 OFFICE O/P EST MOD 30 MIN: CPT | Performed by: INTERNAL MEDICINE

## 2024-03-28 PROCEDURE — 1159F MED LIST DOCD IN RCRD: CPT | Performed by: INTERNAL MEDICINE

## 2024-03-28 PROCEDURE — 3079F DIAST BP 80-89 MM HG: CPT | Performed by: INTERNAL MEDICINE

## 2024-03-28 RX ORDER — HYDRALAZINE HYDROCHLORIDE 50 MG/1
50 TABLET, FILM COATED ORAL 3 TIMES DAILY
Qty: 90 TABLET | Refills: 3 | Status: SHIPPED | OUTPATIENT
Start: 2024-03-28

## 2024-03-28 NOTE — PROGRESS NOTES
Subjective        Chief Complaint   Patient presents with    Hypertension           Luna Jacobson is a 78 y.o. female who presents for    Patient Active Problem List   Diagnosis    Essential hypertension    Other hyperlipidemia    Other specified hypothyroidism    Other headache syndrome    Prediabetes    Iron deficiency anemia due to chronic blood loss    Thoracic aortic aneurysm    Microscopic colitis    Myalgia    DDD (degenerative disc disease), lumbar    Recurrent major depressive disorder, in partial remission    Moderate aortic regurgitation    Diffusion capacity of lung (dl), decreased    Age-related osteoporosis without current pathological fracture       History of Present Illness     She has seen Dr. Koenig twice. She saw him for an irregular heart beat.  He increased her coreg but her BP went too low so she went to 12.5 BID and will take an extra pill for an irregular beat. She is not using stiolto. She uses albuterol two times per month.    She fell in January and bruised her ribs. She had a CT of her chest and abdomen.  Her SBP runs 135-165. Her DBP runs 70-84.    Allergies   Allergen Reactions    Amlodipine Headache     Head tightness    Itraconazole Hives     sporanax    Lisinopril Hives    Norvasc [Amlodipine Besylate] Other (See Comments)     Head tightness    Sulfa Antibiotics Other (See Comments)     headache    Hydrochlorothiazide Other (See Comments)     hyponatremia  Other reaction(s): Other (See Comments)  hyponatremia    Sulfamethoxazole Nausea And Vomiting       Current Outpatient Medications on File Prior to Visit   Medication Sig Dispense Refill    albuterol sulfate  (90 Base) MCG/ACT inhaler Inhale 2 puffs Every 4 (Four) Hours As Needed.      atorvastatin (LIPITOR) 40 MG tablet TAKE 1 TABLET BY MOUTH DAILY 90 tablet 1    butalbital-acetaminophen-caffeine (FIORICET, ESGIC) -40 MG per tablet Take 1 tablet by mouth Every 6 (Six) Hours As Needed for Headache. 30 tablet 1     Calcium Carbonate-Vitamin D 500-125 MG-UNIT tablet Take  by mouth.      carvedilol (COREG) 12.5 MG tablet TAKE 1 TABLET BY MOUTH TWICE DAILY WITH MEALS 180 tablet 3    clobetasol (TEMOVATE) 0.05 % external solution APPLY TOPICALLY TO THE SCALP EVERY DAY IN THE MORNING AND IN THE EVENING      cloNIDine (CATAPRES) 0.1 MG tablet TAKE 1 TABLET BY MOUTH EVERY NIGHT 90 tablet 1    coenzyme Q10 100 MG capsule Take 1 capsule by mouth Daily.      cyclobenzaprine (FLEXERIL) 10 MG tablet Take 1 tablet by mouth 2 (Two) Times a Day As Needed for Muscle Spasms for up to 180 days. 180 tablet 1    dicyclomine (BENTYL) 20 MG tablet Take 1 tablet by mouth Daily.      FeroSul 325 (65 Fe) MG tablet TAKE 1 TABLET BY MOUTH DAILY 30 tablet 2    fexofenadine (ALLEGRA) 180 MG tablet Take 1 tablet by mouth Daily.      fluticasone (FLONASE) 50 MCG/ACT nasal spray SHAKE LIQUID AND USE 2 SPRAYS IN EACH NOSTRIL EVERY DAY 48 g 3    HYDROcodone-acetaminophen (NORCO) 5-325 MG per tablet Take 1 tablet by mouth 3 (Three) Times a Day As Needed for Severe Pain for up to 30 days. 90 tablet 0    [START ON 4/14/2024] HYDROcodone-acetaminophen (NORCO) 5-325 MG per tablet Take 1 tablet by mouth 3 (Three) Times a Day As Needed for Severe Pain for up to 30 days. 90 tablet 0    ketoconazole (NIZORAL) 2 % shampoo   3    levothyroxine (SYNTHROID, LEVOTHROID) 100 MCG tablet TAKE 1 TABLET BY MOUTH DAILY 90 tablet 0    Multiple Vitamin (MULTIVITAMIN) tablet Take 1 tablet by mouth Daily.      pantoprazole (PROTONIX) 40 MG EC tablet Take 1 tablet by mouth Daily.      polyethylene glycol (MIRALAX) powder       varenicline (CHANTIX) 1 MG tablet TAKE 1 TABLET BY MOUTH TWICE DAILY 60 tablet 5    [DISCONTINUED] hydrALAZINE (APRESOLINE) 50 MG tablet Take 1 tablet by mouth 2 (Two) Times a Day. 60 tablet 3    [DISCONTINUED] tiotropium bromide-olodaterol (Stiolto Respimat) 2.5-2.5 MCG/ACT aerosol solution inhaler Inhale Daily. (Patient not taking: Reported on 3/28/2024)        No current facility-administered medications on file prior to visit.       Past Medical History:   Diagnosis Date    COPD (chronic obstructive pulmonary disease)     Coronary artery calcification seen on CT scan 07/2012    mild calcification in the LAD with calcium score of 22. modere narrowing left subclavian origin    Diverticulosis     Gastroparesis     GERD (gastroesophageal reflux disease)     GIST (gastrointestinal stromal tumor), non-malignant     Hip pain, bilateral     Irregular heartbeat     Irritable bowel syndrome     Low back pain     Mild mitral regurgitation     Neck pain     PAD (peripheral artery disease)     small vessel disease in feet    Subclavian artery stenosis, left 05/2016    50 percent    Tubular adenoma of colon 01/2017       Past Surgical History:   Procedure Laterality Date    APPENDECTOMY      COLONOSCOPY  10/01/2019    dr owen    ENDOSCOPY  02/2019    ENDOSCOPY  02/01/2023    Dr. Owen    HEMORRHOIDECTOMY      HYSTERECTOMY      TONSILLECTOMY      TOOTH EXTRACTION      8/3/21       Family History   Problem Relation Age of Onset    Hypertension Mother     Diabetes Mother     Dementia Mother     Stroke Mother     Lung cancer Father     Alcohol abuse Father     COPD Father     Breast cancer Sister     Hypertension Sister     Hypothyroidism Sister     Stroke Sister     Other Sister         AAA    Bipolar disorder Daughter     Fibromyalgia Daughter        Social History     Socioeconomic History    Marital status:    Tobacco Use    Smoking status: Former     Current packs/day: 0.00     Types: Cigarettes     Quit date: 8/13/2020     Years since quitting: 3.6    Smokeless tobacco: Never    Tobacco comments:     she has smoked intermittently for 40 years; she smoked over 2 ppd twenty years ago   Vaping Use    Vaping status: Never Used   Substance and Sexual Activity    Alcohol use: No    Drug use: No    Sexual activity: Defer           The following portions of the patient's  "history were reviewed and updated as appropriate: problem list, allergies, current medications, past medical history, past family history, past social history, and past surgical history.    Review of Systems    Immunization History   Administered Date(s) Administered    COVID-19 (PFIZER) Purple Cap Monovalent 02/07/2021, 02/28/2021    FLUAD TRI 65YR+ 11/01/2019    Fluzone High Dose =>65 Years ([employer] ONLY) 11/29/2018    Fluzone High-Dose 65+yrs 12/02/2022    Hepatitis A 05/01/2018, 12/02/2018    Influenza Seasonal Injectable 11/07/2013, 10/13/2014    Pneumococcal Conjugate 13-Valent (PCV13) 04/23/2015    Pneumococcal Polysaccharide (PPSV23) 08/01/2008    Tdap 01/01/2012    Zostavax 08/25/2010       Objective   Vitals:    03/28/24 1504   BP: 180/80   Weight: 62.1 kg (137 lb)   Height: 160 cm (62.99\")     Body mass index is 24.27 kg/m².  Physical Exam  Vitals reviewed.   Constitutional:       Appearance: She is well-developed.   HENT:      Head: Normocephalic and atraumatic.   Cardiovascular:      Rate and Rhythm: Normal rate and regular rhythm. Occasional Extrasystoles are present.     Heart sounds: Normal heart sounds, S1 normal and S2 normal.   Pulmonary:      Effort: Pulmonary effort is normal.      Breath sounds: Normal breath sounds.   Skin:     General: Skin is warm.   Neurological:      Mental Status: She is alert.   Psychiatric:         Behavior: Behavior normal.           ECG 12 Lead    Date/Time: 3/28/2024 4:59 PM  Performed by: Danish Serrano MD    Authorized by: Danish Serrano MD  Comparison: compared with previous ECG from 4/26/2022  Comparison to previous ECG: Now with sinus jeffery and PVCs  Rhythm: sinus bradycardia  Ectopy: unifocal PVCs  Rate: bradycardic    Clinical impression: abnormal EKG  Comments: Sinus jeffery with PVCs          Assessment & Plan   Diagnoses and all orders for this visit:    1. Thyroid nodule (Primary)  -     US Head Neck Soft Tissue; Future    2. Lung nodule seen on " imaging study  Comments:  Repeat in a year    3. Thoracic aortic aneurysm (TAA), unspecified part, unspecified whether ruptured  Comments:  Aorta stable    4. Other hyperlipidemia  Comments:  Check lipids    5. Other specified hypothyroidism  Comments:  Check TSH    6. Prediabetes  Comments:  check a1c    7. Iron deficiency anemia due to chronic blood loss  Comments:  Check CBC    8. PVC's (premature ventricular contractions)  -     ECG 12 Lead    9. Essential hypertension  -     hydrALAZINE (APRESOLINE) 50 MG tablet; Take 1 tablet by mouth 3 (Three) Times a Day.  Dispense: 90 tablet; Refill: 3  -     ECG 12 Lead        Increase hydralazine. She will call Dr. Koenig about her PVCs; I gave her a copy of her EKG and she will get her TSH checked.         She did not get labs for today. She was anemic and had a low sodium in January in ER. She will get labs on Monday at [place].    She has not taken her hydralazine or clonidine today.  Return in about 6 weeks (around 5/9/2024), or 30 minutes.

## 2024-03-29 DIAGNOSIS — D50.0 IRON DEFICIENCY ANEMIA DUE TO CHRONIC BLOOD LOSS: ICD-10-CM

## 2024-03-29 DIAGNOSIS — E78.49 OTHER HYPERLIPIDEMIA: ICD-10-CM

## 2024-03-30 DIAGNOSIS — G44.89 OTHER HEADACHE SYNDROME: ICD-10-CM

## 2024-04-01 ENCOUNTER — LAB (OUTPATIENT)
Dept: LAB | Facility: HOSPITAL | Age: 79
End: 2024-04-01
Payer: MEDICARE

## 2024-04-01 ENCOUNTER — TELEPHONE (OUTPATIENT)
Dept: INTERNAL MEDICINE | Facility: CLINIC | Age: 79
End: 2024-04-01
Payer: MEDICARE

## 2024-04-01 PROCEDURE — 80061 LIPID PANEL: CPT | Performed by: INTERNAL MEDICINE

## 2024-04-01 PROCEDURE — 83036 HEMOGLOBIN GLYCOSYLATED A1C: CPT | Performed by: INTERNAL MEDICINE

## 2024-04-01 PROCEDURE — 80053 COMPREHEN METABOLIC PANEL: CPT | Performed by: INTERNAL MEDICINE

## 2024-04-01 PROCEDURE — 85025 COMPLETE CBC W/AUTO DIFF WBC: CPT | Performed by: INTERNAL MEDICINE

## 2024-04-01 PROCEDURE — 82728 ASSAY OF FERRITIN: CPT | Performed by: INTERNAL MEDICINE

## 2024-04-01 PROCEDURE — 84443 ASSAY THYROID STIM HORMONE: CPT | Performed by: INTERNAL MEDICINE

## 2024-04-01 RX ORDER — BUTALBITAL, ACETAMINOPHEN AND CAFFEINE 50; 325; 40 MG/1; MG/1; MG/1
1 TABLET ORAL EVERY 6 HOURS PRN
Qty: 30 TABLET | Refills: 2 | Status: SHIPPED | OUTPATIENT
Start: 2024-04-01

## 2024-04-01 RX ORDER — FERROUS SULFATE 325(65) MG
325 TABLET ORAL DAILY
Qty: 30 TABLET | Refills: 2 | Status: SHIPPED | OUTPATIENT
Start: 2024-04-01

## 2024-04-01 RX ORDER — ATORVASTATIN CALCIUM 40 MG/1
40 TABLET, FILM COATED ORAL DAILY
Qty: 90 TABLET | Refills: 1 | Status: SHIPPED | OUTPATIENT
Start: 2024-04-01

## 2024-04-01 NOTE — TELEPHONE ENCOUNTER
Let her know I filled her butalbital. Since she is taking Norco she needs to add up how much acetaminophen she is consuming. She should not take more than 1 gm every 8 hours

## 2024-04-22 DIAGNOSIS — D50.0 IRON DEFICIENCY ANEMIA DUE TO CHRONIC BLOOD LOSS: ICD-10-CM

## 2024-04-22 RX ORDER — FERROUS SULFATE 325(65) MG
325 TABLET ORAL DAILY
Qty: 90 TABLET | Refills: 1 | Status: SHIPPED | OUTPATIENT
Start: 2024-04-22

## 2024-04-26 NOTE — PROGRESS NOTES
Subjective   Luna Jacobson is a 78 y.o. female is here for follow-up for lower back pain.  Patient was last seen for LESI L4-5 with significant improvement in pain.      Lower back pain is 3/10 on VAS, maximum 4/10.  Pain is dull and aching and sharp in nature.  Referred to bilateral hips, bilateral posterior legs to ankle- radicular pain better after LESI.  Constant pain improved by pain medications and caudal NIMA.  Pain worsens with walking. Main complaint is axial back pain and buttock pain.    Neck pain is 6/10 on VAS, maximum 7/10.  Tightness in nature.  Not referred but that has numbness in bilateral hands.  Improved with pain medications and chiropractic care.  Worse with flexion of the neck.  Chiropractic care really helped in the past but the last few sessions have not helped.  + Headaches starting from occipital region radiating to the top of her head.  Daily headaches.  Had taken Fioricet before with some relief.    Previous Injection:   3/27/2024-LESI L4-5- 80% pain relief with functional improvement.   12/1/23 - LESI L4-5- 80% pain relief for first few weeks; 40% pain relief for now. VAS down from 8/10 on 5/10 on VAS- 2.5 months.  11/7/2023-caudal NIMA with sedation - no significant relief.   1/26/2023-caudal NIMA with sedation- 80% pain relief with radicular pain- 9 months of relief.   11/20/2020 - Caudal NIMA - 80% pain relief - 2 years.     PMH: COPD, GERD, HLD, HTN, Thoracic aortic aneurysm      Current Medications:   Norco 5-325 mg BID PRN   Flexeril 10 mg BID PRN  Fioricet     Past Medications:       Past Modalities:  TENS:                                                                          no                                                  Physical Therapy Within The Last 6 Months              Yes- chiropractor care >6 weeks.  Psychotherapy                                                            no  Massage Therapy                                                       no      Patient Complains Of:  Uro-Fecal Incontinence          no  Weight Gain/Loss                   no  Fever/Chills                             no  Weakness                               Yes (subjective weakness in left leg)            Current Outpatient Medications:     albuterol sulfate  (90 Base) MCG/ACT inhaler, Inhale 2 puffs Every 4 (Four) Hours As Needed., Disp: , Rfl:     atorvastatin (LIPITOR) 40 MG tablet, TAKE 1 TABLET BY MOUTH DAILY, Disp: 90 tablet, Rfl: 1    butalbital-acetaminophen-caffeine (FIORICET, ESGIC) -40 MG per tablet, TAKE 1 TABLET BY MOUTH EVERY 6 HOURS AS NEEDED FOR HEADACHE, Disp: 30 tablet, Rfl: 2    Calcium Carbonate-Vitamin D 500-125 MG-UNIT tablet, Take  by mouth., Disp: , Rfl:     clobetasol (TEMOVATE) 0.05 % external solution, APPLY TOPICALLY TO THE SCALP EVERY DAY IN THE MORNING AND IN THE EVENING, Disp: , Rfl:     cloNIDine (CATAPRES) 0.1 MG tablet, TAKE 1 TABLET BY MOUTH EVERY NIGHT, Disp: 90 tablet, Rfl: 1    coenzyme Q10 100 MG capsule, Take 1 capsule by mouth Daily., Disp: , Rfl:     cyclobenzaprine (FLEXERIL) 10 MG tablet, Take 1 tablet by mouth 2 (Two) Times a Day As Needed for Muscle Spasms for up to 180 days., Disp: 180 tablet, Rfl: 1    dicyclomine (BENTYL) 20 MG tablet, Take 1 tablet by mouth Daily., Disp: , Rfl:     ferrous sulfate (FeroSul) 325 (65 FE) MG tablet, Take 1 tablet by mouth Daily., Disp: 90 tablet, Rfl: 1    fexofenadine (ALLEGRA) 180 MG tablet, Take 1 tablet by mouth Daily., Disp: , Rfl:     fluticasone (FLONASE) 50 MCG/ACT nasal spray, SHAKE LIQUID AND USE 2 SPRAYS IN EACH NOSTRIL EVERY DAY, Disp: 48 g, Rfl: 3    hydrALAZINE (APRESOLINE) 50 MG tablet, Take 1 tablet by mouth 3 (Three) Times a Day., Disp: 90 tablet, Rfl: 3    ketoconazole (NIZORAL) 2 % shampoo, , Disp: , Rfl: 3    levothyroxine (SYNTHROID, LEVOTHROID) 100 MCG tablet, TAKE 1 TABLET BY MOUTH DAILY, Disp: 90 tablet, Rfl: 0    metoprolol succinate XL (TOPROL-XL) 100 MG 24 hr tablet,  TAKE 1 TABLET(100 MG) BY MOUTH 1 TIME EACH DAY. DO NOT CRUSH OR CHEW, Disp: , Rfl:     Multiple Vitamin (MULTIVITAMIN) tablet, Take 1 tablet by mouth Daily., Disp: , Rfl:     pantoprazole (PROTONIX) 40 MG EC tablet, Take 1 tablet by mouth Daily., Disp: , Rfl:     polyethylene glycol (MIRALAX) powder, , Disp: , Rfl:     varenicline (CHANTIX) 1 MG tablet, TAKE 1 TABLET BY MOUTH TWICE DAILY, Disp: 60 tablet, Rfl: 5    [START ON 5/13/2024] HYDROcodone-acetaminophen (NORCO) 5-325 MG per tablet, Take 1 tablet by mouth 3 (Three) Times a Day As Needed for Severe Pain for up to 30 days., Disp: 90 tablet, Rfl: 0    [START ON 6/12/2024] HYDROcodone-acetaminophen (NORCO) 5-325 MG per tablet, Take 1 tablet by mouth 3 (Three) Times a Day As Needed for Severe Pain for up to 30 days., Disp: 90 tablet, Rfl: 0    The following portions of the patient's history were reviewed and updated as appropriate: allergies, current medications, past family history, past medical history, past social history, past surgical history and problem list.      REVIEW OF PERTINENT MEDICAL DATA    Past Medical History:   Diagnosis Date    COPD (chronic obstructive pulmonary disease)     Coronary artery calcification seen on CT scan 07/2012    mild calcification in the LAD with calcium score of 22. modere narrowing left subclavian origin    Diverticulosis     Gastroparesis     GERD (gastroesophageal reflux disease)     GIST (gastrointestinal stromal tumor), non-malignant     Hip pain, bilateral     Irregular heartbeat     Irritable bowel syndrome     Low back pain     Mild mitral regurgitation     Neck pain     PAD (peripheral artery disease)     small vessel disease in feet    Subclavian artery stenosis, left 05/2016    50 percent    Tubular adenoma of colon 01/2017     Past Surgical History:   Procedure Laterality Date    APPENDECTOMY      COLONOSCOPY  10/01/2019    dr owen    ENDOSCOPY  02/2019    ENDOSCOPY  02/01/2023    Dr. Owen     HEMORRHOIDECTOMY      HYSTERECTOMY      TONSILLECTOMY      TOOTH EXTRACTION      8/3/21     Family History   Problem Relation Age of Onset    Hypertension Mother     Diabetes Mother     Dementia Mother     Stroke Mother     Lung cancer Father     Alcohol abuse Father     COPD Father     Breast cancer Sister     Hypertension Sister     Hypothyroidism Sister     Stroke Sister     Other Sister         AAA    Bipolar disorder Daughter     Fibromyalgia Daughter      Social History     Socioeconomic History    Marital status:    Tobacco Use    Smoking status: Former     Current packs/day: 0.00     Types: Cigarettes     Quit date: 8/13/2020     Years since quitting: 3.7    Smokeless tobacco: Never    Tobacco comments:     she has smoked intermittently for 40 years; she smoked over 2 ppd twenty years ago   Vaping Use    Vaping status: Never Used   Substance and Sexual Activity    Alcohol use: No    Drug use: No    Sexual activity: Defer         Review of Systems   Musculoskeletal:  Positive for back pain and neck pain.   Neurological:  Positive for headaches.         Vitals:    04/29/24 1452   BP: 135/86   BP Location: Right arm   Patient Position: Sitting   Cuff Size: Large Adult   Pulse: 57   Resp: 16   SpO2: 97%   Weight: 62.6 kg (138 lb)   PainSc:   7                     Objective   Physical Exam  Neck:     Musculoskeletal:         General: Tenderness present.      Lumbar back: Tenderness present. Decreased range of motion.        Legs:    Neurological:      Comments: Motor strength 5/5 b/l LE  Sensory intact b/l LE             Imaging Reviewed:  MRI done at [facility] - 9/2020:    L3-L4-a small broad based disc protrusion centrally.  There is moderate facet arthropathy.  This changes contribute to moderate central canal stenosis.  There is moderate right and severe left neural foraminal stenosis.  L4-L5-there is a posterior disc protrusion centrally.  Moderate facet arthropathy and mild limited flavum hypertrophy.  Severe central canal stenosis.  Severe bilateral neural foraminal stenosis, right greater than left.  L5-S1-there is small left far lateral disc protrusion.  No central canal stenosis.  There is mild facet arthropathy.  There is moderate bilateral neural foraminal stenosis.     Lumbar X-ray: 10/1/2020   Multilevel degenerative spondylosis.      Cervical x-ray-4/20/2023  - Mild degenerative disc and endplate changes in cervical spine most notable at C4-5 through C6-7  - Mild right C4-5 facet arthropathy    Assessment:    1. DDD (degenerative disc disease), lumbar    2. Lumbar radiculopathy    3. Bilateral occipital neuralgia    4. Chronic left-sided low back pain with sciatica, sciatica laterality unspecified    5. High risk medication use        Plan:  1.   UDS on 1/17/24 is consistent with patient's interview.. Narcotic contract discussed on 11/3/22. Narcan ordered on 10/9/2020.  2. Due to increased pain, continue Norco 5-325 mg TID PRN (5/13; 6/12). Discussed with the patient regarding long-term side effects of opioids including but not limited to opioid induced hormonal suppression, hyperalgesia, fatigue, weight gain, possible opioid induced altered immune system, addiction, tolerance, dependence, risk of hearing loss and elevated risk of myocardial infarction. Proper use and potential life threatening side effects of over use discussed with patient. Patient states understanding of their use and risks.  3. Continue Flexeril to 10 mg BID PRN (7/15/2024). Common side effects of flexeril include blurred vision, dizziness or lightheadedness, drowsiness, dryness of mouth, bloated feeling or gas, indigestion, nausea or vomiting, or stomach cramps or pain, constipation, diarrhea, excitement or nervousness, frequent urination, general feeling of discomfort or illness, headache, muscle twitching, numbness, tingling, pain, or weakness in hands or feet, pounding heartbeat, problems in speaking, trembling, trouble in  sleeping, unpleasant taste or other taste changes, unusual muscle weakness or unusual tiredness, especially during the first few days as your body adjusts to this medication.  4. . Patient has pain in the head with referred pain on the top of the head. Bilateral occipital tenderness present. Discussed bilateral greater and lesser occipital nerve block. Discussed the possibility of infection, bleeding, nerve damage, headache, increased pain. Patient understands and agrees.  Discussed with patient that it is not appropriate to do IV sedation for minor procedure like occipital nerve block.  I did offer Xanax to be taken before the procedure but patient is not sure and would like to think about it.  5. Good relief with LESI. Repeat PRN.    RTC before 7/12/24    Galileo Portillo DO  Pain Management   [facility]         INSPECT REPORT    As part of the patient's treatment plan, I may be prescribing controlled substances. The patient has been made aware of appropriate use of such medications, including potential risk of somnolence, limited ability to drive and/or work safely, and the potential for dependence or overdose. It has also been made clear that these medications are for use by this patient only, without concomitant use of alcohol or other substances unless prescribed.     Patient has completed prescribing agreement detailing terms of continued prescribing of controlled substances, including monitoring INSPECT reports, urine drug screening, and pill counts if necessary. The patient is aware that inappropriate use will results in cessation of prescribing such medications.    INSPECT report has been reviewed and scanned into the patient's chart.

## 2024-04-29 ENCOUNTER — OFFICE VISIT (OUTPATIENT)
Dept: PAIN MEDICINE | Facility: CLINIC | Age: 79
End: 2024-04-29
Payer: MEDICARE

## 2024-04-29 VITALS
SYSTOLIC BLOOD PRESSURE: 135 MMHG | BODY MASS INDEX: 24.45 KG/M2 | HEART RATE: 57 BPM | DIASTOLIC BLOOD PRESSURE: 86 MMHG | WEIGHT: 138 LBS | RESPIRATION RATE: 16 BRPM | OXYGEN SATURATION: 97 %

## 2024-04-29 DIAGNOSIS — M54.81 BILATERAL OCCIPITAL NEURALGIA: ICD-10-CM

## 2024-04-29 DIAGNOSIS — M54.16 LUMBAR RADICULOPATHY: ICD-10-CM

## 2024-04-29 DIAGNOSIS — M54.40 CHRONIC LEFT-SIDED LOW BACK PAIN WITH SCIATICA, SCIATICA LATERALITY UNSPECIFIED: ICD-10-CM

## 2024-04-29 DIAGNOSIS — G89.29 CHRONIC LEFT-SIDED LOW BACK PAIN WITH SCIATICA, SCIATICA LATERALITY UNSPECIFIED: ICD-10-CM

## 2024-04-29 DIAGNOSIS — Z79.899 HIGH RISK MEDICATION USE: ICD-10-CM

## 2024-04-29 DIAGNOSIS — M51.36 DDD (DEGENERATIVE DISC DISEASE), LUMBAR: Primary | ICD-10-CM

## 2024-04-29 PROCEDURE — 3079F DIAST BP 80-89 MM HG: CPT | Performed by: STUDENT IN AN ORGANIZED HEALTH CARE EDUCATION/TRAINING PROGRAM

## 2024-04-29 PROCEDURE — 3075F SYST BP GE 130 - 139MM HG: CPT | Performed by: STUDENT IN AN ORGANIZED HEALTH CARE EDUCATION/TRAINING PROGRAM

## 2024-04-29 PROCEDURE — 99214 OFFICE O/P EST MOD 30 MIN: CPT | Performed by: STUDENT IN AN ORGANIZED HEALTH CARE EDUCATION/TRAINING PROGRAM

## 2024-04-29 PROCEDURE — 1159F MED LIST DOCD IN RCRD: CPT | Performed by: STUDENT IN AN ORGANIZED HEALTH CARE EDUCATION/TRAINING PROGRAM

## 2024-04-29 PROCEDURE — 1160F RVW MEDS BY RX/DR IN RCRD: CPT | Performed by: STUDENT IN AN ORGANIZED HEALTH CARE EDUCATION/TRAINING PROGRAM

## 2024-04-29 PROCEDURE — 1125F AMNT PAIN NOTED PAIN PRSNT: CPT | Performed by: STUDENT IN AN ORGANIZED HEALTH CARE EDUCATION/TRAINING PROGRAM

## 2024-04-29 RX ORDER — METOPROLOL SUCCINATE 100 MG/1
TABLET, EXTENDED RELEASE ORAL
COMMUNITY
Start: 2024-04-19

## 2024-04-29 RX ORDER — HYDROCODONE BITARTRATE AND ACETAMINOPHEN 5; 325 MG/1; MG/1
1 TABLET ORAL 3 TIMES DAILY PRN
Qty: 90 TABLET | Refills: 0 | Status: SHIPPED | OUTPATIENT
Start: 2024-06-12 | End: 2024-07-12

## 2024-04-29 RX ORDER — HYDROCODONE BITARTRATE AND ACETAMINOPHEN 5; 325 MG/1; MG/1
1 TABLET ORAL 3 TIMES DAILY PRN
Qty: 90 TABLET | Refills: 0 | Status: SHIPPED | OUTPATIENT
Start: 2024-05-13 | End: 2024-06-12

## 2024-05-13 ENCOUNTER — HOSPITAL ENCOUNTER (OUTPATIENT)
Dept: ULTRASOUND IMAGING | Facility: HOSPITAL | Age: 79
Discharge: HOME | End: 2024-05-13
Admitting: INTERNAL MEDICINE
Payer: MEDICARE

## 2024-05-13 DIAGNOSIS — E04.1 THYROID NODULE: ICD-10-CM

## 2024-05-13 PROCEDURE — 76536 US EXAM OF HEAD AND NECK: CPT

## 2024-05-17 ENCOUNTER — OFFICE VISIT (OUTPATIENT)
Dept: INTERNAL MEDICINE | Facility: CLINIC | Age: 79
End: 2024-05-17
Payer: MEDICARE

## 2024-05-17 VITALS
SYSTOLIC BLOOD PRESSURE: 126 MMHG | BODY MASS INDEX: 24.66 KG/M2 | HEIGHT: 63 IN | DIASTOLIC BLOOD PRESSURE: 80 MMHG | WEIGHT: 139.2 LBS

## 2024-05-17 DIAGNOSIS — R73.03 PREDIABETES: ICD-10-CM

## 2024-05-17 DIAGNOSIS — I10 ESSENTIAL HYPERTENSION: Chronic | ICD-10-CM

## 2024-05-17 DIAGNOSIS — R51.9 CHRONIC DAILY HEADACHE: ICD-10-CM

## 2024-05-17 DIAGNOSIS — D50.0 IRON DEFICIENCY ANEMIA DUE TO CHRONIC BLOOD LOSS: ICD-10-CM

## 2024-05-17 DIAGNOSIS — E78.49 OTHER HYPERLIPIDEMIA: Chronic | ICD-10-CM

## 2024-05-17 DIAGNOSIS — E04.1 THYROID NODULE: Primary | ICD-10-CM

## 2024-05-17 DIAGNOSIS — E03.8 OTHER SPECIFIED HYPOTHYROIDISM: Chronic | ICD-10-CM

## 2024-05-17 DIAGNOSIS — R00.2 PALPITATIONS: ICD-10-CM

## 2024-05-17 PROCEDURE — 1160F RVW MEDS BY RX/DR IN RCRD: CPT | Performed by: INTERNAL MEDICINE

## 2024-05-17 PROCEDURE — 99214 OFFICE O/P EST MOD 30 MIN: CPT | Performed by: INTERNAL MEDICINE

## 2024-05-17 PROCEDURE — 3079F DIAST BP 80-89 MM HG: CPT | Performed by: INTERNAL MEDICINE

## 2024-05-17 PROCEDURE — 1159F MED LIST DOCD IN RCRD: CPT | Performed by: INTERNAL MEDICINE

## 2024-05-17 PROCEDURE — 1125F AMNT PAIN NOTED PAIN PRSNT: CPT | Performed by: INTERNAL MEDICINE

## 2024-05-17 PROCEDURE — 3074F SYST BP LT 130 MM HG: CPT | Performed by: INTERNAL MEDICINE

## 2024-05-17 NOTE — PROGRESS NOTES
Subjective        Chief Complaint   Patient presents with    Hypertension           Luna Jacobson is a 78 y.o. female who presents for    Patient Active Problem List   Diagnosis    Essential hypertension    Other hyperlipidemia    Other specified hypothyroidism    Other headache syndrome    Prediabetes    Iron deficiency anemia due to chronic blood loss    Thoracic aortic aneurysm    Microscopic colitis    Myalgia    DDD (degenerative disc disease), lumbar    Recurrent major depressive disorder, in partial remission    Moderate aortic regurgitation    Diffusion capacity of lung (dl), decreased    Age-related osteoporosis without current pathological fracture    Thyroid nodule       History of Present Illness    She called cards about a fast irregular hearbeat. Dr. Koenig changed her to toprol from carvedilol. She thinks her symptoms are not as bad. She has it most days. Her BP has been /68-83. She drinks 3-4 cups of coffee per day. Does not drink etoh.    She had an epidural in her back. She had a biopsy of an area on her scalp.     She has had daily HA for a year. They occur on the top. There is no specific trigger. Fioricet does help. She denies slurred speech or blindness.    Her  has dementia and he can have rages at times. His doctor did prescribe him meds to calm him down.     Allergies   Allergen Reactions    Amlodipine Headache     Head tightness    Itraconazole Hives     sporanax    Lisinopril Hives    Norvasc [Amlodipine Besylate] Other (See Comments)     Head tightness    Sulfa Antibiotics Other (See Comments)     headache    Hydrochlorothiazide Other (See Comments)     hyponatremia  Other reaction(s): Other (See Comments)  hyponatremia    Sulfamethoxazole Nausea And Vomiting       Current Outpatient Medications on File Prior to Visit   Medication Sig Dispense Refill    albuterol sulfate  (90 Base) MCG/ACT inhaler Inhale 2 puffs Every 4 (Four) Hours As Needed.      atorvastatin  (LIPITOR) 40 MG tablet TAKE 1 TABLET BY MOUTH DAILY 90 tablet 1    butalbital-acetaminophen-caffeine (FIORICET, ESGIC) -40 MG per tablet TAKE 1 TABLET BY MOUTH EVERY 6 HOURS AS NEEDED FOR HEADACHE 30 tablet 2    Calcium Carbonate-Vitamin D 500-125 MG-UNIT tablet Take  by mouth.      clobetasol (TEMOVATE) 0.05 % external solution APPLY TOPICALLY TO THE SCALP EVERY DAY IN THE MORNING AND IN THE EVENING      cloNIDine (CATAPRES) 0.1 MG tablet TAKE 1 TABLET BY MOUTH EVERY NIGHT 90 tablet 1    coenzyme Q10 100 MG capsule Take 1 capsule by mouth Daily.      cyclobenzaprine (FLEXERIL) 10 MG tablet Take 1 tablet by mouth 2 (Two) Times a Day As Needed for Muscle Spasms for up to 180 days. 180 tablet 1    dicyclomine (BENTYL) 20 MG tablet Take 1 tablet by mouth Daily.      ferrous sulfate (FeroSul) 325 (65 FE) MG tablet Take 1 tablet by mouth Daily. 90 tablet 1    fexofenadine (ALLEGRA) 180 MG tablet Take 1 tablet by mouth Daily.      fluticasone (FLONASE) 50 MCG/ACT nasal spray SHAKE LIQUID AND USE 2 SPRAYS IN EACH NOSTRIL EVERY DAY 48 g 3    hydrALAZINE (APRESOLINE) 50 MG tablet Take 1 tablet by mouth 3 (Three) Times a Day. 90 tablet 3    HYDROcodone-acetaminophen (NORCO) 5-325 MG per tablet Take 1 tablet by mouth 3 (Three) Times a Day As Needed for Severe Pain for up to 30 days. 90 tablet 0    [START ON 6/12/2024] HYDROcodone-acetaminophen (NORCO) 5-325 MG per tablet Take 1 tablet by mouth 3 (Three) Times a Day As Needed for Severe Pain for up to 30 days. 90 tablet 0    ketoconazole (NIZORAL) 2 % shampoo   3    levothyroxine (SYNTHROID, LEVOTHROID) 100 MCG tablet TAKE 1 TABLET BY MOUTH DAILY 90 tablet 0    metoprolol succinate XL (TOPROL-XL) 100 MG 24 hr tablet TAKE 1 TABLET(100 MG) BY MOUTH 1 TIME EACH DAY. DO NOT CRUSH OR CHEW      Multiple Vitamin (MULTIVITAMIN) tablet Take 1 tablet by mouth Daily.      pantoprazole (PROTONIX) 40 MG EC tablet Take 1 tablet by mouth Daily.      polyethylene glycol (MIRALAX) powder        varenicline (CHANTIX) 1 MG tablet TAKE 1 TABLET BY MOUTH TWICE DAILY 60 tablet 5     No current facility-administered medications on file prior to visit.       Past Medical History:   Diagnosis Date    COPD (chronic obstructive pulmonary disease)     Coronary artery calcification seen on CT scan 07/2012    mild calcification in the LAD with calcium score of 22. modere narrowing left subclavian origin    Diverticulosis     Gastroparesis     GERD (gastroesophageal reflux disease)     GIST (gastrointestinal stromal tumor), non-malignant     Hip pain, bilateral     Irregular heartbeat     Irritable bowel syndrome     Low back pain     Mild mitral regurgitation     Neck pain     PAD (peripheral artery disease)     small vessel disease in feet    Subclavian artery stenosis, left 05/2016    50 percent    Tubular adenoma of colon 01/2017       Past Surgical History:   Procedure Laterality Date    APPENDECTOMY      COLONOSCOPY  10/01/2019    dr owen    ENDOSCOPY  02/2019    ENDOSCOPY  02/01/2023    Dr. Owen    HEMORRHOIDECTOMY      HYSTERECTOMY      TONSILLECTOMY      TOOTH EXTRACTION      8/3/21       Family History   Problem Relation Age of Onset    Hypertension Mother     Diabetes Mother     Dementia Mother     Stroke Mother     Lung cancer Father     Alcohol abuse Father     COPD Father     Breast cancer Sister     Hypertension Sister     Hypothyroidism Sister     Stroke Sister     Other Sister         AAA    Bipolar disorder Daughter     Fibromyalgia Daughter        Social History     Socioeconomic History    Marital status:    Tobacco Use    Smoking status: Former     Current packs/day: 0.00     Types: Cigarettes     Quit date: 8/13/2020     Years since quitting: 3.7    Smokeless tobacco: Never    Tobacco comments:     she has smoked intermittently for 40 years; she smoked over 2 ppd twenty years ago   Vaping Use    Vaping status: Never Used   Substance and Sexual Activity    Alcohol use: No    Drug  "use: No    Sexual activity: Defer           The following portions of the patient's history were reviewed and updated as appropriate: problem list, allergies, current medications, past medical history, past family history, past social history, and past surgical history.    Review of Systems    Immunization History   Administered Date(s) Administered    COVID-19 (PFIZER) Purple Cap Monovalent 02/07/2021, 02/28/2021    FLUAD TRI 65YR+ 11/01/2019    Fluzone High Dose =>65 Years ([employer] ONLY) 11/29/2018    Fluzone High-Dose 65+yrs 12/02/2022    Hepatitis A 05/01/2018, 12/02/2018    Influenza Seasonal Injectable 11/07/2013, 10/13/2014    Pneumococcal Conjugate 13-Valent (PCV13) 04/23/2015    Pneumococcal Polysaccharide (PPSV23) 08/01/2008    Tdap 01/01/2012    Zostavax 08/25/2010       Objective   Vitals:    05/17/24 1355   BP: 126/80   Weight: 63.1 kg (139 lb 3.2 oz)   Height: 160 cm (62.99\")     Body mass index is 24.66 kg/m².  Physical Exam  Vitals reviewed.   Constitutional:       Appearance: She is well-developed.   HENT:      Head: Normocephalic and atraumatic.   Cardiovascular:      Rate and Rhythm: Normal rate and regular rhythm. Occasional Extrasystoles are present.     Heart sounds: Normal heart sounds, S1 normal and S2 normal.       with a grade of 1/6.   Pulmonary:      Effort: Pulmonary effort is normal.      Breath sounds: Normal breath sounds.   Skin:     General: Skin is warm.   Neurological:      Mental Status: She is alert.      Comments: CN II-XII intact except did not check VIII    5/5 strength throughout   Psychiatric:         Behavior: Behavior normal.         Procedures    Assessment & Plan   Diagnoses and all orders for this visit:    1. Thyroid nodule (Primary)  Comments:  Reviewed US with radiologist who does not feel f/u needed.    2. Essential hypertension  Comments:  BP is good  Orders:  -     Comprehensive Metabolic Panel; Future    3. Other hyperlipidemia  Comments:  LDL at goal    4. " Prediabetes  -     Hemoglobin A1c; Future    5. Iron deficiency anemia due to chronic blood loss  Comments:  stable    6. Chronic daily headache  Comments:  Try to hold fioricet as I think there may be a rebound effect.  Orders:  -     MRI Brain Without Contrast; Future    7. Palpitations  Comments:  She will call Dr. Koenig for f/u    8. Other specified hypothyroidism  -     TSH; Future               Reviewed cmp, flp, tsh, cbc, and A1c from April.    She will call infusion center to get set Reclast set up for August.    Discussed her safety with  at home; she says she does not feel like her  is going to hurt her.     Return in about 3 months (around 8/17/2024) for Medicare Wellness, Lab Before FUP.

## 2024-05-21 DIAGNOSIS — D50.0 IRON DEFICIENCY ANEMIA DUE TO CHRONIC BLOOD LOSS: ICD-10-CM

## 2024-05-21 RX ORDER — FERROUS SULFATE 325(65) MG
325 TABLET ORAL DAILY
Qty: 90 TABLET | Refills: 1 | Status: SHIPPED | OUTPATIENT
Start: 2024-05-21

## 2024-06-03 RX ORDER — FLUTICASONE PROPIONATE 50 MCG
SPRAY, SUSPENSION (ML) NASAL
Qty: 48 G | Refills: 3 | Status: SHIPPED | OUTPATIENT
Start: 2024-06-03

## 2024-06-07 ENCOUNTER — OFFICE (AMBULATORY)
Dept: URBAN - METROPOLITAN AREA CLINIC 64 | Facility: CLINIC | Age: 79
End: 2024-06-07
Payer: MEDICARE

## 2024-06-07 VITALS
HEIGHT: 63 IN | DIASTOLIC BLOOD PRESSURE: 78 MMHG | WEIGHT: 142 LBS | HEART RATE: 58 BPM | SYSTOLIC BLOOD PRESSURE: 135 MMHG

## 2024-06-07 DIAGNOSIS — K62.5 HEMORRHAGE OF ANUS AND RECTUM: ICD-10-CM

## 2024-06-07 DIAGNOSIS — K21.9 GASTRO-ESOPHAGEAL REFLUX DISEASE WITHOUT ESOPHAGITIS: ICD-10-CM

## 2024-06-07 DIAGNOSIS — Z86.010 PERSONAL HISTORY OF COLONIC POLYPS: ICD-10-CM

## 2024-06-07 PROCEDURE — 99214 OFFICE O/P EST MOD 30 MIN: CPT | Performed by: NURSE PRACTITIONER

## 2024-06-10 PROBLEM — Z86.010 PERSONAL HISTORY OF COLONIC POLYPS: Status: ACTIVE | Noted: 2019-10-01

## 2024-06-28 ENCOUNTER — HOSPITAL ENCOUNTER (OUTPATIENT)
Dept: MRI IMAGING | Facility: HOSPITAL | Age: 79
Discharge: HOME | End: 2024-06-28
Payer: MEDICARE

## 2024-06-28 DIAGNOSIS — R51.9 CHRONIC DAILY HEADACHE: ICD-10-CM

## 2024-06-28 PROCEDURE — 70551 MRI BRAIN STEM W/O DYE: CPT

## 2024-07-02 DIAGNOSIS — H70.93 MASTOIDITIS OF BOTH SIDES: Primary | ICD-10-CM

## 2024-07-09 NOTE — PROGRESS NOTES
Subjective   Luna Jacobson is a 78 y.o. female is here for follow-up for lower back pain.  Last seen on 4/29/2024.  Increased lower back pain since last visit for last 1 month.      Lower back pain is 5/10 on VAS, maximum 6/10.  Pain is dull and aching and sharp in nature.  Referred to bilateral hips, bilateral posterior legs to ankle.Constant pain improved by pain medications and caudal NIMA.  Pain worsens with walking. Main complaint is axial back pain and buttock pain.    Neck pain is 6/10 on VAS, maximum 7/10.  Tightness in nature.  Not referred but that has numbness in bilateral hands.  Improved with pain medications and chiropractic care.  Worse with flexion of the neck.  Chiropractic care really helped in the past but the last few sessions have not helped.  + Headaches starting from occipital region radiating to the top of her head.  Daily headaches.  Had taken Fioricet before with some relief.    Previous Injection:   3/27/2024-LESI L4-5- 80% pain relief with functional improvement- 3 months of pain relief.    12/1/23 - LESI L4-5- 80% pain relief for first few weeks; 40% pain relief for now. VAS down from 8/10 on 5/10 on VAS- 2.5 months.  11/7/2023-caudal NIMA with sedation - no significant relief.   1/26/2023-caudal NIMA with sedation- 80% pain relief with radicular pain- 9 months of relief.   11/20/2020 - Caudal NIMA - 80% pain relief - 2 years.     PMH: COPD, GERD, HLD, HTN, Thoracic aortic aneurysm      Current Medications:   Norco 5-325 mg BID PRN   Flexeril 10 mg BID PRN  Fioricet     Past Medications:       Past Modalities:  TENS:                                                                          no                                                  Physical Therapy Within The Last 6 Months              Yes- chiropractor care >6 weeks.  Psychotherapy                                                            no  Massage Therapy                                                       no      Patient Complains Of:  Uro-Fecal Incontinence          no  Weight Gain/Loss                   no  Fever/Chills                             no  Weakness                               Yes (subjective weakness in left leg)            Current Outpatient Medications:     albuterol sulfate  (90 Base) MCG/ACT inhaler, Inhale 2 puffs Every 4 (Four) Hours As Needed., Disp: , Rfl:     atorvastatin (LIPITOR) 40 MG tablet, TAKE 1 TABLET BY MOUTH DAILY, Disp: 90 tablet, Rfl: 1    butalbital-acetaminophen-caffeine (FIORICET, ESGIC) -40 MG per tablet, TAKE 1 TABLET BY MOUTH EVERY 6 HOURS AS NEEDED FOR HEADACHE, Disp: 30 tablet, Rfl: 2    Calcium Carbonate-Vitamin D 500-125 MG-UNIT tablet, Take  by mouth., Disp: , Rfl:     clobetasol (TEMOVATE) 0.05 % external solution, APPLY TOPICALLY TO THE SCALP EVERY DAY IN THE MORNING AND IN THE EVENING, Disp: , Rfl:     cloNIDine (CATAPRES) 0.1 MG tablet, TAKE 1 TABLET BY MOUTH EVERY NIGHT, Disp: 90 tablet, Rfl: 1    coenzyme Q10 100 MG capsule, Take 1 capsule by mouth Daily., Disp: , Rfl:     [START ON 7/12/2024] cyclobenzaprine (FLEXERIL) 10 MG tablet, Take 1 tablet by mouth 2 (Two) Times a Day As Needed for Muscle Spasms for up to 180 days., Disp: 180 tablet, Rfl: 1    dicyclomine (BENTYL) 20 MG tablet, Take 1 tablet by mouth Daily., Disp: , Rfl:     ferrous sulfate (FeroSul) 325 (65 FE) MG tablet, Take 1 tablet by mouth Daily., Disp: 90 tablet, Rfl: 1    fexofenadine (ALLEGRA) 180 MG tablet, Take 1 tablet by mouth Daily., Disp: , Rfl:     fluticasone (FLONASE) 50 MCG/ACT nasal spray, SHAKE LIQUID AND USE 2 SPRAYS IN EACH NOSTRIL EVERY DAY, Disp: 48 g, Rfl: 3    hydrALAZINE (APRESOLINE) 50 MG tablet, Take 1 tablet by mouth 3 (Three) Times a Day., Disp: 90 tablet, Rfl: 3    [START ON 7/12/2024] HYDROcodone-acetaminophen (NORCO) 5-325 MG per tablet, Take 1 tablet by mouth 4 (Four) Times a Day As Needed for Severe Pain for up to 30 days., Disp: 120 tablet, Rfl: 0    [START ON  8/11/2024] HYDROcodone-acetaminophen (NORCO) 5-325 MG per tablet, Take 1 tablet by mouth 3 (Three) Times a Day As Needed for Severe Pain for up to 30 days., Disp: 90 tablet, Rfl: 0    ketoconazole (NIZORAL) 2 % shampoo, , Disp: , Rfl: 3    levothyroxine (SYNTHROID, LEVOTHROID) 100 MCG tablet, TAKE 1 TABLET BY MOUTH DAILY, Disp: 90 tablet, Rfl: 0    metoprolol succinate XL (TOPROL-XL) 100 MG 24 hr tablet, TAKE 1 TABLET(100 MG) BY MOUTH 1 TIME EACH DAY. DO NOT CRUSH OR CHEW, Disp: , Rfl:     Multiple Vitamin (MULTIVITAMIN) tablet, Take 1 tablet by mouth Daily., Disp: , Rfl:     pantoprazole (PROTONIX) 40 MG EC tablet, Take 1 tablet by mouth Daily., Disp: , Rfl:     polyethylene glycol (MIRALAX) powder, , Disp: , Rfl:     tiotropium (Spiriva HandiHaler) 18 MCG per inhalation capsule, As Needed., Disp: , Rfl:     varenicline (CHANTIX) 1 MG tablet, TAKE 1 TABLET BY MOUTH TWICE DAILY, Disp: 60 tablet, Rfl: 5    The following portions of the patient's history were reviewed and updated as appropriate: allergies, current medications, past family history, past medical history, past social history, past surgical history and problem list.      REVIEW OF PERTINENT MEDICAL DATA    Past Medical History:   Diagnosis Date    COPD (chronic obstructive pulmonary disease)     Coronary artery calcification seen on CT scan 07/2012    mild calcification in the LAD with calcium score of 22. modere narrowing left subclavian origin    Diverticulosis     Gastroparesis     GERD (gastroesophageal reflux disease)     GIST (gastrointestinal stromal tumor), non-malignant     Hip pain, bilateral     Irregular heartbeat     Irritable bowel syndrome     Low back pain     Mild mitral regurgitation     Neck pain     PAD (peripheral artery disease)     small vessel disease in feet    Subclavian artery stenosis, left 05/2016    50 percent    Tubular adenoma of colon 01/2017     Past Surgical History:   Procedure Laterality Date    APPENDECTOMY       COLONOSCOPY  10/01/2019    dr owen    ENDOSCOPY  02/2019    ENDOSCOPY  02/01/2023    Dr. Owen    HEMORRHOIDECTOMY      HYSTERECTOMY      TONSILLECTOMY      TOOTH EXTRACTION      8/3/21     Family History   Problem Relation Age of Onset    Hypertension Mother     Diabetes Mother     Dementia Mother     Stroke Mother     Lung cancer Father     Alcohol abuse Father     COPD Father     Breast cancer Sister     Hypertension Sister     Hypothyroidism Sister     Stroke Sister     Other Sister         AAA    Bipolar disorder Daughter     Fibromyalgia Daughter      Social History     Socioeconomic History    Marital status:    Tobacco Use    Smoking status: Former     Current packs/day: 0.00     Types: Cigarettes     Quit date: 8/13/2020     Years since quitting: 3.9    Smokeless tobacco: Never    Tobacco comments:     she has smoked intermittently for 40 years; she smoked over 2 ppd twenty years ago   Vaping Use    Vaping status: Never Used   Substance and Sexual Activity    Alcohol use: No    Drug use: No    Sexual activity: Defer         Review of Systems   Musculoskeletal:  Positive for back pain and neck pain.   Neurological:  Positive for headaches.         Vitals:    07/10/24 1441   BP: 131/70   Pulse: 58   Resp: 16   SpO2: 97%   Weight: 63.5 kg (140 lb)   PainSc:   5                       Objective   Physical Exam  Neck:     Musculoskeletal:         General: Tenderness present.      Lumbar back: Tenderness present. Decreased range of motion.        Legs:    Neurological:      Comments: Motor strength 5/5 b/l LE  Sensory intact b/l LE             Imaging Reviewed:  MRI done at [facility] - 9/2020:    L3-L4-a small broad based disc protrusion centrally.  There is moderate facet arthropathy.  This changes contribute to moderate central canal stenosis.  There is moderate right and severe left neural foraminal stenosis.  L4-L5-there is a posterior disc protrusion centrally.  Moderate facet arthropathy and mild  limited flavum hypertrophy. Severe central canal stenosis.  Severe bilateral neural foraminal stenosis, right greater than left.  L5-S1-there is small left far lateral disc protrusion.  No central canal stenosis.  There is mild facet arthropathy.  There is moderate bilateral neural foraminal stenosis.     Lumbar X-ray: 10/1/2020   Multilevel degenerative spondylosis.      Cervical x-ray-4/20/2023  - Mild degenerative disc and endplate changes in cervical spine most notable at C4-5 through C6-7  - Mild right C4-5 facet arthropathy    Assessment:    1. High risk medication use    2. DDD (degenerative disc disease), lumbar    3. Lumbar radiculopathy    4. Bilateral occipital neuralgia    5. Chronic left-sided low back pain with sciatica, sciatica laterality unspecified    6. Lumbar spondylosis          Plan:  1.   UDS on 1/17/24 is consistent with patient's interview.. Narcotic contract discussed on 11/3/22. Narcan ordered on 10/9/2020.  2. Due to increased pain, increase Norco 5-325 mg to QID PRN for 1 month and then going back to 90 tabs (7/12; 8/11). Discussed with the patient regarding long-term side effects of opioids including but not limited to opioid induced hormonal suppression, hyperalgesia, fatigue, weight gain, possible opioid induced altered immune system, addiction, tolerance, dependence, risk of hearing loss and elevated risk of myocardial infarction. Proper use and potential life threatening side effects of over use discussed with patient. Patient states understanding of their use and risks.  3. Continue Flexeril to 10 mg BID PRN (1/6/25). Common side effects of flexeril include blurred vision, dizziness or lightheadedness, drowsiness, dryness of mouth, bloated feeling or gas, indigestion, nausea or vomiting, or stomach cramps or pain, constipation, diarrhea, excitement or nervousness, frequent urination, general feeling of discomfort or illness, headache, muscle twitching, numbness, tingling, pain, or  weakness in hands or feet, pounding heartbeat, problems in speaking, trembling, trouble in sleeping, unpleasant taste or other taste changes, unusual muscle weakness or unusual tiredness, especially during the first few days as your body adjusts to this medication.  4. . Patient has pain in the head with referred pain on the top of the head. Bilateral occipital tenderness present. Discussed bilateral greater and lesser occipital nerve block. Discussed the possibility of infection, bleeding, nerve damage, headache, increased pain. Patient understands and agrees.  Discussed with patient that it is not appropriate to do IV sedation for minor procedure like occipital nerve block.  I did offer Xanax to be taken before the procedure but patient is not sure and would like to think about it.  5. Good relief with LESI lasting for about 3 months. Pain is returning now. Repeat LESI L4-5 with sedation.     RTC for LESI and then 4 weeks follow up.    Galileo Portillo DO  Pain Management   [facility]         INSPECT REPORT    As part of the patient's treatment plan, I may be prescribing controlled substances. The patient has been made aware of appropriate use of such medications, including potential risk of somnolence, limited ability to drive and/or work safely, and the potential for dependence or overdose. It has also been made clear that these medications are for use by this patient only, without concomitant use of alcohol or other substances unless prescribed.     Patient has completed prescribing agreement detailing terms of continued prescribing of controlled substances, including monitoring INSPECT reports, urine drug screening, and pill counts if necessary. The patient is aware that inappropriate use will results in cessation of prescribing such medications.    INSPECT report has been reviewed and scanned into the patient's chart.

## 2024-07-10 ENCOUNTER — OFFICE VISIT (OUTPATIENT)
Dept: PAIN MEDICINE | Facility: CLINIC | Age: 79
End: 2024-07-10
Payer: MEDICARE

## 2024-07-10 VITALS
OXYGEN SATURATION: 97 % | WEIGHT: 140 LBS | SYSTOLIC BLOOD PRESSURE: 131 MMHG | HEART RATE: 58 BPM | DIASTOLIC BLOOD PRESSURE: 70 MMHG | BODY MASS INDEX: 24.81 KG/M2 | RESPIRATION RATE: 16 BRPM

## 2024-07-10 DIAGNOSIS — Z79.899 HIGH RISK MEDICATION USE: Primary | ICD-10-CM

## 2024-07-10 DIAGNOSIS — M54.81 BILATERAL OCCIPITAL NEURALGIA: ICD-10-CM

## 2024-07-10 DIAGNOSIS — M54.16 LUMBAR RADICULOPATHY: ICD-10-CM

## 2024-07-10 DIAGNOSIS — M51.36 DDD (DEGENERATIVE DISC DISEASE), LUMBAR: ICD-10-CM

## 2024-07-10 DIAGNOSIS — M54.40 CHRONIC LEFT-SIDED LOW BACK PAIN WITH SCIATICA, SCIATICA LATERALITY UNSPECIFIED: ICD-10-CM

## 2024-07-10 DIAGNOSIS — G89.29 CHRONIC LEFT-SIDED LOW BACK PAIN WITH SCIATICA, SCIATICA LATERALITY UNSPECIFIED: ICD-10-CM

## 2024-07-10 DIAGNOSIS — M47.816 LUMBAR SPONDYLOSIS: ICD-10-CM

## 2024-07-10 RX ORDER — CYCLOBENZAPRINE HCL 10 MG
10 TABLET ORAL 2 TIMES DAILY PRN
Qty: 180 TABLET | Refills: 1 | Status: SHIPPED | OUTPATIENT
Start: 2024-07-12 | End: 2025-01-08

## 2024-07-10 RX ORDER — HYDROCODONE BITARTRATE AND ACETAMINOPHEN 5; 325 MG/1; MG/1
1 TABLET ORAL 4 TIMES DAILY PRN
Qty: 120 TABLET | Refills: 0 | Status: SHIPPED | OUTPATIENT
Start: 2024-07-12 | End: 2024-08-11

## 2024-07-10 RX ORDER — HYDROCODONE BITARTRATE AND ACETAMINOPHEN 5; 325 MG/1; MG/1
1 TABLET ORAL 3 TIMES DAILY PRN
Qty: 90 TABLET | Refills: 0 | Status: SHIPPED | OUTPATIENT
Start: 2024-08-11 | End: 2024-09-10

## 2024-07-10 RX ORDER — TIOTROPIUM BROMIDE 18 UG/1
CAPSULE ORAL; RESPIRATORY (INHALATION) AS NEEDED
COMMUNITY

## 2024-07-15 DIAGNOSIS — G44.89 OTHER HEADACHE SYNDROME: ICD-10-CM

## 2024-07-15 RX ORDER — BUTALBITAL, ACETAMINOPHEN AND CAFFEINE 50; 325; 40 MG/1; MG/1; MG/1
1 TABLET ORAL EVERY 6 HOURS PRN
Qty: 30 TABLET | Refills: 0 | Status: SHIPPED | OUTPATIENT
Start: 2024-07-15

## 2024-07-29 DIAGNOSIS — I10 ESSENTIAL HYPERTENSION: Chronic | ICD-10-CM

## 2024-07-29 RX ORDER — CLONIDINE HYDROCHLORIDE 0.1 MG/1
0.1 TABLET ORAL NIGHTLY
Qty: 90 TABLET | Refills: 1 | Status: SHIPPED | OUTPATIENT
Start: 2024-07-29

## 2024-07-30 ENCOUNTER — HOSPITAL ENCOUNTER (OUTPATIENT)
Dept: PAIN MEDICINE | Facility: HOSPITAL | Age: 79
Discharge: HOME | End: 2024-07-30
Payer: MEDICARE

## 2024-07-30 VITALS
DIASTOLIC BLOOD PRESSURE: 59 MMHG | RESPIRATION RATE: 16 BRPM | OXYGEN SATURATION: 95 % | BODY MASS INDEX: 25.69 KG/M2 | TEMPERATURE: 97.1 F | WEIGHT: 145 LBS | HEIGHT: 63 IN | HEART RATE: 54 BPM | SYSTOLIC BLOOD PRESSURE: 143 MMHG

## 2024-07-30 DIAGNOSIS — R52 PAIN: ICD-10-CM

## 2024-07-30 DIAGNOSIS — M54.16 LUMBAR RADICULOPATHY: Primary | ICD-10-CM

## 2024-07-30 PROCEDURE — 99152 MOD SED SAME PHYS/QHP 5/>YRS: CPT | Performed by: STUDENT IN AN ORGANIZED HEALTH CARE EDUCATION/TRAINING PROGRAM

## 2024-07-30 PROCEDURE — 62323 NJX INTERLAMINAR LMBR/SAC: CPT | Performed by: STUDENT IN AN ORGANIZED HEALTH CARE EDUCATION/TRAINING PROGRAM

## 2024-07-30 PROCEDURE — 77003 FLUOROGUIDE FOR SPINE INJECT: CPT

## 2024-07-30 PROCEDURE — 25510000001 IOPAMIDOL 41 % SOLUTION: Performed by: STUDENT IN AN ORGANIZED HEALTH CARE EDUCATION/TRAINING PROGRAM

## 2024-07-30 PROCEDURE — 25010000002 MIDAZOLAM PER 1 MG

## 2024-07-30 PROCEDURE — 25010000002 METHYLPREDNISOLONE PER 40 MG: Performed by: STUDENT IN AN ORGANIZED HEALTH CARE EDUCATION/TRAINING PROGRAM

## 2024-07-30 RX ORDER — MIDAZOLAM HYDROCHLORIDE 1 MG/ML
2 INJECTION INTRAMUSCULAR; INTRAVENOUS ONCE
Status: COMPLETED | OUTPATIENT
Start: 2024-07-30 | End: 2024-07-30

## 2024-07-30 RX ORDER — MIDAZOLAM HYDROCHLORIDE 1 MG/ML
INJECTION INTRAMUSCULAR; INTRAVENOUS
Status: COMPLETED
Start: 2024-07-30 | End: 2024-07-30

## 2024-07-30 RX ORDER — IOPAMIDOL 408 MG/ML
3 INJECTION, SOLUTION INTRATHECAL
Status: COMPLETED | OUTPATIENT
Start: 2024-07-30 | End: 2024-07-30

## 2024-07-30 RX ORDER — METHYLPREDNISOLONE ACETATE 40 MG/ML
40 INJECTION, SUSPENSION INTRA-ARTICULAR; INTRALESIONAL; INTRAMUSCULAR; SOFT TISSUE ONCE
Status: COMPLETED | OUTPATIENT
Start: 2024-07-30 | End: 2024-07-30

## 2024-07-30 RX ADMIN — MIDAZOLAM HYDROCHLORIDE 2 MG: 1 INJECTION INTRAMUSCULAR; INTRAVENOUS at 15:31

## 2024-07-30 RX ADMIN — METHYLPREDNISOLONE ACETATE 40 MG: 40 INJECTION, SUSPENSION INTRA-ARTICULAR; INTRALESIONAL; INTRAMUSCULAR; INTRASYNOVIAL; SOFT TISSUE at 15:34

## 2024-07-30 RX ADMIN — IOPAMIDOL 3 ML: 408 INJECTION, SOLUTION INTRATHECAL at 15:34

## 2024-07-30 RX ADMIN — MIDAZOLAM 2 MG: 1 INJECTION INTRAMUSCULAR; INTRAVENOUS at 15:31

## 2024-07-30 NOTE — DISCHARGE INSTRUCTIONS
EPIDURAL STEROID INJECTION          An epidural steroid injection is a shot of steroid medicine and numbing medicine that is given into the space between the spinal cord and the bones of the back (epidural space).  The injection helps relieve pain by an irritated or swollen nerve root.    TELL YOUR HEALTH CARE PROVIDER ABOUT:  Any allergies you have  All medicines you are taking including any over the counter medicines  Any blood disorders you have  Any surgeries you have had  Any medical conditions you have  Whether you are pregnant or may be pregnant    WHAT ARE THE RISK?  Generally, this is a safe procedure. However,problems may occur, including  Headache  Bleeding  Infection  Allergic Reaction  Nerve Damage    WHAT CAN I EXPECT AFTER THE PROCEDURE?    INJECTION SITE  Remove the Band-Aid/s after 24 hours  Check your injection site every day for signs of infection.  Check for:             Redness             Bleeding (small amt is normal)             Warmth             Pus or bad odor  Some numbness may be experienced for several hours following the procedure.  Avoid using heat on the injection site for 24 hours. You may use ice intermittently if needed by placing a         towel between your skin and the ice bag and using the ice for 20 minutes 2-3 times a day.  Do not take baths, swim or use a hot tub for 24 hours.    ACTIVITY  No strenuous activity for 24 hours then return to normal activity as tolerated.  If your leg is numb, no driving until full sensation and strength has returned.    GENERAL INSTRUCTIONS:  The injection site may feel numb, use ice with caution if numbness is present and no heat for 24 hours or until numbness is gone.   If you have numbness or weakness in your arm or leg, use those areas with caution until normal sensation returns.  It is not uncommon to notice an increase in discomfort or a change in the location of discomfort for 3-4 days after the procedure.  If discomfort is noticed  at the injection site, ice may be            applied to that area for 20 min 2-3 times a day.  Take the pain medicine your physician has prescribed or over the counter pain relievers as long as you do not have any contraindications.  If you are a diabetic, monitor your blood sugar closely.  The steroids used in your procedure may increase your blood sugar level up to 36 hours after the injection.  If your blood sugar is greater than 250, call the physician that helps you monitor your blood sugar.  Keep all follow-up visits as scheduled by your health care provider. This is important.    CONTACT OUR OFFICE IF:  You have any of these signs of infection            -Redness, swelling, or warmth around your injection site.            -Fluid or blood coming from your injection site (small amt of blood is normal)            -Pus or a bad odor from your injection site            -A fever  You develop a severe headache or a stiff neck  You lose control of your bladder or bowel movements      PAIN MANAGEMENT CENTER HOURS   Monday-Friday 7:30 am. - 4:00 pm.  For any problem related to your procedure we can be reached at [phone]  If you experience an emergency with your procedure, call [phone] or go to the emergency room.      Moderate Conscious Sedation, Adult, Care After    This sheet gives you information about how to care for yourself after your procedure. Your health care provider may also give you more specific instructions. If you have problems or questions, contact your health care provider.    What can I expect after the procedure?  After the procedure, it is common to have:  Sleepiness for several hours.  Impaired judgment for several hours.  Difficulty with balance.  Vomiting if you eat too soon.    Follow these instructions at home:  For the next 24 hours:         Rest.  Do not participate in activities where you could fall or become injured.  Do not drive or use machinery.  Do not drink alcohol.  Do not  take sleeping pills or medicines that cause drowsiness.  Do not make important decisions or sign legal documents.  Do not take care of children on your own.    Eating and drinking    Follow the diet recommended by your health care provider.  Drink enough fluid to keep your urine pale yellow.  If you vomit:  Drink water, juice, or soup when you can drink without vomiting.  Make sure you have little or no nausea before eating solid foods.     General instructions  Take over-the-counter and prescription medicines only as told by your health care provider.  Have a responsible adult stay with you for 24 hours. It is important to have someone help care for you until you are awake and alert.  Do not smoke.  Keep all follow-up visits as told by your health care provider. This is important.    Contact a health care provider if:  You are still sleepy or having trouble with balance after 24 hours.  You feel light-headed.  You keep feeling nauseous or you keep vomiting.  You develop a rash.  You have a fever.  You have redness or swelling around the IV site.    Get help right away if:  You have trouble breathing.  You have new-onset confusion at home.    Summary  After the procedure, it is common to feel sleepy, have impaired judgment, or feel nauseous if you eat too soon.  Rest after you get home. Know the things you should not do after the procedure.  Follow the diet recommended by your health care provider and drink enough fluid to keep your urine pale yellow.  Get help right away if you have trouble breathing or new-onset confusion at home.    This information is not intended to replace advice given to you by your health care provider. Make sure you discuss any questions you have with your health care provider.    Document Revised: 04/16/2021 Document Reviewed: 11/12/2020  Elsevier Patient Education © 2021 [employer]

## 2024-07-30 NOTE — H&P
H and P reviewed from previous visit and no changes to patient's clinical presentation. Will proceed with procedure as planned. Patient denies history of DM and being on blood thinners. Patient is extremely afraid of needles and requests to have sedation.     Sedation Plan    ASA 3     Mallampati class: II.    Risks, benefits, and alternatives discussed with patient.          Galileo Portillo DO  Pain Management   [facility]

## 2024-07-30 NOTE — PROCEDURES
PREOPERATIVE DIAGNOSIS:    1. Lumbar DDD    POSTOPERATIVE DIAGNOSIS: Same    PROCEDURE:  Lumbar epidural steroid injection L 4-5    PROCEDURE NOTE:  After obtaining written informed consent patient was taken to the procedure room. Pre-procedure blood pressure and pulse were stable and recorded in patients clinic chart.     The patient was placed in the prone position. The lower back was prepped with antiseptic solution and draped in the usual sterile fashion.  The skin over the L4-5 space was identified under fluoroscopic guidance and infiltrated with 1% lidocaine for local anesthesia via 25 gauge needle.  A 20 gauge tuohy needle was used to access the epidural space using loss of resistance to air technique. Following negative aspiration, 2 cc of the omnipaque dye was injected.  There was good spread of the dye from L3-L5 area. A mixture containing  3 ml of saline with 40 mg of dep-medrol was injected. There was no evidence of CSF, paresthesia or vascular spread. The needle was removed. Skin was cleaned and band aid was applied.    Following the procedure the patient's vital signs were stable. The patient was discharged home in good condition after being given discharge instructions.    COMPLICATIONS: None    Moderate sedation:   Start time: 1531 PM  End time: 1550 PM     2 mg Versed ; 15 minutes of moderate sedation was done and vitals were monitored closely during this periods.  ETCO2, RR, BP, HR, O2 were monitored closely throughout sedation period.     Galileo Portillo DO  Pain Management   [facility]

## 2024-07-31 ENCOUNTER — TELEPHONE (OUTPATIENT)
Dept: PAIN MEDICINE | Facility: HOSPITAL | Age: 79
End: 2024-07-31
Payer: MEDICARE

## 2024-07-31 NOTE — TELEPHONE ENCOUNTER
Postop call completed. Denies any problems since coming home. Rates pain #2. No questions or concerns. Satisfied with care.

## 2024-08-07 DIAGNOSIS — E03.8 OTHER SPECIFIED HYPOTHYROIDISM: ICD-10-CM

## 2024-08-07 NOTE — TELEPHONE ENCOUNTER
Caller: Luna Jacobson    Relationship: Self      Requested Prescriptions:   Requested Prescriptions     Pending Prescriptions Disp Refills    levothyroxine (SYNTHROID, LEVOTHROID) 100 MCG tablet 90 tablet 0     Sig: Take 1 tablet by mouth Daily.      Pharmacy where request should be sent: [id number] [place], [place] - [address] AT [place] [address] & [address] - [phone]  - [phone] FX     Last office visit with prescribing clinician: 5/17/2024   Last telemedicine visit with prescribing clinician: Visit date not found   Next office visit with prescribing clinician: 8/22/2024       Benjamin Price Rep   08/07/24 15:46 EDT

## 2024-08-08 DIAGNOSIS — G44.89 OTHER HEADACHE SYNDROME: ICD-10-CM

## 2024-08-08 RX ORDER — BUTALBITAL, ACETAMINOPHEN AND CAFFEINE 50; 325; 40 MG/1; MG/1; MG/1
1 TABLET ORAL EVERY 6 HOURS PRN
Qty: 30 TABLET | Refills: 2 | Status: SHIPPED | OUTPATIENT
Start: 2024-08-08

## 2024-08-08 RX ORDER — LEVOTHYROXINE SODIUM 0.1 MG/1
100 TABLET ORAL DAILY
Qty: 90 TABLET | Refills: 0 | Status: SHIPPED | OUTPATIENT
Start: 2024-08-08

## 2024-08-20 NOTE — PROGRESS NOTES
Subjective   Luna Jacobson is a 78 y.o. female is here for follow-up for lower back pain.  Patient was last seen for LESI L4-5.    Lower back pain is 3/10 on VAS, maximum 4/10.  Pain is dull and aching and sharp in nature.  Referred to bilateral hips, bilateral posterior legs to ankle.Constant pain improved by pain medications and caudal NIMA.  Pain worsens with walking.    Neck pain is 6/10 on VAS, maximum 7/10.  Tightness in nature.  Not referred but that has numbness in bilateral hands.  Improved with pain medications and chiropractic care.  Worse with flexion of the neck.  Chiropractic care really helped in the past but the last few sessions have not helped.  + Headaches starting from occipital region radiating to the top of her head.  Daily headaches.  Had taken Fioricet before with some relief.    Previous Injection:   7/30/2024-LESI L4-5 with sedation- >80% pain relief with functional improvement  3/27/2024-LESI L4-5- 80% pain relief with functional improvement- 3 months of pain relief.    12/1/23 - LESI L4-5- 80% pain relief for first few weeks; 40% pain relief for now. VAS down from 8/10 on 5/10 on VAS- 2.5 months.  11/7/2023-caudal NIMA with sedation - no significant relief.   1/26/2023-caudal NIMA with sedation- 80% pain relief with radicular pain- 9 months of relief.   11/20/2020 - Caudal NIMA - 80% pain relief - 2 years.     PMH: COPD, GERD, HLD, HTN, Thoracic aortic aneurysm      Current Medications:   Norco 5-325 mg BID PRN   Flexeril 10 mg BID PRN  Fioricet     Past Medications:       Past Modalities:  TENS:                                                                          no                                                  Physical Therapy Within The Last 6 Months              Yes- chiropractor care >6 weeks.  Psychotherapy                                                            no  Massage Therapy                                                       no     Patient Complains  Of:  Uro-Fecal Incontinence          no  Weight Gain/Loss                   no  Fever/Chills                             no  Weakness                               Yes (subjective weakness in left leg)            Current Outpatient Medications:     albuterol sulfate  (90 Base) MCG/ACT inhaler, Inhale 2 puffs Every 4 (Four) Hours As Needed., Disp: , Rfl:     atorvastatin (LIPITOR) 40 MG tablet, TAKE 1 TABLET BY MOUTH DAILY, Disp: 90 tablet, Rfl: 1    butalbital-acetaminophen-caffeine (FIORICET, ESGIC) -40 MG per tablet, TAKE 1 TABLET BY MOUTH EVERY 6 HOURS AS NEEDED FOR HEADACHE, Disp: 30 tablet, Rfl: 2    Calcium Carbonate-Vitamin D 500-125 MG-UNIT tablet, Take  by mouth., Disp: , Rfl:     clobetasol (TEMOVATE) 0.05 % external solution, APPLY TOPICALLY TO THE SCALP EVERY DAY IN THE MORNING AND IN THE EVENING, Disp: , Rfl:     cloNIDine (CATAPRES) 0.1 MG tablet, TAKE 1 TABLET BY MOUTH EVERY NIGHT, Disp: 90 tablet, Rfl: 1    coenzyme Q10 100 MG capsule, Take 1 capsule by mouth Daily., Disp: , Rfl:     cyclobenzaprine (FLEXERIL) 10 MG tablet, Take 1 tablet by mouth 2 (Two) Times a Day As Needed for Muscle Spasms for up to 180 days., Disp: 180 tablet, Rfl: 1    dicyclomine (BENTYL) 20 MG tablet, Take 1 tablet by mouth Daily., Disp: , Rfl:     ferrous sulfate (FeroSul) 325 (65 FE) MG tablet, Take 1 tablet by mouth Daily., Disp: 90 tablet, Rfl: 1    fexofenadine (ALLEGRA) 180 MG tablet, Take 1 tablet by mouth Daily., Disp: , Rfl:     fluticasone (FLONASE) 50 MCG/ACT nasal spray, SHAKE LIQUID AND USE 2 SPRAYS IN EACH NOSTRIL EVERY DAY, Disp: 48 g, Rfl: 3    hydrALAZINE (APRESOLINE) 50 MG tablet, Take 1 tablet by mouth 3 (Three) Times a Day., Disp: 90 tablet, Rfl: 3    HYDROcodone-acetaminophen (NORCO) 5-325 MG per tablet, Take 1 tablet by mouth 3 (Three) Times a Day As Needed for Severe Pain for up to 30 days., Disp: 90 tablet, Rfl: 0    ketoconazole (NIZORAL) 2 % shampoo, , Disp: , Rfl: 3    levothyroxine  (SYNTHROID, LEVOTHROID) 100 MCG tablet, Take 1 tablet by mouth Daily., Disp: 90 tablet, Rfl: 0    metoprolol succinate XL (TOPROL-XL) 100 MG 24 hr tablet, TAKE 1 TABLET(100 MG) BY MOUTH 1 TIME EACH DAY. DO NOT CRUSH OR CHEW, Disp: , Rfl:     metoprolol tartrate (LOPRESSOR) 100 MG tablet, Take 2 tablets by mouth., Disp: , Rfl:     Multiple Vitamin (MULTIVITAMIN) tablet, Take 1 tablet by mouth Daily., Disp: , Rfl:     pantoprazole (PROTONIX) 40 MG EC tablet, Take 1 tablet by mouth Daily., Disp: , Rfl:     polyethylene glycol (MIRALAX) powder, , Disp: , Rfl:     tiotropium (Spiriva HandiHaler) 18 MCG per inhalation capsule, As Needed., Disp: , Rfl:     varenicline (CHANTIX) 1 MG tablet, TAKE 1 TABLET BY MOUTH TWICE DAILY, Disp: 60 tablet, Rfl: 5    The following portions of the patient's history were reviewed and updated as appropriate: allergies, current medications, past family history, past medical history, past social history, past surgical history and problem list.      REVIEW OF PERTINENT MEDICAL DATA    Past Medical History:   Diagnosis Date    COPD (chronic obstructive pulmonary disease)     Coronary artery calcification seen on CT scan 07/2012    mild calcification in the LAD with calcium score of 22. modere narrowing left subclavian origin    Diverticulosis     Gastroparesis     GERD (gastroesophageal reflux disease)     GIST (gastrointestinal stromal tumor), non-malignant     Hip pain, bilateral     Irregular heartbeat     Irritable bowel syndrome     Low back pain     Mild mitral regurgitation     Neck pain     PAD (peripheral artery disease)     small vessel disease in feet    Subclavian artery stenosis, left 05/2016    50 percent    Tubular adenoma of colon 01/2017     Past Surgical History:   Procedure Laterality Date    APPENDECTOMY      COLONOSCOPY  10/01/2019    dr owen    ENDOSCOPY  02/2019    ENDOSCOPY  02/01/2023    Dr. Owen    HEMORRHOIDECTOMY      HYSTERECTOMY      TONSILLECTOMY      TOOTH  EXTRACTION      8/3/21     Family History   Problem Relation Age of Onset    Hypertension Mother     Diabetes Mother     Dementia Mother     Stroke Mother     Lung cancer Father     Alcohol abuse Father     COPD Father     Breast cancer Sister     Hypertension Sister     Hypothyroidism Sister     Stroke Sister     Other Sister         AAA    Bipolar disorder Daughter     Fibromyalgia Daughter      Social History     Socioeconomic History    Marital status:    Tobacco Use    Smoking status: Former     Current packs/day: 0.00     Types: Cigarettes     Quit date: 2020     Years since quittin.0    Smokeless tobacco: Never    Tobacco comments:     she has smoked intermittently for 40 years; she smoked over 2 ppd twenty years ago   Vaping Use    Vaping status: Never Used   Substance and Sexual Activity    Alcohol use: No    Drug use: No    Sexual activity: Defer         Review of Systems   Musculoskeletal:  Positive for back pain and neck pain.   Neurological:  Positive for headaches.         Vitals:    24 1439   BP: 143/82   Pulse: 55   Resp: 16   SpO2: 96%   Weight: 64 kg (141 lb)   PainSc:   3                         Objective   Physical Exam  Neck:     Musculoskeletal:         General: Tenderness present.      Lumbar back: Tenderness present. Decreased range of motion.        Legs:    Neurological:      Comments: Motor strength 5/5 b/l LE  Sensory intact b/l LE             Imaging Reviewed:  MRI done at [facility] - 2020:    L3-L4-a small broad based disc protrusion centrally.  There is moderate facet arthropathy.  This changes contribute to moderate central canal stenosis.  There is moderate right and severe left neural foraminal stenosis.  L4-L5-there is a posterior disc protrusion centrally.  Moderate facet arthropathy and mild limited flavum hypertrophy. Severe central canal stenosis.  Severe bilateral neural foraminal stenosis, right greater than left.  L5-S1-there is small left far lateral  disc protrusion.  No central canal stenosis.  There is mild facet arthropathy.  There is moderate bilateral neural foraminal stenosis.     Lumbar X-ray: 10/1/2020   Multilevel degenerative spondylosis.      Cervical x-ray-4/20/2023  - Mild degenerative disc and endplate changes in cervical spine most notable at C4-5 through C6-7  - Mild right C4-5 facet arthropathy    Assessment:    1. High risk medication use    2. DDD (degenerative disc disease), lumbar    3. Lumbar radiculopathy    4. Bilateral occipital neuralgia    5. Chronic left-sided low back pain with sciatica, sciatica laterality unspecified        Plan:  1.  Repeat UDS-8/21/2024 UDS on 1/17/24 is consistent with patient's interview.. Narcotic contract discussed on 11/3/22. Narcan ordered on 10/9/2020.  2. Continue Norco 5-325 mg to QID PRN for 1 month and then going back to 90 tabs (9/10; 10/10). Discussed with the patient regarding long-term side effects of opioids including but not limited to opioid induced hormonal suppression, hyperalgesia, fatigue, weight gain, possible opioid induced altered immune system, addiction, tolerance, dependence, risk of hearing loss and elevated risk of myocardial infarction. Proper use and potential life threatening side effects of over use discussed with patient. Patient states understanding of their use and risks.  3. Continue Flexeril to 10 mg BID PRN (1/6/25). Common side effects of flexeril include blurred vision, dizziness or lightheadedness, drowsiness, dryness of mouth, bloated feeling or gas, indigestion, nausea or vomiting, or stomach cramps or pain, constipation, diarrhea, excitement or nervousness, frequent urination, general feeling of discomfort or illness, headache, muscle twitching, numbness, tingling, pain, or weakness in hands or feet, pounding heartbeat, problems in speaking, trembling, trouble in sleeping, unpleasant taste or other taste changes, unusual muscle weakness or unusual tiredness,  especially during the first few days as your body adjusts to this medication.  4. . Patient has pain in the head with referred pain on the top of the head. Bilateral occipital tenderness present. Discussed bilateral greater and lesser occipital nerve block. Discussed the possibility of infection, bleeding, nerve damage, headache, increased pain. Patient understands and agrees.  Discussed with patient that it is not appropriate to do IV sedation for minor procedure like occipital nerve block.  I did offer Xanax to be taken before the procedure but patient is not sure and would like to think about it.  5. Good relief with LESI. Repeat PRN.    RTC before 11/9/2024.    Galileo Portillo DO  Pain Management   [facility]         INSPECT REPORT    As part of the patient's treatment plan, I may be prescribing controlled substances. The patient has been made aware of appropriate use of such medications, including potential risk of somnolence, limited ability to drive and/or work safely, and the potential for dependence or overdose. It has also been made clear that these medications are for use by this patient only, without concomitant use of alcohol or other substances unless prescribed.     Patient has completed prescribing agreement detailing terms of continued prescribing of controlled substances, including monitoring INSPECT reports, urine drug screening, and pill counts if necessary. The patient is aware that inappropriate use will results in cessation of prescribing such medications.    INSPECT report has been reviewed and scanned into the patient's chart.

## 2024-08-21 ENCOUNTER — OFFICE VISIT (OUTPATIENT)
Dept: PAIN MEDICINE | Facility: CLINIC | Age: 79
End: 2024-08-21
Payer: MEDICARE

## 2024-08-21 VITALS
DIASTOLIC BLOOD PRESSURE: 82 MMHG | OXYGEN SATURATION: 96 % | BODY MASS INDEX: 24.98 KG/M2 | HEART RATE: 55 BPM | SYSTOLIC BLOOD PRESSURE: 143 MMHG | WEIGHT: 141 LBS | RESPIRATION RATE: 16 BRPM

## 2024-08-21 DIAGNOSIS — M54.16 LUMBAR RADICULOPATHY: ICD-10-CM

## 2024-08-21 DIAGNOSIS — M51.36 DDD (DEGENERATIVE DISC DISEASE), LUMBAR: ICD-10-CM

## 2024-08-21 DIAGNOSIS — Z79.899 HIGH RISK MEDICATION USE: Primary | ICD-10-CM

## 2024-08-21 DIAGNOSIS — G89.29 CHRONIC LEFT-SIDED LOW BACK PAIN WITH SCIATICA, SCIATICA LATERALITY UNSPECIFIED: ICD-10-CM

## 2024-08-21 DIAGNOSIS — M54.40 CHRONIC LEFT-SIDED LOW BACK PAIN WITH SCIATICA, SCIATICA LATERALITY UNSPECIFIED: ICD-10-CM

## 2024-08-21 DIAGNOSIS — M54.81 BILATERAL OCCIPITAL NEURALGIA: ICD-10-CM

## 2024-08-21 PROCEDURE — 3079F DIAST BP 80-89 MM HG: CPT | Performed by: STUDENT IN AN ORGANIZED HEALTH CARE EDUCATION/TRAINING PROGRAM

## 2024-08-21 PROCEDURE — 1160F RVW MEDS BY RX/DR IN RCRD: CPT | Performed by: STUDENT IN AN ORGANIZED HEALTH CARE EDUCATION/TRAINING PROGRAM

## 2024-08-21 PROCEDURE — 3077F SYST BP >= 140 MM HG: CPT | Performed by: STUDENT IN AN ORGANIZED HEALTH CARE EDUCATION/TRAINING PROGRAM

## 2024-08-21 PROCEDURE — 1125F AMNT PAIN NOTED PAIN PRSNT: CPT | Performed by: STUDENT IN AN ORGANIZED HEALTH CARE EDUCATION/TRAINING PROGRAM

## 2024-08-21 PROCEDURE — 99214 OFFICE O/P EST MOD 30 MIN: CPT | Performed by: STUDENT IN AN ORGANIZED HEALTH CARE EDUCATION/TRAINING PROGRAM

## 2024-08-21 PROCEDURE — 1159F MED LIST DOCD IN RCRD: CPT | Performed by: STUDENT IN AN ORGANIZED HEALTH CARE EDUCATION/TRAINING PROGRAM

## 2024-08-21 RX ORDER — METOPROLOL TARTRATE 100 MG/1
200 TABLET ORAL
COMMUNITY
Start: 2024-08-08 | End: 2025-08-08

## 2024-08-21 RX ORDER — HYDROCODONE BITARTRATE AND ACETAMINOPHEN 5; 325 MG/1; MG/1
1 TABLET ORAL 3 TIMES DAILY PRN
Qty: 90 TABLET | Refills: 0 | Status: SHIPPED | OUTPATIENT
Start: 2024-10-10 | End: 2024-11-09

## 2024-08-21 RX ORDER — HYDROCODONE BITARTRATE AND ACETAMINOPHEN 5; 325 MG/1; MG/1
1 TABLET ORAL 3 TIMES DAILY PRN
Qty: 90 TABLET | Refills: 0 | Status: SHIPPED | OUTPATIENT
Start: 2024-09-10 | End: 2024-10-10

## 2024-08-22 ENCOUNTER — OFFICE VISIT (OUTPATIENT)
Dept: INTERNAL MEDICINE | Facility: CLINIC | Age: 79
End: 2024-08-22
Payer: MEDICARE

## 2024-08-22 VITALS
HEIGHT: 63 IN | SYSTOLIC BLOOD PRESSURE: 132 MMHG | BODY MASS INDEX: 25.16 KG/M2 | WEIGHT: 142 LBS | DIASTOLIC BLOOD PRESSURE: 82 MMHG

## 2024-08-22 DIAGNOSIS — M81.0 AGE-RELATED OSTEOPOROSIS WITHOUT CURRENT PATHOLOGICAL FRACTURE: Chronic | ICD-10-CM

## 2024-08-22 DIAGNOSIS — I71.20 THORACIC AORTIC ANEURYSM WITHOUT RUPTURE, UNSPECIFIED PART: Chronic | ICD-10-CM

## 2024-08-22 DIAGNOSIS — D36.9 TUBULAR ADENOMA: ICD-10-CM

## 2024-08-22 DIAGNOSIS — Z00.00 ENCOUNTER FOR SUBSEQUENT ANNUAL WELLNESS VISIT (AWV) IN MEDICARE PATIENT: Primary | ICD-10-CM

## 2024-08-22 DIAGNOSIS — E78.00 HIGH CHOLESTEROL: ICD-10-CM

## 2024-08-22 DIAGNOSIS — R73.03 PREDIABETES: ICD-10-CM

## 2024-08-22 DIAGNOSIS — M51.36 DDD (DEGENERATIVE DISC DISEASE), LUMBAR: ICD-10-CM

## 2024-08-22 DIAGNOSIS — E03.8 OTHER SPECIFIED HYPOTHYROIDISM: ICD-10-CM

## 2024-08-22 DIAGNOSIS — I10 ESSENTIAL HYPERTENSION: Chronic | ICD-10-CM

## 2024-08-22 PROBLEM — E04.1 THYROID NODULE: Status: RESOLVED | Noted: 2024-05-17 | Resolved: 2024-08-22

## 2024-08-22 NOTE — PROGRESS NOTES
Subjective   The ABCs of the Annual Wellness Visit  Medicare Wellness Visit      Luna Jacobson is a 78 y.o. patient who presents for a Medicare Wellness Visit.    The following portions of the patient's history were reviewed and   updated as appropriate: allergies, current medications, past family history, past medical history, past social history, past surgical history, and problem list.    Compared to one year ago, the patient's physical   health is the same.  Compared to one year ago, the patient's mental   health is the same.    Recent Hospitalizations:  This patient has had a [facility] admission record on file within the last 365 days.  Current Medical Providers:  Patient Care Team:  Danish Serrano MD as PCP - General (Internal Medicine)  Danish Serrano MD as PCP - Family Medicine  Galileo Portillo DO as Consulting Physician (Anesthesiology)  Adelita Koenig MD as Consulting Physician (Cardiology)    Outpatient Medications Prior to Visit   Medication Sig Dispense Refill    albuterol sulfate  (90 Base) MCG/ACT inhaler Inhale 2 puffs Every 4 (Four) Hours As Needed.      atorvastatin (LIPITOR) 40 MG tablet TAKE 1 TABLET BY MOUTH DAILY 90 tablet 1    butalbital-acetaminophen-caffeine (FIORICET, ESGIC) -40 MG per tablet TAKE 1 TABLET BY MOUTH EVERY 6 HOURS AS NEEDED FOR HEADACHE 30 tablet 2    Calcium Carbonate-Vitamin D 500-125 MG-UNIT tablet Take  by mouth.      clobetasol (TEMOVATE) 0.05 % external solution APPLY TOPICALLY TO THE SCALP EVERY DAY IN THE MORNING AND IN THE EVENING      cloNIDine (CATAPRES) 0.1 MG tablet TAKE 1 TABLET BY MOUTH EVERY NIGHT 90 tablet 1    coenzyme Q10 100 MG capsule Take 1 capsule by mouth Daily.      cyclobenzaprine (FLEXERIL) 10 MG tablet Take 1 tablet by mouth 2 (Two) Times a Day As Needed for Muscle Spasms for up to 180 days. 180 tablet 1    dicyclomine (BENTYL) 20 MG tablet Take 1 tablet by mouth Daily.      ferrous sulfate (FeroSul) 325 (65 FE)  MG tablet Take 1 tablet by mouth Daily. 90 tablet 1    fexofenadine (ALLEGRA) 180 MG tablet Take 1 tablet by mouth Daily.      fluticasone (FLONASE) 50 MCG/ACT nasal spray SHAKE LIQUID AND USE 2 SPRAYS IN EACH NOSTRIL EVERY DAY 48 g 3    hydrALAZINE (APRESOLINE) 50 MG tablet Take 1 tablet by mouth 3 (Three) Times a Day. 90 tablet 3    [START ON 9/10/2024] HYDROcodone-acetaminophen (NORCO) 5-325 MG per tablet Take 1 tablet by mouth 3 (Three) Times a Day As Needed for Severe Pain for up to 30 days. 90 tablet 0    ketoconazole (NIZORAL) 2 % shampoo   3    levothyroxine (SYNTHROID, LEVOTHROID) 100 MCG tablet Take 1 tablet by mouth Daily. 90 tablet 0    metoprolol tartrate (LOPRESSOR) 100 MG tablet Take 2 tablets by mouth.      Multiple Vitamin (MULTIVITAMIN) tablet Take 1 tablet by mouth Daily.      pantoprazole (PROTONIX) 40 MG EC tablet Take 1 tablet by mouth Daily.      polyethylene glycol (MIRALAX) powder       varenicline (CHANTIX) 1 MG tablet TAKE 1 TABLET BY MOUTH TWICE DAILY 60 tablet 5    [START ON 10/10/2024] HYDROcodone-acetaminophen (NORCO) 5-325 MG per tablet Take 1 tablet by mouth 3 (Three) Times a Day As Needed for Severe Pain for up to 30 days. (Patient not taking: Reported on 8/22/2024) 90 tablet 0    metoprolol succinate XL (TOPROL-XL) 100 MG 24 hr tablet TAKE 1 TABLET(100 MG) BY MOUTH 1 TIME EACH DAY. DO NOT CRUSH OR CHEW (Patient not taking: Reported on 8/22/2024)      tiotropium (Spiriva HandiHaler) 18 MCG per inhalation capsule As Needed. (Patient not taking: Reported on 8/22/2024)       No facility-administered medications prior to visit.     Opioid medication/s are on active medication list.  and I have evaluated her active treatment plan and pain score trends (see table).  There were no vitals filed for this visit.  I have reviewed the chart for potential of high risk medication and harmful drug interactions in the elderly.        Aspirin is not on active medication list.  Aspirin use is not  "indicated based on review of current medical condition/s. Risk of harm outweighs potential benefits.  .    Patient Active Problem List   Diagnosis    Essential hypertension    High cholesterol    Other specified hypothyroidism    Other headache syndrome    Prediabetes    Iron deficiency anemia due to chronic blood loss    Thoracic aortic aneurysm    Microscopic colitis    Myalgia    DDD (degenerative disc disease), lumbar    Recurrent major depressive disorder, in partial remission    Moderate aortic regurgitation    Diffusion capacity of lung (dl), decreased    Age-related osteoporosis without current pathological fracture     Advance Care Planning Advance Directive is not on file.  ACP discussion was held with the patient during this visit. Patient has an advance directive (not in EMR), copy requested.            Objective   Vitals:    24 1539   BP: 132/82   Weight: 64.4 kg (142 lb)   Height: 160 cm (62.99\")       Estimated body mass index is 25.16 kg/m² as calculated from the following:    Height as of this encounter: 160 cm (62.99\").    Weight as of this encounter: 64.4 kg (142 lb).    BMI is within normal parameters. No other follow-up for BMI required.       Does the patient have evidence of cognitive impairment? No                                                                                                Health  Risk Assessment    Smoking Status:  Social History     Tobacco Use   Smoking Status Former    Current packs/day: 0.00    Types: Cigarettes    Quit date: 2020    Years since quittin.0   Smokeless Tobacco Never   Tobacco Comments    she has smoked intermittently for 40 years; she smoked over 2 ppd twenty years ago     Alcohol Consumption:  Social History     Substance and Sexual Activity   Alcohol Use No       Fall Risk Screen  STEADI Fall Risk Assessment has not been completed.    Depression Screenin/10/2024     2:44 PM   PHQ-2/PHQ-9 Depression Screening   Little Interest or " Pleasure in Doing Things 0-->not at all   Feeling Down, Depressed or Hopeless 0-->not at all   PHQ-9: Brief Depression Severity Measure Score 0     Health Habits and Functional and Cognitive Screenin/27/2023     2:28 PM   Functional & Cognitive Status   Do you have difficulty preparing food and eating? No   Do you have difficulty bathing yourself, getting dressed or grooming yourself? No   Do you have difficulty using the toilet? No   Do you have difficulty moving around from place to place? No   Do you have trouble with steps or getting out of a bed or a chair? No   Current Diet Limited Junk Food   Dental Exam Up to date   Eye Exam Not up to date   Exercise (times per week) 4 times per week   Current Exercises Include Walking;House Cleaning   Do you need help using the phone?  No   Are you deaf or do you have serious difficulty hearing?  No   Do you need help to go to places out of walking distance? No   Do you need help shopping? No   Do you need help preparing meals?  No   Do you need help with housework?  No   Do you need help with laundry? No   Do you need help taking your medications? No   Do you need help managing money? No   Do you ever drive or ride in a car without wearing a seat belt? No   Have you felt unusual stress, anger or loneliness in the last month? No   Who do you live with? Spouse   If you need help, do you have trouble finding someone available to you? No   Have you been bothered in the last four weeks by sexual problems? No   Do you have difficulty concentrating, remembering or making decisions? No           Age-appropriate Screening Schedule:  Refer to the list below for future screening recommendations based on patient's age, sex and/or medical conditions. Orders for these recommended tests are listed in the plan section. The patient has been provided with a written plan.    Health Maintenance List  Health Maintenance   Topic Date Due    RSV Vaccine - Adults (1 - 1-dose 60+ series)  "Never done    ZOSTER VACCINE (2 of 3) 10/20/2010    TDAP/TD VACCINES (2 - Td or Tdap) 01/01/2022    COVID-19 Vaccine (3 - 2023-24 season) 09/01/2023    BMI FOLLOWUP  07/27/2024    INFLUENZA VACCINE  08/01/2024    DXA SCAN  10/27/2024    LIPID PANEL  04/01/2025    ANNUAL WELLNESS VISIT  08/22/2025    HEPATITIS C SCREENING  Completed    Pneumococcal Vaccine 65+  Discontinued    COLORECTAL CANCER SCREENING  Discontinued                                                                                                                                                CMS Preventative Services Quick Reference  Risk Factors Identified During Encounter  Immunizations Discussed/Encouraged: Tdap and Shingrix    The above risks/problems have been discussed with the patient.  Pertinent information has been shared with the patient in the After Visit Summary.  An After Visit Summary and PPPS were made available to the patient.    Follow Up:   Next Medicare Wellness visit to be scheduled in 1 year.         Additional E&M Note during same encounter follows:  Patient has additional, significant, and separately identifiable condition(s)/problem(s) that require work above and beyond the Medicare Wellness Visit     Chief Complaint  Medicare Wellness-subsequent    Subjective   HPI AWV and HTN    She is taking metoprolol 100 mg two tabs BID for skipped heart beats. She does not think it has helped. She will feel a flutter at night 2 times per week. Dr. Koenig wants to get a holter if her symptoms don't improve. She stopped her spiriva because would make her have a choking feeling. She uses albuterol twice per month. Dr. Leary removed a BCC from her scalp.              Objective   Vital Signs:  /82   Ht 160 cm (62.99\")   Wt 64.4 kg (142 lb)   BMI 25.16 kg/m²   Physical Exam  Vitals reviewed.   Constitutional:       Appearance: She is well-developed.   HENT:      Head: Normocephalic and atraumatic.   Neck:      Vascular: No carotid " bruit.   Cardiovascular:      Rate and Rhythm: Normal rate and regular rhythm. Occasional Extrasystoles are present.     Heart sounds: Normal heart sounds, S1 normal and S2 normal.      Comments: I-II/VI systolic murmur at RUSB  Pulmonary:      Effort: Pulmonary effort is normal.      Breath sounds: Normal breath sounds.   Skin:     General: Skin is warm.   Neurological:      Mental Status: She is alert.   Psychiatric:         Behavior: Behavior normal.                 Assessment and Plan               Encounter for subsequent annual wellness visit (AWV) in Medicare patient    High cholesterol     Essential hypertension    Prediabetes    Tubular adenoma    Age-related osteoporosis without current pathological fracture    Thoracic aortic aneurysm without rupture, unspecified part    Other specified hypothyroidism    DDD (degenerative disc disease), lumbar    Diagnoses and all orders for this visit:    1. Encounter for subsequent annual wellness visit (AWV) in Medicare patient (Primary)    2. High cholesterol  Assessment & Plan:       Orders:  -     Lipid Panel With / Chol / HDL Ratio; Future    3. Essential hypertension  Assessment & Plan:      Orders:  -     Comprehensive Metabolic Panel; Future  -     CBC & Differential; Future    4. Prediabetes  -     Hemoglobin A1c; Future    5. Tubular adenoma  Comments:  Cscope is coming up soon.    6. Age-related osteoporosis without current pathological fracture  Comments:  she will call [facility] to get her reclast.    7. Thoracic aortic aneurysm without rupture, unspecified part  -     CT Angiogram Chest With Contrast; Future    8. Other specified hypothyroidism  -     TSH; Future    9. DDD (degenerative disc disease), lumbar  Comments:  Gets Norco with pain mgmt. She gets epidural with them as well. YARA is appropriate.               Follow Up   No follow-ups on file.  Patient was given instructions and counseling regarding her condition or for health maintenance advice.  Please see specific information pulled into the AVS if appropriate.     She has a cscope scheduled for next week. She will get her labs drawn. BP is decent  today. She will f/u with Dr. Koenig for her skipped heart beats. Recc Tdap and Shingrix.

## 2024-09-05 PROBLEM — Z86.010 PERSONAL HISTORY OF COLONIC POLYPS: Status: ACTIVE | Noted: 2019-10-01

## 2024-10-09 DIAGNOSIS — E78.49 OTHER HYPERLIPIDEMIA: ICD-10-CM

## 2024-10-10 RX ORDER — ATORVASTATIN CALCIUM 40 MG/1
40 TABLET, FILM COATED ORAL DAILY
Qty: 90 TABLET | Refills: 3 | Status: SHIPPED | OUTPATIENT
Start: 2024-10-10

## 2024-10-14 ENCOUNTER — ON CAMPUS - OUTPATIENT (AMBULATORY)
Age: 79
End: 2024-10-14
Payer: MEDICARE

## 2024-10-14 ENCOUNTER — OFFICE (AMBULATORY)
Age: 79
End: 2024-10-14
Payer: MEDICARE

## 2024-10-14 ENCOUNTER — ON CAMPUS - OUTPATIENT (AMBULATORY)
Dept: URBAN - METROPOLITAN AREA HOSPITAL 2 | Facility: HOSPITAL | Age: 79
End: 2024-10-14
Payer: MEDICARE

## 2024-10-14 ENCOUNTER — OFFICE (AMBULATORY)
Dept: URBAN - METROPOLITAN AREA PATHOLOGY 19 | Facility: PATHOLOGY | Age: 79
End: 2024-10-14
Payer: MEDICARE

## 2024-10-14 VITALS
SYSTOLIC BLOOD PRESSURE: 171 MMHG | SYSTOLIC BLOOD PRESSURE: 172 MMHG | DIASTOLIC BLOOD PRESSURE: 79 MMHG | RESPIRATION RATE: 25 BRPM | TEMPERATURE: 98.4 F | DIASTOLIC BLOOD PRESSURE: 80 MMHG | DIASTOLIC BLOOD PRESSURE: 85 MMHG | OXYGEN SATURATION: 99 % | DIASTOLIC BLOOD PRESSURE: 80 MMHG | DIASTOLIC BLOOD PRESSURE: 77 MMHG | HEIGHT: 63 IN | SYSTOLIC BLOOD PRESSURE: 153 MMHG | OXYGEN SATURATION: 100 % | OXYGEN SATURATION: 98 % | RESPIRATION RATE: 25 BRPM | RESPIRATION RATE: 20 BRPM | DIASTOLIC BLOOD PRESSURE: 75 MMHG | OXYGEN SATURATION: 99 % | SYSTOLIC BLOOD PRESSURE: 144 MMHG | OXYGEN SATURATION: 97 % | DIASTOLIC BLOOD PRESSURE: 76 MMHG | OXYGEN SATURATION: 97 % | HEART RATE: 60 BPM | SYSTOLIC BLOOD PRESSURE: 146 MMHG | RESPIRATION RATE: 15 BRPM | SYSTOLIC BLOOD PRESSURE: 172 MMHG | DIASTOLIC BLOOD PRESSURE: 81 MMHG | SYSTOLIC BLOOD PRESSURE: 125 MMHG | HEART RATE: 57 BPM | DIASTOLIC BLOOD PRESSURE: 76 MMHG | RESPIRATION RATE: 25 BRPM | SYSTOLIC BLOOD PRESSURE: 171 MMHG | SYSTOLIC BLOOD PRESSURE: 172 MMHG | HEIGHT: 63 IN | TEMPERATURE: 98.4 F | DIASTOLIC BLOOD PRESSURE: 75 MMHG | HEART RATE: 54 BPM | SYSTOLIC BLOOD PRESSURE: 126 MMHG | SYSTOLIC BLOOD PRESSURE: 177 MMHG | HEART RATE: 57 BPM | TEMPERATURE: 98.4 F | SYSTOLIC BLOOD PRESSURE: 177 MMHG | SYSTOLIC BLOOD PRESSURE: 150 MMHG | RESPIRATION RATE: 12 BRPM | RESPIRATION RATE: 15 BRPM | SYSTOLIC BLOOD PRESSURE: 118 MMHG | DIASTOLIC BLOOD PRESSURE: 73 MMHG | HEART RATE: 50 BPM | DIASTOLIC BLOOD PRESSURE: 70 MMHG | HEIGHT: 63 IN | DIASTOLIC BLOOD PRESSURE: 75 MMHG | RESPIRATION RATE: 18 BRPM | DIASTOLIC BLOOD PRESSURE: 70 MMHG | DIASTOLIC BLOOD PRESSURE: 81 MMHG | HEART RATE: 54 BPM | RESPIRATION RATE: 25 BRPM | DIASTOLIC BLOOD PRESSURE: 80 MMHG | SYSTOLIC BLOOD PRESSURE: 177 MMHG | HEART RATE: 54 BPM | DIASTOLIC BLOOD PRESSURE: 70 MMHG | DIASTOLIC BLOOD PRESSURE: 75 MMHG | TEMPERATURE: 98.4 F | SYSTOLIC BLOOD PRESSURE: 153 MMHG | OXYGEN SATURATION: 97 % | HEART RATE: 57 BPM | RESPIRATION RATE: 20 BRPM | SYSTOLIC BLOOD PRESSURE: 150 MMHG | SYSTOLIC BLOOD PRESSURE: 118 MMHG | RESPIRATION RATE: 18 BRPM | RESPIRATION RATE: 18 BRPM | HEART RATE: 62 BPM | DIASTOLIC BLOOD PRESSURE: 75 MMHG | RESPIRATION RATE: 12 BRPM | RESPIRATION RATE: 20 BRPM | HEART RATE: 60 BPM | SYSTOLIC BLOOD PRESSURE: 126 MMHG | DIASTOLIC BLOOD PRESSURE: 81 MMHG | DIASTOLIC BLOOD PRESSURE: 85 MMHG | RESPIRATION RATE: 16 BRPM | HEIGHT: 63 IN | OXYGEN SATURATION: 98 % | RESPIRATION RATE: 12 BRPM | SYSTOLIC BLOOD PRESSURE: 118 MMHG | HEART RATE: 50 BPM | OXYGEN SATURATION: 97 % | HEART RATE: 57 BPM | DIASTOLIC BLOOD PRESSURE: 77 MMHG | SYSTOLIC BLOOD PRESSURE: 172 MMHG | HEART RATE: 60 BPM | SYSTOLIC BLOOD PRESSURE: 146 MMHG | HEART RATE: 62 BPM | SYSTOLIC BLOOD PRESSURE: 172 MMHG | OXYGEN SATURATION: 98 % | DIASTOLIC BLOOD PRESSURE: 76 MMHG | DIASTOLIC BLOOD PRESSURE: 80 MMHG | DIASTOLIC BLOOD PRESSURE: 77 MMHG | WEIGHT: 144 LBS | HEART RATE: 59 BPM | HEART RATE: 59 BPM | SYSTOLIC BLOOD PRESSURE: 171 MMHG | DIASTOLIC BLOOD PRESSURE: 81 MMHG | SYSTOLIC BLOOD PRESSURE: 153 MMHG | SYSTOLIC BLOOD PRESSURE: 144 MMHG | HEART RATE: 62 BPM | DIASTOLIC BLOOD PRESSURE: 79 MMHG | HEIGHT: 63 IN | SYSTOLIC BLOOD PRESSURE: 171 MMHG | RESPIRATION RATE: 21 BRPM | SYSTOLIC BLOOD PRESSURE: 150 MMHG | DIASTOLIC BLOOD PRESSURE: 75 MMHG | SYSTOLIC BLOOD PRESSURE: 146 MMHG | SYSTOLIC BLOOD PRESSURE: 146 MMHG | RESPIRATION RATE: 20 BRPM | WEIGHT: 144 LBS | SYSTOLIC BLOOD PRESSURE: 177 MMHG | RESPIRATION RATE: 21 BRPM | DIASTOLIC BLOOD PRESSURE: 85 MMHG | SYSTOLIC BLOOD PRESSURE: 172 MMHG | DIASTOLIC BLOOD PRESSURE: 73 MMHG | DIASTOLIC BLOOD PRESSURE: 77 MMHG | SYSTOLIC BLOOD PRESSURE: 126 MMHG | DIASTOLIC BLOOD PRESSURE: 80 MMHG | OXYGEN SATURATION: 100 % | SYSTOLIC BLOOD PRESSURE: 118 MMHG | HEART RATE: 59 BPM | DIASTOLIC BLOOD PRESSURE: 70 MMHG | HEART RATE: 62 BPM | DIASTOLIC BLOOD PRESSURE: 77 MMHG | OXYGEN SATURATION: 97 % | HEART RATE: 62 BPM | OXYGEN SATURATION: 99 % | DIASTOLIC BLOOD PRESSURE: 79 MMHG | DIASTOLIC BLOOD PRESSURE: 73 MMHG | DIASTOLIC BLOOD PRESSURE: 81 MMHG | RESPIRATION RATE: 21 BRPM | SYSTOLIC BLOOD PRESSURE: 144 MMHG | DIASTOLIC BLOOD PRESSURE: 70 MMHG | SYSTOLIC BLOOD PRESSURE: 126 MMHG | HEART RATE: 54 BPM | OXYGEN SATURATION: 99 % | DIASTOLIC BLOOD PRESSURE: 80 MMHG | DIASTOLIC BLOOD PRESSURE: 85 MMHG | HEART RATE: 62 BPM | HEART RATE: 60 BPM | SYSTOLIC BLOOD PRESSURE: 125 MMHG | RESPIRATION RATE: 21 BRPM | OXYGEN SATURATION: 97 % | RESPIRATION RATE: 20 BRPM | SYSTOLIC BLOOD PRESSURE: 144 MMHG | HEART RATE: 60 BPM | RESPIRATION RATE: 25 BRPM | HEART RATE: 54 BPM | HEART RATE: 57 BPM | HEIGHT: 63 IN | DIASTOLIC BLOOD PRESSURE: 81 MMHG | DIASTOLIC BLOOD PRESSURE: 79 MMHG | HEART RATE: 50 BPM | SYSTOLIC BLOOD PRESSURE: 153 MMHG | RESPIRATION RATE: 16 BRPM | HEART RATE: 57 BPM | HEART RATE: 59 BPM | OXYGEN SATURATION: 100 % | SYSTOLIC BLOOD PRESSURE: 153 MMHG | DIASTOLIC BLOOD PRESSURE: 85 MMHG | OXYGEN SATURATION: 100 % | SYSTOLIC BLOOD PRESSURE: 171 MMHG | SYSTOLIC BLOOD PRESSURE: 153 MMHG | OXYGEN SATURATION: 98 % | SYSTOLIC BLOOD PRESSURE: 150 MMHG | DIASTOLIC BLOOD PRESSURE: 75 MMHG | SYSTOLIC BLOOD PRESSURE: 118 MMHG | RESPIRATION RATE: 21 BRPM | RESPIRATION RATE: 12 BRPM | HEART RATE: 50 BPM | RESPIRATION RATE: 16 BRPM | DIASTOLIC BLOOD PRESSURE: 76 MMHG | WEIGHT: 144 LBS | HEART RATE: 62 BPM | HEART RATE: 59 BPM | RESPIRATION RATE: 15 BRPM | SYSTOLIC BLOOD PRESSURE: 177 MMHG | RESPIRATION RATE: 16 BRPM | WEIGHT: 144 LBS | OXYGEN SATURATION: 97 % | OXYGEN SATURATION: 99 % | HEART RATE: 50 BPM | SYSTOLIC BLOOD PRESSURE: 171 MMHG | OXYGEN SATURATION: 98 % | WEIGHT: 144 LBS | HEART RATE: 50 BPM | SYSTOLIC BLOOD PRESSURE: 125 MMHG | DIASTOLIC BLOOD PRESSURE: 85 MMHG | HEART RATE: 59 BPM | SYSTOLIC BLOOD PRESSURE: 171 MMHG | HEART RATE: 57 BPM | RESPIRATION RATE: 16 BRPM | RESPIRATION RATE: 18 BRPM | RESPIRATION RATE: 16 BRPM | HEART RATE: 50 BPM | OXYGEN SATURATION: 99 % | SYSTOLIC BLOOD PRESSURE: 146 MMHG | OXYGEN SATURATION: 98 % | SYSTOLIC BLOOD PRESSURE: 144 MMHG | RESPIRATION RATE: 25 BRPM | DIASTOLIC BLOOD PRESSURE: 73 MMHG | OXYGEN SATURATION: 98 % | RESPIRATION RATE: 16 BRPM | RESPIRATION RATE: 15 BRPM | RESPIRATION RATE: 12 BRPM | RESPIRATION RATE: 15 BRPM | RESPIRATION RATE: 12 BRPM | WEIGHT: 144 LBS | SYSTOLIC BLOOD PRESSURE: 126 MMHG | HEART RATE: 60 BPM | DIASTOLIC BLOOD PRESSURE: 80 MMHG | RESPIRATION RATE: 15 BRPM | DIASTOLIC BLOOD PRESSURE: 73 MMHG | SYSTOLIC BLOOD PRESSURE: 177 MMHG | SYSTOLIC BLOOD PRESSURE: 125 MMHG | SYSTOLIC BLOOD PRESSURE: 118 MMHG | WEIGHT: 144 LBS | RESPIRATION RATE: 18 BRPM | DIASTOLIC BLOOD PRESSURE: 77 MMHG | SYSTOLIC BLOOD PRESSURE: 144 MMHG | OXYGEN SATURATION: 99 % | HEART RATE: 60 BPM | DIASTOLIC BLOOD PRESSURE: 73 MMHG | TEMPERATURE: 98.4 F | RESPIRATION RATE: 18 BRPM | SYSTOLIC BLOOD PRESSURE: 126 MMHG | SYSTOLIC BLOOD PRESSURE: 150 MMHG | OXYGEN SATURATION: 100 % | RESPIRATION RATE: 18 BRPM | RESPIRATION RATE: 25 BRPM | SYSTOLIC BLOOD PRESSURE: 146 MMHG | DIASTOLIC BLOOD PRESSURE: 79 MMHG | SYSTOLIC BLOOD PRESSURE: 126 MMHG | SYSTOLIC BLOOD PRESSURE: 153 MMHG | DIASTOLIC BLOOD PRESSURE: 77 MMHG | DIASTOLIC BLOOD PRESSURE: 76 MMHG | HEART RATE: 54 BPM | DIASTOLIC BLOOD PRESSURE: 76 MMHG | DIASTOLIC BLOOD PRESSURE: 76 MMHG | SYSTOLIC BLOOD PRESSURE: 118 MMHG | DIASTOLIC BLOOD PRESSURE: 73 MMHG | HEART RATE: 54 BPM | DIASTOLIC BLOOD PRESSURE: 85 MMHG | SYSTOLIC BLOOD PRESSURE: 177 MMHG | RESPIRATION RATE: 21 BRPM | RESPIRATION RATE: 21 BRPM | TEMPERATURE: 98.4 F | DIASTOLIC BLOOD PRESSURE: 79 MMHG | DIASTOLIC BLOOD PRESSURE: 70 MMHG | RESPIRATION RATE: 20 BRPM | TEMPERATURE: 98.4 F | SYSTOLIC BLOOD PRESSURE: 150 MMHG | OXYGEN SATURATION: 100 % | SYSTOLIC BLOOD PRESSURE: 125 MMHG | DIASTOLIC BLOOD PRESSURE: 70 MMHG | SYSTOLIC BLOOD PRESSURE: 144 MMHG | DIASTOLIC BLOOD PRESSURE: 79 MMHG | SYSTOLIC BLOOD PRESSURE: 150 MMHG | SYSTOLIC BLOOD PRESSURE: 125 MMHG | HEIGHT: 63 IN | RESPIRATION RATE: 20 BRPM | RESPIRATION RATE: 15 BRPM | HEART RATE: 59 BPM | SYSTOLIC BLOOD PRESSURE: 146 MMHG | SYSTOLIC BLOOD PRESSURE: 125 MMHG | DIASTOLIC BLOOD PRESSURE: 81 MMHG | SYSTOLIC BLOOD PRESSURE: 172 MMHG | OXYGEN SATURATION: 100 % | RESPIRATION RATE: 12 BRPM

## 2024-10-14 DIAGNOSIS — K57.30 DIVERTICULOSIS OF LARGE INTESTINE WITHOUT PERFORATION OR ABS: ICD-10-CM

## 2024-10-14 DIAGNOSIS — Z86.0101 PERSONAL HISTORY OF ADENOMATOUS AND SERRATED COLON POLYPS: ICD-10-CM

## 2024-10-14 DIAGNOSIS — Z09 ENCOUNTER FOR FOLLOW-UP EXAMINATION AFTER COMPLETED TREATMEN: ICD-10-CM

## 2024-10-14 DIAGNOSIS — K55.20 ANGIODYSPLASIA OF COLON WITHOUT HEMORRHAGE: ICD-10-CM

## 2024-10-14 DIAGNOSIS — K92.1 MELENA: ICD-10-CM

## 2024-10-14 DIAGNOSIS — K31.89 OTHER DISEASES OF STOMACH AND DUODENUM: ICD-10-CM

## 2024-10-14 LAB
GI HISTOLOGY: A. SECOND PART OF THE DUODENUM: (no result)
GI HISTOLOGY: PDF REPORT: (no result)

## 2024-10-14 PROCEDURE — 88305 TISSUE EXAM BY PATHOLOGIST: CPT | Mod: 26 | Performed by: PATHOLOGY

## 2024-10-14 PROCEDURE — 43239 EGD BIOPSY SINGLE/MULTIPLE: CPT | Performed by: INTERNAL MEDICINE

## 2024-10-14 PROCEDURE — G0105 COLORECTAL SCRN; HI RISK IND: HCPCS | Performed by: INTERNAL MEDICINE

## 2024-10-14 NOTE — SERVICEHPINOTES
BLANK BLUM  is a  79  female   who presents today for a  EGD-Colonoscopy   for   the indications listed below. The updated Patient Profile was reviewed prior to the procedure, in conjunction with the Physical Exam, including medical conditions, surgical procedures, medications, allergies, family history and social history. See Physical Exam time stamp below for date and time of HPI completion.Pre-operatively, I reviewed the indication(s) for the procedure, the risks of the procedure [including but not limited to: unexpected bleeding possibly requiring hospitalization and/or unplanned repeat procedures, perforation possibly requiring surgical treatment, missed lesions and complications of sedation/general anesthesia (also explained by anesthesia staff)]. I have evaluated the patient for risks associated with the planned anesthesia and the procedure to be performed and find the patient an acceptable candidate for IV sedation.Multiple opportunities were provided for any questions or concerns, and all questions were answered satisfactorily before any anesthesia was administered. We will proceed with the planned procedure.br

## 2024-10-24 DIAGNOSIS — G44.89 OTHER HEADACHE SYNDROME: ICD-10-CM

## 2024-10-24 RX ORDER — BUTALBITAL, ACETAMINOPHEN AND CAFFEINE 50; 325; 40 MG/1; MG/1; MG/1
1 TABLET ORAL EVERY 6 HOURS PRN
Qty: 30 TABLET | Refills: 1 | Status: SHIPPED | OUTPATIENT
Start: 2024-10-24

## 2024-10-29 ENCOUNTER — OFFICE (AMBULATORY)
Age: 79
End: 2024-10-29
Payer: MEDICARE

## 2024-10-29 ENCOUNTER — OFFICE (AMBULATORY)
Dept: URBAN - METROPOLITAN AREA CLINIC 64 | Facility: CLINIC | Age: 79
End: 2024-10-29
Payer: MEDICARE

## 2024-10-29 VITALS
HEART RATE: 61 BPM | HEIGHT: 63 IN | SYSTOLIC BLOOD PRESSURE: 87 MMHG | HEIGHT: 63 IN | DIASTOLIC BLOOD PRESSURE: 68 MMHG | WEIGHT: 144.4 LBS | DIASTOLIC BLOOD PRESSURE: 68 MMHG | WEIGHT: 144.4 LBS | HEART RATE: 61 BPM | SYSTOLIC BLOOD PRESSURE: 87 MMHG

## 2024-10-29 DIAGNOSIS — D50.0 IRON DEFICIENCY ANEMIA SECONDARY TO BLOOD LOSS (CHRONIC): ICD-10-CM

## 2024-10-29 PROCEDURE — 99213 OFFICE O/P EST LOW 20 MIN: CPT | Performed by: INTERNAL MEDICINE

## 2024-11-04 DIAGNOSIS — E03.8 OTHER SPECIFIED HYPOTHYROIDISM: ICD-10-CM

## 2024-11-04 NOTE — PROGRESS NOTES
Subjective   Luna Jacobson is a 79 y.o. female is here for follow-up for lower back pain.  Patient was last seen on 8/21/2024.  Lower back pain is 3/10 on VAS, maximum 4/10.  Pain is dull and aching and sharp in nature.  Referred to bilateral hips, bilateral posterior legs to ankle.Constant pain improved by pain medications and caudal NIMA.  Pain worsens with walking.    Neck pain is 6/10 on VAS, maximum 7/10.  Tightness in nature.  Not referred but that has numbness in bilateral hands.  Improved with pain medications and chiropractic care.  Worse with flexion of the neck.  Chiropractic care really helped in the past but the last few sessions have not helped.  + Headaches starting from occipital region radiating to the top of her head.  Daily headaches.  Had taken Fioricet before with some relief.    Previous Injection:   7/30/2024-LESI L4-5 with sedation- >80% pain relief with functional improvement  3/27/2024-LESI L4-5- 80% pain relief with functional improvement- 3 months of pain relief.    12/1/23 - LESI L4-5- 80% pain relief for first few weeks; 40% pain relief for now. VAS down from 8/10 on 5/10 on VAS- 2.5 months.  11/7/2023-caudal NIMA with sedation - no significant relief.   1/26/2023-caudal NIMA with sedation- 80% pain relief with radicular pain- 9 months of relief.   11/20/2020 - Caudal NIMA - 80% pain relief - 2 years.     PMH: COPD, GERD, HLD, HTN, Thoracic aortic aneurysm      Current Medications:   Norco 5-325 mg BID PRN   Flexeril 10 mg BID PRN  Fioricet     Past Medications:       Past Modalities:  TENS:                                                                          no                                                  Physical Therapy Within The Last 6 Months              Yes- chiropractor care >6 weeks.  Psychotherapy                                                            no  Massage Therapy                                                       no     Patient Complains  Of:  Uro-Fecal Incontinence          no  Weight Gain/Loss                   no  Fever/Chills                             no  Weakness                               Yes (subjective weakness in left leg)            Current Outpatient Medications:     albuterol sulfate  (90 Base) MCG/ACT inhaler, Inhale 2 puffs Every 4 (Four) Hours As Needed., Disp: , Rfl:     atorvastatin (LIPITOR) 40 MG tablet, TAKE 1 TABLET BY MOUTH DAILY, Disp: 90 tablet, Rfl: 3    butalbital-acetaminophen-caffeine (FIORICET, ESGIC) -40 MG per tablet, TAKE 1 TABLET BY MOUTH EVERY 6 HOURS AS NEEDED FOR HEADACHE, Disp: 30 tablet, Rfl: 1    Calcium Carbonate-Vitamin D 500-125 MG-UNIT tablet, Take  by mouth., Disp: , Rfl:     clobetasol (TEMOVATE) 0.05 % external solution, APPLY TOPICALLY TO THE SCALP EVERY DAY IN THE MORNING AND IN THE EVENING, Disp: , Rfl:     cloNIDine (CATAPRES) 0.1 MG tablet, TAKE 1 TABLET BY MOUTH EVERY NIGHT, Disp: 90 tablet, Rfl: 1    coenzyme Q10 100 MG capsule, Take 1 capsule by mouth Daily., Disp: , Rfl:     cyclobenzaprine (FLEXERIL) 10 MG tablet, Take 1 tablet by mouth 2 (Two) Times a Day As Needed for Muscle Spasms for up to 180 days., Disp: 180 tablet, Rfl: 1    dicyclomine (BENTYL) 20 MG tablet, Take 1 tablet by mouth Daily., Disp: , Rfl:     ferrous sulfate (FeroSul) 325 (65 FE) MG tablet, Take 1 tablet by mouth Daily., Disp: 90 tablet, Rfl: 1    fexofenadine (ALLEGRA) 180 MG tablet, Take 1 tablet by mouth Daily., Disp: , Rfl:     fluticasone (FLONASE) 50 MCG/ACT nasal spray, SHAKE LIQUID AND USE 2 SPRAYS IN EACH NOSTRIL EVERY DAY, Disp: 48 g, Rfl: 3    hydrALAZINE (APRESOLINE) 50 MG tablet, Take 1 tablet by mouth 3 (Three) Times a Day., Disp: 90 tablet, Rfl: 3    [START ON 11/9/2024] HYDROcodone-acetaminophen (NORCO) 5-325 MG per tablet, Take 1 tablet by mouth 3 (Three) Times a Day As Needed for Severe Pain for up to 30 days., Disp: 90 tablet, Rfl: 0    [START ON 12/9/2024] HYDROcodone-acetaminophen  (NORCO) 5-325 MG per tablet, Take 1 tablet by mouth 3 (Three) Times a Day As Needed for Severe Pain for up to 30 days., Disp: 90 tablet, Rfl: 0    [START ON 1/8/2025] HYDROcodone-acetaminophen (NORCO) 5-325 MG per tablet, Take 1 tablet by mouth 3 (Three) Times a Day As Needed for Severe Pain for up to 30 days., Disp: 90 tablet, Rfl: 0    ketoconazole (NIZORAL) 2 % shampoo, , Disp: , Rfl: 3    levothyroxine (SYNTHROID, LEVOTHROID) 100 MCG tablet, TAKE 1 TABLET BY MOUTH DAILY, Disp: 90 tablet, Rfl: 3    metoprolol tartrate (LOPRESSOR) 100 MG tablet, Take 2 tablets by mouth., Disp: , Rfl:     Multiple Vitamin (MULTIVITAMIN) tablet, Take 1 tablet by mouth Daily., Disp: , Rfl:     pantoprazole (PROTONIX) 40 MG EC tablet, Take 1 tablet by mouth Daily., Disp: , Rfl:     polyethylene glycol (MIRALAX) powder, , Disp: , Rfl:     varenicline (CHANTIX) 1 MG tablet, TAKE 1 TABLET BY MOUTH TWICE DAILY, Disp: 60 tablet, Rfl: 5    The following portions of the patient's history were reviewed and updated as appropriate: allergies, current medications, past family history, past medical history, past social history, past surgical history and problem list.      REVIEW OF PERTINENT MEDICAL DATA    Past Medical History:   Diagnosis Date    BCC (basal cell carcinoma of skin) 07/2024    COPD (chronic obstructive pulmonary disease)     Coronary artery calcification seen on CT scan 07/2012    mild calcification in the LAD with calcium score of 22. modere narrowing left subclavian origin    Diverticulosis     Gastroparesis     GERD (gastroesophageal reflux disease)     GIST (gastrointestinal stromal tumor), non-malignant     Hip pain, bilateral     Irregular heartbeat     Irritable bowel syndrome     Low back pain     Mild mitral regurgitation     Neck pain     PAD (peripheral artery disease)     small vessel disease in feet    Subclavian artery stenosis, left 05/2016    50 percent    Thyroid nodule 05/17/2024    Tubular adenoma of colon  2017     Past Surgical History:   Procedure Laterality Date    APPENDECTOMY      BASAL CELL CARCINOMA EXCISION  2024    COLONOSCOPY  10/01/2019    dr owen    ENDOSCOPY  2019    ENDOSCOPY  2023    Dr. Owen    HEMORRHOIDECTOMY      HYSTERECTOMY      TONSILLECTOMY      TOOTH EXTRACTION      8/3/21     Family History   Problem Relation Age of Onset    Hypertension Mother     Diabetes Mother     Dementia Mother     Stroke Mother     Lung cancer Father     Alcohol abuse Father     COPD Father     Breast cancer Sister     Hypertension Sister     Hypothyroidism Sister     Stroke Sister     Other Sister         AAA    Bipolar disorder Daughter     Fibromyalgia Daughter      Social History     Socioeconomic History    Marital status:    Tobacco Use    Smoking status: Former     Current packs/day: 0.00     Types: Cigarettes     Quit date: 2020     Years since quittin.2    Smokeless tobacco: Never    Tobacco comments:     she has smoked intermittently for 40 years; she smoked over 2 ppd twenty years ago   Vaping Use    Vaping status: Never Used   Substance and Sexual Activity    Alcohol use: No    Drug use: No    Sexual activity: Defer         Review of Systems   Musculoskeletal:  Positive for back pain and neck pain.   Neurological:  Positive for headaches.         Vitals:    24 1446   BP: 145/62   Pulse: 74   Resp: 16   SpO2: 96%   Weight: 64.9 kg (143 lb)   PainSc:   4                           Objective   Physical Exam  Neck:     Musculoskeletal:         General: Tenderness present.      Lumbar back: Tenderness present. Decreased range of motion.        Legs:    Neurological:      Comments: Motor strength 5/5 b/l LE  Sensory intact b/l LE             Imaging Reviewed:  MRI done at [facility] - 2020:    L3-L4-a small broad based disc protrusion centrally.  There is moderate facet arthropathy.  This changes contribute to moderate central canal stenosis.  There is moderate right and  severe left neural foraminal stenosis.  L4-L5-there is a posterior disc protrusion centrally.  Moderate facet arthropathy and mild limited flavum hypertrophy. Severe central canal stenosis.  Severe bilateral neural foraminal stenosis, right greater than left.  L5-S1-there is small left far lateral disc protrusion.  No central canal stenosis.  There is mild facet arthropathy.  There is moderate bilateral neural foraminal stenosis.     Lumbar X-ray: 10/1/2020   Multilevel degenerative spondylosis.      Cervical x-ray-4/20/2023  - Mild degenerative disc and endplate changes in cervical spine most notable at C4-5 through C6-7  - Mild right C4-5 facet arthropathy    Assessment:    1. High risk medication use    2. DDD (degenerative disc disease), lumbar    3. Lumbar radiculopathy    4. Bilateral occipital neuralgia    5. Chronic left-sided low back pain with sciatica, sciatica laterality unspecified        Plan:  1.  UDS on 8/21/2024 is consistent with patient's interview.. Narcotic contract discussed on 11/3/22. Narcan ordered on 10/9/2020.  2. Continue Norco 5-325 mg TID PRN  (11/9; 12/9; 1/8). Discussed with the patient regarding long-term side effects of opioids including but not limited to opioid induced hormonal suppression, hyperalgesia, fatigue, weight gain, possible opioid induced altered immune system, addiction, tolerance, dependence, risk of hearing loss and elevated risk of myocardial infarction. Proper use and potential life threatening side effects of over use discussed with patient. Patient states understanding of their use and risks.  3. Continue Flexeril to 10 mg BID PRN (1/6/25). Common side effects of flexeril include blurred vision, dizziness or lightheadedness, drowsiness, dryness of mouth, bloated feeling or gas, indigestion, nausea or vomiting, or stomach cramps or pain, constipation, diarrhea, excitement or nervousness, frequent urination, general feeling of discomfort or illness, headache,  muscle twitching, numbness, tingling, pain, or weakness in hands or feet, pounding heartbeat, problems in speaking, trembling, trouble in sleeping, unpleasant taste or other taste changes, unusual muscle weakness or unusual tiredness, especially during the first few days as your body adjusts to this medication.  4. . Patient has pain in the head with referred pain on the top of the head. Bilateral occipital tenderness present. Discussed bilateral greater and lesser occipital nerve block. Discussed the possibility of infection, bleeding, nerve damage, headache, increased pain. Patient understands and agrees.  Discussed with patient that it is not appropriate to do IV sedation for minor procedure like occipital nerve block.  I did offer Xanax to be taken before the procedure but patient is not sure and would like to think about it.  5. Good relief with LESI. Repeat PRN.    RTC before 2/7/25    Galileo Portillo DO  Pain Management   [facility]         INSPECT REPORT    As part of the patient's treatment plan, I may be prescribing controlled substances. The patient has been made aware of appropriate use of such medications, including potential risk of somnolence, limited ability to drive and/or work safely, and the potential for dependence or overdose. It has also been made clear that these medications are for use by this patient only, without concomitant use of alcohol or other substances unless prescribed.     Patient has completed prescribing agreement detailing terms of continued prescribing of controlled substances, including monitoring INSPECT reports, urine drug screening, and pill counts if necessary. The patient is aware that inappropriate use will results in cessation of prescribing such medications.    INSPECT report has been reviewed and scanned into the patient's chart.

## 2024-11-05 RX ORDER — LEVOTHYROXINE SODIUM 100 UG/1
100 TABLET ORAL DAILY
Qty: 90 TABLET | Refills: 3 | Status: SHIPPED | OUTPATIENT
Start: 2024-11-05

## 2024-11-06 ENCOUNTER — OFFICE VISIT (OUTPATIENT)
Dept: PAIN MEDICINE | Facility: CLINIC | Age: 79
End: 2024-11-06
Payer: MEDICARE

## 2024-11-06 VITALS
BODY MASS INDEX: 25.34 KG/M2 | RESPIRATION RATE: 16 BRPM | HEART RATE: 74 BPM | WEIGHT: 143 LBS | DIASTOLIC BLOOD PRESSURE: 62 MMHG | OXYGEN SATURATION: 96 % | SYSTOLIC BLOOD PRESSURE: 145 MMHG

## 2024-11-06 DIAGNOSIS — M51.369 DDD (DEGENERATIVE DISC DISEASE), LUMBAR: ICD-10-CM

## 2024-11-06 DIAGNOSIS — M54.40 CHRONIC LEFT-SIDED LOW BACK PAIN WITH SCIATICA, SCIATICA LATERALITY UNSPECIFIED: ICD-10-CM

## 2024-11-06 DIAGNOSIS — Z79.899 HIGH RISK MEDICATION USE: Primary | ICD-10-CM

## 2024-11-06 DIAGNOSIS — M54.81 BILATERAL OCCIPITAL NEURALGIA: ICD-10-CM

## 2024-11-06 DIAGNOSIS — M54.16 LUMBAR RADICULOPATHY: ICD-10-CM

## 2024-11-06 DIAGNOSIS — G89.29 CHRONIC LEFT-SIDED LOW BACK PAIN WITH SCIATICA, SCIATICA LATERALITY UNSPECIFIED: ICD-10-CM

## 2024-11-06 RX ORDER — HYDROCODONE BITARTRATE AND ACETAMINOPHEN 5; 325 MG/1; MG/1
1 TABLET ORAL 3 TIMES DAILY PRN
Qty: 90 TABLET | Refills: 0 | Status: SHIPPED | OUTPATIENT
Start: 2024-11-09 | End: 2024-11-08 | Stop reason: SDUPTHER

## 2024-11-06 RX ORDER — HYDROCODONE BITARTRATE AND ACETAMINOPHEN 5; 325 MG/1; MG/1
1 TABLET ORAL 3 TIMES DAILY PRN
Qty: 90 TABLET | Refills: 0 | Status: SHIPPED | OUTPATIENT
Start: 2024-12-09 | End: 2025-01-08

## 2024-11-06 RX ORDER — HYDROCODONE BITARTRATE AND ACETAMINOPHEN 5; 325 MG/1; MG/1
1 TABLET ORAL 3 TIMES DAILY PRN
Qty: 90 TABLET | Refills: 0 | Status: SHIPPED | OUTPATIENT
Start: 2025-01-08 | End: 2025-02-07

## 2024-11-08 ENCOUNTER — TELEPHONE (OUTPATIENT)
Dept: PAIN MEDICINE | Facility: CLINIC | Age: 79
End: 2024-11-08
Payer: MEDICARE

## 2024-11-08 DIAGNOSIS — M54.16 LUMBAR RADICULOPATHY: ICD-10-CM

## 2024-11-08 DIAGNOSIS — M54.40 CHRONIC LEFT-SIDED LOW BACK PAIN WITH SCIATICA, SCIATICA LATERALITY UNSPECIFIED: ICD-10-CM

## 2024-11-08 DIAGNOSIS — M54.81 BILATERAL OCCIPITAL NEURALGIA: ICD-10-CM

## 2024-11-08 DIAGNOSIS — M51.369 DDD (DEGENERATIVE DISC DISEASE), LUMBAR: ICD-10-CM

## 2024-11-08 DIAGNOSIS — G89.29 CHRONIC LEFT-SIDED LOW BACK PAIN WITH SCIATICA, SCIATICA LATERALITY UNSPECIFIED: ICD-10-CM

## 2024-11-08 RX ORDER — HYDROCODONE BITARTRATE AND ACETAMINOPHEN 5; 325 MG/1; MG/1
1 TABLET ORAL 3 TIMES DAILY PRN
Qty: 90 TABLET | Refills: 0 | Status: SHIPPED | OUTPATIENT
Start: 2024-11-09 | End: 2024-12-09

## 2024-11-08 NOTE — TELEPHONE ENCOUNTER
Caller: Luna Jacobson    Relationship: Self    Best call back number:     Requested Prescriptions: HYDROcodone-acetaminophen (NORCO) 5-325 MG per tablet       Pharmacy where request should be sent:IF TODAY [id number] - [place], [place] - [address] OF [facility] [address] - [phone] PH - [phone]       [facility] [address] within [place] [place] [address] ·     Last office visit with prescribing clinician: 11/6/2024   Last telemedicine visit with prescribing clinician: Visit date not found   Next office visit with prescribing clinician: 2/5/2025     Additional details provided by patient: [id number] - [place], [place] - [address] AT [address] OF [facility] [address] - [phone] PH - [phone] NOW CLOSES ON SATURDAYS SO THEY WOULD NEED PRESCRIPTION SENT OVER FOR TODAY OR IF WE WAIT TIL TOMORROW WE NEED TO SENT IT TO [facility] [address], located [place] [address]     PLEASE CALL BACK TO LET PATIENT KNOW EITHER WAY ·     Does the patient have less than a 3 day supply:  [x] Yes  [] No    Would you like a call back once the refill request has been completed: [x] Yes [] No    If the office needs to give you a call back, can they leave a voicemail: [x] Yes [] No    Benjamin Perkins Rep   11/08/24 13:03 EST

## 2024-11-14 ENCOUNTER — TRANSCRIBE ORDERS (OUTPATIENT)
Dept: ADMINISTRATIVE | Facility: HOSPITAL | Age: 79
End: 2024-11-14
Payer: MEDICARE

## 2024-11-14 DIAGNOSIS — Z12.31 BREAST CANCER SCREENING BY MAMMOGRAM: Primary | ICD-10-CM

## 2024-11-25 ENCOUNTER — TELEPHONE (OUTPATIENT)
Dept: INTERNAL MEDICINE | Facility: CLINIC | Age: 79
End: 2024-11-25
Payer: MEDICARE

## 2024-11-25 NOTE — TELEPHONE ENCOUNTER
Caller: Luna Jacobson    Relationship: Self    Best call back number: [phone]       What was the call regarding: PATIENT WANTED TO INFORM DR BUENROSTRO THAT [facility] WILL BE FAXING OVER ER MEDICAL RECORDS FROM 11/23/24

## 2024-11-25 NOTE — PROGRESS NOTES
Subjective   Chief Complaint   Patient presents with    Elevated Blood Pressure     ER follow up       History of Present Illness   79 y.o. female presents with cc elevated BP; she is followed by Dr. Serrano.     She was seen at [facility] 11/23/2024 with cc HTN and tachycardia with  bpm with SBP 180s. CXR and EKG unremarkable. Potassium was low at 3.0 and replaced orally.  Mg replaced. Troponins negative. Given an extra dose of Clonidine 0.1 mg and BP improved. Advised she could take an extra dose of Clonidine 0.1 mg if SBP greater than 180. No adjustments made to Diltiazem (switched to in October at which time Carvedilol was stopped) and advised to follow up with her cardiologist Dr. Koenig.     Feeling better after a good nights rest. She had felt weak and tired for several days prior. She has been stressed. Her  has dementia. She worries as she has a thoracic aneurysm. She has brought in HBP readings ranging 110-169/67-88.   She has a history of labile BP especially when she has increased stress. Continues Clonidine 0.1 mg qhs and Hydralazine 50 mg tid.        She reports intermittent tachycardia and notes dyspnea if she talks too much. No lower extremity edema, orthopnea or PND.     Patient Active Problem List   Diagnosis    Essential hypertension    High cholesterol    Other specified hypothyroidism    Other headache syndrome    Prediabetes    Iron deficiency anemia due to chronic blood loss    Thoracic aortic aneurysm    Microscopic colitis    Myalgia    DDD (degenerative disc disease), lumbar    Recurrent major depressive disorder, in partial remission    Moderate aortic regurgitation    Diffusion capacity of lung (dl), decreased    Age-related osteoporosis without current pathological fracture       Allergies   Allergen Reactions    Amlodipine Headache     Head tightness    Itraconazole Hives     sporanax    Lisinopril Hives    Norvasc [Amlodipine Besylate] Other (See Comments)      Head tightness    Sulfa Antibiotics Other (See Comments)     headache    Hydrochlorothiazide Other (See Comments)     hyponatremia  Other reaction(s): Other (See Comments)  hyponatremia    Sulfamethoxazole Nausea And Vomiting       Current Outpatient Medications on File Prior to Visit   Medication Sig Dispense Refill    albuterol sulfate  (90 Base) MCG/ACT inhaler Inhale 2 puffs Every 4 (Four) Hours As Needed.      atorvastatin (LIPITOR) 40 MG tablet TAKE 1 TABLET BY MOUTH DAILY 90 tablet 3    butalbital-acetaminophen-caffeine (FIORICET, ESGIC) -40 MG per tablet TAKE 1 TABLET BY MOUTH EVERY 6 HOURS AS NEEDED FOR HEADACHE 30 tablet 1    Calcium Carbonate-Vitamin D 500-125 MG-UNIT tablet Take  by mouth.      clobetasol (TEMOVATE) 0.05 % external solution APPLY TOPICALLY TO THE SCALP EVERY DAY IN THE MORNING AND IN THE EVENING      cloNIDine (CATAPRES) 0.1 MG tablet TAKE 1 TABLET BY MOUTH EVERY NIGHT 90 tablet 1    coenzyme Q10 100 MG capsule Take 1 capsule by mouth Daily.      cyclobenzaprine (FLEXERIL) 10 MG tablet Take 1 tablet by mouth 2 (Two) Times a Day As Needed for Muscle Spasms for up to 180 days. 180 tablet 1    dicyclomine (BENTYL) 20 MG tablet Take 1 tablet by mouth Daily.      dilTIAZem CD (CARDIZEM CD) 120 MG 24 hr capsule Take 1 capsule by mouth Daily.      ferrous sulfate (FeroSul) 325 (65 FE) MG tablet Take 1 tablet by mouth Daily. 90 tablet 1    fexofenadine (ALLEGRA) 180 MG tablet Take 1 tablet by mouth Daily.      fluticasone (FLONASE) 50 MCG/ACT nasal spray SHAKE LIQUID AND USE 2 SPRAYS IN EACH NOSTRIL EVERY DAY 48 g 3    hydrALAZINE (APRESOLINE) 50 MG tablet Take 1 tablet by mouth 3 (Three) Times a Day. 90 tablet 3    [START ON 12/9/2024] HYDROcodone-acetaminophen (NORCO) 5-325 MG per tablet Take 1 tablet by mouth 3 (Three) Times a Day As Needed for Severe Pain for up to 30 days. 90 tablet 0    ketoconazole (NIZORAL) 2 % shampoo   3    levothyroxine (SYNTHROID, LEVOTHROID) 100 MCG  tablet TAKE 1 TABLET BY MOUTH DAILY 90 tablet 3    Multiple Vitamin (MULTIVITAMIN) tablet Take 1 tablet by mouth Daily.      pantoprazole (PROTONIX) 40 MG EC tablet Take 1 tablet by mouth Daily.      polyethylene glycol (MIRALAX) powder       varenicline (CHANTIX) 1 MG tablet TAKE 1 TABLET BY MOUTH TWICE DAILY 60 tablet 5    [START ON 1/8/2025] HYDROcodone-acetaminophen (NORCO) 5-325 MG per tablet Take 1 tablet by mouth 3 (Three) Times a Day As Needed for Severe Pain for up to 30 days. (Patient not taking: Reported on 11/26/2024) 90 tablet 0    HYDROcodone-acetaminophen (NORCO) 5-325 MG per tablet Take 1 tablet by mouth 3 (Three) Times a Day As Needed for Severe Pain for up to 30 days. (Patient not taking: Reported on 11/26/2024) 90 tablet 0    metoprolol tartrate (LOPRESSOR) 100 MG tablet Take 2 tablets by mouth. (Patient not taking: Reported on 11/26/2024)       No current facility-administered medications on file prior to visit.       Past Medical History:   Diagnosis Date    BCC (basal cell carcinoma of skin) 07/2024    COPD (chronic obstructive pulmonary disease)     Coronary artery calcification seen on CT scan 07/2012    mild calcification in the LAD with calcium score of 22. modere narrowing left subclavian origin    Diverticulosis     Gastroparesis     GERD (gastroesophageal reflux disease)     GIST (gastrointestinal stromal tumor), non-malignant     Hip pain, bilateral     Irregular heartbeat     Irritable bowel syndrome     Low back pain     Mild mitral regurgitation     Neck pain     PAD (peripheral artery disease)     small vessel disease in feet    Subclavian artery stenosis, left 05/2016    50 percent    Thyroid nodule 05/17/2024    Tubular adenoma of colon 01/2017       Family History   Problem Relation Age of Onset    Hypertension Mother     Diabetes Mother     Dementia Mother     Stroke Mother     Lung cancer Father     Alcohol abuse Father     COPD Father     Breast cancer Sister     Hypertension  "Sister     Hypothyroidism Sister     Stroke Sister     Other Sister         AAA    Bipolar disorder Daughter     Fibromyalgia Daughter        Social History     Socioeconomic History    Marital status:    Tobacco Use    Smoking status: Former     Current packs/day: 0.00     Types: Cigarettes     Quit date: 2020     Years since quittin.2    Smokeless tobacco: Never    Tobacco comments:     she has smoked intermittently for 40 years; she smoked over 2 ppd twenty years ago   Vaping Use    Vaping status: Never Used   Substance and Sexual Activity    Alcohol use: No    Drug use: No    Sexual activity: Defer       Past Surgical History:   Procedure Laterality Date    APPENDECTOMY      BASAL CELL CARCINOMA EXCISION  2024    COLONOSCOPY  10/01/2019    dr owen    ENDOSCOPY  2019    ENDOSCOPY  2023    Dr. Owen    HEMORRHOIDECTOMY      HYSTERECTOMY      TONSILLECTOMY      TOOTH EXTRACTION      8/3/21       The following portions of the patient's history were reviewed and updated as appropriate: problem list, allergies, current medications, past medical history, past family history, past social history, and past surgical history.    Review of Systems    Immunization History   Administered Date(s) Administered    COVID-19 (PFIZER) Purple Cap Monovalent 2021, 2021    FLUAD TRI 65YR+ 2019    Fluzone High-Dose 65+YRS 2018    Fluzone High-Dose 65+yrs 2022    Hepatitis A 2018, 2018    Influenza Seasonal Injectable 2013, 10/13/2014    Pneumococcal Conjugate 13-Valent (PCV13) 2015    Pneumococcal Polysaccharide (PPSV23) 2008    Tdap 2012    Zostavax 2010       Objective   Vitals:    24 1203   BP: 124/78   Pulse: 84   Resp: 16   Temp: 98 °F (36.7 °C)   Weight: 67.6 kg (149 lb)   Height: 160 cm (62.99\")     Body mass index is 26.4 kg/m².  Physical Exam  Vitals reviewed.   Constitutional:       Appearance: Normal appearance. She " is well-developed.   HENT:      Head: Normocephalic and atraumatic.   Cardiovascular:      Rate and Rhythm: Normal rate and regular rhythm.      Heart sounds: Normal heart sounds, S1 normal and S2 normal.   Pulmonary:      Effort: Pulmonary effort is normal.      Breath sounds: Normal breath sounds.   Musculoskeletal:      Right lower leg: No edema.      Left lower leg: No edema.   Skin:     General: Skin is warm and dry.   Neurological:      Mental Status: She is alert.   Psychiatric:         Mood and Affect: Mood normal.         Behavior: Behavior normal.       Procedures    Assessment & Plan   Diagnoses and all orders for this visit:    1. Essential hypertension (Primary)  Comments:  Controlled. Continue current medications. Reviewed HBPM guidelines.    2. Hypokalemia  Comments:  BMP today.  Orders:  -     Basic Metabolic Panel    3. Hypomagnesemia  Comments:  Repeat Magnesium level today.  Orders:  -     Magnesium    4. Dyspnea, unspecified type  Comments:  Primarily when she talks. CXR, EKG and CBC in ER unrevealing. Given complaints intermittent tachycardia, schedule echo and Holter.  Orders:  -     Holter Monitor - 48 Hour  -     Adult Transthoracic Echo Complete W/ Cont if Necessary Per Protocol    5. Tachycardia  Comments:  Add free TSH, FT4 and schedule Holter.  Orders:  -     Holter Monitor - 48 Hour  -     T4, free  -     TSH    6. Need for influenza vaccination  -     Fluzone High-Dose 65+yrs    ER records 11/23 at [facility] ER reviewed.     Return in about 1 month (around 12/26/2024) for Dr. Serrano 4-6 weeks, [facility].

## 2024-11-26 ENCOUNTER — OFFICE VISIT (OUTPATIENT)
Dept: INTERNAL MEDICINE | Facility: CLINIC | Age: 79
End: 2024-11-26
Payer: MEDICARE

## 2024-11-26 VITALS
WEIGHT: 149 LBS | DIASTOLIC BLOOD PRESSURE: 78 MMHG | HEART RATE: 84 BPM | SYSTOLIC BLOOD PRESSURE: 124 MMHG | TEMPERATURE: 98 F | HEIGHT: 63 IN | RESPIRATION RATE: 16 BRPM | BODY MASS INDEX: 26.4 KG/M2

## 2024-11-26 DIAGNOSIS — R06.00 DYSPNEA, UNSPECIFIED TYPE: ICD-10-CM

## 2024-11-26 DIAGNOSIS — I10 ESSENTIAL HYPERTENSION: Primary | Chronic | ICD-10-CM

## 2024-11-26 DIAGNOSIS — Z23 NEED FOR INFLUENZA VACCINATION: ICD-10-CM

## 2024-11-26 DIAGNOSIS — R00.0 TACHYCARDIA: ICD-10-CM

## 2024-11-26 DIAGNOSIS — E87.6 HYPOKALEMIA: ICD-10-CM

## 2024-11-26 DIAGNOSIS — E83.42 HYPOMAGNESEMIA: ICD-10-CM

## 2024-11-26 PROCEDURE — 3078F DIAST BP <80 MM HG: CPT | Performed by: NURSE PRACTITIONER

## 2024-11-26 PROCEDURE — 1125F AMNT PAIN NOTED PAIN PRSNT: CPT | Performed by: NURSE PRACTITIONER

## 2024-11-26 PROCEDURE — 3074F SYST BP LT 130 MM HG: CPT | Performed by: NURSE PRACTITIONER

## 2024-11-26 PROCEDURE — G0008 ADMIN INFLUENZA VIRUS VAC: HCPCS | Performed by: NURSE PRACTITIONER

## 2024-11-26 PROCEDURE — 1160F RVW MEDS BY RX/DR IN RCRD: CPT | Performed by: NURSE PRACTITIONER

## 2024-11-26 PROCEDURE — 99214 OFFICE O/P EST MOD 30 MIN: CPT | Performed by: NURSE PRACTITIONER

## 2024-11-26 PROCEDURE — 1159F MED LIST DOCD IN RCRD: CPT | Performed by: NURSE PRACTITIONER

## 2024-11-26 PROCEDURE — 90662 IIV NO PRSV INCREASED AG IM: CPT | Performed by: NURSE PRACTITIONER

## 2024-11-26 RX ORDER — DILTIAZEM HYDROCHLORIDE 120 MG/1
120 CAPSULE, COATED, EXTENDED RELEASE ORAL DAILY
COMMUNITY
Start: 2024-11-12 | End: 2025-11-12

## 2024-11-27 ENCOUNTER — TELEPHONE (OUTPATIENT)
Dept: INTERNAL MEDICINE | Facility: CLINIC | Age: 79
End: 2024-11-27
Payer: MEDICARE

## 2024-11-27 LAB
BUN SERPL-MCNC: 17 MG/DL (ref 8–23)
BUN/CREAT SERPL: 17.7 (ref 7–25)
CALCIUM SERPL-MCNC: 9.2 MG/DL (ref 8.6–10.5)
CHLORIDE SERPL-SCNC: 98 MMOL/L (ref 98–107)
CO2 SERPL-SCNC: 25.8 MMOL/L (ref 22–29)
CREAT SERPL-MCNC: 0.96 MG/DL (ref 0.57–1)
EGFRCR SERPLBLD CKD-EPI 2021: 60.7 ML/MIN/1.73
GLUCOSE SERPL-MCNC: 103 MG/DL (ref 65–99)
MAGNESIUM SERPL-MCNC: 2.1 MG/DL (ref 1.6–2.4)
POTASSIUM SERPL-SCNC: 4.2 MMOL/L (ref 3.5–5.2)
SODIUM SERPL-SCNC: 139 MMOL/L (ref 136–145)
T4 FREE SERPL-MCNC: 1.46 NG/DL (ref 0.92–1.68)
TSH SERPL DL<=0.005 MIU/L-ACNC: 5.61 UIU/ML (ref 0.27–4.2)

## 2024-11-27 NOTE — TELEPHONE ENCOUNTER
Name: Luna Jacobson      Relationship: Self      Best Callback Number: [phone]      HUB PROVIDED THE RELAY MESSAGE FROM THE OFFICE      PATIENT: VOICED UNDERSTANDING AND HAS NO FURTHER QUESTIONS AT THIS TIME    ADDITIONAL INFORMATION:

## 2024-11-27 NOTE — TELEPHONE ENCOUNTER
"Relay     \"Glucose and kidney function tests look good. Potassium and magnesium are normal.TSH slightly elevated at 5.6 but FT4 is normal. We will keep an eye on this and recheck it. Keep appt with Dr. Serrano at the end of the month. \"    "

## 2024-11-27 NOTE — TELEPHONE ENCOUNTER
Caller: Luna Jacobson    Relationship: Self    Best call back number: [phone]    Which medication are you concerned about: dilTIAZem CD (CARDIZEM CD)     Who prescribed you this medication: DR WHITE     What are your concerns: PATIENT THOUGHT THIS MEDICATION WAS SUPPOSED TO BE INCREASED DUE TO HER BLOOD PRESSURE AND HEART RATE UNDER CONTROL.    How long have you had these concerns: SINCE OFFICE VISIT ON NOV 26

## 2024-12-02 NOTE — TELEPHONE ENCOUNTER
Discussed it but her BP was controlled the day I saw her so dose was not changed.  She is to check her BP a few times a week using the guidelines we discussed. She is to bring her monitor and her readings with her to her follow up visit with Dr. Serrano later this month.

## 2024-12-02 NOTE — TELEPHONE ENCOUNTER
Caller: Luna Jacobson    Relationship to patient: Self    Best call back number:   [phone] (home)       Patient is needing: PATIENT IS WANTING TO INFORM DR. BUENROSTRO THAT SHE IS NO LONGER REQUESTING A CALLBACK, SHE MADE AN APPOINTMENT WITH CARDIOLOGY ON 12/03/2024 AT 9:15 WITH DR. GUZMÁN AND BELIEVES HER ISSUES WILL BE DISCUSSED THEN.

## 2024-12-02 NOTE — TELEPHONE ENCOUNTER
Pt states that she thought you said you would increase dosage on this medication  Skin normal color for race, warm, dry and intact. No evidence of rash.

## 2024-12-10 DIAGNOSIS — I10 ESSENTIAL HYPERTENSION: Chronic | ICD-10-CM

## 2024-12-11 RX ORDER — CLONIDINE HYDROCHLORIDE 0.1 MG/1
0.1 TABLET ORAL NIGHTLY
Qty: 90 TABLET | Refills: 1 | Status: SHIPPED | OUTPATIENT
Start: 2024-12-11

## 2024-12-30 ENCOUNTER — OFFICE VISIT (OUTPATIENT)
Dept: INTERNAL MEDICINE | Facility: CLINIC | Age: 79
End: 2024-12-30
Payer: MEDICARE

## 2024-12-30 VITALS
HEART RATE: 66 BPM | SYSTOLIC BLOOD PRESSURE: 108 MMHG | WEIGHT: 147.2 LBS | BODY MASS INDEX: 26.08 KG/M2 | HEIGHT: 63 IN | DIASTOLIC BLOOD PRESSURE: 72 MMHG

## 2024-12-30 DIAGNOSIS — E03.8 OTHER SPECIFIED HYPOTHYROIDISM: Primary | ICD-10-CM

## 2024-12-30 DIAGNOSIS — E03.8 OTHER SPECIFIED HYPOTHYROIDISM: Chronic | ICD-10-CM

## 2024-12-30 DIAGNOSIS — I10 ESSENTIAL HYPERTENSION: ICD-10-CM

## 2024-12-30 DIAGNOSIS — R73.03 PREDIABETES: ICD-10-CM

## 2024-12-30 DIAGNOSIS — E78.00 HIGH CHOLESTEROL: Primary | ICD-10-CM

## 2024-12-30 DIAGNOSIS — F33.0 MILD EPISODE OF RECURRENT MAJOR DEPRESSIVE DISORDER: ICD-10-CM

## 2024-12-30 LAB
ALBUMIN SERPL-MCNC: 4.1 G/DL (ref 3.5–5.2)
ALBUMIN/GLOB SERPL: 1.9 G/DL
ALP SERPL-CCNC: 105 U/L (ref 39–117)
ALT SERPL-CCNC: 19 U/L (ref 1–33)
AST SERPL-CCNC: 26 U/L (ref 1–32)
BILIRUB SERPL-MCNC: 0.3 MG/DL (ref 0–1.2)
BUN SERPL-MCNC: 12 MG/DL (ref 8–23)
BUN/CREAT SERPL: 11.5 (ref 7–25)
CALCIUM SERPL-MCNC: 9.2 MG/DL (ref 8.6–10.5)
CHLORIDE SERPL-SCNC: 101 MMOL/L (ref 98–107)
CHOLEST SERPL-MCNC: 132 MG/DL (ref 0–200)
CHOLEST/HDLC SERPL: 2.81 {RATIO}
CO2 SERPL-SCNC: 25.4 MMOL/L (ref 22–29)
CREAT SERPL-MCNC: 1.04 MG/DL (ref 0.57–1)
EGFRCR SERPLBLD CKD-EPI 2021: 55.1 ML/MIN/1.73
GLOBULIN SER CALC-MCNC: 2.2 GM/DL
GLUCOSE SERPL-MCNC: 131 MG/DL (ref 65–99)
HBA1C MFR BLD: 5.7 % (ref 4.8–5.6)
HDLC SERPL-MCNC: 47 MG/DL (ref 40–60)
LDLC SERPL CALC-MCNC: 64 MG/DL (ref 0–100)
POTASSIUM SERPL-SCNC: 3.9 MMOL/L (ref 3.5–5.2)
PROT SERPL-MCNC: 6.3 G/DL (ref 6–8.5)
SODIUM SERPL-SCNC: 136 MMOL/L (ref 136–145)
TRIGL SERPL-MCNC: 119 MG/DL (ref 0–150)
TSH SERPL DL<=0.005 MIU/L-ACNC: 4.68 UIU/ML (ref 0.27–4.2)
VLDLC SERPL CALC-MCNC: 21 MG/DL (ref 5–40)

## 2024-12-30 PROCEDURE — 1125F AMNT PAIN NOTED PAIN PRSNT: CPT | Performed by: INTERNAL MEDICINE

## 2024-12-30 PROCEDURE — 3074F SYST BP LT 130 MM HG: CPT | Performed by: INTERNAL MEDICINE

## 2024-12-30 PROCEDURE — 99214 OFFICE O/P EST MOD 30 MIN: CPT | Performed by: INTERNAL MEDICINE

## 2024-12-30 PROCEDURE — 3078F DIAST BP <80 MM HG: CPT | Performed by: INTERNAL MEDICINE

## 2024-12-30 PROCEDURE — 1160F RVW MEDS BY RX/DR IN RCRD: CPT | Performed by: INTERNAL MEDICINE

## 2024-12-30 PROCEDURE — G2211 COMPLEX E/M VISIT ADD ON: HCPCS | Performed by: INTERNAL MEDICINE

## 2024-12-30 PROCEDURE — 1159F MED LIST DOCD IN RCRD: CPT | Performed by: INTERNAL MEDICINE

## 2024-12-30 RX ORDER — DILTIAZEM HYDROCHLORIDE 120 MG/1
120 CAPSULE, EXTENDED RELEASE ORAL 2 TIMES DAILY
COMMUNITY

## 2024-12-30 RX ORDER — BUPROPION HYDROCHLORIDE 300 MG/1
TABLET ORAL
Qty: 30 TABLET | Refills: 2 | Status: SHIPPED | OUTPATIENT
Start: 2024-12-30

## 2024-12-30 RX ORDER — LEVOTHYROXINE SODIUM 112 UG/1
112 TABLET ORAL DAILY
Qty: 90 TABLET | Refills: 1 | Status: SHIPPED | OUTPATIENT
Start: 2024-12-30

## 2024-12-30 NOTE — PROGRESS NOTES
Subjective        Chief Complaint   Patient presents with    Hypertension           Luna Jacobson is a 79 y.o. female who presents for    Patient Active Problem List   Diagnosis    Essential hypertension    High cholesterol    Other specified hypothyroidism    Other headache syndrome    Prediabetes    Iron deficiency anemia due to chronic blood loss    Thoracic aortic aneurysm    Microscopic colitis    Myalgia    DDD (degenerative disc disease), lumbar    Mild episode of recurrent major depressive disorder    Moderate aortic regurgitation    Diffusion capacity of lung (dl), decreased    Age-related osteoporosis without current pathological fracture       History of Present Illness       She saw Dr. Koenig. He stopped her metoprolol. He placed her on diltiazem; she is taking BID. Her BP has been 124-143/65-73. She had an EGD and cscope; she had an endoclip placed in her colon.    She has had depression and anxiety. She does not want to get out of bed. Denies SI. Her  has dementia. She r/s some Paxil that she had left over at home. She took wellbutrin in the past and it helped her depression.  Allergies   Allergen Reactions    Amlodipine Headache     Head tightness    Itraconazole Hives     sporanax    Lisinopril Hives    Norvasc [Amlodipine Besylate] Other (See Comments)     Head tightness    Sulfa Antibiotics Other (See Comments)     headache    Hydrochlorothiazide Other (See Comments)     hyponatremia  Other reaction(s): Other (See Comments)  hyponatremia    Sulfamethoxazole Nausea And Vomiting       Current Outpatient Medications on File Prior to Visit   Medication Sig Dispense Refill    albuterol sulfate  (90 Base) MCG/ACT inhaler Inhale 2 puffs Every 4 (Four) Hours As Needed.      atorvastatin (LIPITOR) 40 MG tablet TAKE 1 TABLET BY MOUTH DAILY 90 tablet 3    butalbital-acetaminophen-caffeine (FIORICET, ESGIC) -40 MG per tablet TAKE 1 TABLET BY MOUTH EVERY 6 HOURS AS NEEDED FOR  HEADACHE 30 tablet 1    Calcium Carbonate-Vitamin D 500-125 MG-UNIT tablet Take  by mouth.      clobetasol (TEMOVATE) 0.05 % external solution APPLY TOPICALLY TO THE SCALP EVERY DAY IN THE MORNING AND IN THE EVENING      cloNIDine (CATAPRES) 0.1 MG tablet TAKE 1 TABLET BY MOUTH EVERY NIGHT 90 tablet 1    coenzyme Q10 100 MG capsule Take 1 capsule by mouth Daily.      cyclobenzaprine (FLEXERIL) 10 MG tablet Take 1 tablet by mouth 2 (Two) Times a Day As Needed for Muscle Spasms for up to 180 days. 180 tablet 1    dicyclomine (BENTYL) 20 MG tablet Take 1 tablet by mouth Daily.      dilTIAZem SR (CARDIZEM SR) 120 MG 12 hr capsule Take 1 capsule by mouth 2 (Two) Times a Day.      ferrous sulfate (FeroSul) 325 (65 FE) MG tablet Take 1 tablet by mouth Daily. 90 tablet 1    fexofenadine (ALLEGRA) 180 MG tablet Take 1 tablet by mouth Daily.      fluticasone (FLONASE) 50 MCG/ACT nasal spray SHAKE LIQUID AND USE 2 SPRAYS IN EACH NOSTRIL EVERY DAY 48 g 3    hydrALAZINE (APRESOLINE) 50 MG tablet Take 1 tablet by mouth 3 (Three) Times a Day. 90 tablet 3    HYDROcodone-acetaminophen (NORCO) 5-325 MG per tablet Take 1 tablet by mouth 3 (Three) Times a Day As Needed for Severe Pain for up to 30 days. 90 tablet 0    [START ON 1/8/2025] HYDROcodone-acetaminophen (NORCO) 5-325 MG per tablet Take 1 tablet by mouth 3 (Three) Times a Day As Needed for Severe Pain for up to 30 days. 90 tablet 0    ketoconazole (NIZORAL) 2 % shampoo   3    levothyroxine (SYNTHROID, LEVOTHROID) 100 MCG tablet TAKE 1 TABLET BY MOUTH DAILY 90 tablet 3    Multiple Vitamin (MULTIVITAMIN) tablet Take 1 tablet by mouth Daily.      pantoprazole (PROTONIX) 40 MG EC tablet Take 1 tablet by mouth Daily.      polyethylene glycol (MIRALAX) powder       varenicline (CHANTIX) 1 MG tablet TAKE 1 TABLET BY MOUTH TWICE DAILY 60 tablet 5    [DISCONTINUED] dilTIAZem CD (CARDIZEM CD) 120 MG 24 hr capsule Take 1 capsule by mouth Daily.      [DISCONTINUED] metoprolol tartrate  (LOPRESSOR) 100 MG tablet Take 2 tablets by mouth.       No current facility-administered medications on file prior to visit.       Past Medical History:   Diagnosis Date    BCC (basal cell carcinoma of skin) 2024    COPD (chronic obstructive pulmonary disease)     Coronary artery calcification seen on CT scan 2012    mild calcification in the LAD with calcium score of 22. modere narrowing left subclavian origin    Diverticulosis     Gastroparesis     GERD (gastroesophageal reflux disease)     GIST (gastrointestinal stromal tumor), non-malignant     Hip pain, bilateral     Irregular heartbeat     Irritable bowel syndrome     Low back pain     Mild mitral regurgitation     Neck pain     PAD (peripheral artery disease)     small vessel disease in feet    Subclavian artery stenosis, left 2016    50 percent    Thyroid nodule 2024    Tubular adenoma of colon 2017       Past Surgical History:   Procedure Laterality Date    APPENDECTOMY      BASAL CELL CARCINOMA EXCISION  2024    COLONOSCOPY  10/01/2019    dr owen    COLONOSCOPY  10/14/2024    ENDOSCOPY  2019    ENDOSCOPY  2023    Dr. Owen    ENDOSCOPY  10/14/2024    HEMORRHOIDECTOMY      HYSTERECTOMY      TONSILLECTOMY      TOOTH EXTRACTION      8/3/21       Family History   Problem Relation Age of Onset    Hypertension Mother     Diabetes Mother     Dementia Mother     Stroke Mother     Lung cancer Father     Alcohol abuse Father     COPD Father     Breast cancer Sister     Hypertension Sister     Hypothyroidism Sister     Stroke Sister     Other Sister         AAA    Bipolar disorder Daughter     Fibromyalgia Daughter        Social History     Socioeconomic History    Marital status:    Tobacco Use    Smoking status: Former     Current packs/day: 0.00     Types: Cigarettes     Quit date: 2020     Years since quittin.3    Smokeless tobacco: Never    Tobacco comments:     she has smoked intermittently for 40 years; she  "smoked over 2 ppd twenty years ago   Vaping Use    Vaping status: Never Used   Substance and Sexual Activity    Alcohol use: No    Drug use: No    Sexual activity: Defer           The following portions of the patient's history were reviewed and updated as appropriate: problem list, allergies, current medications, past medical history, past family history, past social history, and past surgical history.    Review of Systems    Immunization History   Administered Date(s) Administered    COVID-19 (PFIZER) Purple Cap Monovalent 02/07/2021, 02/28/2021    FLUAD TRI 65YR+ 11/01/2019    Fluzone High-Dose 65+YRS 11/29/2018, 11/26/2024    Fluzone High-Dose 65+yrs 12/02/2022    Hepatitis A 05/01/2018, 12/02/2018    Influenza Seasonal Injectable 11/07/2013, 10/13/2014    Pneumococcal Conjugate 13-Valent (PCV13) 04/23/2015    Pneumococcal Polysaccharide (PPSV23) 08/01/2008    Tdap 01/01/2012    Zostavax 08/25/2010       Objective   Vitals:    12/30/24 1010   BP: 108/72   Pulse: 66   Weight: 66.8 kg (147 lb 3.2 oz)   Height: 160 cm (62.99\")     Body mass index is 26.08 kg/m².  Physical Exam  Vitals reviewed.   Constitutional:       Appearance: She is well-developed.   HENT:      Head: Normocephalic and atraumatic.   Cardiovascular:      Rate and Rhythm: Normal rate and regular rhythm.      Heart sounds: Normal heart sounds, S1 normal and S2 normal.   Pulmonary:      Effort: Pulmonary effort is normal.      Breath sounds: Normal breath sounds.   Skin:     General: Skin is warm.   Neurological:      Mental Status: She is alert.   Psychiatric:         Behavior: Behavior normal.         Procedures    Assessment & Plan   Diagnoses and all orders for this visit:    1. High cholesterol (Primary)  -     Comprehensive Metabolic Panel  -     Lipid Panel With / Chol / HDL Ratio    2. Prediabetes  -     Hemoglobin A1c    3. Other specified hypothyroidism  -     TSH    4. Essential hypertension  -     Comprehensive Metabolic Panel    5. Mild " episode of recurrent major depressive disorder  -     buPROPion XL (Wellbutrin XL) 300 MG 24 hr tablet; Take 1/2 tab po daily for 10 days then then one tab daily  Dispense: 30 tablet; Refill: 2      BP is better. Labs today. She would like to take wellbutrin which helped with depression in the past. Stop Paxil.    She sees me in Feb.             Return for Lab Today.

## 2025-01-14 DIAGNOSIS — G44.89 OTHER HEADACHE SYNDROME: ICD-10-CM

## 2025-01-15 RX ORDER — BUTALBITAL, ACETAMINOPHEN AND CAFFEINE 50; 325; 40 MG/1; MG/1; MG/1
1 TABLET ORAL EVERY 6 HOURS PRN
Qty: 30 TABLET | Refills: 2 | Status: SHIPPED | OUTPATIENT
Start: 2025-01-15

## 2025-01-21 DIAGNOSIS — I10 ESSENTIAL HYPERTENSION: Chronic | ICD-10-CM

## 2025-01-21 RX ORDER — HYDRALAZINE HYDROCHLORIDE 50 MG/1
50 TABLET, FILM COATED ORAL 3 TIMES DAILY
Qty: 90 TABLET | Refills: 3 | Status: SHIPPED | OUTPATIENT
Start: 2025-01-21

## 2025-02-04 NOTE — PROGRESS NOTES
Subjective   Luna Jacobson is a 79 y.o. female is here for follow-up for lower back and neck pain.  Patient was last seen on 11/6/2024.  She has increased lower back pain with radicular pain.     Lower back pain is 4/10 on VAS, maximum 5/10.  Pain is dull and aching and sharp in nature.  Referred to bilateral hips, bilateral posterior legs to ankle.Constant pain improved by pain medications and caudal NIMA.  Pain worsens with walking.    Neck pain is 6/10 on VAS, maximum 7/10.  Tightness in nature.  Not referred but that has numbness in bilateral hands.  Improved with pain medications and chiropractic care.  Worse with flexion of the neck.  Chiropractic care really helped in the past but the last few sessions have not helped.  + Headaches starting from occipital region radiating to the top of her head.  Daily headaches.  Had taken Fioricet before with some relief.    Previous Injection:   7/30/2024-LESI L4-5 with sedation- >80% pain relief with functional improvement- helped for about 6 months.   3/27/2024-LESI L4-5- 80% pain relief with functional improvement- 3 months of pain relief.    12/1/23 - LESI L4-5- 80% pain relief for first few weeks; 40% pain relief for now. VAS down from 8/10 on 5/10 on VAS- 2.5 months.  11/7/2023-caudal NIMA with sedation - no significant relief.   1/26/2023-caudal NIMA with sedation- 80% pain relief with radicular pain- 9 months of relief.   11/20/2020 - Caudal NIMA - 80% pain relief - 2 years.     PMH: COPD, GERD, HLD, HTN, Thoracic aortic aneurysm      Current Medications:   Norco 5-325 mg BID PRN   Flexeril 10 mg BID PRN  Fioricet     Past Medications:       Past Modalities:  TENS:                                                                          no                                                  Physical Therapy Within The Last 6 Months              Yes- chiropractor care >6 weeks.  Psychotherapy                                                            no  Massage  Therapy                                                       no     Patient Complains Of:  Uro-Fecal Incontinence          no  Weight Gain/Loss                   no  Fever/Chills                             no  Weakness                               Yes (subjective weakness in left leg)            Current Outpatient Medications:     albuterol sulfate  (90 Base) MCG/ACT inhaler, Inhale 2 puffs Every 4 (Four) Hours As Needed., Disp: , Rfl:     atorvastatin (LIPITOR) 40 MG tablet, TAKE 1 TABLET BY MOUTH DAILY, Disp: 90 tablet, Rfl: 3    buPROPion XL (Wellbutrin XL) 300 MG 24 hr tablet, Take 1/2 tab po daily for 10 days then then one tab daily, Disp: 30 tablet, Rfl: 2    butalbital-acetaminophen-caffeine (FIORICET, ESGIC) -40 MG per tablet, TAKE 1 TABLET BY MOUTH EVERY 6 HOURS AS NEEDED FOR HEADACHE, Disp: 30 tablet, Rfl: 2    Calcium Carbonate-Vitamin D 500-125 MG-UNIT tablet, Take  by mouth., Disp: , Rfl:     clobetasol (TEMOVATE) 0.05 % external solution, APPLY TOPICALLY TO THE SCALP EVERY DAY IN THE MORNING AND IN THE EVENING, Disp: , Rfl:     cloNIDine (CATAPRES) 0.1 MG tablet, TAKE 1 TABLET BY MOUTH EVERY NIGHT, Disp: 90 tablet, Rfl: 1    coenzyme Q10 100 MG capsule, Take 1 capsule by mouth Daily., Disp: , Rfl:     dicyclomine (BENTYL) 20 MG tablet, Take 1 tablet by mouth Daily., Disp: , Rfl:     dilTIAZem SR (CARDIZEM SR) 120 MG 12 hr capsule, Take 1 capsule by mouth 2 (Two) Times a Day., Disp: , Rfl:     ferrous sulfate (FeroSul) 325 (65 FE) MG tablet, Take 1 tablet by mouth Daily., Disp: 90 tablet, Rfl: 1    fexofenadine (ALLEGRA) 180 MG tablet, Take 1 tablet by mouth Daily., Disp: , Rfl:     fluticasone (FLONASE) 50 MCG/ACT nasal spray, SHAKE LIQUID AND USE 2 SPRAYS IN EACH NOSTRIL EVERY DAY, Disp: 48 g, Rfl: 3    hydrALAZINE (APRESOLINE) 50 MG tablet, TAKE 1 TABLET BY MOUTH THREE TIMES DAILY, Disp: 90 tablet, Rfl: 3    [START ON 2/7/2025] HYDROcodone-acetaminophen (NORCO) 5-325 MG per tablet,  Take 1 tablet by mouth 3 (Three) Times a Day As Needed for Severe Pain for up to 30 days., Disp: 90 tablet, Rfl: 0    [START ON 3/9/2025] HYDROcodone-acetaminophen (NORCO) 5-325 MG per tablet, Take 1 tablet by mouth 3 (Three) Times a Day As Needed for Severe Pain for up to 30 days., Disp: 90 tablet, Rfl: 0    ketoconazole (NIZORAL) 2 % shampoo, , Disp: , Rfl: 3    levothyroxine (Synthroid) 112 MCG tablet, Take 1 tablet by mouth Daily., Disp: 90 tablet, Rfl: 1    Multiple Vitamin (MULTIVITAMIN) tablet, Take 1 tablet by mouth Daily., Disp: , Rfl:     pantoprazole (PROTONIX) 40 MG EC tablet, Take 1 tablet by mouth Daily., Disp: , Rfl:     polyethylene glycol (MIRALAX) powder, , Disp: , Rfl:     varenicline (CHANTIX) 1 MG tablet, TAKE 1 TABLET BY MOUTH TWICE DAILY, Disp: 60 tablet, Rfl: 5    cyclobenzaprine (FLEXERIL) 10 MG tablet, Take 1 tablet by mouth 2 (Two) Times a Day As Needed for Muscle Spasms for up to 180 days., Disp: 180 tablet, Rfl: 1    The following portions of the patient's history were reviewed and updated as appropriate: allergies, current medications, past family history, past medical history, past social history, past surgical history and problem list.      REVIEW OF PERTINENT MEDICAL DATA    Past Medical History:   Diagnosis Date    BCC (basal cell carcinoma of skin) 07/2024    COPD (chronic obstructive pulmonary disease)     Coronary artery calcification seen on CT scan 07/2012    mild calcification in the LAD with calcium score of 22. modere narrowing left subclavian origin    Diverticulosis     Gastroparesis     GERD (gastroesophageal reflux disease)     GIST (gastrointestinal stromal tumor), non-malignant     Hip pain, bilateral     Irregular heartbeat     Irritable bowel syndrome     Low back pain     Mild mitral regurgitation     Neck pain     PAD (peripheral artery disease)     small vessel disease in feet    Subclavian artery stenosis, left 05/2016    50 percent    Thyroid nodule 05/17/2024     Tubular adenoma of colon 2017     Past Surgical History:   Procedure Laterality Date    APPENDECTOMY      BASAL CELL CARCINOMA EXCISION  2024    COLONOSCOPY  10/01/2019    dr owen    COLONOSCOPY  10/14/2024    ENDOSCOPY  2019    ENDOSCOPY  2023    Dr. Owen    ENDOSCOPY  10/14/2024    HEMORRHOIDECTOMY      HYSTERECTOMY      TONSILLECTOMY      TOOTH EXTRACTION      8/3/21     Family History   Problem Relation Age of Onset    Hypertension Mother     Diabetes Mother     Dementia Mother     Stroke Mother     Lung cancer Father     Alcohol abuse Father     COPD Father     Breast cancer Sister     Hypertension Sister     Hypothyroidism Sister     Stroke Sister     Other Sister         AAA    Bipolar disorder Daughter     Fibromyalgia Daughter      Social History     Socioeconomic History    Marital status:    Tobacco Use    Smoking status: Former     Current packs/day: 0.00     Types: Cigarettes     Quit date: 2020     Years since quittin.4    Smokeless tobacco: Never    Tobacco comments:     she has smoked intermittently for 40 years; she smoked over 2 ppd twenty years ago   Vaping Use    Vaping status: Never Used   Substance and Sexual Activity    Alcohol use: No    Drug use: No    Sexual activity: Defer         Review of Systems   Musculoskeletal:  Positive for back pain and neck pain.   Neurological:  Positive for headaches.         Vitals:    25 1439   BP: 144/82   Pulse: 72   Resp: 16   SpO2: 97%   Weight: 67.1 kg (148 lb)   PainSc:   4                             Objective   Physical Exam  Neck:     Musculoskeletal:         General: Tenderness present.      Lumbar back: Tenderness present. Decreased range of motion.        Legs:    Neurological:      Comments: Motor strength 5/5 b/l LE  Sensory intact b/l LE             Imaging Reviewed:  MRI done at [facility] - 2020:    L3-L4-a small broad based disc protrusion centrally.  There is moderate facet arthropathy.  This  changes contribute to moderate central canal stenosis.  There is moderate right and severe left neural foraminal stenosis.  L4-L5-there is a posterior disc protrusion centrally.  Moderate facet arthropathy and mild limited flavum hypertrophy. Severe central canal stenosis.  Severe bilateral neural foraminal stenosis, right greater than left.  L5-S1-there is small left far lateral disc protrusion.  No central canal stenosis.  There is mild facet arthropathy.  There is moderate bilateral neural foraminal stenosis.     Lumbar X-ray: 10/1/2020   Multilevel degenerative spondylosis.      Cervical x-ray-4/20/2023  - Mild degenerative disc and endplate changes in cervical spine most notable at C4-5 through C6-7  - Mild right C4-5 facet arthropathy    Assessment:    1. DDD (degenerative disc disease), lumbar    2. Lumbar radiculopathy    3. Bilateral occipital neuralgia    4. Chronic left-sided low back pain with sciatica, sciatica laterality unspecified        Plan:  1.  Repeat UDS-2/5/2025.  UDS on 8/21/2024 is consistent with patient's interview.. Narcotic contract discussed on 11/3/22. Narcan ordered on 10/9/2020.  2. Continue Norco 5-325 mg TID PRN  (2/7; 3/9). Discussed with the patient regarding long-term side effects of opioids including but not limited to opioid induced hormonal suppression, hyperalgesia, fatigue, weight gain, possible opioid induced altered immune system, addiction, tolerance, dependence, risk of hearing loss and elevated risk of myocardial infarction. Proper use and potential life threatening side effects of over use discussed with patient. Patient states understanding of their use and risks.  3. Continue Flexeril to 10 mg BID PRN (8/3/25). Common side effects of flexeril include blurred vision, dizziness or lightheadedness, drowsiness, dryness of mouth, bloated feeling or gas, indigestion, nausea or vomiting, or stomach cramps or pain, constipation, diarrhea, excitement or nervousness, frequent  urination, general feeling of discomfort or illness, headache, muscle twitching, numbness, tingling, pain, or weakness in hands or feet, pounding heartbeat, problems in speaking, trembling, trouble in sleeping, unpleasant taste or other taste changes, unusual muscle weakness or unusual tiredness, especially during the first few days as your body adjusts to this medication.  4. . Patient has pain in the head with referred pain on the top of the head. Bilateral occipital tenderness present. Discussed bilateral greater and lesser occipital nerve block. Discussed the possibility of infection, bleeding, nerve damage, headache, increased pain. Patient understands and agrees.  Discussed with patient that it is not appropriate to do IV sedation for minor procedure like occipital nerve block.  I did offer Xanax to be taken before the procedure but patient is not sure and would like to think about it.  5. Good relief with LESI L4-5 lasting for about 6 months but pain is returning. Recommend repeating LESI L4-5 (right) with sedation. Benefits and risks discussed.     RTC before 4/8/2025    Galileo Portillo DO  Pain Management   [facility]         INSPECT REPORT    As part of the patient's treatment plan, I may be prescribing controlled substances. The patient has been made aware of appropriate use of such medications, including potential risk of somnolence, limited ability to drive and/or work safely, and the potential for dependence or overdose. It has also been made clear that these medications are for use by this patient only, without concomitant use of alcohol or other substances unless prescribed.     Patient has completed prescribing agreement detailing terms of continued prescribing of controlled substances, including monitoring INSPECT reports, urine drug screening, and pill counts if necessary. The patient is aware that inappropriate use will results in cessation of prescribing such medications.    INSPECT report has  been reviewed and scanned into the patient's chart.

## 2025-02-05 ENCOUNTER — OFFICE VISIT (OUTPATIENT)
Dept: PAIN MEDICINE | Facility: CLINIC | Age: 80
End: 2025-02-05
Payer: MEDICARE

## 2025-02-05 VITALS
BODY MASS INDEX: 26.22 KG/M2 | RESPIRATION RATE: 16 BRPM | OXYGEN SATURATION: 97 % | WEIGHT: 148 LBS | DIASTOLIC BLOOD PRESSURE: 82 MMHG | SYSTOLIC BLOOD PRESSURE: 144 MMHG | HEART RATE: 72 BPM

## 2025-02-05 DIAGNOSIS — G89.29 CHRONIC LEFT-SIDED LOW BACK PAIN WITH SCIATICA, SCIATICA LATERALITY UNSPECIFIED: ICD-10-CM

## 2025-02-05 DIAGNOSIS — M54.81 BILATERAL OCCIPITAL NEURALGIA: ICD-10-CM

## 2025-02-05 DIAGNOSIS — M54.40 CHRONIC LEFT-SIDED LOW BACK PAIN WITH SCIATICA, SCIATICA LATERALITY UNSPECIFIED: ICD-10-CM

## 2025-02-05 DIAGNOSIS — M51.369 DDD (DEGENERATIVE DISC DISEASE), LUMBAR: ICD-10-CM

## 2025-02-05 DIAGNOSIS — M54.16 LUMBAR RADICULOPATHY: ICD-10-CM

## 2025-02-05 RX ORDER — CYCLOBENZAPRINE HCL 10 MG
10 TABLET ORAL 2 TIMES DAILY PRN
Qty: 180 TABLET | Refills: 1 | Status: SHIPPED | OUTPATIENT
Start: 2025-02-05 | End: 2025-08-04

## 2025-02-05 RX ORDER — HYDROCODONE BITARTRATE AND ACETAMINOPHEN 5; 325 MG/1; MG/1
1 TABLET ORAL 3 TIMES DAILY PRN
Qty: 90 TABLET | Refills: 0 | Status: SHIPPED | OUTPATIENT
Start: 2025-02-07 | End: 2025-03-09

## 2025-02-05 RX ORDER — HYDROCODONE BITARTRATE AND ACETAMINOPHEN 5; 325 MG/1; MG/1
1 TABLET ORAL 3 TIMES DAILY PRN
Qty: 90 TABLET | Refills: 0 | Status: SHIPPED | OUTPATIENT
Start: 2025-03-09 | End: 2025-04-08

## 2025-02-25 ENCOUNTER — TELEPHONE (OUTPATIENT)
Dept: PAIN MEDICINE | Facility: HOSPITAL | Age: 80
End: 2025-02-25
Payer: MEDICARE

## 2025-02-25 NOTE — TELEPHONE ENCOUNTER
Pre Procedure Phone call made to inform patient: Nothing to eat or drink after midnight or 8 hour prior, may take a sip of water to take morning medications, must have a  that can wait in the waiting room, do not wear anything with metal or jewelry, and must arrive 45 minutes prior to procedure time. LMOM for patient.

## 2025-02-26 ENCOUNTER — HOSPITAL ENCOUNTER (OUTPATIENT)
Dept: PAIN MEDICINE | Facility: HOSPITAL | Age: 80
Discharge: HOME | End: 2025-02-26
Payer: MEDICARE

## 2025-02-26 VITALS
TEMPERATURE: 96.9 F | OXYGEN SATURATION: 97 % | BODY MASS INDEX: 26.22 KG/M2 | RESPIRATION RATE: 16 BRPM | DIASTOLIC BLOOD PRESSURE: 58 MMHG | SYSTOLIC BLOOD PRESSURE: 136 MMHG | HEART RATE: 65 BPM | HEIGHT: 63 IN | WEIGHT: 148 LBS

## 2025-02-26 DIAGNOSIS — M54.16 LUMBAR RADICULOPATHY: Primary | ICD-10-CM

## 2025-02-26 DIAGNOSIS — R52 PAIN: ICD-10-CM

## 2025-02-26 PROCEDURE — 77003 FLUOROGUIDE FOR SPINE INJECT: CPT

## 2025-02-26 PROCEDURE — 25010000002 METHYLPREDNISOLONE PER 40 MG: Performed by: STUDENT IN AN ORGANIZED HEALTH CARE EDUCATION/TRAINING PROGRAM

## 2025-02-26 PROCEDURE — 25010000002 MIDAZOLAM PER 1 MG

## 2025-02-26 PROCEDURE — 25510000001 IOPAMIDOL 41 % SOLUTION: Performed by: STUDENT IN AN ORGANIZED HEALTH CARE EDUCATION/TRAINING PROGRAM

## 2025-02-26 RX ORDER — METHYLPREDNISOLONE ACETATE 40 MG/ML
40 INJECTION, SUSPENSION INTRA-ARTICULAR; INTRALESIONAL; INTRAMUSCULAR; SOFT TISSUE ONCE
Status: COMPLETED | OUTPATIENT
Start: 2025-02-26 | End: 2025-02-26

## 2025-02-26 RX ORDER — MIDAZOLAM HYDROCHLORIDE 1 MG/ML
2 INJECTION, SOLUTION INTRAMUSCULAR; INTRAVENOUS ONCE
Status: COMPLETED | OUTPATIENT
Start: 2025-02-26 | End: 2025-02-26

## 2025-02-26 RX ORDER — MIDAZOLAM HYDROCHLORIDE 1 MG/ML
INJECTION, SOLUTION INTRAMUSCULAR; INTRAVENOUS
Status: COMPLETED
Start: 2025-02-26 | End: 2025-02-26

## 2025-02-26 RX ORDER — IOPAMIDOL 408 MG/ML
3 INJECTION, SOLUTION INTRATHECAL
Status: COMPLETED | OUTPATIENT
Start: 2025-02-26 | End: 2025-02-26

## 2025-02-26 RX ADMIN — IOPAMIDOL 3 ML: 408 INJECTION, SOLUTION INTRATHECAL at 10:04

## 2025-02-26 RX ADMIN — MIDAZOLAM HYDROCHLORIDE 2 MG: 1 INJECTION, SOLUTION INTRAMUSCULAR; INTRAVENOUS at 09:59

## 2025-02-26 RX ADMIN — METHYLPREDNISOLONE ACETATE 40 MG: 40 INJECTION, SUSPENSION INTRA-ARTICULAR; INTRALESIONAL; INTRAMUSCULAR; INTRASYNOVIAL; SOFT TISSUE at 10:04

## 2025-02-26 RX ADMIN — MIDAZOLAM 2 MG: 1 INJECTION INTRAMUSCULAR; INTRAVENOUS at 09:59

## 2025-02-26 NOTE — DISCHARGE INSTRUCTIONS
EPIDURAL STEROID INJECTION          An epidural steroid injection is a shot of steroid medicine and numbing medicine that is given into the space between the spinal cord and the bones of the back (epidural space).  The injection helps relieve pain by an irritated or swollen nerve root.    TELL YOUR HEALTH CARE PROVIDER ABOUT:  Any allergies you have  All medicines you are taking including any over the counter medicines  Any blood disorders you have  Any surgeries you have had  Any medical conditions you have  Whether you are pregnant or may be pregnant    WHAT ARE THE RISK?  Generally, this is a safe procedure. However,problems may occur, including  Headache  Bleeding  Infection  Allergic Reaction  Nerve Damage    WHAT CAN I EXPECT AFTER THE PROCEDURE?    INJECTION SITE  Remove the Band-Aid/s after 24 hours  Check your injection site every day for signs of infection.  Check for:             Redness             Bleeding (small amt is normal)             Warmth             Pus or bad odor  Some numbness may be experienced for several hours following the procedure.  Avoid using heat on the injection site for 24 hours. You may use ice intermittently if needed by placing a         towel between your skin and the ice bag and using the ice for 20 minutes 2-3 times a day.  Do not take baths, swim or use a hot tub for 24 hours.    ACTIVITY  No strenuous activity for 24 hours then return to normal activity as tolerated.  If your leg is numb, no driving until full sensation and strength has returned.    GENERAL INSTRUCTIONS:  The injection site may feel numb, use ice with caution if numbness is present and no heat for 24 hours or until numbness is gone.   If you have numbness or weakness in your arm or leg, use those areas with caution until normal sensation returns.  It is not uncommon to notice an increase in discomfort or a change in the location of discomfort for 3-4 days after the procedure.  If discomfort is noticed  at the injection site, ice may be            applied to that area for 20 min 2-3 times a day.  Take the pain medicine your physician has prescribed or over the counter pain relievers as long as you do not have any contraindications.  If you are a diabetic, monitor your blood sugar closely.  The steroids used in your procedure may increase your blood sugar level up to 36 hours after the injection.  If your blood sugar is greater than 250, call the physician that helps you monitor your blood sugar.  Keep all follow-up visits as scheduled by your health care provider. This is important.    CONTACT OUR OFFICE IF:  You have any of these signs of infection            -Redness, swelling, or warmth around your injection site.            -Fluid or blood coming from your injection site (small amt of blood is normal)            -Pus or a bad odor from your injection site            -A fever  You develop a severe headache or a stiff neck  You lose control of your bladder or bowel movements      PAIN MANAGEMENT CENTER HOURS   Monday-Friday 7:30 am. - 4:00 pm.  For any problem related to your procedure we can be reached at [phone]  If you experience an emergency with your procedure, call [phone] or go to the emergency room.                               Moderate Conscious Sedation, Adult, Care After    This sheet gives you information about how to care for yourself after your procedure. Your health care provider may also give you more specific instructions. If you have problems or questions, contact your health care provider.    What can I expect after the procedure?  After the procedure, it is common to have:  Sleepiness for several hours.  Impaired judgment for several hours.  Difficulty with balance.  Vomiting if you eat too soon.    Follow these instructions at home:  For the next 24 hours:         Rest.  Do not participate in activities where you could fall or become injured.  Do not drive or use machinery.  Do not  drink alcohol.  Do not take sleeping pills or medicines that cause drowsiness.  Do not make important decisions or sign legal documents.  Do not take care of children on your own.    Eating and drinking    Follow the diet recommended by your health care provider.  Drink enough fluid to keep your urine pale yellow.  If you vomit:  Drink water, juice, or soup when you can drink without vomiting.  Make sure you have little or no nausea before eating solid foods.     General instructions  Take over-the-counter and prescription medicines only as told by your health care provider.  Have a responsible adult stay with you for 24 hours. It is important to have someone help care for you until you are awake and alert.  Do not smoke.  Keep all follow-up visits as told by your health care provider. This is important.    Contact a health care provider if:  You are still sleepy or having trouble with balance after 24 hours.  You feel light-headed.  You keep feeling nauseous or you keep vomiting.  You develop a rash.  You have a fever.  You have redness or swelling around the IV site.    Get help right away if:  You have trouble breathing.  You have new-onset confusion at home.    Summary  After the procedure, it is common to feel sleepy, have impaired judgment, or feel nauseous if you eat too soon.  Rest after you get home. Know the things you should not do after the procedure.  Follow the diet recommended by your health care provider and drink enough fluid to keep your urine pale yellow.  Get help right away if you have trouble breathing or new-onset confusion at home.    This information is not intended to replace advice given to you by your health care provider. Make sure you discuss any questions you have with your health care provider.    Document Revised: 04/16/2021 Document Reviewed: 11/12/2020  Elsevier Patient Education © 2021 [employer]

## 2025-02-26 NOTE — H&P
H and P reviewed from previous visit and no changes to patient's clinical presentation. Will proceed with procedure as planned. Patient denies history of DM and being on blood thinners.    Sedation Plan    ASA 3     Mallampati class: II.    Risks, benefits, and alternatives discussed with patient.          Galileo Portillo DO  Pain Management   [facility]

## 2025-02-26 NOTE — PROCEDURES
PREOPERATIVE DIAGNOSIS:    1. Lumbar radiculopathy    POSTOPERATIVE DIAGNOSIS: Same    PROCEDURE:  Lumbar epidural steroid injection L 4-5    PROCEDURE NOTE:  After obtaining written informed consent patient was taken to the procedure room. Pre-procedure blood pressure and pulse were stable and recorded in patients clinic chart.     The patient was placed in the prone position. The lower back was prepped with antiseptic solution and draped in the usual sterile fashion.  The skin over the L4-5 space was identified under fluoroscopic guidance and infiltrated with 1% lidocaine for local anesthesia via 25 gauge needle.  A 20 gauge tuohy needle was used to access the epidural space using loss of resistance to air technique. Following negative aspiration, 2 cc of the omnipaque dye was injected.  There was good spread of the dye from L3-L5 area. A mixture containing  3 ml of saline with 40 mg of dep-medrol was injected. There was no evidence of CSF, paresthesia or vascular spread. The needle was removed. Skin was cleaned and band aid was applied.    Following the procedure the patient's vital signs were stable. The patient was discharged home in good condition after being given discharge instructions.    COMPLICATIONS: None    Moderate sedation:   Start time: 0959 AM  End time: 1024 AM    2 mg Versed ; 15 minutes of moderate sedation was done and vitals were monitored closely during this periods.  ETCO2, RR, BP, HR, O2 were monitored closely throughout sedation period.

## 2025-02-27 ENCOUNTER — TELEPHONE (OUTPATIENT)
Dept: PAIN MEDICINE | Facility: HOSPITAL | Age: 80
End: 2025-02-27
Payer: MEDICARE

## 2025-03-21 ENCOUNTER — TELEPHONE (OUTPATIENT)
Dept: INTERNAL MEDICINE | Facility: CLINIC | Age: 80
End: 2025-03-21
Payer: MEDICARE

## 2025-03-21 DIAGNOSIS — I10 ESSENTIAL HYPERTENSION: Primary | ICD-10-CM

## 2025-03-21 RX ORDER — CLONIDINE HYDROCHLORIDE 0.1 MG/1
TABLET ORAL
Qty: 180 TABLET | Refills: 1 | Status: SHIPPED | OUTPATIENT
Start: 2025-03-21

## 2025-03-21 NOTE — TELEPHONE ENCOUNTER
Caller: Luna Jacobson    Relationship: Self    Best call back number: [phone]     What medication are you requesting:     cloNIDine (CATAPRES) 0.1 MG tablet   TAKE 2 PER DAY AS NEEDED        If a prescription is needed, what is your preferred pharmacy and phone number:  [id number] [address] AT [address] OF [facility] [address] [facility] [address]  - [phone] [facility] [phone]  [phone]     Additional notes:    PATIENT SEEN CARDIOLOGIST. CARDIOLOGIST RECOMMENDED THAT PATIENT TAKE AN EXTRA PILL WHEN HER BLOOD PRESSURE GETS HIGH.

## 2025-04-01 NOTE — PROGRESS NOTES
Subjective   Luna Jacobson is a 79 y.o. female is here for follow-up for lower back and neck pain.  Patient was last seen for LESI L4-5 with good relief. She had a twisted her back in near fall event increasing some of her pain.  She is also stressed due to her 's worsening dementia.    Lower back pain is 4/10 on VAS, maximum 5/10.  Pain is dull and aching and sharp in nature.  Referred to bilateral hips, bilateral posterior legs to ankle.Constant pain improved by pain medications and caudal NIMA.  Pain worsens with walking.    Neck pain is 6/10 on VAS, maximum 7/10.  Tightness in nature.  Not referred but that has numbness in bilateral hands.  Improved with pain medications and chiropractic care.  Worse with flexion of the neck.  Chiropractic care really helped in the past but the last few sessions have not helped.  + Headaches starting from occipital region radiating to the top of her head.  Daily headaches.  Had taken Fioricet before with some relief.    Previous Injection:   2/26/2025-LESI L4-5 with sedation -80% pain relief for 1.5 months.   7/30/2024; 3/27/2024; 12/1/2023-LESI L4-5 with sedation- >80% pain relief with functional improvement- helped for about 3-6 months.   11/7/2023-caudal NIMA with sedation - no significant relief.   1/26/2023-caudal NIMA with sedation- 80% pain relief with radicular pain- 9 months of relief.   11/20/2020 - Caudal NIMA - 80% pain relief - 2 years.     PMH: COPD, GERD, HLD, HTN, Thoracic aortic aneurysm      Current Medications:   Norco 5-325 mg BID PRN   Flexeril 10 mg BID PRN  Fioricet     Past Medications:       Past Modalities:  TENS:                                                                          no                                                  Physical Therapy Within The Last 6 Months              Yes- chiropractor care >6 weeks.  Psychotherapy                                                            no  Massage Therapy                                                        no     Patient Complains Of:  Uro-Fecal Incontinence          no  Weight Gain/Loss                   no  Fever/Chills                             no  Weakness                               Yes (subjective weakness in left leg)            Current Outpatient Medications:     atorvastatin (LIPITOR) 40 MG tablet, TAKE 1 TABLET BY MOUTH DAILY, Disp: 90 tablet, Rfl: 3    butalbital-acetaminophen-caffeine (FIORICET, ESGIC) -40 MG per tablet, TAKE 1 TABLET BY MOUTH EVERY 6 HOURS AS NEEDED FOR HEADACHE, Disp: 30 tablet, Rfl: 2    Calcium Carbonate-Vitamin D 500-125 MG-UNIT tablet, Take  by mouth., Disp: , Rfl:     clobetasol (TEMOVATE) 0.05 % external solution, APPLY TOPICALLY TO THE SCALP EVERY DAY IN THE MORNING AND IN THE EVENING, Disp: , Rfl:     cloNIDine (CATAPRES) 0.1 MG tablet, Take one tab po daily. May repeat a second dose if SBP >155, Disp: 180 tablet, Rfl: 1    coenzyme Q10 100 MG capsule, Take 1 capsule by mouth Daily., Disp: , Rfl:     cyclobenzaprine (FLEXERIL) 10 MG tablet, Take 1 tablet by mouth 2 (Two) Times a Day As Needed for Muscle Spasms for up to 180 days., Disp: 180 tablet, Rfl: 1    dicyclomine (BENTYL) 20 MG tablet, Take 1 tablet by mouth Daily., Disp: , Rfl:     dilTIAZem SR (CARDIZEM SR) 120 MG 12 hr capsule, Take 1 capsule by mouth 2 (Two) Times a Day., Disp: , Rfl:     ferrous sulfate (FeroSul) 325 (65 FE) MG tablet, Take 1 tablet by mouth Daily., Disp: 90 tablet, Rfl: 1    fexofenadine (ALLEGRA) 180 MG tablet, Take 1 tablet by mouth Daily., Disp: , Rfl:     fluticasone (FLONASE) 50 MCG/ACT nasal spray, SHAKE LIQUID AND USE 2 SPRAYS IN EACH NOSTRIL EVERY DAY, Disp: 48 g, Rfl: 3    hydrALAZINE (APRESOLINE) 50 MG tablet, TAKE 1 TABLET BY MOUTH THREE TIMES DAILY, Disp: 90 tablet, Rfl: 3    HYDROcodone-acetaminophen (NORCO) 5-325 MG per tablet, Take 1 tablet by mouth 3 (Three) Times a Day As Needed for Severe Pain for up to 30 days., Disp: 90 tablet, Rfl: 0     ketoconazole (NIZORAL) 2 % shampoo, , Disp: , Rfl: 3    levothyroxine (Synthroid) 112 MCG tablet, Take 1 tablet by mouth Daily., Disp: 90 tablet, Rfl: 1    Multiple Vitamin (MULTIVITAMIN) tablet, Take 1 tablet by mouth Daily., Disp: , Rfl:     pantoprazole (PROTONIX) 40 MG EC tablet, Take 1 tablet by mouth Daily., Disp: , Rfl:     polyethylene glycol (MIRALAX) powder, , Disp: , Rfl:     varenicline (CHANTIX) 1 MG tablet, TAKE 1 TABLET BY MOUTH TWICE DAILY, Disp: 60 tablet, Rfl: 5    albuterol sulfate  (90 Base) MCG/ACT inhaler, Inhale 2 puffs Every 4 (Four) Hours As Needed. (Patient not taking: Reported on 4/2/2025), Disp: , Rfl:     buPROPion XL (Wellbutrin XL) 300 MG 24 hr tablet, Take 1/2 tab po daily for 10 days then then one tab daily (Patient not taking: Reported on 4/2/2025), Disp: 30 tablet, Rfl: 2    The following portions of the patient's history were reviewed and updated as appropriate: allergies, current medications, past family history, past medical history, past social history, past surgical history and problem list.      REVIEW OF PERTINENT MEDICAL DATA    Past Medical History:   Diagnosis Date    BCC (basal cell carcinoma of skin) 07/2024    COPD (chronic obstructive pulmonary disease)     Coronary artery calcification seen on CT scan 07/2012    mild calcification in the LAD with calcium score of 22. modere narrowing left subclavian origin    Diverticulosis     Gastroparesis     GERD (gastroesophageal reflux disease)     GIST (gastrointestinal stromal tumor), non-malignant     Hip pain, bilateral     Irregular heartbeat     Irritable bowel syndrome     Low back pain     Mild mitral regurgitation     Neck pain     PAD (peripheral artery disease)     small vessel disease in feet    Subclavian artery stenosis, left 05/2016    50 percent    Thyroid nodule 05/17/2024    Tubular adenoma of colon 01/2017     Past Surgical History:   Procedure Laterality Date    APPENDECTOMY      BASAL CELL CARCINOMA  "EXCISION  2024    COLONOSCOPY  10/01/2019    dr owen    COLONOSCOPY  10/14/2024    ENDOSCOPY  2019    ENDOSCOPY  2023    Dr. Owen    ENDOSCOPY  10/14/2024    HEMORRHOIDECTOMY      HYSTERECTOMY      TONSILLECTOMY      TOOTH EXTRACTION      8/3/21     Family History   Problem Relation Age of Onset    Hypertension Mother     Diabetes Mother     Dementia Mother     Stroke Mother     Lung cancer Father     Alcohol abuse Father     COPD Father     Breast cancer Sister     Hypertension Sister     Hypothyroidism Sister     Stroke Sister     Other Sister         AAA    Bipolar disorder Daughter     Fibromyalgia Daughter      Social History     Socioeconomic History    Marital status:    Tobacco Use    Smoking status: Former     Current packs/day: 0.00     Types: Cigarettes     Quit date: 2020     Years since quittin.6     Passive exposure: Past    Smokeless tobacco: Never    Tobacco comments:     she has smoked intermittently for 40 years; she smoked over 2 ppd twenty years ago   Vaping Use    Vaping status: Never Used   Substance and Sexual Activity    Alcohol use: No    Drug use: No    Sexual activity: Defer         Review of Systems   Musculoskeletal:  Positive for back pain and neck pain.   Neurological:  Positive for headaches.         Vitals:    25 1535 25 1544   BP: 140/80 131/86   BP Location: Right arm Right arm   Patient Position: Sitting Sitting   Cuff Size: Adult Adult   Pulse: 71    SpO2: 94%    Weight: 66.2 kg (146 lb)    Height: 160 cm (63\")    PainSc: 4     PainLoc: Back                                Objective   Physical Exam  Neck:     Musculoskeletal:         General: Tenderness present.      Lumbar back: Tenderness present. Decreased range of motion.        Legs:    Neurological:      Comments: Motor strength 5/5 b/l LE  Sensory intact b/l LE             Imaging Reviewed:  MRI done at [facility] - 2020:    L3-L4-a small broad based disc protrusion centrally.  " There is moderate facet arthropathy.  This changes contribute to moderate central canal stenosis.  There is moderate right and severe left neural foraminal stenosis.  L4-L5-there is a posterior disc protrusion centrally.  Moderate facet arthropathy and mild limited flavum hypertrophy. Severe central canal stenosis.  Severe bilateral neural foraminal stenosis, right greater than left.  L5-S1-there is small left far lateral disc protrusion.  No central canal stenosis.  There is mild facet arthropathy.  There is moderate bilateral neural foraminal stenosis.     Lumbar X-ray: 10/1/2020   Multilevel degenerative spondylosis.      Cervical x-ray-4/20/2023  - Mild degenerative disc and endplate changes in cervical spine most notable at C4-5 through C6-7  - Mild right C4-5 facet arthropathy    Assessment:    1. DDD (degenerative disc disease), lumbar    2. Lumbar radiculopathy    3. Bilateral occipital neuralgia    4. Chronic left-sided low back pain with sciatica, sciatica laterality unspecified          Plan:  1.   UDS on 2/5/2025 is consistent with patient's interview.. Narcotic contract discussed on 11/3/22. Narcan ordered on 10/9/2020.  2. Continue Norco 5-325 mg TID PRN  (4/8 for 120 tabs and 5/8). Discussed with the patient regarding long-term side effects of opioids including but not limited to opioid induced hormonal suppression, hyperalgesia, fatigue, weight gain, possible opioid induced altered immune system, addiction, tolerance, dependence, risk of hearing loss and elevated risk of myocardial infarction. Proper use and potential life threatening side effects of over use discussed with patient. Patient states understanding of their use and risks.  3. Continue Flexeril to 10 mg BID PRN (8/3/25). Common side effects of flexeril include blurred vision, dizziness or lightheadedness, drowsiness, dryness of mouth, bloated feeling or gas, indigestion, nausea or vomiting, or stomach cramps or pain, constipation,  diarrhea, excitement or nervousness, frequent urination, general feeling of discomfort or illness, headache, muscle twitching, numbness, tingling, pain, or weakness in hands or feet, pounding heartbeat, problems in speaking, trembling, trouble in sleeping, unpleasant taste or other taste changes, unusual muscle weakness or unusual tiredness, especially during the first few days as your body adjusts to this medication.  4. . Patient has pain in the head with referred pain on the top of the head. Bilateral occipital tenderness present. Discussed bilateral greater and lesser occipital nerve block. Discussed the possibility of infection, bleeding, nerve damage, headache, increased pain. Patient understands and agrees.  Discussed with patient that it is not appropriate to do IV sedation for minor procedure like occipital nerve block.  I did offer Xanax to be taken before the procedure but patient is not sure and would like to think about it.  5. Good relief with LESI L4-5.  Repeat as needed.    RTC before 6/6/2025.    Galileo Portillo DO  Pain Management   [facility]         INSPECT REPORT    As part of the patient's treatment plan, I may be prescribing controlled substances. The patient has been made aware of appropriate use of such medications, including potential risk of somnolence, limited ability to drive and/or work safely, and the potential for dependence or overdose. It has also been made clear that these medications are for use by this patient only, without concomitant use of alcohol or other substances unless prescribed.     Patient has completed prescribing agreement detailing terms of continued prescribing of controlled substances, including monitoring INSPECT reports, urine drug screening, and pill counts if necessary. The patient is aware that inappropriate use will results in cessation of prescribing such medications.    INSPECT report has been reviewed and scanned into the patient's chart.

## 2025-04-02 ENCOUNTER — OFFICE VISIT (OUTPATIENT)
Dept: PAIN MEDICINE | Facility: CLINIC | Age: 80
End: 2025-04-02
Payer: MEDICARE

## 2025-04-02 VITALS
HEART RATE: 71 BPM | OXYGEN SATURATION: 94 % | SYSTOLIC BLOOD PRESSURE: 131 MMHG | DIASTOLIC BLOOD PRESSURE: 86 MMHG | HEIGHT: 63 IN | WEIGHT: 146 LBS | BODY MASS INDEX: 25.87 KG/M2

## 2025-04-02 DIAGNOSIS — M54.40 CHRONIC LEFT-SIDED LOW BACK PAIN WITH SCIATICA, SCIATICA LATERALITY UNSPECIFIED: ICD-10-CM

## 2025-04-02 DIAGNOSIS — M51.369 DDD (DEGENERATIVE DISC DISEASE), LUMBAR: ICD-10-CM

## 2025-04-02 DIAGNOSIS — M54.81 BILATERAL OCCIPITAL NEURALGIA: ICD-10-CM

## 2025-04-02 DIAGNOSIS — M54.16 LUMBAR RADICULOPATHY: ICD-10-CM

## 2025-04-02 DIAGNOSIS — G89.29 CHRONIC LEFT-SIDED LOW BACK PAIN WITH SCIATICA, SCIATICA LATERALITY UNSPECIFIED: ICD-10-CM

## 2025-04-02 RX ORDER — HYDROCODONE BITARTRATE AND ACETAMINOPHEN 5; 325 MG/1; MG/1
1 TABLET ORAL 3 TIMES DAILY PRN
Qty: 90 TABLET | Refills: 0 | Status: SHIPPED | OUTPATIENT
Start: 2025-05-08 | End: 2025-06-07

## 2025-04-02 RX ORDER — HYDROCODONE BITARTRATE AND ACETAMINOPHEN 5; 325 MG/1; MG/1
1 TABLET ORAL 3 TIMES DAILY PRN
Qty: 90 TABLET | Refills: 0 | Status: SHIPPED | OUTPATIENT
Start: 2025-04-08 | End: 2025-05-08

## 2025-04-09 ENCOUNTER — TELEPHONE (OUTPATIENT)
Dept: PAIN MEDICINE | Facility: CLINIC | Age: 80
End: 2025-04-09
Payer: MEDICARE

## 2025-04-09 DIAGNOSIS — M54.16 LUMBAR RADICULOPATHY: Primary | ICD-10-CM

## 2025-04-09 DIAGNOSIS — M54.81 BILATERAL OCCIPITAL NEURALGIA: ICD-10-CM

## 2025-04-09 RX ORDER — HYDROCODONE BITARTRATE AND ACETAMINOPHEN 5; 325 MG/1; MG/1
1 TABLET ORAL EVERY 6 HOURS PRN
Qty: 120 TABLET | Refills: 0 | Status: SHIPPED | OUTPATIENT
Start: 2025-04-30 | End: 2025-05-30

## 2025-04-09 NOTE — TELEPHONE ENCOUNTER
Were you going to increase Hydrocodone to every 6 hours? When she went to the pharmacy it was still only 90 pills.

## 2025-04-11 ENCOUNTER — TELEPHONE (OUTPATIENT)
Dept: INTERNAL MEDICINE | Facility: CLINIC | Age: 80
End: 2025-04-11

## 2025-04-11 NOTE — TELEPHONE ENCOUNTER
Provider: DR BRATTON    Caller: Luna Jacobson    Relationship to Patient: Self    Pharmacy: [facility] #[id number] [place] [address] [address] AT [facility] OF [facility] [address] [facility] [address] - [phone]  - [phone]  [phone]     Phone Number: [phone]     Reason for Call: PATIENT IS CALLING TO STATE SHE HAS BEEN HAVING SOME SWELLING IN HER ANKLES AND FEET FOR A FEW DAYS.  SHE STATES THEY GOT WORSE LAST NIGHT AND ARE SWOLLEN QUITE A LOT TODAY.  SHE IS WANTING TO KNOW WHAT DR BUENROSTRO RECOMMENDS.  SHE STATES SHE HATES TO ASK SO LATE IN THE DAY, BUT THAT SHE HAS BEEN IN BED MOST OF THE DAY DUE TO BACK PROBLEMS.      PLEASE ADVISE.

## 2025-04-13 NOTE — PROGRESS NOTES
Subjective   Chief Complaint   Patient presents with    Foot Swelling     6 days onset        History of Present Illness   79 y.o. female presents with cc lower extremity edema ongoing times several days; she is followed by PCP Dr. Serrano.     Started an OTC diuretic last week and took it for 2 days with 98% improvement. Denies chest pain or dyspnea. Clonidine has helped her BP; she takes it as needed.      Patient Active Problem List   Diagnosis    Essential hypertension    High cholesterol    Other specified hypothyroidism    Other headache syndrome    Prediabetes    Iron deficiency anemia due to chronic blood loss    Thoracic aortic aneurysm    Microscopic colitis    Myalgia    DDD (degenerative disc disease), lumbar    Mild episode of recurrent major depressive disorder    Moderate aortic regurgitation    Diffusion capacity of lung (dl), decreased    Age-related osteoporosis without current pathological fracture       Allergies   Allergen Reactions    Amlodipine Headache     Head tightness    Itraconazole Hives     sporanax    Lisinopril Hives    Norvasc [Amlodipine Besylate] Other (See Comments)     Head tightness    Sulfa Antibiotics Other (See Comments)     headache    Hydrochlorothiazide Other (See Comments)     hyponatremia  Other reaction(s): Other (See Comments)  hyponatremia    Sulfamethoxazole Nausea And Vomiting       Current Outpatient Medications on File Prior to Visit   Medication Sig Dispense Refill    albuterol sulfate  (90 Base) MCG/ACT inhaler Inhale 2 puffs Every 4 (Four) Hours As Needed.      atorvastatin (LIPITOR) 40 MG tablet TAKE 1 TABLET BY MOUTH DAILY 90 tablet 3    butalbital-acetaminophen-caffeine (FIORICET, ESGIC) -40 MG per tablet TAKE 1 TABLET BY MOUTH EVERY 6 HOURS AS NEEDED FOR HEADACHE 30 tablet 2    Calcium Carbonate-Vitamin D 500-125 MG-UNIT tablet Take  by mouth.      clobetasol (TEMOVATE) 0.05 % external solution APPLY TOPICALLY TO THE SCALP EVERY DAY IN THE  MORNING AND IN THE EVENING      cloNIDine (CATAPRES) 0.1 MG tablet Take one tab po daily. May repeat a second dose if SBP >155 180 tablet 1    coenzyme Q10 100 MG capsule Take 1 capsule by mouth Daily.      cyclobenzaprine (FLEXERIL) 10 MG tablet Take 1 tablet by mouth 2 (Two) Times a Day As Needed for Muscle Spasms for up to 180 days. 180 tablet 1    dicyclomine (BENTYL) 20 MG tablet Take 1 tablet by mouth Daily.      dilTIAZem SR (CARDIZEM SR) 120 MG 12 hr capsule Take 1 capsule by mouth 2 (Two) Times a Day.      ferrous sulfate (FeroSul) 325 (65 FE) MG tablet Take 1 tablet by mouth Daily. 90 tablet 1    fexofenadine (ALLEGRA) 180 MG tablet Take 1 tablet by mouth Daily.      fluticasone (FLONASE) 50 MCG/ACT nasal spray SHAKE LIQUID AND USE 2 SPRAYS IN EACH NOSTRIL EVERY DAY 48 g 3    hydrALAZINE (APRESOLINE) 50 MG tablet TAKE 1 TABLET BY MOUTH THREE TIMES DAILY 90 tablet 3    HYDROcodone-acetaminophen (NORCO) 5-325 MG per tablet Take 1 tablet by mouth 3 (Three) Times a Day As Needed for Severe Pain for up to 30 days. 90 tablet 0    [START ON 4/30/2025] HYDROcodone-acetaminophen (NORCO) 5-325 MG per tablet Take 1 tablet by mouth Every 6 (Six) Hours As Needed for Severe Pain for up to 30 days. 120 tablet 0    ketoconazole (NIZORAL) 2 % shampoo   3    levothyroxine (Synthroid) 112 MCG tablet Take 1 tablet by mouth Daily. 90 tablet 1    Multiple Vitamin (MULTIVITAMIN) tablet Take 1 tablet by mouth Daily.      pantoprazole (PROTONIX) 40 MG EC tablet Take 1 tablet by mouth Daily.      polyethylene glycol (MIRALAX) powder       varenicline (CHANTIX) 1 MG tablet TAKE 1 TABLET BY MOUTH TWICE DAILY 60 tablet 5    buPROPion XL (Wellbutrin XL) 300 MG 24 hr tablet Take 1/2 tab po daily for 10 days then then one tab daily (Patient not taking: Reported on 4/15/2025) 30 tablet 2     No current facility-administered medications on file prior to visit.       Past Medical History:   Diagnosis Date    BCC (basal cell carcinoma of  skin) 2024    COPD (chronic obstructive pulmonary disease)     Coronary artery calcification seen on CT scan 2012    mild calcification in the LAD with calcium score of 22. modere narrowing left subclavian origin    Diverticulosis     Gastroparesis     GERD (gastroesophageal reflux disease)     GIST (gastrointestinal stromal tumor), non-malignant     Hip pain, bilateral     Irregular heartbeat     Irritable bowel syndrome     Low back pain     Mild mitral regurgitation     Neck pain     PAD (peripheral artery disease)     small vessel disease in feet    Subclavian artery stenosis, left 2016    50 percent    Thyroid nodule 2024    Tubular adenoma of colon 2017       Family History   Problem Relation Age of Onset    Hypertension Mother     Diabetes Mother     Dementia Mother     Stroke Mother     Lung cancer Father     Alcohol abuse Father     COPD Father     Breast cancer Sister     Hypertension Sister     Hypothyroidism Sister     Stroke Sister     Other Sister         AAA    Bipolar disorder Daughter     Fibromyalgia Daughter        Social History     Socioeconomic History    Marital status:    Tobacco Use    Smoking status: Former     Current packs/day: 0.00     Types: Cigarettes     Quit date: 2020     Years since quittin.6     Passive exposure: Past    Smokeless tobacco: Never    Tobacco comments:     she has smoked intermittently for 40 years; she smoked over 2 ppd twenty years ago   Vaping Use    Vaping status: Never Used   Substance and Sexual Activity    Alcohol use: No    Drug use: No    Sexual activity: Defer       Past Surgical History:   Procedure Laterality Date    APPENDECTOMY      BASAL CELL CARCINOMA EXCISION  2024    COLONOSCOPY  10/01/2019    dr owen    COLONOSCOPY  10/14/2024    ENDOSCOPY  2019    ENDOSCOPY  2023    Dr. Owen    ENDOSCOPY  10/14/2024    HEMORRHOIDECTOMY      HYSTERECTOMY      TONSILLECTOMY      TOOTH EXTRACTION      8/3/21  "      The following portions of the patient's history were reviewed and updated as appropriate: problem list, allergies, current medications, past medical history, and past social history.    Review of Systems    Immunization History   Administered Date(s) Administered    COVID-19 (PFIZER) Purple Cap Monovalent 02/07/2021, 02/28/2021    FLUAD TRI 65YR+ 11/01/2019    Fluzone High-Dose 65+YRS 11/29/2018, 11/26/2024    Fluzone High-Dose 65+yrs 12/02/2022    Hepatitis A 05/01/2018, 12/02/2018    Influenza Seasonal Injectable 11/07/2013, 10/13/2014    Pneumococcal Conjugate 13-Valent (PCV13) 04/23/2015    Pneumococcal Polysaccharide (PPSV23) 08/01/2008    Tdap 01/01/2012    Zostavax 08/25/2010       Objective   Vitals:    04/15/25 1514   BP: 132/60   Pulse: 68   Resp: 16   Temp: 97.8 °F (36.6 °C)   Weight: 67.9 kg (149 lb 12.8 oz)   Height: 160 cm (62.99\")     Body mass index is 26.54 kg/m².  Physical Exam  Vitals reviewed.   Constitutional:       Appearance: Normal appearance. She is well-developed.   HENT:      Head: Normocephalic and atraumatic.   Cardiovascular:      Rate and Rhythm: Normal rate and regular rhythm.      Heart sounds: Normal heart sounds, S1 normal and S2 normal.   Pulmonary:      Effort: Pulmonary effort is normal.      Breath sounds: Normal breath sounds.   Musculoskeletal:      Right lower leg: No edema.      Left lower leg: No edema.   Skin:     General: Skin is warm and dry.   Neurological:      Mental Status: She is alert.   Psychiatric:         Mood and Affect: Mood normal.         Behavior: Behavior normal.         Procedures    Assessment & Plan   Diagnoses and all orders for this visit:    1. Bilateral lower extremity edema (Primary)  Comments:  98% improved after starting OTC diuretic.She will call with name of medicine.  Orders:  -     Comprehensive Metabolic Panel  -     TSH  -     Urinalysis With Culture If Indicated -    Records reviewed include previous OV with myself, Dr. Serrano and " Dr. Koenig as well as labs.     Return for Next scheduled follow up, Lab Today.

## 2025-04-15 ENCOUNTER — OFFICE VISIT (OUTPATIENT)
Dept: INTERNAL MEDICINE | Facility: CLINIC | Age: 80
End: 2025-04-15
Payer: MEDICARE

## 2025-04-15 VITALS
BODY MASS INDEX: 26.54 KG/M2 | TEMPERATURE: 97.8 F | WEIGHT: 149.8 LBS | RESPIRATION RATE: 16 BRPM | DIASTOLIC BLOOD PRESSURE: 60 MMHG | SYSTOLIC BLOOD PRESSURE: 132 MMHG | HEART RATE: 68 BPM | HEIGHT: 63 IN

## 2025-04-15 DIAGNOSIS — R60.0 BILATERAL LOWER EXTREMITY EDEMA: Primary | ICD-10-CM

## 2025-04-16 LAB
ALBUMIN SERPL-MCNC: 4.1 G/DL (ref 3.5–5.2)
ALBUMIN/GLOB SERPL: 1.6 G/DL
ALP SERPL-CCNC: 111 U/L (ref 39–117)
ALT SERPL-CCNC: 26 U/L (ref 1–33)
APPEARANCE UR: CLEAR
AST SERPL-CCNC: 31 U/L (ref 1–32)
BACTERIA #/AREA URNS HPF: NORMAL /[HPF]
BILIRUB SERPL-MCNC: 0.3 MG/DL (ref 0–1.2)
BILIRUB UR QL STRIP: NEGATIVE
BUN SERPL-MCNC: 10 MG/DL (ref 8–23)
BUN/CREAT SERPL: 10.2 (ref 7–25)
CALCIUM SERPL-MCNC: 9.2 MG/DL (ref 8.6–10.5)
CASTS URNS QL MICRO: NORMAL /LPF
CHLORIDE SERPL-SCNC: 102 MMOL/L (ref 98–107)
CO2 SERPL-SCNC: 26.2 MMOL/L (ref 22–29)
COLOR UR: YELLOW
CREAT SERPL-MCNC: 0.98 MG/DL (ref 0.57–1)
EGFRCR SERPLBLD CKD-EPI 2021: 59.2 ML/MIN/1.73
EPI CELLS #/AREA URNS HPF: NORMAL /HPF (ref 0–10)
GLOBULIN SER CALC-MCNC: 2.6 GM/DL
GLUCOSE SERPL-MCNC: 98 MG/DL (ref 65–99)
GLUCOSE UR QL STRIP: NEGATIVE
HGB UR QL STRIP: NEGATIVE
KETONES UR QL STRIP: NEGATIVE
LEUKOCYTE ESTERASE UR QL STRIP: NEGATIVE
MICRO URNS: ABNORMAL
MICRO URNS: ABNORMAL
NITRITE UR QL STRIP: NEGATIVE
PH UR STRIP: 8 [PH] (ref 5–7.5)
POTASSIUM SERPL-SCNC: 4.4 MMOL/L (ref 3.5–5.2)
PROT SERPL-MCNC: 6.7 G/DL (ref 6–8.5)
PROT UR QL STRIP: NEGATIVE
RBC #/AREA URNS HPF: NORMAL /HPF (ref 0–2)
SODIUM SERPL-SCNC: 138 MMOL/L (ref 136–145)
SP GR UR STRIP: 1.01 (ref 1–1.03)
TSH SERPL DL<=0.005 MIU/L-ACNC: 4.06 UIU/ML (ref 0.27–4.2)
URINALYSIS REFLEX: ABNORMAL
UROBILINOGEN UR STRIP-MCNC: 0.2 MG/DL (ref 0.2–1)
WBC #/AREA URNS HPF: NORMAL /HPF (ref 0–5)

## 2025-05-08 ENCOUNTER — TELEPHONE (OUTPATIENT)
Dept: PAIN MEDICINE | Facility: CLINIC | Age: 80
End: 2025-05-08
Payer: MEDICARE

## 2025-05-08 DIAGNOSIS — M54.81 BILATERAL OCCIPITAL NEURALGIA: ICD-10-CM

## 2025-05-08 DIAGNOSIS — G89.29 CHRONIC LEFT-SIDED LOW BACK PAIN WITH SCIATICA, SCIATICA LATERALITY UNSPECIFIED: ICD-10-CM

## 2025-05-08 DIAGNOSIS — G44.89 OTHER HEADACHE SYNDROME: ICD-10-CM

## 2025-05-08 DIAGNOSIS — M54.40 CHRONIC LEFT-SIDED LOW BACK PAIN WITH SCIATICA, SCIATICA LATERALITY UNSPECIFIED: ICD-10-CM

## 2025-05-08 DIAGNOSIS — M51.369 DDD (DEGENERATIVE DISC DISEASE), LUMBAR: ICD-10-CM

## 2025-05-08 DIAGNOSIS — M54.16 LUMBAR RADICULOPATHY: ICD-10-CM

## 2025-05-08 RX ORDER — HYDROCODONE BITARTRATE AND ACETAMINOPHEN 5; 325 MG/1; MG/1
1 TABLET ORAL EVERY 6 HOURS PRN
Qty: 120 TABLET | Refills: 0 | Status: SHIPPED | OUTPATIENT
Start: 2025-05-29 | End: 2025-06-28

## 2025-05-08 NOTE — TELEPHONE ENCOUNTER
Provider: DR ROMANO    Caller: MELBA GARCIA     Relationship to Patient: PATIENT     Pharmacy[facility]   [id number] [place] [address] AT [facility] OF [place] [address] & [facility] [address] - [phone]  - [phone] FX            Phone Number: [phone]    Reason for Call: PATIENT IS SCHEDULED FOR FOLLOW UP 06/04/2025.  PATIENT STATES HER RX FOR    HYDROcodone-acetaminophen (NORCO) 5-325 MG per tablet     WILL RUN OUT ON 05/29 OR 05/30 AND SHE WOULD LIKE TO RESCHEDULE TO COME IN PRIOR TO 05/29/2025 OR REQUEST PARTIAL REFILL PLEASE.  PLEASE CALL PATIENT TO ADVISE     When was the patient last seen: 04/02/2025

## 2025-05-08 NOTE — TELEPHONE ENCOUNTER
Do you just want to send in an rx with a dnf for pt and she can keep her 6-4-25 appt?? Or do you want me to try to move her appt up?  Inspect in chart and refill started

## 2025-05-09 RX ORDER — BUTALBITAL, ACETAMINOPHEN AND CAFFEINE 50; 325; 40 MG/1; MG/1; MG/1
1 TABLET ORAL
Qty: 30 TABLET | Refills: 1 | Status: SHIPPED | OUTPATIENT
Start: 2025-05-09

## 2025-06-04 ENCOUNTER — OFFICE VISIT (OUTPATIENT)
Dept: PAIN MEDICINE | Facility: CLINIC | Age: 80
End: 2025-06-04
Payer: MEDICARE

## 2025-06-04 VITALS
DIASTOLIC BLOOD PRESSURE: 89 MMHG | SYSTOLIC BLOOD PRESSURE: 148 MMHG | OXYGEN SATURATION: 98 % | BODY MASS INDEX: 25.82 KG/M2 | RESPIRATION RATE: 16 BRPM | HEART RATE: 87 BPM | WEIGHT: 145.7 LBS

## 2025-06-04 DIAGNOSIS — M54.81 BILATERAL OCCIPITAL NEURALGIA: ICD-10-CM

## 2025-06-04 DIAGNOSIS — M54.16 LUMBAR RADICULOPATHY: ICD-10-CM

## 2025-06-04 DIAGNOSIS — G89.29 CHRONIC LEFT-SIDED LOW BACK PAIN WITH SCIATICA, SCIATICA LATERALITY UNSPECIFIED: ICD-10-CM

## 2025-06-04 DIAGNOSIS — Z79.899 HIGH RISK MEDICATION USE: Primary | ICD-10-CM

## 2025-06-04 DIAGNOSIS — M54.40 CHRONIC LEFT-SIDED LOW BACK PAIN WITH SCIATICA, SCIATICA LATERALITY UNSPECIFIED: ICD-10-CM

## 2025-06-04 RX ORDER — DILTIAZEM HYDROCHLORIDE 120 MG/1
CAPSULE, COATED, EXTENDED RELEASE ORAL
COMMUNITY
Start: 2025-05-28

## 2025-06-04 RX ORDER — HYDRALAZINE HYDROCHLORIDE 50 MG/1
50 TABLET, FILM COATED ORAL 2 TIMES DAILY
COMMUNITY
Start: 2025-06-03

## 2025-06-04 RX ORDER — HYDROCODONE BITARTRATE AND ACETAMINOPHEN 5; 325 MG/1; MG/1
1 TABLET ORAL EVERY 6 HOURS PRN
Qty: 120 TABLET | Refills: 0 | Status: SHIPPED | OUTPATIENT
Start: 2025-06-28 | End: 2025-07-28

## 2025-06-04 RX ORDER — HYDROCODONE BITARTRATE AND ACETAMINOPHEN 5; 325 MG/1; MG/1
1 TABLET ORAL EVERY 6 HOURS PRN
Qty: 120 TABLET | Refills: 0 | Status: SHIPPED | OUTPATIENT
Start: 2025-07-28 | End: 2025-08-27

## 2025-06-04 RX ORDER — FUROSEMIDE 20 MG/1
20 TABLET ORAL DAILY
COMMUNITY
Start: 2025-06-03 | End: 2026-06-03

## 2025-06-04 NOTE — PROGRESS NOTES
Subjective   Luna Jacobson is a 79 y.o. female is here for follow-up for lower back and neck pain.  Last seen on 4/2/2025.    On last visit - increased pain meds - doing much better    Lower back pain is 4/10 on VAS, maximum 5/10.  Pain is dull and aching and sharp in nature.  Referred to bilateral hips, bilateral posterior legs to ankle.Constant pain improved by pain medications and caudal NIMA.  Pain worsens with walking.    Neck pain is 6/10 on VAS, maximum 7/10.  Tightness in nature.  Not referred but that has numbness in bilateral hands.  Improved with pain medications and chiropractic care.  Worse with flexion of the neck.  Chiropractic care really helped in the past but the last few sessions have not helped.  + Headaches starting from occipital region radiating to the top of her head.  Daily headaches.  Had taken Fioricet before with some relief.    Previous Injection:   2/26/2025-LESI L4-5 with sedation -80% pain relief for 1.5 months.   7/30/2024; 3/27/2024; 12/1/2023-LESI L4-5 with sedation- >80% pain relief with functional improvement- helped for about 3-6 months.   11/7/2023-caudal NIMA with sedation - no significant relief.   1/26/2023-caudal NIMA with sedation- 80% pain relief with radicular pain- 9 months of relief.   11/20/2020 - Caudal NIMA - 80% pain relief - 2 years.     PMH: COPD, GERD, HLD, HTN, Thoracic aortic aneurysm      Current Medications:   Norco 5-325 mg BID PRN   Flexeril 10 mg BID PRN  Fioricet     Past Medications:       Past Modalities:  TENS:                                                                          no                                                  Physical Therapy Within The Last 6 Months              Yes- chiropractor care >6 weeks.  Psychotherapy                                                            no  Massage Therapy                                                       no     Patient Complains Of:  Uro-Fecal Incontinence          no  Weight Gain/Loss                    no  Fever/Chills                             no  Weakness                               Yes (subjective weakness in left leg)            Current Outpatient Medications:     albuterol sulfate  (90 Base) MCG/ACT inhaler, Inhale 2 puffs Every 4 (Four) Hours As Needed., Disp: , Rfl:     atorvastatin (LIPITOR) 40 MG tablet, TAKE 1 TABLET BY MOUTH DAILY, Disp: 90 tablet, Rfl: 3    buPROPion XL (Wellbutrin XL) 300 MG 24 hr tablet, Take 1/2 tab po daily for 10 days then then one tab daily, Disp: 30 tablet, Rfl: 2    butalbital-acetaminophen-caffeine (FIORICET, ESGIC) -40 MG per tablet, TAKE 1 TABLET BY MOUTH EVERY 6 HOURS AS NEEDED FOR HEADACHE, Disp: 30 tablet, Rfl: 1    Calcium Carbonate-Vitamin D 500-125 MG-UNIT tablet, Take  by mouth., Disp: , Rfl:     clobetasol (TEMOVATE) 0.05 % external solution, APPLY TOPICALLY TO THE SCALP EVERY DAY IN THE MORNING AND IN THE EVENING, Disp: , Rfl:     cloNIDine (CATAPRES) 0.1 MG tablet, Take one tab po daily. May repeat a second dose if SBP >155, Disp: 180 tablet, Rfl: 1    coenzyme Q10 100 MG capsule, Take 1 capsule by mouth Daily., Disp: , Rfl:     cyclobenzaprine (FLEXERIL) 10 MG tablet, Take 1 tablet by mouth 2 (Two) Times a Day As Needed for Muscle Spasms for up to 180 days., Disp: 180 tablet, Rfl: 1    dicyclomine (BENTYL) 20 MG tablet, Take 1 tablet by mouth Daily., Disp: , Rfl:     dilTIAZem CD (CARDIZEM CD) 120 MG 24 hr capsule, , Disp: , Rfl:     ferrous sulfate (FeroSul) 325 (65 FE) MG tablet, Take 1 tablet by mouth Daily., Disp: 90 tablet, Rfl: 1    fexofenadine (ALLEGRA) 180 MG tablet, Take 1 tablet by mouth Daily., Disp: , Rfl:     fluticasone (FLONASE) 50 MCG/ACT nasal spray, SHAKE LIQUID AND USE 2 SPRAYS IN EACH NOSTRIL EVERY DAY, Disp: 48 g, Rfl: 3    furosemide (LASIX) 20 MG tablet, Take 1 tablet by mouth Daily., Disp: , Rfl:     hydrALAZINE (APRESOLINE) 50 MG tablet, Take 1 tablet by mouth 2 (Two) Times a Day., Disp: , Rfl:      HYDROcodone-acetaminophen (NORCO) 5-325 MG per tablet, Take 1 tablet by mouth Every 6 (Six) Hours As Needed for Severe Pain for up to 30 days., Disp: 120 tablet, Rfl: 0    ketoconazole (NIZORAL) 2 % shampoo, , Disp: , Rfl: 3    levothyroxine (Synthroid) 112 MCG tablet, Take 1 tablet by mouth Daily., Disp: 90 tablet, Rfl: 1    Multiple Vitamin (MULTIVITAMIN) tablet, Take 1 tablet by mouth Daily., Disp: , Rfl:     pantoprazole (PROTONIX) 40 MG EC tablet, Take 1 tablet by mouth Daily., Disp: , Rfl:     polyethylene glycol (MIRALAX) powder, , Disp: , Rfl:     varenicline (CHANTIX) 1 MG tablet, TAKE 1 TABLET BY MOUTH TWICE DAILY, Disp: 60 tablet, Rfl: 5    The following portions of the patient's history were reviewed and updated as appropriate: allergies, current medications, past family history, past medical history, past social history, past surgical history and problem list.      REVIEW OF PERTINENT MEDICAL DATA    Past Medical History:   Diagnosis Date    BCC (basal cell carcinoma of skin) 07/2024    COPD (chronic obstructive pulmonary disease)     Coronary artery calcification seen on CT scan 07/2012    mild calcification in the LAD with calcium score of 22. modere narrowing left subclavian origin    Diverticulosis     Gastroparesis     GERD (gastroesophageal reflux disease)     GIST (gastrointestinal stromal tumor), non-malignant     Hip pain, bilateral     Irregular heartbeat     Irritable bowel syndrome     Low back pain     Mild mitral regurgitation     Neck pain     PAD (peripheral artery disease)     small vessel disease in feet    Subclavian artery stenosis, left 05/2016    50 percent    Thyroid nodule 05/17/2024    Tubular adenoma of colon 01/2017     Past Surgical History:   Procedure Laterality Date    APPENDECTOMY      BASAL CELL CARCINOMA EXCISION  07/2024    COLONOSCOPY  10/01/2019    dr owen    COLONOSCOPY  10/14/2024    ENDOSCOPY  02/2019    ENDOSCOPY  02/01/2023    Dr. Owen    ENDOSCOPY   10/14/2024    HEMORRHOIDECTOMY      HYSTERECTOMY      TONSILLECTOMY      TOOTH EXTRACTION      8/3/21     Family History   Problem Relation Age of Onset    Hypertension Mother     Diabetes Mother     Dementia Mother     Stroke Mother     Lung cancer Father     Alcohol abuse Father     COPD Father     Breast cancer Sister     Hypertension Sister     Hypothyroidism Sister     Stroke Sister     Other Sister         AAA    Bipolar disorder Daughter     Fibromyalgia Daughter      Social History     Socioeconomic History    Marital status:    Tobacco Use    Smoking status: Former     Current packs/day: 0.00     Types: Cigarettes     Quit date: 2020     Years since quittin.8     Passive exposure: Past    Smokeless tobacco: Never    Tobacco comments:     she has smoked intermittently for 40 years; she smoked over 2 ppd twenty years ago   Vaping Use    Vaping status: Never Used   Substance and Sexual Activity    Alcohol use: No    Drug use: No    Sexual activity: Defer         Review of Systems   Musculoskeletal:  Positive for back pain and neck pain.   Neurological:  Positive for headaches.         Vitals:    25 1517   BP: 148/89   Pulse: 87   Resp: 16   SpO2: 98%   Weight: 66.1 kg (145 lb 11.2 oz)   PainSc: 4                                  Objective   Physical Exam  Neck:     Musculoskeletal:         General: Tenderness present.      Lumbar back: Tenderness present. Decreased range of motion.        Legs:    Neurological:      Comments: Motor strength 5/5 b/l LE  Sensory intact b/l LE             Imaging Reviewed:  MRI done at [facility] - 2020:    L3-L4-a small broad based disc protrusion centrally.  There is moderate facet arthropathy.  This changes contribute to moderate central canal stenosis.  There is moderate right and severe left neural foraminal stenosis.  L4-L5-there is a posterior disc protrusion centrally.  Moderate facet arthropathy and mild limited flavum hypertrophy. Severe central  canal stenosis.  Severe bilateral neural foraminal stenosis, right greater than left.  L5-S1-there is small left far lateral disc protrusion.  No central canal stenosis.  There is mild facet arthropathy.  There is moderate bilateral neural foraminal stenosis.     Lumbar X-ray: 10/1/2020   Multilevel degenerative spondylosis.      Cervical x-ray-4/20/2023  - Mild degenerative disc and endplate changes in cervical spine most notable at C4-5 through C6-7  - Mild right C4-5 facet arthropathy    Assessment:    1. High risk medication use    2. Lumbar radiculopathy    3. Bilateral occipital neuralgia    4. Chronic left-sided low back pain with sciatica, sciatica laterality unspecified        Plan:  1.   UDS on 2/5/2025 is consistent with patient's interview.. Narcotic contract discussed on 11/3/22. Narcan ordered on 10/9/2020.  2. Continue Norco 5-325 mg QID PRN  (6/28; 7/28). Discussed with the patient regarding long-term side effects of opioids including but not limited to opioid induced hormonal suppression, hyperalgesia, fatigue, weight gain, possible opioid induced altered immune system, addiction, tolerance, dependence, risk of hearing loss and elevated risk of myocardial infarction. Proper use and potential life threatening side effects of over use discussed with patient. Patient states understanding of their use and risks.  3. Continue Flexeril 10 mg BID PRN (8/3/25). Common side effects of flexeril include blurred vision, dizziness or lightheadedness, drowsiness, dryness of mouth, bloated feeling or gas, indigestion, nausea or vomiting, or stomach cramps or pain, constipation, diarrhea, excitement or nervousness, frequent urination, general feeling of discomfort or illness, headache, muscle twitching, numbness, tingling, pain, or weakness in hands or feet, pounding heartbeat, problems in speaking, trembling, trouble in sleeping, unpleasant taste or other taste changes, unusual muscle weakness or unusual  tiredness, especially during the first few days as your body adjusts to this medication.  4. . Patient has pain in the head with referred pain on the top of the head. Bilateral occipital tenderness present. Discussed bilateral greater and lesser occipital nerve block. Discussed the possibility of infection, bleeding, nerve damage, headache, increased pain. Patient understands and agrees.  Discussed with patient that it is not appropriate to do IV sedation for minor procedure like occipital nerve block.  I did offer Xanax to be taken before the procedure but patient is not sure and would like to think about it.  5. Good relief with LESI L4-5.  Repeat as needed.    RTC before 8/27/25    Galileo Portillo DO  Pain Management   [facility]         INSPECT REPORT    As part of the patient's treatment plan, I may be prescribing controlled substances. The patient has been made aware of appropriate use of such medications, including potential risk of somnolence, limited ability to drive and/or work safely, and the potential for dependence or overdose. It has also been made clear that these medications are for use by this patient only, without concomitant use of alcohol or other substances unless prescribed.     Patient has completed prescribing agreement detailing terms of continued prescribing of controlled substances, including monitoring INSPECT reports, urine drug screening, and pill counts if necessary. The patient is aware that inappropriate use will results in cessation of prescribing such medications.    INSPECT report has been reviewed and scanned into the patient's chart.

## 2025-06-20 RX ORDER — VARENICLINE TARTRATE 1 MG/1
1 TABLET, FILM COATED ORAL 2 TIMES DAILY
Qty: 60 TABLET | Refills: 5 | Status: SHIPPED | OUTPATIENT
Start: 2025-06-20

## 2025-06-20 NOTE — TELEPHONE ENCOUNTER
Caller: Luna Jacobson    Relationship: Self    Best call back number: [phone]     Requested Prescriptions:   Requested Prescriptions     Pending Prescriptions Disp Refills    varenicline (CHANTIX) 1 MG tablet 60 tablet 5     Sig: Take 1 tablet by mouth 2 (Two) Times a Day.        Pharmacy where request should be sent: [facility] #[id number] AT [place] [address] & [facility] [address]  - [phone]  - [phone] FX     Last office visit with prescribing clinician: 2024   Last telemedicine visit with prescribing clinician: Visit date not found   Next office visit with prescribing clinician: 2025     Additional details provided by patient: PATIENT STATES SHE IS OUT OF THIS MEDICATION AND THE PRESCRIPTION AT THE PHARMACY HAS     Does the patient have less than a 3 day supply:  [x] Yes  [] No    Benjamin Greco Rep   25 13:27 EDT

## 2025-06-24 DIAGNOSIS — D50.0 IRON DEFICIENCY ANEMIA DUE TO CHRONIC BLOOD LOSS: ICD-10-CM

## 2025-06-24 RX ORDER — FERROUS SULFATE 325(65) MG
325 TABLET ORAL DAILY
Qty: 90 TABLET | Refills: 1 | Status: SHIPPED | OUTPATIENT
Start: 2025-06-24

## 2025-07-15 DIAGNOSIS — G44.89 OTHER HEADACHE SYNDROME: ICD-10-CM

## 2025-07-15 RX ORDER — BUTALBITAL, ACETAMINOPHEN AND CAFFEINE 50; 325; 40 MG/1; MG/1; MG/1
1 TABLET ORAL
Qty: 30 TABLET | OUTPATIENT
Start: 2025-07-15

## 2025-07-16 DIAGNOSIS — G44.89 OTHER HEADACHE SYNDROME: ICD-10-CM

## 2025-07-16 NOTE — TELEPHONE ENCOUNTER
PATIENT CALLED FOR MEDICATION REFILL OF    butalbital-acetaminophen-caffeine (FIORICET, ESGIC) -40 MG per tablet   SHE IS OUT OF MEDICATION.  SHE IS REQUESTING A REFILL TO HOLD HER OF UNTIL HER APPOINTMENT ON 8/18/25    [facility] #[id number] [place] - [address] AT [address] OF [place] [address] & [facility] [address]  - [phone]  - [phone]  [phone]     CALL BACK NUMBER [phone]

## 2025-07-18 RX ORDER — BUTALBITAL, ACETAMINOPHEN AND CAFFEINE 50; 325; 40 MG/1; MG/1; MG/1
1 TABLET ORAL
Qty: 30 TABLET | Refills: 0 | Status: SHIPPED | OUTPATIENT
Start: 2025-07-18

## 2025-07-18 NOTE — TELEPHONE ENCOUNTER
PATIENT CALLING BACK WANTING TO KNOW IF DR BUENROSTRO WILL BE REFILLING THIS MEDICATION REQUEST    butalbital-acetaminophen-caffeine (FIORICET, ESGIC) -40 MG per tablet     SHE IS OUT OF MEDICATION AND IS HAVING HEADACHES    [id number] [place] - [address] AT [address] OF [address] [facility] [address] [facility] [address]  - [phone]  - [phone]  [phone]       PLEASE CALL AND ADVISE [phone]    SHE WILL SEE HIM ON 8/18/25    THIS IS HER THIRD TIME CALLING

## 2025-08-18 ENCOUNTER — OFFICE VISIT (OUTPATIENT)
Dept: INTERNAL MEDICINE | Facility: CLINIC | Age: 80
End: 2025-08-18
Payer: MEDICARE

## 2025-08-18 VITALS
HEIGHT: 63 IN | DIASTOLIC BLOOD PRESSURE: 80 MMHG | WEIGHT: 147.6 LBS | SYSTOLIC BLOOD PRESSURE: 128 MMHG | BODY MASS INDEX: 26.15 KG/M2

## 2025-08-18 DIAGNOSIS — Z12.31 ENCOUNTER FOR SCREENING MAMMOGRAM FOR MALIGNANT NEOPLASM OF BREAST: ICD-10-CM

## 2025-08-18 DIAGNOSIS — R42 DIZZINESS: ICD-10-CM

## 2025-08-18 DIAGNOSIS — E03.8 OTHER SPECIFIED HYPOTHYROIDISM: Chronic | ICD-10-CM

## 2025-08-18 DIAGNOSIS — M81.0 AGE-RELATED OSTEOPOROSIS WITHOUT CURRENT PATHOLOGICAL FRACTURE: Primary | Chronic | ICD-10-CM

## 2025-08-18 DIAGNOSIS — I10 ESSENTIAL HYPERTENSION: Chronic | ICD-10-CM

## 2025-08-18 DIAGNOSIS — R73.03 PREDIABETES: ICD-10-CM

## 2025-08-18 DIAGNOSIS — I71.20 THORACIC AORTIC ANEURYSM WITHOUT RUPTURE, UNSPECIFIED PART: Chronic | ICD-10-CM

## 2025-08-18 PROCEDURE — 1125F AMNT PAIN NOTED PAIN PRSNT: CPT | Performed by: INTERNAL MEDICINE

## 2025-08-18 PROCEDURE — 3074F SYST BP LT 130 MM HG: CPT | Performed by: INTERNAL MEDICINE

## 2025-08-18 PROCEDURE — 3078F DIAST BP <80 MM HG: CPT | Performed by: INTERNAL MEDICINE

## 2025-08-18 PROCEDURE — G2211 COMPLEX E/M VISIT ADD ON: HCPCS | Performed by: INTERNAL MEDICINE

## 2025-08-18 PROCEDURE — 1160F RVW MEDS BY RX/DR IN RCRD: CPT | Performed by: INTERNAL MEDICINE

## 2025-08-18 PROCEDURE — 99214 OFFICE O/P EST MOD 30 MIN: CPT | Performed by: INTERNAL MEDICINE

## 2025-08-18 PROCEDURE — 1159F MED LIST DOCD IN RCRD: CPT | Performed by: INTERNAL MEDICINE

## 2025-08-18 RX ORDER — SODIUM CHLORIDE 9 MG/ML
20 INJECTION, SOLUTION INTRAVENOUS ONCE
OUTPATIENT
Start: 2025-08-18

## 2025-08-18 RX ORDER — ZOLEDRONIC ACID 0.05 MG/ML
5 INJECTION, SOLUTION INTRAVENOUS ONCE
OUTPATIENT
Start: 2025-08-18

## 2025-08-19 DIAGNOSIS — M54.16 LUMBAR RADICULOPATHY: ICD-10-CM

## 2025-08-19 DIAGNOSIS — M54.40 CHRONIC LEFT-SIDED LOW BACK PAIN WITH SCIATICA, SCIATICA LATERALITY UNSPECIFIED: ICD-10-CM

## 2025-08-19 DIAGNOSIS — G44.89 OTHER HEADACHE SYNDROME: ICD-10-CM

## 2025-08-19 DIAGNOSIS — G89.29 CHRONIC LEFT-SIDED LOW BACK PAIN WITH SCIATICA, SCIATICA LATERALITY UNSPECIFIED: ICD-10-CM

## 2025-08-19 DIAGNOSIS — M51.369 DDD (DEGENERATIVE DISC DISEASE), LUMBAR: ICD-10-CM

## 2025-08-19 DIAGNOSIS — M54.81 BILATERAL OCCIPITAL NEURALGIA: ICD-10-CM

## 2025-08-19 LAB
ALBUMIN SERPL-MCNC: 4.3 G/DL (ref 3.5–5.2)
ALBUMIN/GLOB SERPL: 1.8 G/DL
ALP SERPL-CCNC: 106 U/L (ref 39–117)
ALT SERPL-CCNC: 17 U/L (ref 1–33)
AST SERPL-CCNC: 25 U/L (ref 1–32)
BASOPHILS # BLD AUTO: 0.06 10*3/MM3 (ref 0–0.2)
BASOPHILS NFR BLD AUTO: 0.7 % (ref 0–1.5)
BILIRUB SERPL-MCNC: 0.3 MG/DL (ref 0–1.2)
BUN SERPL-MCNC: 12 MG/DL (ref 8–23)
BUN/CREAT SERPL: 11.5 (ref 7–25)
CALCIUM SERPL-MCNC: 9.7 MG/DL (ref 8.6–10.5)
CHLORIDE SERPL-SCNC: 103 MMOL/L (ref 98–107)
CO2 SERPL-SCNC: 22.8 MMOL/L (ref 22–29)
CREAT SERPL-MCNC: 1.04 MG/DL (ref 0.57–1)
EGFRCR SERPLBLD CKD-EPI 2021: 55.1 ML/MIN/1.73
EOSINOPHIL # BLD AUTO: 0.23 10*3/MM3 (ref 0–0.4)
EOSINOPHIL NFR BLD AUTO: 2.7 % (ref 0.3–6.2)
ERYTHROCYTE [DISTWIDTH] IN BLOOD BY AUTOMATED COUNT: 13.1 % (ref 12.3–15.4)
GLOBULIN SER CALC-MCNC: 2.4 GM/DL
GLUCOSE SERPL-MCNC: 101 MG/DL (ref 65–99)
HBA1C MFR BLD: 5.6 % (ref 4.8–5.6)
HCT VFR BLD AUTO: 36.9 % (ref 34–46.6)
HGB BLD-MCNC: 12.1 G/DL (ref 12–15.9)
IMM GRANULOCYTES # BLD AUTO: 0.03 10*3/MM3 (ref 0–0.05)
IMM GRANULOCYTES NFR BLD AUTO: 0.4 % (ref 0–0.5)
LYMPHOCYTES # BLD AUTO: 2.17 10*3/MM3 (ref 0.7–3.1)
LYMPHOCYTES NFR BLD AUTO: 25.5 % (ref 19.6–45.3)
MCH RBC QN AUTO: 30.1 PG (ref 26.6–33)
MCHC RBC AUTO-ENTMCNC: 32.8 G/DL (ref 31.5–35.7)
MCV RBC AUTO: 91.8 FL (ref 79–97)
MONOCYTES # BLD AUTO: 1 10*3/MM3 (ref 0.1–0.9)
MONOCYTES NFR BLD AUTO: 11.8 % (ref 5–12)
NEUTROPHILS # BLD AUTO: 5.02 10*3/MM3 (ref 1.7–7)
NEUTROPHILS NFR BLD AUTO: 58.9 % (ref 42.7–76)
NRBC BLD AUTO-RTO: 0 /100 WBC (ref 0–0.2)
PLATELET # BLD AUTO: 340 10*3/MM3 (ref 140–450)
POTASSIUM SERPL-SCNC: 4.4 MMOL/L (ref 3.5–5.2)
PROT SERPL-MCNC: 6.7 G/DL (ref 6–8.5)
RBC # BLD AUTO: 4.02 10*6/MM3 (ref 3.77–5.28)
SODIUM SERPL-SCNC: 138 MMOL/L (ref 136–145)
TSH SERPL DL<=0.005 MIU/L-ACNC: 3.75 UIU/ML (ref 0.27–4.2)
WBC # BLD AUTO: 8.51 10*3/MM3 (ref 3.4–10.8)

## 2025-08-19 RX ORDER — CYCLOBENZAPRINE HCL 10 MG
10 TABLET ORAL 2 TIMES DAILY PRN
Qty: 180 TABLET | Refills: 1 | Status: SHIPPED | OUTPATIENT
Start: 2025-08-19

## 2025-08-19 RX ORDER — BUTALBITAL, ACETAMINOPHEN AND CAFFEINE 50; 325; 40 MG/1; MG/1; MG/1
TABLET ORAL
Qty: 30 TABLET | Refills: 2 | Status: SHIPPED | OUTPATIENT
Start: 2025-08-19

## 2025-08-25 DIAGNOSIS — Z78.0 POST-MENOPAUSAL: Primary | ICD-10-CM

## 2025-08-26 RX ORDER — CYCLOBENZAPRINE HCL 10 MG
10 TABLET ORAL 2 TIMES DAILY PRN
Qty: 180 TABLET | Refills: 1 | Status: CANCELLED | OUTPATIENT
Start: 2025-08-26

## 2025-08-27 ENCOUNTER — OFFICE VISIT (OUTPATIENT)
Dept: PAIN MEDICINE | Facility: CLINIC | Age: 80
End: 2025-08-27
Payer: MEDICARE

## 2025-08-27 VITALS
SYSTOLIC BLOOD PRESSURE: 105 MMHG | BODY MASS INDEX: 24.98 KG/M2 | HEART RATE: 69 BPM | WEIGHT: 141 LBS | DIASTOLIC BLOOD PRESSURE: 78 MMHG | OXYGEN SATURATION: 96 % | RESPIRATION RATE: 16 BRPM

## 2025-08-27 DIAGNOSIS — M54.81 BILATERAL OCCIPITAL NEURALGIA: ICD-10-CM

## 2025-08-27 DIAGNOSIS — Z79.899 HIGH RISK MEDICATION USE: Primary | ICD-10-CM

## 2025-08-27 DIAGNOSIS — M54.16 LUMBAR RADICULOPATHY: ICD-10-CM

## 2025-08-27 DIAGNOSIS — G89.29 CHRONIC LEFT-SIDED LOW BACK PAIN WITH SCIATICA, SCIATICA LATERALITY UNSPECIFIED: ICD-10-CM

## 2025-08-27 DIAGNOSIS — M51.369 DDD (DEGENERATIVE DISC DISEASE), LUMBAR: ICD-10-CM

## 2025-08-27 DIAGNOSIS — M54.40 CHRONIC LEFT-SIDED LOW BACK PAIN WITH SCIATICA, SCIATICA LATERALITY UNSPECIFIED: ICD-10-CM

## 2025-08-27 RX ORDER — DAPAGLIFLOZIN 10 MG/1
1 TABLET, FILM COATED ORAL EVERY MORNING
COMMUNITY
Start: 2025-08-14

## 2025-08-27 RX ORDER — HYDROCODONE BITARTRATE AND ACETAMINOPHEN 5; 325 MG/1; MG/1
1 TABLET ORAL EVERY 6 HOURS PRN
Qty: 120 TABLET | Refills: 0 | Status: SHIPPED | OUTPATIENT
Start: 2025-10-26 | End: 2025-11-25

## 2025-08-27 RX ORDER — HYDROCODONE BITARTRATE AND ACETAMINOPHEN 5; 325 MG/1; MG/1
1 TABLET ORAL EVERY 6 HOURS PRN
Qty: 120 TABLET | Refills: 0 | Status: SHIPPED | OUTPATIENT
Start: 2025-08-27 | End: 2025-09-26

## 2025-08-27 RX ORDER — HYDROCODONE BITARTRATE AND ACETAMINOPHEN 5; 325 MG/1; MG/1
1 TABLET ORAL EVERY 6 HOURS PRN
Qty: 120 TABLET | Refills: 0 | Status: SHIPPED | OUTPATIENT
Start: 2025-09-26 | End: 2025-10-26